# Patient Record
Sex: MALE | Race: BLACK OR AFRICAN AMERICAN | NOT HISPANIC OR LATINO | Employment: UNEMPLOYED | URBAN - METROPOLITAN AREA
[De-identification: names, ages, dates, MRNs, and addresses within clinical notes are randomized per-mention and may not be internally consistent; named-entity substitution may affect disease eponyms.]

---

## 2024-07-24 ENCOUNTER — LAB (OUTPATIENT)
Dept: LAB | Facility: LAB | Age: 74
End: 2024-07-24
Payer: MEDICARE

## 2024-07-24 ENCOUNTER — OFFICE VISIT (OUTPATIENT)
Dept: GASTROENTEROLOGY | Facility: HOSPITAL | Age: 74
End: 2024-07-24
Payer: MEDICARE

## 2024-07-24 VITALS
WEIGHT: 212.5 LBS | SYSTOLIC BLOOD PRESSURE: 118 MMHG | BODY MASS INDEX: 31.38 KG/M2 | HEART RATE: 102 BPM | DIASTOLIC BLOOD PRESSURE: 85 MMHG | OXYGEN SATURATION: 95 % | TEMPERATURE: 97.4 F

## 2024-07-24 DIAGNOSIS — R76.8 HCV ANTIBODY POSITIVE: ICD-10-CM

## 2024-07-24 DIAGNOSIS — B18.2 CHRONIC HEPATITIS C WITHOUT HEPATIC COMA (MULTI): ICD-10-CM

## 2024-07-24 DIAGNOSIS — B18.2 CHRONIC HEPATITIS C WITHOUT HEPATIC COMA (MULTI): Primary | ICD-10-CM

## 2024-07-24 LAB
AFP SERPL-MCNC: 5 NG/ML (ref 0–9)
ALBUMIN SERPL BCP-MCNC: 4.2 G/DL (ref 3.4–5)
ALP SERPL-CCNC: 81 U/L (ref 33–136)
ALT SERPL W P-5'-P-CCNC: 42 U/L (ref 10–52)
ANION GAP SERPL CALC-SCNC: 16 MMOL/L (ref 10–20)
APPEARANCE UR: CLEAR
AST SERPL W P-5'-P-CCNC: 51 U/L (ref 9–39)
BILIRUB DIRECT SERPL-MCNC: 0.1 MG/DL (ref 0–0.3)
BILIRUB SERPL-MCNC: 0.5 MG/DL (ref 0–1.2)
BILIRUB UR STRIP.AUTO-MCNC: NEGATIVE MG/DL
BUN SERPL-MCNC: 23 MG/DL (ref 6–23)
CALCIUM SERPL-MCNC: 10.7 MG/DL (ref 8.6–10.6)
CHLORIDE SERPL-SCNC: 102 MMOL/L (ref 98–107)
CO2 SERPL-SCNC: 22 MMOL/L (ref 21–32)
COLOR UR: NORMAL
CREAT SERPL-MCNC: 1.42 MG/DL (ref 0.5–1.3)
EGFRCR SERPLBLD CKD-EPI 2021: 52 ML/MIN/1.73M*2
ERYTHROCYTE [DISTWIDTH] IN BLOOD BY AUTOMATED COUNT: 14.7 % (ref 11.5–14.5)
GLUCOSE SERPL-MCNC: 94 MG/DL (ref 74–99)
GLUCOSE UR STRIP.AUTO-MCNC: NORMAL MG/DL
HAV AB SER QL IA: NONREACTIVE
HBV CORE AB SER QL: REACTIVE
HBV SURFACE AB SER-ACNC: <3.1 MIU/ML
HBV SURFACE AG SERPL QL IA: NONREACTIVE
HCT VFR BLD AUTO: 41 % (ref 41–52)
HCV AB SER QL: REACTIVE
HGB BLD-MCNC: 13.1 G/DL (ref 13.5–17.5)
HIV 1+2 AB+HIV1 P24 AG SERPL QL IA: NONREACTIVE
INR PPP: 1.1 (ref 0.9–1.1)
KETONES UR STRIP.AUTO-MCNC: NEGATIVE MG/DL
LEUKOCYTE ESTERASE UR QL STRIP.AUTO: NEGATIVE
MCH RBC QN AUTO: 28.3 PG (ref 26–34)
MCHC RBC AUTO-ENTMCNC: 32 G/DL (ref 32–36)
MCV RBC AUTO: 89 FL (ref 80–100)
NITRITE UR QL STRIP.AUTO: NEGATIVE
NRBC BLD-RTO: 0 /100 WBCS (ref 0–0)
PH UR STRIP.AUTO: 6 [PH]
PLATELET # BLD AUTO: 290 X10*3/UL (ref 150–450)
POTASSIUM SERPL-SCNC: 4.4 MMOL/L (ref 3.5–5.3)
PROT SERPL-MCNC: 9.3 G/DL (ref 6.4–8.2)
PROT UR STRIP.AUTO-MCNC: NEGATIVE MG/DL
PROTHROMBIN TIME: 11.9 SECONDS (ref 9.8–12.8)
RBC # BLD AUTO: 4.63 X10*6/UL (ref 4.5–5.9)
RBC # UR STRIP.AUTO: NEGATIVE /UL
SODIUM SERPL-SCNC: 136 MMOL/L (ref 136–145)
SP GR UR STRIP.AUTO: 1.01
UROBILINOGEN UR STRIP.AUTO-MCNC: NORMAL MG/DL
WBC # BLD AUTO: 9 X10*3/UL (ref 4.4–11.3)

## 2024-07-24 PROCEDURE — 87902 NFCT AGT GNTYP ALYS HEP C: CPT

## 2024-07-24 PROCEDURE — 1160F RVW MEDS BY RX/DR IN RCRD: CPT | Performed by: INTERNAL MEDICINE

## 2024-07-24 PROCEDURE — 85027 COMPLETE CBC AUTOMATED: CPT

## 2024-07-24 PROCEDURE — 87340 HEPATITIS B SURFACE AG IA: CPT

## 2024-07-24 PROCEDURE — 85610 PROTHROMBIN TIME: CPT

## 2024-07-24 PROCEDURE — 80053 COMPREHEN METABOLIC PANEL: CPT

## 2024-07-24 PROCEDURE — 81003 URINALYSIS AUTO W/O SCOPE: CPT

## 2024-07-24 PROCEDURE — 1159F MED LIST DOCD IN RCRD: CPT | Performed by: INTERNAL MEDICINE

## 2024-07-24 PROCEDURE — 99204 OFFICE O/P NEW MOD 45 MIN: CPT | Performed by: INTERNAL MEDICINE

## 2024-07-24 PROCEDURE — 87521 HEPATITIS C PROBE&RVRS TRNSC: CPT

## 2024-07-24 PROCEDURE — 99214 OFFICE O/P EST MOD 30 MIN: CPT | Performed by: INTERNAL MEDICINE

## 2024-07-24 PROCEDURE — 86706 HEP B SURFACE ANTIBODY: CPT

## 2024-07-24 PROCEDURE — 82105 ALPHA-FETOPROTEIN SERUM: CPT

## 2024-07-24 PROCEDURE — 86708 HEPATITIS A ANTIBODY: CPT

## 2024-07-24 PROCEDURE — 36415 COLL VENOUS BLD VENIPUNCTURE: CPT

## 2024-07-24 PROCEDURE — 87389 HIV-1 AG W/HIV-1&-2 AB AG IA: CPT

## 2024-07-24 PROCEDURE — 86704 HEP B CORE ANTIBODY TOTAL: CPT

## 2024-07-24 PROCEDURE — 82248 BILIRUBIN DIRECT: CPT

## 2024-07-24 PROCEDURE — 86803 HEPATITIS C AB TEST: CPT

## 2024-07-24 PROCEDURE — 1125F AMNT PAIN NOTED PAIN PRSNT: CPT | Performed by: INTERNAL MEDICINE

## 2024-07-24 RX ORDER — TAMSULOSIN HYDROCHLORIDE 0.4 MG/1
0.4 CAPSULE ORAL AS NEEDED
COMMUNITY

## 2024-07-24 RX ORDER — LEVOTHYROXINE SODIUM 25 UG/1
25 TABLET ORAL DAILY
COMMUNITY

## 2024-07-24 RX ORDER — AMLODIPINE BESYLATE 5 MG/1
1 TABLET ORAL
COMMUNITY
Start: 2023-10-26

## 2024-07-24 RX ORDER — GABAPENTIN 300 MG/1
CAPSULE ORAL AS NEEDED
COMMUNITY
Start: 2024-05-23

## 2024-07-24 RX ORDER — ACETAMINOPHEN 500 MG
TABLET ORAL DAILY
COMMUNITY

## 2024-07-24 RX ORDER — CHOLECALCIFEROL (VITAMIN D3) 25 MCG
TABLET ORAL
COMMUNITY

## 2024-07-24 RX ORDER — ASPIRIN 81 MG/1
TABLET ORAL DAILY
COMMUNITY
Start: 2021-10-12

## 2024-07-24 RX ORDER — IBUPROFEN 800 MG/1
800 TABLET ORAL 3 TIMES DAILY PRN
COMMUNITY
Start: 2023-01-30

## 2024-07-24 SDOH — ECONOMIC STABILITY: FOOD INSECURITY: WITHIN THE PAST 12 MONTHS, THE FOOD YOU BOUGHT JUST DIDN'T LAST AND YOU DIDN'T HAVE MONEY TO GET MORE.: NEVER TRUE

## 2024-07-24 SDOH — ECONOMIC STABILITY: FOOD INSECURITY: WITHIN THE PAST 12 MONTHS, YOU WORRIED THAT YOUR FOOD WOULD RUN OUT BEFORE YOU GOT MONEY TO BUY MORE.: NEVER TRUE

## 2024-07-24 ASSESSMENT — PATIENT HEALTH QUESTIONNAIRE - PHQ9
1. LITTLE INTEREST OR PLEASURE IN DOING THINGS: NOT AT ALL
SUM OF ALL RESPONSES TO PHQ9 QUESTIONS 1 & 2: 0
2. FEELING DOWN, DEPRESSED OR HOPELESS: NOT AT ALL

## 2024-07-24 ASSESSMENT — PAIN SCALES - GENERAL: PAINLEVEL: 5

## 2024-07-24 ASSESSMENT — LIFESTYLE VARIABLES
SKIP TO QUESTIONS 9-10: 1
HOW MANY STANDARD DRINKS CONTAINING ALCOHOL DO YOU HAVE ON A TYPICAL DAY: PATIENT DOES NOT DRINK
HOW OFTEN DO YOU HAVE A DRINK CONTAINING ALCOHOL: NEVER
HOW OFTEN DO YOU HAVE SIX OR MORE DRINKS ON ONE OCCASION: NEVER
AUDIT-C TOTAL SCORE: 0

## 2024-07-24 NOTE — PATIENT INSTRUCTIONS
"Welcome to Dr. [facility].  Dr. Morris sees patients at the following sites:  [place] [address] Liver/GI Clinic at [facility]  [address], [address] at [facility], [facility] [address]    Dr. Morris's hepatology care coordinator, Kelli ALVAREZ, can be reached at [phone].  Dr. Morris's , Mary Wellington, can be reached at [phone].    Get blood work and urine studies completed.    Get a Liver Ultrasound.  Do not eat or drink anything 6 hours prior to your exam.  Call [phone] to schedule.    Get a Fibroscan of your Liver.  Do not eat or drink anything 3 hours prior to your exam.   Schedule on the way out from your visit today, or call [phone].  When calling and after prompts, state \"make an appointment,\" and then \"gastro\" to get to the appropriate .     My office will follow up with a phone call after all testing is completed. If appropriate, Hepatitis C treatment will be prescribed.  "

## 2024-07-24 NOTE — PROGRESS NOTES
Subjective     Evaluation and management of hepatitis C infection.    History of Present Illness:   Swapnil Allen is a 74 y.o. male who presents to the [facility] Liver clinic for for initial evaluation chronic hepatitis C infection.  He is accompanied in clinic by his brother. He is referred by his primary care physician, Dr. Swapnil Max from the [facility].    He recently learned having chronic hepatitis C infection.  His primary care physician performed hepatitis screening with HCV antibody which came back reactive.  He was never aware of having hepatitis in the past.  He denies any history of jaundice or symptoms from liver disease.    Upon further questioning and discussion he has a very remote history of experimenting with intravenous drugs.  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  He denies any illicit drug use at this time.  Otherwise does report a history of heavy alcohol use.  Describes himself as a weekend drinker.  He had his pattern of drinking up until about 10 or so years ago.  At this time he no longer drinks alcohol.    He is eager to learn more about his hepatitis C infection and for treatment.    Review of Systems  Review of Systems  Refer to the new patient questionnaire for comprehensive self-reported review of systems.  Past Medical History  There is a history of high blood pressure, thyroid disease and urinary frequency and BPH.  As a child he was diagnosed with hyperthyroidism and underwent thyroidectomy at age 14.    He had a colonoscopy in 2018 at the [facility].  There were no polyps recommendation was to repeat it in 5 years.    Social History   reports that he has quit smoking. His smoking use included cigarettes. He has never been exposed to tobacco smoke. He has never used smokeless tobacco. He reports that he does not currently use alcohol. He reports that he does not use drugs.   Social history, as reported in the history of present  "illness section of this note.  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  Family History  family history is not on file.   He has a sister with chronic hepatitis C infection.  No known history of liver cancer.  Allergies  No Known Allergies    Medications  Current Outpatient Medications   Medication Instructions    amLODIPine (Norvasc) 5 mg tablet 1 tablet, oral, Daily (0630)    aspirin 81 mg EC tablet oral, Daily    cholecalciferol (Vitamin D-3) 25 MCG (1000 UT) tablet oral    gabapentin (Neurontin) 300 mg capsule As needed    ibuprofen 800 mg, oral, 3 times daily PRN    levothyroxine (SYNTHROID, LEVOXYL) 25 mcg, oral, Daily    melatonin 5 mg tablet oral, Daily    tamsulosin (FLOMAX) 0.4 mg, oral, As needed        Objective   Visit Vitals  /85   Pulse 102   Temp 36.3 °C (97.4 °F)          6/29/2021    10:45 AM 12/2/2021     2:27 PM 7/24/2024     2:35 PM   Vitals   Systolic   118   Diastolic   85   Heart Rate   102   Temp  36.3 °C (97.3 °F) 36.3 °C (97.4 °F)   Height (in) 1.753 m (5' 9\") 1.753 m (5' 9\")    Weight (lb) 203.4 206 212.5   BMI 30.04 kg/m2 30.42 kg/m2 31.38 kg/m2   BSA (m2) 2.12 m2 2.13 m2 2.17 m2   Visit Report   Report     Physical Exam  Vitals and nursing note reviewed.   Constitutional:       Appearance: Normal appearance. He is obese.   HENT:      Head: Normocephalic and atraumatic.      Mouth/Throat:      Mouth: Mucous membranes are moist.      Pharynx: Oropharynx is clear.   Eyes:      General: No scleral icterus.     Extraocular Movements: Extraocular movements intact.      Pupils: Pupils are equal, round, and reactive to light.   Cardiovascular:      Rate and Rhythm: Normal rate and regular rhythm.      Heart sounds: No murmur heard.  Pulmonary:      Effort: Pulmonary effort is normal.      Breath sounds: Normal breath sounds.   Abdominal:      General: Abdomen is flat. Bowel sounds are normal.      Palpations: Abdomen is soft.   Musculoskeletal:         General: No swelling. Normal range of motion. "      Right lower leg: No edema.      Left lower leg: No edema.   Skin:     Coloration: Skin is not jaundiced.   Neurological:      General: No focal deficit present.      Mental Status: He is alert and oriented to person, place, and time. Mental status is at baseline.   Psychiatric:         Mood and Affect: Mood normal.         Thought Content: Thought content normal.         Judgment: Judgment normal.     Labs    WBC   Date/Time Value Ref Range Status   12/03/2022 09:54 AM 8.1 4.4 - 11.3 x10E9/L Final     Hemoglobin   Date/Time Value Ref Range Status   12/03/2022 09:54 AM 15.1 13.5 - 17.5 g/dL Final     Hematocrit   Date/Time Value Ref Range Status   12/03/2022 09:54 AM 42.9 41.0 - 52.0 % Final     MCV   Date/Time Value Ref Range Status   12/03/2022 09:54 AM 89 80 - 100 fL Final     Platelets   Date/Time Value Ref Range Status   12/03/2022 09:54  150 - 450 x10E9/L Final        Albumin   Date/Time Value Ref Range Status   12/03/2022 09:54 AM 3.9 3.4 - 5.0 g/dL Final        Albumin   Date/Time Value Ref Range Status   12/03/2022 09:54 AM 3.9 3.4 - 5.0 g/dL Final      Assessment/Plan   Swapnil Allen is a 74 y.o. male who presents to Liver Clinic for an initial evaluation of chronic HCV infection.    Longstanding chronic HCV infection.  Treatment naive.  Unknown genotype.  Fibrosis: unknown.    Plan:    CBC  BMP  Hepatic Function Panel  PT/INR  Hepatitis A total Ab  Hepatitis B Surf Ag  Hepatitis B Surf Ab   Hepatitis B Core Ab Total  Hepatitis C Ab   HCV PCR with genotype reflex   HIV 1 / 2 Screen  AFP  ------------------------------------  Urinalysis    Initial Radiology Tests    Liver Ultrasound  Fibroscan    I provided counseling on hepatitis C, including discussion about the risks of developing cirrhosis and liver cancer. I also discussed the initial approach to evaluation and treatment. I also discussed issues related to HCV transmission.    Ramon Morris MD    Instructions      Ramon Morris MD

## 2024-07-24 NOTE — LETTER
July 25, 2024     Swapnil Max MD  [id number] [address]  [id number] [address]    Patient: Swapnil Allen   YOB: 1950   Date of Visit: 7/24/2024       Dear Dr. Swapnil Max MD:    Thank you for referring Swapnil Allen to me for evaluation. Below are my notes for this consultation.  If you have questions, please do not hesitate to call me. I look forward to following your patient along with you.       Sincerely,     Ramon Morris MD      CC: No Recipients  ______________________________________________________________________________________    Subjective     Evaluation and management of hepatitis C infection.    History of Present Illness:   Swapnil Allen is a 74 y.o. male who presents to the [facility] Liver clinic for for initial evaluation chronic hepatitis C infection.  He is accompanied in clinic by his brother. He is referred by his primary care physician, Dr. Swapnil Max from the [facility].    He recently learned having chronic hepatitis C infection.  His primary care physician performed hepatitis screening with HCV antibody which came back reactive.  He was never aware of having hepatitis in the past.  He denies any history of jaundice or symptoms from liver disease.    Upon further questioning and discussion he has a very remote history of experimenting with intravenous drugs.  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  He denies any illicit drug use at this time.  Otherwise does report a history of heavy alcohol use.  Describes himself as a weekend drinker.  He had his pattern of drinking up until about 10 or so years ago.  At this time he no longer drinks alcohol.    He is eager to learn more about his hepatitis C infection and for treatment.    Review of Systems  Review of Systems  Refer to the new patient questionnaire for comprehensive self-reported review of systems.  Past Medical History  There is a history of high blood pressure,  "thyroid disease and urinary frequency and BPH.  As a child he was diagnosed with hyperthyroidism and underwent thyroidectomy at age 14.    He had a colonoscopy in 2018 at the [facility].  There were no polyps recommendation was to repeat it in 5 years.    Social History   reports that he has quit smoking. His smoking use included cigarettes. He has never been exposed to tobacco smoke. He has never used smokeless tobacco. He reports that he does not currently use alcohol. He reports that he does not use drugs.   Social history, as reported in the history of present illness section of this note.  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  Family History  family history is not on file.   He has a sister with chronic hepatitis C infection.  No known history of liver cancer.  Allergies  No Known Allergies    Medications  Current Outpatient Medications   Medication Instructions   • amLODIPine (Norvasc) 5 mg tablet 1 tablet, oral, Daily (0630)   • aspirin 81 mg EC tablet oral, Daily   • cholecalciferol (Vitamin D-3) 25 MCG (1000 UT) tablet oral   • gabapentin (Neurontin) 300 mg capsule As needed   • ibuprofen 800 mg, oral, 3 times daily PRN   • levothyroxine (SYNTHROID, LEVOXYL) 25 mcg, oral, Daily   • melatonin 5 mg tablet oral, Daily   • tamsulosin (FLOMAX) 0.4 mg, oral, As needed        Objective   Visit Vitals  /85   Pulse 102   Temp 36.3 °C (97.4 °F)          6/29/2021    10:45 AM 12/2/2021     2:27 PM 7/24/2024     2:35 PM   Vitals   Systolic   118   Diastolic   85   Heart Rate   102   Temp  36.3 °C (97.3 °F) 36.3 °C (97.4 °F)   Height (in) 1.753 m (5' 9\") 1.753 m (5' 9\")    Weight (lb) 203.4 206 212.5   BMI 30.04 kg/m2 30.42 kg/m2 31.38 kg/m2   BSA (m2) 2.12 m2 2.13 m2 2.17 m2   Visit Report   Report     Physical Exam  Vitals and nursing note reviewed.   Constitutional:       Appearance: Normal appearance. He is obese.   HENT:      Head: Normocephalic and atraumatic.      Mouth/Throat:      Mouth: Mucous membranes " are moist.      Pharynx: Oropharynx is clear.   Eyes:      General: No scleral icterus.     Extraocular Movements: Extraocular movements intact.      Pupils: Pupils are equal, round, and reactive to light.   Cardiovascular:      Rate and Rhythm: Normal rate and regular rhythm.      Heart sounds: No murmur heard.  Pulmonary:      Effort: Pulmonary effort is normal.      Breath sounds: Normal breath sounds.   Abdominal:      General: Abdomen is flat. Bowel sounds are normal.      Palpations: Abdomen is soft.   Musculoskeletal:         General: No swelling. Normal range of motion.      Right lower leg: No edema.      Left lower leg: No edema.   Skin:     Coloration: Skin is not jaundiced.   Neurological:      General: No focal deficit present.      Mental Status: He is alert and oriented to person, place, and time. Mental status is at baseline.   Psychiatric:         Mood and Affect: Mood normal.         Thought Content: Thought content normal.         Judgment: Judgment normal.     Labs    WBC   Date/Time Value Ref Range Status   12/03/2022 09:54 AM 8.1 4.4 - 11.3 x10E9/L Final     Hemoglobin   Date/Time Value Ref Range Status   12/03/2022 09:54 AM 15.1 13.5 - 17.5 g/dL Final     Hematocrit   Date/Time Value Ref Range Status   12/03/2022 09:54 AM 42.9 41.0 - 52.0 % Final     MCV   Date/Time Value Ref Range Status   12/03/2022 09:54 AM 89 80 - 100 fL Final     Platelets   Date/Time Value Ref Range Status   12/03/2022 09:54  150 - 450 x10E9/L Final        Albumin   Date/Time Value Ref Range Status   12/03/2022 09:54 AM 3.9 3.4 - 5.0 g/dL Final        Albumin   Date/Time Value Ref Range Status   12/03/2022 09:54 AM 3.9 3.4 - 5.0 g/dL Final      Assessment/Plan   Swapnil Allen is a 74 y.o. male who presents to Liver Clinic for an initial evaluation of chronic HCV infection.    Longstanding chronic HCV infection.  Treatment naive.  Unknown genotype.  Fibrosis: unknown.    Plan:    CBC  BMP  Hepatic Function  Panel  PT/INR  Hepatitis A total Ab  Hepatitis B Surf Ag  Hepatitis B Surf Ab   Hepatitis B Core Ab Total  Hepatitis C Ab   HCV PCR with genotype reflex   HIV 1 / 2 Screen  AFP  ------------------------------------  Urinalysis    Initial Radiology Tests    Liver Ultrasound  Fibroscan    I provided counseling on hepatitis C, including discussion about the risks of developing cirrhosis and liver cancer. I also discussed the initial approach to evaluation and treatment. I also discussed issues related to HCV transmission.    Ramon Morris MD    Instructions      Ramon Morris MD

## 2024-07-25 LAB
HCV RNA SERPL NAA+PROBE-ACNC: ABNORMAL IU/ML
HCV RNA SERPL NAA+PROBE-ACNC: DETECTED K[IU]/ML
HCV RNA SERPL NAA+PROBE-LOG IU: 6.92 LOG IU/ML
LABORATORY COMMENT REPORT: ABNORMAL

## 2024-07-31 LAB
ELECTRONICALLY SIGNED BY: NORMAL
HCV GENTYP SERPL SEQ: NORMAL
HEPATITIS C INTERPRETATION: NORMAL

## 2024-08-07 ENCOUNTER — CLINICAL SUPPORT (OUTPATIENT)
Dept: GASTROENTEROLOGY | Facility: HOSPITAL | Age: 74
End: 2024-08-07
Payer: MEDICARE

## 2024-08-07 DIAGNOSIS — R76.8 HCV ANTIBODY POSITIVE: ICD-10-CM

## 2024-08-07 DIAGNOSIS — B18.2 CHRONIC HEPATITIS C WITHOUT HEPATIC COMA (MULTI): Primary | ICD-10-CM

## 2024-08-07 PROCEDURE — 91200 LIVER ELASTOGRAPHY: CPT | Performed by: INTERNAL MEDICINE

## 2024-08-07 RX ORDER — VELPATASVIR AND SOFOSBUVIR 100; 400 MG/1; MG/1
1 TABLET, FILM COATED ORAL DAILY
Qty: 28 TABLET | Refills: 2 | Status: SHIPPED | OUTPATIENT
Start: 2024-08-07 | End: 2024-10-30

## 2024-08-07 NOTE — PROGRESS NOTES
Results:  E (median liver stiffness measurement): 9.0 kPa  CAP (controlled attenuation parameter):  219 dB/m    Interpretation:  This was a technically adequate study. 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 The CAP score is consistent with 0 - 5 % hepatocyte steatosis (steatosis stage 0 of 3) .    Ramon Morris MD  Hepatology

## 2024-08-08 ENCOUNTER — SPECIALTY PHARMACY (OUTPATIENT)
Dept: PHARMACY | Facility: CLINIC | Age: 74
End: 2024-08-08

## 2024-08-09 ENCOUNTER — TELEPHONE (OUTPATIENT)
Dept: GASTROENTEROLOGY | Facility: HOSPITAL | Age: 74
End: 2024-08-09
Payer: MEDICARE

## 2024-08-09 ENCOUNTER — SPECIALTY PHARMACY (OUTPATIENT)
Dept: PHARMACY | Facility: CLINIC | Age: 74
End: 2024-08-09

## 2024-08-09 PROCEDURE — RXMED WILLOW AMBULATORY MEDICATION CHARGE

## 2024-08-09 NOTE — PROGRESS NOTES
This patient has scheduled their medication delivery with  [facility].  They should receive their medication 8/14/2024.

## 2024-08-09 NOTE — TELEPHONE ENCOUNTER
Hepatology Nurse Coordinator Note  Called patient to review their most recent results and recommendations from Dr. Morris. Provided education on fibrosis scoring of fibroscan. Patient is aware his fibroscan demonstrates stage 2 fibrosis. He's aware Dr. Morris sent a prescription for his Epclusa to  [facility]. He's aware it will be 1 pill daily x 12 weeks. Patient is aware if he doesn't hear from the pharmacy within a week, to call the office for an update.     Patient verbalized understanding of results and recommendations.  Patient was provided RN coordinators contact information should they have any additional questions, or concerns.    ----- Message from Ramon Morris sent at 8/7/2024  5:26 PM EDT -----  Regarding: HCV treatment  Please review with patient:    GT 1  Fibrosis - stage 2 on Fibroscan.    I prescribed Epclusa 1 pill daily x 12 weeks.  Sent to  [facility].  ----- Message -----  From: Lissette Balderas RN  Sent: 8/7/2024   4:31 PM EDT  To: Ramon Morris MD

## 2024-08-12 ENCOUNTER — PHARMACY VISIT (OUTPATIENT)
Dept: PHARMACY | Facility: CLINIC | Age: 74
End: 2024-08-12
Payer: MEDICARE

## 2024-08-12 ENCOUNTER — HOSPITAL ENCOUNTER (OUTPATIENT)
Dept: RADIOLOGY | Facility: HOSPITAL | Age: 74
Discharge: HOME | End: 2024-08-12
Payer: MEDICARE

## 2024-08-12 ENCOUNTER — APPOINTMENT (OUTPATIENT)
Dept: GASTROENTEROLOGY | Facility: HOSPITAL | Age: 74
End: 2024-08-12
Payer: MEDICARE

## 2024-08-12 DIAGNOSIS — R76.8 HCV ANTIBODY POSITIVE: ICD-10-CM

## 2024-08-12 DIAGNOSIS — B18.2 CHRONIC HEPATITIS C WITHOUT HEPATIC COMA (MULTI): ICD-10-CM

## 2024-08-12 PROCEDURE — 76700 US EXAM ABDOM COMPLETE: CPT | Performed by: RADIOLOGY

## 2024-08-12 PROCEDURE — 76700 US EXAM ABDOM COMPLETE: CPT

## 2024-08-14 ENCOUNTER — SPECIALTY PHARMACY (OUTPATIENT)
Dept: INTERNAL MEDICINE | Facility: HOSPITAL | Age: 74
End: 2024-08-14
Payer: MEDICARE

## 2024-08-14 DIAGNOSIS — N28.89 RIGHT KIDNEY MASS: Primary | ICD-10-CM

## 2024-08-14 DIAGNOSIS — B18.2 CHRONIC HEPATITIS C WITHOUT HEPATIC COMA (MULTI): Primary | ICD-10-CM

## 2024-08-14 NOTE — PROGRESS NOTES
[facility] Clinical Note    Swapnil Allen is a 74 y.o. male, who is on the specialty pharmacy service for management of:  Hepatitis C Core.    Swapnil Allen is taking: Epclusa.    Medication Receipt Date: 8/14/24  Medication Start Date (planned or actual): 8/14/24    Swapnil was contacted on 8/14/2024 at 1:47 PM for a virtual pharmacy visit with encounter number [id number] from:   [facility]PAT[facility] V[facility]  [address]  VIRTUAL DEPARTMENT  [facility] [address]  Loc: [phone]    Swapnil was offered a Telemedicine Video visit or Telephone visit.  Swapnil consented to a telephone visit, which was performed.    The most recent encounter visit with the referring prescriber Dr. Morris on 7/24/24 was reviewed.  Pharmacy will continue to collaborate in the care of this patient with the referring prescriber Dr. Morris.    General Assessment      Impression/Plan  IMPRESSION/PLAN:  Is patient high risk (potential patients:  pregnancy, geriatric, pediatric)?  yes  Is laboratory follow-up needed? no  Is a clinical intervention needed? no  Next reassessment date? Approx 9/17/24  Additional comments: patient wanted to know if the med will help him sleep; advised that there are a lot of factors that can affect his sleep and this is not something I could confirm    Refer to the encounter summary report for documentation details about patient counseling and education.      Medication Adherence    The importance of adherence was discussed with the patient and they were advised to take the medication as prescribed by their provider. Patient was encouraged to call their physician's office if they have a question regarding a missed dose.     QOL/Patient Satisfaction  Rate your quality of life on scale of 1-10: 5  Rate your satisfaction with  Specialty [facility]acy on scale of 1-10: 10 - Completely satisfied      Patient was advised to contact the pharmacy if there are any changes to  their medication list, including prescriptions, OTC medications, herbal products, or supplements. Patient was advised of [facility]'s dispensing process, refill timeline, contact information ([phone]), and patient management follow up. Patient confirmed understanding of education conducted during assessment. All patient questions and concerns were addressed to the best of my ability. Patient was encouraged to contact the specialty pharmacy with any questions or concerns.    Confirmed follow-up outreaches are properly scheduled and reviewed goals of therapy with the patient.        Marci Fishman, BarryD    Lab Results   Component Value Date    HCVPCRQUANT 8,230,000 (H) 07/24/2024    HCVGENO Genotype 1b 07/24/2024    AST 51 (H) 07/24/2024    ALT 42 07/24/2024    ALKPHOS 81 07/24/2024    HEPBCAB Reactive (A) 07/24/2024      Medication start date: 8/14/24  Therapy completion: 11/6/24  Anticipated SVR date: 1/29/25

## 2024-08-17 ENCOUNTER — TELEPHONE (OUTPATIENT)
Dept: UROLOGY | Facility: CLINIC | Age: 74
End: 2024-08-17

## 2024-08-19 ENCOUNTER — OFFICE VISIT (OUTPATIENT)
Dept: UROLOGY | Facility: HOSPITAL | Age: 74
End: 2024-08-19
Payer: MEDICARE

## 2024-08-19 DIAGNOSIS — N28.89 RIGHT KIDNEY MASS: Primary | ICD-10-CM

## 2024-08-19 DIAGNOSIS — N28.89 RIGHT RENAL MASS: ICD-10-CM

## 2024-08-19 DIAGNOSIS — N28.1 RENAL CYST, LEFT: ICD-10-CM

## 2024-08-19 PROBLEM — E03.9 HYPOTHYROIDISM (ACQUIRED): Status: ACTIVE | Noted: 2024-08-19

## 2024-08-19 PROBLEM — E78.5 HYPERLIPIDEMIA: Status: ACTIVE | Noted: 2024-08-19

## 2024-08-19 PROBLEM — I10 ESSENTIAL (PRIMARY) HYPERTENSION: Status: ACTIVE | Noted: 2021-10-12

## 2024-08-19 PROBLEM — H90.3 SENSORINEURAL HEARING LOSS, BILATERAL: Status: ACTIVE | Noted: 2024-08-19

## 2024-08-19 PROBLEM — F17.200 SMOKING: Status: ACTIVE | Noted: 2024-08-19

## 2024-08-19 PROBLEM — I63.9 CEREBROVASCULAR ACCIDENT (CVA) (MULTI): Status: ACTIVE | Noted: 2020-08-26

## 2024-08-19 PROCEDURE — 1159F MED LIST DOCD IN RCRD: CPT | Performed by: NURSE PRACTITIONER

## 2024-08-19 PROCEDURE — 99214 OFFICE O/P EST MOD 30 MIN: CPT | Performed by: NURSE PRACTITIONER

## 2024-08-19 PROCEDURE — 99204 OFFICE O/P NEW MOD 45 MIN: CPT | Performed by: NURSE PRACTITIONER

## 2024-08-19 PROCEDURE — G2211 COMPLEX E/M VISIT ADD ON: HCPCS | Performed by: NURSE PRACTITIONER

## 2024-08-19 PROCEDURE — 1160F RVW MEDS BY RX/DR IN RCRD: CPT | Performed by: NURSE PRACTITIONER

## 2024-08-19 PROCEDURE — 51798 US URINE CAPACITY MEASURE: CPT | Performed by: NURSE PRACTITIONER

## 2024-08-19 PROCEDURE — 1036F TOBACCO NON-USER: CPT | Performed by: NURSE PRACTITIONER

## 2024-08-19 RX ORDER — CHLORTHALIDONE 25 MG/1
25 TABLET ORAL DAILY
COMMUNITY
Start: 2024-08-19

## 2024-08-19 NOTE — PROGRESS NOTES
[facility]  Outpatient Clinic Note    Patient Name:  Swapnil Allen  MRN:  [id number]  :  1950    Referring Provider: Ramon Morris MD  Date of Service: 2024   Visit type: New patient visit     problem list/Chief complaint:  BPH - Taking Tamsulosin 0.4 mg nightly  Right Kidney mass 8.8 x 10.7 x 10 cm  Renal cysts      HISTORY OF PRESENT ILLNESS:  Swapnil Allen is a 74 y.o. male with past medical history of hepatitis C core on Epclusa, hyperthyroidism s/p thyroidectomy, Warthin's tumor, HTN, BPH, who presents for initial Urology visit. I performed a detailed review of the medical chart records lab testing and imaging. Patient referred by his Gastroenterologist for kidney mass.  Patient reports frequency about 5-6 times a day, nocturia x 3-4 due to waking up. He feels like the nocturia is due to sleep issues. Has good stream, no dysuria, no hematuria, no fever or chills. Erections are not a priority for him.     PVR: 7 ml  STEPHEN: 14  IPSS: 15  QOL: 4    I personally reviewed US abdomen dated 24.   IMPRESSION:  1. Large heterogeneous mass within the inferior pole of the right  kidney, concerning for solid renal neoplasm. MRI renal mass protocol  recommended.  2. Heterogeneous and echogenic appearance of the liver parenchyma,  which may represent steatosis. Correlate with LFTs.  3. The gallbladder wall is mildly thickened and heterogeneous, with  echogenic foci and comet tail artifacts. Findings are favored to  represent cholesterolosis of the gallbladder.      PAST MEDICAL HISTORY:  History reviewed. No pertinent past medical history.    PAST SURGICAL HISTORY:  History reviewed. No pertinent surgical history.    ALLERGIES:  No Known Allergies    MEDICATIONS:  Current Outpatient Medications   Medication Instructions    amLODIPine (Norvasc) 5 mg tablet 1 tablet, oral, Daily (0630)    aspirin 81 mg EC tablet oral, Daily    chlorthalidone (HYGROTON) 25 mg, oral, Daily    cholecalciferol (Vitamin  D-3) 25 MCG (1000 UT) tablet oral    gabapentin (Neurontin) 300 mg capsule As needed    ibuprofen 800 mg, oral, 3 times daily PRN    levothyroxine (SYNTHROID, LEVOXYL) 25 mcg, oral, Daily    melatonin 5 mg tablet oral, Daily    sofosbuvir-velpatasvir (Epclusa) 400-100 mg tablet 1 tablet, oral, Daily    tamsulosin (FLOMAX) 0.4 mg, oral, As needed        SOCIAL HISTORY:  Social History     Tobacco Use    Smoking status: Former     Types: Cigarettes     Passive exposure: Never    Smokeless tobacco: Never   Substance Use Topics    Alcohol use: Not Currently    Drug use: Never        FAMILY HISTORY:  No family history on file.     REVIEW OF SYSTEMS:  10-pt ROS reviewed and negative except as mentioned above.    Vital signs:  There were no vitals taken for this visit.    PHYSICAL EXAMINATION:  General: Appears comfortable and in no apparent distress, well nourished  Head: Normocephalic, atraumatic  Eyes: Non-injected conjunctiva, sclera clear, no proptosis  Lungs: Breathing is easy, non-labored. Speaking in clear and complete sentences. Normal diaphragmatic movement.  Cardiovascular: no peripheral edema, cyanosis or pallor.   Abdomen: soft, non-distended, non-tender  : Bladder: non tender, not distended  MSK: Ambulatory with cane  Skin: No visible rashes or lesions  Neurologic: Alert, oriented to person, place, and time  Psychiatric: mood and affect appropriate      IMAGING DATA:   US abdomen complete  Narrative: Interpreted By:  Amando Snyder,  and Nia Brady   STUDY:  US ABDOMEN COMPLETE;  8/12/2024 11:00 am      INDICATION:  Signs/Symptoms:screen for adv liver disease, screen for liver cancer.      COMPARISON:  None.      ACCESSION NUMBER(S):  [id number]      ORDERING CLINICIAN:  ELSIE PROCTOR      TECHNIQUE:  Multiple images of the abdomen were obtained.      FINDINGS:  LIVER:  The liver measures 14.2 cm . The liver is heterogeneous and mildly  hyperechoic. No focal lesions appreciated.       GALLBLADDER:  The gallbladder wall is mildly thickened and heterogeneous measuring  0.34 cm, with punctate echogenic foci producing comet tail artifacts.  Otherwise, the gallbladder is nondistended, and demonstrates no  evidence of gallstones or surrounding fluid.      BILE DUCTS:  No evidence of intra or extrahepatic biliary dilatation is  identified; the common bile duct measures 0.6 cm.      PANCREAS:  The pancreas is poorly visualized due to overlying bowel gas.      RIGHT KIDNEY:  The right kidney measures 9.0 cm in length. Large heterogeneous mass  within the inferior pole of the kidney measures 8.8 x 10.7 x 10.0 cm.  Multiple smaller renal cysts noted. No hydronephrosis or renal  calculi are seen.      LEFT KIDNEY:  The left kidney measures 11.1 cm in length. The renal cortical  echogenicity and thickness are within normal limits. There are  multiple anechoic cysts noted, measuring up to 4.6 cm. No  hydronephrosis or renal calculi are seen.      SPLEEN:  The spleen measures 7.3 cm and is grossly unremarkable.      URINARY BLADDER:  The urinary bladder is within normal limits.      PERITONEUM:  There is no free or loculated fluid seen in the abdomen.      ABDOMINAL AORTA AND IVC:  The visualized portions of the aorta and IVC are unremarkable.      Impression: 1. Large heterogeneous mass within the inferior pole of the right  kidney, concerning for solid renal neoplasm. MRI renal mass protocol  recommended.  2. Heterogeneous and echogenic appearance of the liver parenchyma,  which may represent steatosis. Correlate with LFTs.  3. The gallbladder wall is mildly thickened and heterogeneous,, with  echogenic foci and comet tail artifacts. Findings are favored to  represent cholesterolosis of the gallbladder.      I personally reviewed the images/study and I agree with the findings  as stated by Dr. Jose Antonio Kramer. This study was interpreted at  [facility], [place], [place].       MACRO:  Critical Finding:  See findings. Notification was initiated on  8/13/2024 at 3:35 pm by  Amando Allen.  (**-[id number]-**)  Instructions:  Urology consult.      Signed by: Amando Allen 8/13/2024 3:36 PM  Dictation workstation:   [id number]      LABORATORY DATA:    Lab Results   Component Value Date    WBC 9.0 07/24/2024    HGB 13.1 (L) 07/24/2024    HCT 41.0 07/24/2024    MCV 89 07/24/2024     07/24/2024     Lab Results   Component Value Date    GLUCOSE 94 07/24/2024    CALCIUM 10.7 (H) 07/24/2024     07/24/2024    K 4.4 07/24/2024    CO2 22 07/24/2024     07/24/2024    BUN 23 07/24/2024    CREATININE 1.42 (H) 07/24/2024         ASSESSMENT:  Swapnil Allen is a 74 y.o. male with history of BPH, recent kidney mass and kidney cysts noted on US abdomen on 8/12/24 who presents for initial Urology visit.    1.We discussed that simple renal cysts are benign findings and do not require intervention unless they are causing discomfort    PLAN:  -Patient unable to void during visit, has no concerning urinary symptoms  -MRI abdomen ordered to rule out cancer  -Discussed the risks and benefit of continuing Tamsulosin 0.4 mg nightly, medication is working well for patient with no side effects. Discussed other management options. The patient and I agreed to continue with medication.  -Referral to Dr. Green to review MRI results  -Follow up after scan or sooner if needed    All questions and concerns were addressed. Patient verbalizes understanding and has no other questions at this time.     E&M visit today is associated with current or anticipated ongoing medical care services related to a patient's single, serious condition or a complex condition.    KYLIE Arana-CNP  [facility]  8/19/2024 10:16 AM

## 2024-08-30 PROCEDURE — RXMED WILLOW AMBULATORY MEDICATION CHARGE

## 2024-09-03 ENCOUNTER — PHARMACY VISIT (OUTPATIENT)
Dept: PHARMACY | Facility: CLINIC | Age: 74
End: 2024-09-03
Payer: MEDICARE

## 2024-09-09 ENCOUNTER — LAB (OUTPATIENT)
Dept: LAB | Facility: LAB | Age: 74
End: 2024-09-09
Payer: MEDICARE

## 2024-09-09 ENCOUNTER — APPOINTMENT (OUTPATIENT)
Dept: RADIOLOGY | Facility: HOSPITAL | Age: 74
End: 2024-09-09
Payer: MEDICARE

## 2024-09-09 DIAGNOSIS — N28.89 RIGHT RENAL MASS: ICD-10-CM

## 2024-09-09 DIAGNOSIS — B18.2 CHRONIC HEPATITIS C WITHOUT HEPATIC COMA (MULTI): ICD-10-CM

## 2024-09-09 LAB
ALBUMIN SERPL BCP-MCNC: 4 G/DL (ref 3.4–5)
ALP SERPL-CCNC: 61 U/L (ref 33–136)
ALT SERPL W P-5'-P-CCNC: 21 U/L (ref 10–52)
ANION GAP SERPL CALC-SCNC: 11 MMOL/L (ref 10–20)
AST SERPL W P-5'-P-CCNC: 27 U/L (ref 9–39)
BILIRUB DIRECT SERPL-MCNC: 0.1 MG/DL (ref 0–0.3)
BILIRUB SERPL-MCNC: 0.4 MG/DL (ref 0–1.2)
BUN SERPL-MCNC: 27 MG/DL (ref 6–23)
CALCIUM SERPL-MCNC: 10.4 MG/DL (ref 8.6–10.6)
CHLORIDE SERPL-SCNC: 106 MMOL/L (ref 98–107)
CO2 SERPL-SCNC: 25 MMOL/L (ref 21–32)
CREAT SERPL-MCNC: 1.32 MG/DL (ref 0.5–1.3)
EGFRCR SERPLBLD CKD-EPI 2021: 57 ML/MIN/1.73M*2
GLUCOSE SERPL-MCNC: 99 MG/DL (ref 74–99)
POTASSIUM SERPL-SCNC: 4.8 MMOL/L (ref 3.5–5.3)
PROT SERPL-MCNC: 8.4 G/DL (ref 6.4–8.2)
SODIUM SERPL-SCNC: 137 MMOL/L (ref 136–145)

## 2024-09-09 PROCEDURE — 80053 COMPREHEN METABOLIC PANEL: CPT

## 2024-09-09 PROCEDURE — 87517 HEPATITIS B DNA QUANT: CPT

## 2024-09-09 PROCEDURE — 36415 COLL VENOUS BLD VENIPUNCTURE: CPT

## 2024-09-09 PROCEDURE — 87522 HEPATITIS C REVRS TRNSCRPJ: CPT

## 2024-09-09 PROCEDURE — 82248 BILIRUBIN DIRECT: CPT

## 2024-09-10 LAB
HCV RNA SERPL NAA+PROBE-ACNC: NOT DETECTED K[IU]/ML
HCV RNA SERPL NAA+PROBE-LOG IU: NORMAL {LOG_IU}/ML

## 2024-09-12 ENCOUNTER — HOSPITAL ENCOUNTER (OUTPATIENT)
Dept: RADIOLOGY | Facility: HOSPITAL | Age: 74
Discharge: HOME | End: 2024-09-12
Payer: MEDICARE

## 2024-09-12 DIAGNOSIS — N28.89 RIGHT RENAL MASS: ICD-10-CM

## 2024-09-12 LAB
HBV DNA SERPL NAA+PROBE-ACNC: NOT DETECTED [IU]/ML
HBV DNA SERPL NAA+PROBE-LOG IU: NORMAL {LOG_IU}/ML

## 2024-09-12 PROCEDURE — 74177 CT ABD & PELVIS W/CONTRAST: CPT | Performed by: RADIOLOGY

## 2024-09-12 PROCEDURE — 71260 CT THORAX DX C+: CPT | Performed by: RADIOLOGY

## 2024-09-12 PROCEDURE — 71260 CT THORAX DX C+: CPT

## 2024-09-12 PROCEDURE — 74160 CT ABDOMEN W/CONTRAST: CPT

## 2024-09-12 PROCEDURE — 2550000001 HC RX 255 CONTRASTS: Performed by: STUDENT IN AN ORGANIZED HEALTH CARE EDUCATION/TRAINING PROGRAM

## 2024-09-17 ENCOUNTER — SPECIALTY PHARMACY (OUTPATIENT)
Dept: INTERNAL MEDICINE | Facility: HOSPITAL | Age: 74
End: 2024-09-17
Payer: MEDICARE

## 2024-09-17 NOTE — H&P (VIEW-ONLY)
[facility] Specialty Pharm[facility] Clinical Note    Swapnil Allen is a 74 y.o. male, who is on the specialty pharmacy service for management of:  Hepatitis C Core.    Swapnil Allen is taking: Epclusa.    Medication Receipt Date: 8/14/24  Medication Start Date (planned or actual): 8/14/24    Swapnil was contacted on 9/17/2024 at 12:47 PM for a virtual pharmacy visit with encounter number [id number] from:   [facility] HEPATOLOGY VIRTUAL  [address]  VIRTUAL DEPARTMENT  [facility] [address]  Loc: [phone]    Swapnil was offered a Telemedicine Video visit or Telephone visit.  Swapnil consented to a telephone visit, which was performed.    The most recent encounter visit with the referring prescriber Ramon Morris on 7/24/24 was reviewed.  Pharmacy will continue to collaborate in the care of this patient with the referring prescriber Dr. Morris.    General Assessment      Impression/Plan  IMPRESSION/PLAN:  Is patient high risk (potential patients:  pregnancy, geriatric, pediatric)?  Yes, geriatric  Is laboratory follow-up needed? no  Is a clinical intervention needed? no  Next reassessment date? Approx 11/12/24  Additional comments:     Refer to the encounter summary report for documentation details about patient counseling and education.      Medication Adherence    The importance of adherence was discussed with the patient and they were advised to take the medication as prescribed by their provider. Patient was encouraged to call their physician's office if they have a question regarding a missed dose.     QOL/Patient Satisfaction  Rate your quality of life on scale of 1-10: 8  Rate your satisfaction with  Specialty Pharmacy on scale of 1-10: 9      Patient was advised to contact the pharmacy if there are any changes to their medication list, including prescriptions, OTC medications, herbal products, or supplements. Patient was advised of [facility]'s dispensing  process, refill timeline, contact information ([phone]), and patient management follow up. Patient confirmed understanding of education conducted during assessment. All patient questions and concerns were addressed to the best of my ability. Patient was encouraged to contact the specialty pharmacy with any questions or concerns.    Confirmed follow-up outreaches are properly scheduled and reviewed goals of therapy with the patient.        Lab Results   Component Value Date    HCVRNAPCRLOG  09/09/2024      Comment:      Not calculated    HCVRNAPCR Not Detected 09/09/2024        Lab Results   Component Value Date     09/09/2024    K 4.8 09/09/2024     09/09/2024    CO2 25 09/09/2024    BUN 27 (H) 09/09/2024    CREATININE 1.32 (H) 09/09/2024    EGFR 57 (L) 09/09/2024    CALCIUM 10.4 09/09/2024    ALBUMIN 4.0 09/09/2024    PROT 8.4 (H) 09/09/2024    BILITOT 0.4 09/09/2024    ALKPHOS 61 09/09/2024    ALT 21 09/09/2024    AST 27 09/09/2024    GLUCOSE 99 09/09/2024        Lab Results   Component Value Date    HEPBCAB Reactive (A) 07/24/2024   HBV DNA not detected       The patient was called today to follow up mid-HCV treatment. He stated that he has been taking his medication daily, though he does feel more tired than usual. He denies having started any new medications. Discussed the above lab results. He verbalized understanding.      Barry HoyosD

## 2024-09-17 NOTE — PROGRESS NOTES
[facility] Specialty Pharmacy Clinical Note    Swapnil Allen is a 74 y.o. male, who is on the specialty pharmacy service for management of:  Hepatitis C Core.    Swapnil Allen is taking: Epclusa.    Medication Receipt Date: 8/14/24  Medication Start Date (planned or actual): 8/14/24    Swapnil was contacted on 9/17/2024 at 12:47 PM for a virtual pharmacy visit with encounter number [id number] from:   [facility] [place] HEPATOLOGY VIRTUAL  [address]  VIRTUAL DEPARTMENT  [facility] [address]  Loc: [phone]    Swapnil was offered a Telemedicine Video visit or Telephone visit.  Swapnil consented to a telephone visit, which was performed.    The most recent encounter visit with the referring prescriber Ramon Morris on 7/24/24 was reviewed.  Pharmacy will continue to collaborate in the care of this patient with the referring prescriber Dr. Morris.    General Assessment      Impression/Plan  IMPRESSION/PLAN:  Is patient high risk (potential patients:  pregnancy, geriatric, pediatric)?  Yes, geriatric  Is laboratory follow-up needed? no  Is a clinical intervention needed? no  Next reassessment date? Approx 11/12/24  Additional comments:     Refer to the encounter summary report for documentation details about patient counseling and education.      Medication Adherence    The importance of adherence was discussed with the patient and they were advised to take the medication as prescribed by their provider. Patient was encouraged to call their physician's office if they have a question regarding a missed dose.     QOL/Patient Satisfaction  Rate your quality of life on scale of 1-10: 8  Rate your satisfaction with  Specialty Pharmacy on scale of 1-10: 9      Patient was advised to contact the pharmacy if there are any changes to their medication list, including prescriptions, OTC medications, herbal products, or supplements. Patient was advised of [facility]'s dispensing  process, refill timeline, contact information ([phone]), and patient management follow up. Patient confirmed understanding of education conducted during assessment. All patient questions and concerns were addressed to the best of my ability. Patient was encouraged to contact the specialty pharmacy with any questions or concerns.    Confirmed follow-up outreaches are properly scheduled and reviewed goals of therapy with the patient.        Lab Results   Component Value Date    HCVRNAPCRLOG  09/09/2024      Comment:      Not calculated    HCVRNAPCR Not Detected 09/09/2024        Lab Results   Component Value Date     09/09/2024    K 4.8 09/09/2024     09/09/2024    CO2 25 09/09/2024    BUN 27 (H) 09/09/2024    CREATININE 1.32 (H) 09/09/2024    EGFR 57 (L) 09/09/2024    CALCIUM 10.4 09/09/2024    ALBUMIN 4.0 09/09/2024    PROT 8.4 (H) 09/09/2024    BILITOT 0.4 09/09/2024    ALKPHOS 61 09/09/2024    ALT 21 09/09/2024    AST 27 09/09/2024    GLUCOSE 99 09/09/2024        Lab Results   Component Value Date    HEPBCAB Reactive (A) 07/24/2024   HBV DNA not detected       The patient was called today to follow up mid-HCV treatment. He stated that he has been taking his medication daily, though he does feel more tired than usual. He denies having started any new medications. Discussed the above lab results. He verbalized understanding.      Barry HoyosD

## 2024-09-19 ENCOUNTER — APPOINTMENT (OUTPATIENT)
Dept: UROLOGY | Facility: CLINIC | Age: 74
End: 2024-09-19
Payer: MEDICARE

## 2024-09-25 ENCOUNTER — SPECIALTY PHARMACY (OUTPATIENT)
Dept: PHARMACY | Facility: CLINIC | Age: 74
End: 2024-09-25

## 2024-09-25 PROCEDURE — RXMED WILLOW AMBULATORY MEDICATION CHARGE

## 2024-09-26 DIAGNOSIS — N28.89 RENAL MASS: Primary | ICD-10-CM

## 2024-09-26 RX ORDER — SODIUM CHLORIDE, SODIUM LACTATE, POTASSIUM CHLORIDE, CALCIUM CHLORIDE 600; 310; 30; 20 MG/100ML; MG/100ML; MG/100ML; MG/100ML
20 INJECTION, SOLUTION INTRAVENOUS CONTINUOUS
Status: CANCELLED | OUTPATIENT
Start: 2024-09-26

## 2024-09-26 RX ORDER — CEFAZOLIN SODIUM 2 G/100ML
2 INJECTION, SOLUTION INTRAVENOUS ONCE
OUTPATIENT
Start: 2024-09-26 | End: 2024-09-26

## 2024-09-27 PROBLEM — N28.89 RENAL MASS: Status: ACTIVE | Noted: 2024-09-26

## 2024-10-04 ENCOUNTER — PHARMACY VISIT (OUTPATIENT)
Dept: PHARMACY | Facility: CLINIC | Age: 74
End: 2024-10-04
Payer: MEDICARE

## 2024-10-09 ENCOUNTER — PRE-ADMISSION TESTING (OUTPATIENT)
Dept: PREADMISSION TESTING | Facility: HOSPITAL | Age: 74
End: 2024-10-09
Payer: MEDICARE

## 2024-10-09 VITALS
TEMPERATURE: 97.4 F | OXYGEN SATURATION: 96 % | HEIGHT: 71 IN | SYSTOLIC BLOOD PRESSURE: 125 MMHG | WEIGHT: 208.56 LBS | BODY MASS INDEX: 29.2 KG/M2 | HEART RATE: 82 BPM | DIASTOLIC BLOOD PRESSURE: 86 MMHG

## 2024-10-09 DIAGNOSIS — I63.50 CEREBROVASCULAR ACCIDENT (CVA) DUE TO STENOSIS OF CEREBRAL ARTERY (MULTI): ICD-10-CM

## 2024-10-09 DIAGNOSIS — E03.9 HYPOTHYROIDISM (ACQUIRED): Primary | ICD-10-CM

## 2024-10-09 DIAGNOSIS — N28.89 RENAL MASS: ICD-10-CM

## 2024-10-09 LAB
ABO GROUP (TYPE) IN BLOOD: NORMAL
ALBUMIN SERPL BCP-MCNC: 4.1 G/DL (ref 3.4–5)
ALP SERPL-CCNC: 66 U/L (ref 33–136)
ALT SERPL W P-5'-P-CCNC: 24 U/L (ref 10–52)
ANION GAP SERPL CALC-SCNC: 17 MMOL/L (ref 10–20)
ANTIBODY SCREEN: NORMAL
APTT PPP: 36 SECONDS (ref 27–38)
AST SERPL W P-5'-P-CCNC: 27 U/L (ref 9–39)
BILIRUB SERPL-MCNC: 0.5 MG/DL (ref 0–1.2)
BUN SERPL-MCNC: 24 MG/DL (ref 6–23)
CALCIUM SERPL-MCNC: 10.3 MG/DL (ref 8.6–10.6)
CHLORIDE SERPL-SCNC: 101 MMOL/L (ref 98–107)
CO2 SERPL-SCNC: 25 MMOL/L (ref 21–32)
CREAT SERPL-MCNC: 1.39 MG/DL (ref 0.5–1.3)
EGFRCR SERPLBLD CKD-EPI 2021: 53 ML/MIN/1.73M*2
ERYTHROCYTE [DISTWIDTH] IN BLOOD BY AUTOMATED COUNT: 15.9 % (ref 11.5–14.5)
GLUCOSE SERPL-MCNC: 94 MG/DL (ref 74–99)
HCT VFR BLD AUTO: 44.1 % (ref 41–52)
HGB BLD-MCNC: 13.7 G/DL (ref 13.5–17.5)
INR PPP: 1.1 (ref 0.9–1.1)
MCH RBC QN AUTO: 27 PG (ref 26–34)
MCHC RBC AUTO-ENTMCNC: 31.1 G/DL (ref 32–36)
MCV RBC AUTO: 87 FL (ref 80–100)
NRBC BLD-RTO: 0 /100 WBCS (ref 0–0)
PLATELET # BLD AUTO: 215 X10*3/UL (ref 150–450)
POTASSIUM SERPL-SCNC: 4.6 MMOL/L (ref 3.5–5.3)
PROT SERPL-MCNC: 8.5 G/DL (ref 6.4–8.2)
PROTHROMBIN TIME: 12.6 SECONDS (ref 9.8–12.8)
RBC # BLD AUTO: 5.08 X10*6/UL (ref 4.5–5.9)
RH FACTOR (ANTIGEN D): NORMAL
SODIUM SERPL-SCNC: 138 MMOL/L (ref 136–145)
TSH SERPL-ACNC: 2.35 MIU/L (ref 0.44–3.98)
WBC # BLD AUTO: 8.7 X10*3/UL (ref 4.4–11.3)

## 2024-10-09 PROCEDURE — 36415 COLL VENOUS BLD VENIPUNCTURE: CPT

## 2024-10-09 PROCEDURE — 80053 COMPREHEN METABOLIC PANEL: CPT

## 2024-10-09 PROCEDURE — 93005 ELECTROCARDIOGRAM TRACING: CPT

## 2024-10-09 PROCEDURE — 84443 ASSAY THYROID STIM HORMONE: CPT

## 2024-10-09 PROCEDURE — 86901 BLOOD TYPING SEROLOGIC RH(D): CPT

## 2024-10-09 PROCEDURE — 99205 OFFICE O/P NEW HI 60 MIN: CPT | Performed by: NURSE PRACTITIONER

## 2024-10-09 PROCEDURE — 85610 PROTHROMBIN TIME: CPT

## 2024-10-09 PROCEDURE — 85027 COMPLETE CBC AUTOMATED: CPT

## 2024-10-09 ASSESSMENT — DUKE ACTIVITY SCORE INDEX (DASI)
CAN YOU PARTICIPATE IN MODERATE RECREATIONAL ACTIVITIES LIKE GOLF, BOWLING, DANCING, DOUBLES TENNIS OR THROWING A BASEBALL OR FOOTBALL: NO
CAN YOU WALK INDOORS, SUCH AS AROUND YOUR HOUSE: YES
CAN YOU RUN A SHORT DISTANCE: NO
CAN YOU CLIMB A FLIGHT OF STAIRS OR WALK UP A HILL: YES
CAN YOU DO HEAVY WORK AROUND THE HOUSE LIKE SCRUBBING FLOORS OR LIFTING AND MOVING HEAVY FURNITURE: NO
CAN YOU PARTICIPATE IN STRENOUS SPORTS LIKE SWIMMING, SINGLES TENNIS, FOOTBALL, BASKETBALL, OR SKIING: NO
TOTAL_SCORE: 24.2
CAN YOU DO MODERATE WORK AROUND THE HOUSE LIKE VACUUMING, SWEEPING FLOORS OR CARRYING GROCERIES: YES
CAN YOU DO LIGHT WORK AROUND THE HOUSE LIKE DUSTING OR WASHING DISHES: YES
CAN YOU HAVE SEXUAL RELATIONS: YES
CAN YOU TAKE CARE OF YOURSELF (EAT, DRESS, BATHE, OR USE TOILET): YES
CAN YOU DO YARD WORK LIKE RAKING LEAVES, WEEDING OR PUSHING A MOWER: NO
CAN YOU WALK A BLOCK OR TWO ON LEVEL GROUND: YES
DASI METS SCORE: 5.7

## 2024-10-09 ASSESSMENT — ENCOUNTER SYMPTOMS
GASTROINTESTINAL NEGATIVE: 1
CARDIOVASCULAR NEGATIVE: 1
NECK NEGATIVE: 1
CONSTITUTIONAL NEGATIVE: 1
EYES NEGATIVE: 1
MUSCULOSKELETAL NEGATIVE: 1
ENDOCRINE NEGATIVE: 1
NEUROLOGICAL NEGATIVE: 1
RESPIRATORY NEGATIVE: 1

## 2024-10-09 ASSESSMENT — LIFESTYLE VARIABLES: SMOKING_STATUS: NONSMOKER

## 2024-10-09 NOTE — CPM/PAT H&P
CPM/PAT Evaluation       Name: Swapnil Allen (Swapnil Allen)  /Age: 1950/74 y.o.     Visit Type:   In-Person       Chief Complaint: renal mass, preop eval     HPI  The patient is a 74 year old male with history of BPH, recent kidney mass and kidney cysts noted on US abdomen on 24. He presents today for perioperative evaluation in anticipation of Nephrectomy - Right on 10/14/24 with Dr. Perez.    Past Medical History:   Diagnosis Date    BPH (benign prostatic hyperplasia)     Cerebral vascular accident (Multi)     CKD (chronic kidney disease)     Colon polyp     Hypertension     Hypothyroidism     Liver disease     Hep C treated with Epclusa    Parotid mass     Renal mass, right     Stroke (Multi)        Past Surgical History:   Procedure Laterality Date    COLONOSCOPY      THYROID SURGERY         Patient Sexual activity questions deferred to the physician.    Family History   Problem Relation Name Age of Onset    Hypertension Brother         No Known Allergies    Prior to Admission medications    Medication Sig Start Date End Date Taking? Authorizing Provider   amLODIPine (Norvasc) 5 mg tablet Take 1 tablet (5 mg) by mouth early in the morning.. 10/26/23   Historical Provider, MD   aspirin 81 mg EC tablet Take by mouth once daily. 10/12/21   Historical Provider, MD   chlorthalidone (Hygroton) 25 mg tablet Take 1 tablet (25 mg) by mouth once daily. 24   Historical Provider, MD   cholecalciferol (Vitamin D-3) 25 MCG (1000 UT) tablet Take by mouth.    Historical Provider, MD   gabapentin (Neurontin) 300 mg capsule if needed. 24   Historical Provider, MD   ibuprofen 800 mg tablet Take 1 tablet (800 mg) by mouth 3 times a day as needed. 23   Historical Provider, MD   levothyroxine (Synthroid, Levoxyl) 25 mcg tablet Take 1 tablet (25 mcg) by mouth once daily.    Historical Provider, MD   melatonin 5 mg tablet Take by mouth once daily.    Historical Provider, MD   sofosbuvir-velpatasvir (Epclusa)  "400-100 mg tablet Take 1 tablet by mouth once daily. 8/7/24 11/4/24  Ramon Morris MD   tamsulosin (Flomax) 0.4 mg 24 hr capsule Take 1 capsule (0.4 mg) by mouth if needed.    Historical Provider, MD VARGAS ROS:   Constitutional:   neg    Neuro/Psych:    Intermittent hand neuropathy  neg    Eyes:   neg     use of corrective lenses  Ears:    hearing loss  Nose:   neg    Mouth:   neg    Throat:   neg    Neck:   neg    Cardio:   neg    Respiratory:   neg    Endocrine:   neg    GI:   neg    :   neg    Musculoskeletal:   neg    Hematologic:   neg    Skin:  neg        Physical Exam  Vitals reviewed.   Constitutional:       Appearance: Normal appearance.   HENT:      Head: Normocephalic and atraumatic.      Nose: Nose normal.      Mouth/Throat:      Mouth: Mucous membranes are moist.      Pharynx: Oropharynx is clear.   Eyes:      Extraocular Movements: Extraocular movements intact.      Pupils: Pupils are equal, round, and reactive to light.   Cardiovascular:      Rate and Rhythm: Normal rate and regular rhythm.      Pulses: Normal pulses.      Heart sounds: Normal heart sounds.   Pulmonary:      Effort: Pulmonary effort is normal.      Breath sounds: Normal breath sounds.   Musculoskeletal:         General: Normal range of motion.      Cervical back: Normal range of motion.   Skin:     General: Skin is warm and dry.   Neurological:      General: No focal deficit present.      Mental Status: He is alert and oriented to person, place, and time.      Gait: Gait abnormal.      Comments: Uses cane   Psychiatric:         Mood and Affect: Mood normal.         Behavior: Behavior normal.          PAT AIRWAY:   Airway:     Mallampati::  IV    TM distance::  >3 FB    Neck ROM::  Full   Several missing teeth, poor dentition         Visit Vitals  /86   Pulse 82   Temp 36.3 °C (97.4 °F)   Ht 1.803 m (5' 11\")   Wt 94.6 kg (208 lb 8.9 oz)   SpO2 96%   BMI 29.09 kg/m²   Smoking Status Some Days   BSA 2.18 m²          DASI " Risk Score      Flowsheet Row Pre-Admission Testing from 10/9/2024 in [facility]   Can you take care of yourself (eat, dress, bathe, or use toilet)?  2.75 filed at 10/09/2024 0752   Can you walk indoors, such as around your house? 1.75 filed at 10/09/2024 0752   Can you walk a block or two on level ground?  2.75 filed at 10/09/2024 0752   Can you climb a flight of stairs or walk up a hill? 5.5 filed at 10/09/2024 0752   Can you run a short distance? 0 filed at 10/09/2024 0752   Can you do light work around the house like dusting or washing dishes? 2.7 filed at 10/09/2024 0752   Can you do moderate work around the house like vacuuming, sweeping floors or carrying groceries? 3.5 filed at 10/09/2024 0752   Can you do heavy work around the house like scrubbing floors or lifting and moving heavy furniture?  0 filed at 10/09/2024 0752   Can you do yard work like raking leaves, weeding or pushing a mower? 0 filed at 10/09/2024 0752   Can you have sexual relations? 5.25 filed at 10/09/2024 0752   Can you participate in moderate recreational activities like golf, bowling, dancing, doubles tennis or throwing a baseball or football? 0 filed at 10/09/2024 0752   Can you participate in strenous sports like swimming, singles tennis, football, basketball, or skiing? 0 filed at 10/09/2024 0752   DASI SCORE 24.2 filed at 10/09/2024 0752   METS Score (Will be calculated only when all the questions are answered) 5.7 filed at 10/09/2024 0752          Caprini DVT Assessment      Flowsheet Row Pre-Admission Testing from 10/9/2024 in [facility]   DVT Score 8 filed at 10/09/2024 0753   Surgical Factors Major surgery planned, lasting over 3 hours filed at 10/09/2024 0753   BMI 30 or less filed at 10/09/2024 0753          Modified Frailty Index      Flowsheet Row Pre-Admission Testing from 10/9/2024 in [facility]   Non-independent functional status (problems with dressing, bathing, personal  grooming, or cooking) 0 filed at 10/09/2024 0753   History of diabetes mellitus  0 filed at 10/09/2024 0753   History of COPD 0 filed at 10/09/2024 0753   History of CHF No filed at 10/09/2024 0753   History of MI 0 filed at 10/09/2024 0753   History of Percutaneous Coronary Intervention, Cardiac Surgery, or Angina No filed at 10/09/2024 0753   Hypertension requiring the use of medication  0.0909 filed at 10/09/2024 0753   Peripheral vascular disease 0 filed at 10/09/2024 0753   Impaired sensorium (cognitive impairement or loss, clouding, or delirium) 0 filed at 10/09/2024 0753   TIA or CVA withouy residual deficit 0.0909 filed at 10/09/2024 0753   Cerebrovascular accident with deficit 0 filed at 10/09/2024 0753   Modified Frailty Index Calculator .1818 filed at 10/09/2024 0753          CHADS2 Stroke Risk  Current as of 59 minutes ago        N/A 3 to 100%: High Risk   2 to < 3%: Medium Risk   0 to < 2%: Low Risk     Last Change: N/A          This score determines the patient's risk of having a stroke if the patient has atrial fibrillation.        This score is not applicable to this patient. Components are not calculated.          Revised Cardiac Risk Index      Flowsheet Row Pre-Admission Testing from 10/9/2024 in [facility]   High-Risk Surgery (Intraperitoneal, Intrathoracic,Suprainguinal vascular) 0 filed at 10/09/2024 0752   History of ischemic heart disease (History of MI, History of positive exercuse test, Current chest paint considered due to myocardial ischemia, Use of nitrate therapy, ECG with pathological Q Waves) 0 filed at 10/09/2024 0752   History of congestive heart failure (pulmonary edemia, bilateral rales or S3 gallop, Paroxysmal nocturnal dyspnea, CXR showing pulmonary vascular redistribution) 0 filed at 10/09/2024 0752   History of cerebrovascular disease (Prior TIA or stroke) 1 filed at 10/09/2024 0752   Pre-operative insulin treatment 0 filed at 10/09/2024 0752   Pre-operative  creatinine>2 mg/dl 0 filed at 10/09/2024 0752   Revised Cardiac Risk Calculator 1 filed at 10/09/2024 0752          Apfel Simplified Score      Flowsheet Row Pre-Admission Testing from 10/9/2024 in [facility]   Smoking status 1 filed at 10/09/2024 0753   History of motion sickness or PONV  0 filed at 10/09/2024 0753   Use of postoperative opioids 1 filed at 10/09/2024 0753   Gender - Female 0=No filed at 10/09/2024 0753   Apfel Simplified Score Calculator 2 filed at 10/09/2024 0753          Risk Analysis Index Results This Encounter    No data found in the last 10 encounters.       Stop Bang Score      Flowsheet Row Pre-Admission Testing from 10/9/2024 in [facility]   Do you snore loudly? 0 filed at 10/09/2024 0752   Do you often feel tired or fatigued after your sleep? 0 filed at 10/09/2024 0752   Has anyone ever observed you stop breathing in your sleep? 0 filed at 10/09/2024 0752   Do you have or are you being treated for high blood pressure? 1 filed at 10/09/2024 0752   Recent BMI (Calculated) 30.4 filed at 10/09/2024 0752   Is BMI greater than 35 kg/m2? 0=No filed at 10/09/2024 0752   Age older than 50 years old? 1=Yes filed at 10/09/2024 0752   Is your neck circumference greater than 17 inches (Male) or 16 inches (Female)? 0 filed at 10/09/2024 0752   Gender - Male 1=Yes filed at 10/09/2024 0752   STOP-BANG Total Score 3 filed at 10/09/2024 0752          Recent Results (from the past 336 hour(s))   CBC    Collection Time: 10/09/24  8:14 AM   Result Value Ref Range    WBC 8.7 4.4 - 11.3 x10*3/uL    nRBC 0.0 0.0 - 0.0 /100 WBCs    RBC 5.08 4.50 - 5.90 x10*6/uL    Hemoglobin 13.7 13.5 - 17.5 g/dL    Hematocrit 44.1 41.0 - 52.0 %    MCV 87 80 - 100 fL    MCH 27.0 26.0 - 34.0 pg    MCHC 31.1 (L) 32.0 - 36.0 g/dL    RDW 15.9 (H) 11.5 - 14.5 %    Platelets 215 150 - 450 x10*3/uL   Comprehensive Metabolic Panel    Collection Time: 10/09/24  8:14 AM   Result Value Ref Range    Glucose  94 74 - 99 mg/dL    Sodium 138 136 - 145 mmol/L    Potassium 4.6 3.5 - 5.3 mmol/L    Chloride 101 98 - 107 mmol/L    Bicarbonate 25 21 - 32 mmol/L    Anion Gap 17 10 - 20 mmol/L    Urea Nitrogen 24 (H) 6 - 23 mg/dL    Creatinine 1.39 (H) 0.50 - 1.30 mg/dL    eGFR 53 (L) >60 mL/min/1.73m*2    Calcium 10.3 8.6 - 10.6 mg/dL    Albumin 4.1 3.4 - 5.0 g/dL    Alkaline Phosphatase 66 33 - 136 U/L    Total Protein 8.5 (H) 6.4 - 8.2 g/dL    AST 27 9 - 39 U/L    Bilirubin, Total 0.5 0.0 - 1.2 mg/dL    ALT 24 10 - 52 U/L   Coagulation Screen    Collection Time: 10/09/24  8:14 AM   Result Value Ref Range    Protime 12.6 9.8 - 12.8 seconds    INR 1.1 0.9 - 1.1    aPTT 36 27 - 38 seconds   Type And Screen    Collection Time: 10/09/24  8:14 AM   Result Value Ref Range    ABO TYPE O     Rh TYPE POS     ANTIBODY SCREEN NEG    TSH with reflex to Free T4 if abnormal    Collection Time: 10/09/24  8:14 AM   Result Value Ref Range    Thyroid Stimulating Hormone 2.35 0.44 - 3.98 mIU/L        Diagnostic Results    EKG 10/9/24        CTA Head/Neck 8/26/2020  IMPRESSION:   No acute intracranial abnormality.     Mild stenosis of bilateral ICA origin.     Mild to moderate stenosis of right supraclinoid ICA.     2.4 x 2.1 cm nodule in the left parotid gland most likely represent a   mixed benign tumor..     Opacification of left paranasal sinuses.     Assessment and Plan:     Anesthesia:  The patient denies problems with anesthesia in the past such as PONV, prolonged sedation, awareness, dental damage, aspiration, cardiac arrest, difficult intubation, or unexpected hospital admissions.     Cardiovascular  The patient has hypertension managed on amlodipine-continue as prescribed in the perioperative period   He is scheduled for non-cardiac surgery associated with elevated risk. The patient has no major cardiac contraindications to non- cardiac surgery.  Cardiology Evaluation  The patient is not followed by cardiology.  METS  The patient's  functional capacity capacity is greater than 4 METS.  RCRI  The patient meets 1 RCRI criteria and therefore has a 6% risk of major adverse cardiac complications.  SHREYAS score which indicates a 0.1% risk of intraoperative or 30-day postoperative MACE (major adverse cardiac event).    Pulmonary   No significant findings on chart review or clinical presentation and evaluation. The patient is at increased risk of perioperative pulmonary complications secondary to ongoing tobacco abuse, advanced age greater than 60. Smoking cessation discussed     The patient has a stop bang score of 3, which places patient at intermediate risk for having BRAXTON.    ARISCAT 41, Intermediate, 13.3% risk of in-hospital postoperative pulmonary complications  PRODIGY 20, high risk of respiratory depression episode. Patient given PI sheet for preoperative deep breathing exercises.    Neuro:   The patient has history of CVA 2020. Currently on aspirin 81mg po daily-instructed to continue as prescribed int he perioperative period. The patient is at increased risk for postoperative delirium secondary to age 65 or older, renal Insufficiency. The patient is at increased risk for perioperative stroke secondary to prior CVA/TIA, chronic renal failure, hypertension , increased age, general anesthesia, operative time >2.5 hours. Handouts for preoperative brain exercises given to patient.    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 No current ENT complaints. No documented or reported history of airway difficulty. l    Endocrine  The patient has hypothyroidism managed on levothyroxine. Instructed to take levothyroxine morning of surgery.  TSH obtained.    Gastrointestinal  The patient has Hep C currently on Epclusa  GI: Dr. Ramon Morris, LOV 7/24/24    Eat 10- 0,  self-perceived oropharyngeal dysphagia scale (0-40)     Genitourinary  The patient has BPH managed on flomax. No current   complaints.     Renal  The patient has right renal mass scheduled for nephrectomy with Dr. Perez on 10/14/24. The patient has a history of chronic kidney disease most consistent with stage 3.  Patient's renal function appears unchanged when compared to prior labs. The patient has specific risk factors associated with increased risk of perioperative renal complications related to age greater than 55, male gender, hypertension, CKD, cerebrovascular disease. Preventative measures include preoperative hydration.    Hematology  No diagnoses or significant findings on chart review or clinical presentation and evaluation.    Caprini score 8, high risk of perioperative VTE.     Patient instructed to ambulate as soon as possible postoperatively to decrease thromboembolic risk. Initiate mechanical DVT prophylaxis as soon as possible and initiate chemical prophylaxis when deemed safe from a bleeding standpoint post surgery.     Transfusion Evaluation  A type and screen was obtained given the likelihood for perioperative transfusion of blood or blood products.    Musculoskeletal  No diagnoses or significant findings on chart review or clinical presentation and evaluation.    ID  No diagnoses or significant findings on chart review or clinical presentation and evaluation.    -Preoperative medication instructions were provided and reviewed with the patient.  Any additional testing or evaluation was explained to the patient.  NPO Instructions were discussed, and the patient's questions were answered prior to conclusion of this encounter. Patient verbalized understanding of preoperative instructions. After Visit Summary given.

## 2024-10-09 NOTE — PREPROCEDURE INSTRUCTIONS
Fasting Guidelines    NPO Instructions:    Do not eat any food after midnight the night before your surgery/procedure.  You may have up to 13.5 ounces of clear liquids until TWO hours before your instructed arrival time to the hospital. This includes water, black tea/coffee, (no milk or cream), apple juice, and/or electrolyte drinks (Gatorade).  You may chew gum up to TWO hours before your surgery/procedure.    Additional Instructions:    Avoid herbal supplements, multivitamins and NSAIDS (non-steroidal anti-inflammatory drugs) such as Advil, Aleve, Ibuprofen, Naproxen, Excedrin, Meloxicam or Celebrex for at least 7 days prior to surgery. May take Tylenol as needed.    Avoid tobacco and alcohol products for 24 hours prior to surgery.    CONTACT SURGEON'S OFFICE IF YOU DEVELOP:  * Fever = 100.4 F   * New respiratory symptoms (e.g. cough, shortness of breath, respiratory distress, sore throat)  * Recent loss of taste or smell  *Flu like symptoms such as headache, fatigue or gastrointestinal symptoms  * You develop any open sores, shingles, burning or painful urination   AND/OR:  * You no longer wish to have the surgery.  * Any other personal circumstances change that may lead to the need to cancel or defer this surgery.  *You were admitted to any hospital within one week of your planned procedure.    Seven/Six Days before Surgery:  Review your medication instructions, stop indicated medications    Day of Surgery:  Review your medication instructions, take indicated medications  Wear comfortable loose fitting clothing  Do not use moisturizers, creams, lotions or perfume  All jewelry and valuables should be left at home    Yasemin Max [facility]  [facility]  [phone]  [phone]  [email or web address]      Preoperative Brain Exercises    What are brain exercises?  A brain exercise is any activity that engages your thinking (cognitive) skills.    What types of  activities are considered brain exercises?  Jigsaw puzzles, crossword puzzles, word jumble, memory games, word search, and many more.  Many can be found free online or on your phone via a mobile roxi.    Why should I do brain exercises before my surgery?  More recent research has shown brain exercise before surgery can lower the risk of postoperative delirium (confusion) which can be especially important for older adults.  Patients who did brain exercises for 5 to 10 hours the days before surgery, cut their risk of postoperative delirium in half up to 1 week after surgery.         [facility]    Preoperative Deep Breathing Exercises    Why it is important to do deep breathing exercises before my surgery?  Deep breathing exercises strengthen your breathing muscles.  This helps you to recover after your surgery and decreases the chance of breathing complications.      How are the deep breathing exercises done?  Sit straight with your back supported.  Breathe in deeply and slowly through your nose. Your lower rib cage should expand and your abdomen may move forward.  Hold that breath for 3 to 5 seconds.  Breathe out through pursed lips, slowly and completely.  Rest and repeat 10 times every hour while awake.  Rest longer if you become dizzy or lightheaded.         Patient and Family Education             Ways You Can Help Prevent Blood Clots             This handout explains some simple things you can do to help prevent blood clots.      Blood clots are blockages that can form in the body's veins. When a blood clot forms in your deep veins, it may be called a deep vein thrombosis, or DVT for short. Blood clots can happen in any part of the body where blood flows, but they are most common in the arms and legs. If a piece of a blood clot breaks free and travels to the lungs, it is called a pulmonary embolus (PE). A PE can be a very serious problem.         Being in the hospital or having surgery  can raise your chances of getting a blood clot because you may not be well enough to move around as much as you normally do.         Ways you can help prevent blood clots in the hospital         Wearing SCDs. SCDs stands for Sequential Compression Devices.   SCDs are special sleeves that wrap around your legs  They attach to a pump that fills them with air to gently squeeze your legs every few minutes.   This helps return the blood in your legs to your heart.   SCDs should only be taken off when walking or bathing.   SCDs may not be comfortable, but they can help save your life.               Wearing compression stockings - if your doctor orders them. These special snug fitting stockings gently squeeze your legs to help blood flow.       Walking. Walking helps move the blood in your legs.   If your doctor says it is ok, try walking the halls at least   5 times a day. Ask us to help you get up, so you don't fall.      Taking any blood thinning medicines your doctor orders.        Page 1 of 2     [facility]; 3/23   Ways you can help prevent blood clots at home       Wearing compression stockings - if your doctor orders them. ? Walking - to help move the blood in your legs.       Taking any blood thinning medicines your doctor orders.      Signs of a blood clot or PE      Tell your doctor or nurse know right away if you have of the problems listed below.    If you are at home, seek medical care right away. Call 911 for chest pain or problems breathing.               Signs of a blood clot (DVT) - such as pain,  swelling, redness or warmth in your arm or leg      Signs of a pulmonary embolism (PE) - such as chest     pain or feeling short of breath

## 2024-10-11 ENCOUNTER — ANESTHESIA EVENT (OUTPATIENT)
Dept: OPERATING ROOM | Facility: HOSPITAL | Age: 74
End: 2024-10-11

## 2024-10-11 NOTE — PROGRESS NOTES
Pharmacy Medication History Review    Swapnil Allen is a 74 y.o. male who is planned to be admitted for Renal mass. Pharmacy called the patient prior to their scheduled procedure and reviewed the patient's nkcad-ed-fortnyfko medications for accuracy.    Medications ADDED:  none  Medications CHANGED:  Amlodipine 5mg to amlodipine 10mg  Medications REMOVED:   Gabapentin 300mg  Ibuprofen 800mg    Please review updated prior to admission medication list and comments regarding how patient may be taking medications differently by going to Admission tab --> Admission Orders --> Admit Orders / Review prior to admission medications.     Preferred pharmacy and allergies to be confirmed with patient by nursing the day of procedure.     Sources used to complete the med history include spoke with patient, surescripts, oarrs, care everywhere    Below are additional concerns with the patient's PTA list.  Patient states they stopped gabapentin cause they ran out of the medication a couple months ago    Sophia Sharpe    [facility]  Please reach out via Secure Chat for questions

## 2024-10-14 ENCOUNTER — ANESTHESIA (OUTPATIENT)
Dept: OPERATING ROOM | Facility: HOSPITAL | Age: 74
End: 2024-10-14
Payer: MEDICARE

## 2024-10-14 ENCOUNTER — HOSPITAL ENCOUNTER (OUTPATIENT)
Facility: HOSPITAL | Age: 74
Discharge: HOME | End: 2024-10-14
Attending: STUDENT IN AN ORGANIZED HEALTH CARE EDUCATION/TRAINING PROGRAM | Admitting: STUDENT IN AN ORGANIZED HEALTH CARE EDUCATION/TRAINING PROGRAM
Payer: MEDICARE

## 2024-10-14 VITALS
RESPIRATION RATE: 16 BRPM | DIASTOLIC BLOOD PRESSURE: 82 MMHG | HEART RATE: 107 BPM | HEIGHT: 71 IN | BODY MASS INDEX: 28.86 KG/M2 | WEIGHT: 206.13 LBS | TEMPERATURE: 97 F | OXYGEN SATURATION: 98 % | SYSTOLIC BLOOD PRESSURE: 144 MMHG

## 2024-10-14 DIAGNOSIS — N28.89 RIGHT RENAL MASS: Primary | ICD-10-CM

## 2024-10-14 DIAGNOSIS — N28.89 RENAL MASS: ICD-10-CM

## 2024-10-14 LAB
ABO GROUP (TYPE) IN BLOOD: NORMAL
RH FACTOR (ANTIGEN D): NORMAL

## 2024-10-14 PROCEDURE — 36415 COLL VENOUS BLD VENIPUNCTURE: CPT | Performed by: STUDENT IN AN ORGANIZED HEALTH CARE EDUCATION/TRAINING PROGRAM

## 2024-10-14 RX ORDER — CEFAZOLIN SODIUM 2 G/100ML
2 INJECTION, SOLUTION INTRAVENOUS ONCE
Status: DISCONTINUED | OUTPATIENT
Start: 2024-10-14 | End: 2024-10-14 | Stop reason: HOSPADM

## 2024-10-14 RX ORDER — HEPARIN SODIUM 5000 [USP'U]/ML
5000 INJECTION, SOLUTION INTRAVENOUS; SUBCUTANEOUS ONCE
Status: DISCONTINUED | OUTPATIENT
Start: 2024-10-14 | End: 2024-10-14 | Stop reason: HOSPADM

## 2024-10-14 ASSESSMENT — PAIN SCALES - GENERAL: PAINLEVEL_OUTOF10: 0 - NO PAIN

## 2024-10-14 ASSESSMENT — COLUMBIA-SUICIDE SEVERITY RATING SCALE - C-SSRS
1. IN THE PAST MONTH, HAVE YOU WISHED YOU WERE DEAD OR WISHED YOU COULD GO TO SLEEP AND NOT WAKE UP?: NO
2. HAVE YOU ACTUALLY HAD ANY THOUGHTS OF KILLING YOURSELF?: NO
6. HAVE YOU EVER DONE ANYTHING, STARTED TO DO ANYTHING, OR PREPARED TO DO ANYTHING TO END YOUR LIFE?: NO

## 2024-10-14 ASSESSMENT — PAIN - FUNCTIONAL ASSESSMENT: PAIN_FUNCTIONAL_ASSESSMENT: 0-10

## 2024-10-14 NOTE — ANESTHESIA PREPROCEDURE EVALUATION
Patient: Swapnil Allen    Procedure Information       Date/Time: 10/14/24 1220    Procedure: Nephrectomy (Right)    Location: [facility] OR 04 / [facility] OR    Surgeons: Vaughn Perez MD            Relevant Problems   Cardiac   (+) Essential (primary) hypertension   (+) Hyperlipidemia      Neuro   (+) Cerebrovascular accident (CVA) (Multi)      Endocrine   (+) Hypothyroidism (acquired)      HEENT   (+) Sensorineural hearing loss, bilateral       Clinical information reviewed:   Tobacco  Allergies  Meds   Med Hx  Surg Hx   Fam Hx  Soc Hx        NPO Detail:  NPO/Void Status  Date of Last Liquid: 10/14/24  Time of Last Liquid: 0000  Date of Last Solid: 10/14/24  Time of Last Solid: 0000         PHYSICAL EXAM    Anesthesia Plan

## 2024-10-17 ENCOUNTER — APPOINTMENT (OUTPATIENT)
Dept: UROLOGY | Facility: CLINIC | Age: 74
End: 2024-10-17
Payer: MEDICARE

## 2024-10-23 ENCOUNTER — TELEPHONE (OUTPATIENT)
Dept: UROLOGY | Facility: CLINIC | Age: 74
End: 2024-10-23
Payer: MEDICARE

## 2024-10-23 NOTE — TELEPHONE ENCOUNTER
Patient insurance called to confirm if patient had surgery. Insurance informed office that surgery auth is good for 30 days starting today 10/23/24. MD aware and will schedule surgery within time frame.

## 2024-10-26 ENCOUNTER — APPOINTMENT (OUTPATIENT)
Dept: RADIOLOGY | Facility: HOSPITAL | Age: 74
End: 2024-10-26
Payer: MEDICARE

## 2024-11-02 ENCOUNTER — HOSPITAL ENCOUNTER (OUTPATIENT)
Dept: RADIOLOGY | Facility: HOSPITAL | Age: 74
Discharge: HOME | End: 2024-11-02
Payer: MEDICARE

## 2024-11-02 DIAGNOSIS — N28.89 RIGHT RENAL MASS: ICD-10-CM

## 2024-11-02 PROCEDURE — 74183 MRI ABD W/O CNTR FLWD CNTR: CPT

## 2024-11-02 PROCEDURE — 74183 MRI ABD W/O CNTR FLWD CNTR: CPT | Performed by: RADIOLOGY

## 2024-11-02 PROCEDURE — 2550000001 HC RX 255 CONTRASTS: Performed by: STUDENT IN AN ORGANIZED HEALTH CARE EDUCATION/TRAINING PROGRAM

## 2024-11-02 PROCEDURE — A9575 INJ GADOTERATE MEGLUMI 0.1ML: HCPCS | Performed by: STUDENT IN AN ORGANIZED HEALTH CARE EDUCATION/TRAINING PROGRAM

## 2024-11-02 RX ORDER — GADOTERATE MEGLUMINE 376.9 MG/ML
0.2 INJECTION INTRAVENOUS
Status: COMPLETED | OUTPATIENT
Start: 2024-11-02 | End: 2024-11-02

## 2024-11-02 RX ADMIN — GADOTERATE MEGLUMINE 20 ML: 376.9 INJECTION INTRAVENOUS at 09:09

## 2024-11-04 DIAGNOSIS — N28.89 RIGHT RENAL MASS: Primary | ICD-10-CM

## 2024-11-06 DIAGNOSIS — N28.89 RENAL MASS: Primary | ICD-10-CM

## 2024-11-13 ENCOUNTER — SPECIALTY PHARMACY (OUTPATIENT)
Dept: PHARMACY | Facility: CLINIC | Age: 74
End: 2024-11-13

## 2024-11-13 NOTE — PROGRESS NOTES
This patient was called on 11/13/2024, to inquire if he had completed his HCV regimen. He stated that he did complete it without any side effects or missed doses. He was reminded to complete labs and follow up with the office soon after. He verbalized understanding. He didn't have any questions for the pharmacist.

## 2024-11-25 ENCOUNTER — LAB (OUTPATIENT)
Dept: LAB | Facility: LAB | Age: 74
End: 2024-11-25
Payer: MEDICARE

## 2024-11-25 DIAGNOSIS — N28.89 RENAL MASS: ICD-10-CM

## 2024-11-25 DIAGNOSIS — N28.89 RIGHT RENAL MASS: ICD-10-CM

## 2024-11-25 LAB
ANION GAP SERPL CALC-SCNC: 13 MMOL/L (ref 10–20)
APTT PPP: 36 SECONDS (ref 27–38)
BUN SERPL-MCNC: 23 MG/DL (ref 6–23)
CALCIUM SERPL-MCNC: 10.5 MG/DL (ref 8.6–10.6)
CHLORIDE SERPL-SCNC: 105 MMOL/L (ref 98–107)
CO2 SERPL-SCNC: 25 MMOL/L (ref 21–32)
CREAT SERPL-MCNC: 1.47 MG/DL (ref 0.5–1.3)
EGFRCR SERPLBLD CKD-EPI 2021: 50 ML/MIN/1.73M*2
ERYTHROCYTE [DISTWIDTH] IN BLOOD BY AUTOMATED COUNT: 17 % (ref 11.5–14.5)
GLUCOSE SERPL-MCNC: 85 MG/DL (ref 74–99)
HCT VFR BLD AUTO: 42.5 % (ref 41–52)
HGB BLD-MCNC: 13.1 G/DL (ref 13.5–17.5)
INR PPP: 1.1 (ref 0.9–1.1)
MCH RBC QN AUTO: 27.7 PG (ref 26–34)
MCHC RBC AUTO-ENTMCNC: 30.8 G/DL (ref 32–36)
MCV RBC AUTO: 90 FL (ref 80–100)
NRBC BLD-RTO: 0 /100 WBCS (ref 0–0)
PLATELET # BLD AUTO: 240 X10*3/UL (ref 150–450)
POTASSIUM SERPL-SCNC: 4.3 MMOL/L (ref 3.5–5.3)
PROTHROMBIN TIME: 12 SECONDS (ref 9.8–12.8)
RBC # BLD AUTO: 4.73 X10*6/UL (ref 4.5–5.9)
SODIUM SERPL-SCNC: 139 MMOL/L (ref 136–145)
WBC # BLD AUTO: 8.7 X10*3/UL (ref 4.4–11.3)

## 2024-11-25 PROCEDURE — 85610 PROTHROMBIN TIME: CPT

## 2024-11-25 PROCEDURE — 85027 COMPLETE CBC AUTOMATED: CPT

## 2024-11-25 PROCEDURE — 80048 BASIC METABOLIC PNL TOTAL CA: CPT

## 2024-11-25 PROCEDURE — 85730 THROMBOPLASTIN TIME PARTIAL: CPT

## 2024-11-25 PROCEDURE — 36415 COLL VENOUS BLD VENIPUNCTURE: CPT

## 2024-11-26 ENCOUNTER — HOSPITAL ENCOUNTER (OUTPATIENT)
Dept: RADIOLOGY | Facility: HOSPITAL | Age: 74
Discharge: HOME | End: 2024-11-26
Payer: MEDICARE

## 2024-11-26 DIAGNOSIS — N28.89 RIGHT RENAL MASS: ICD-10-CM

## 2024-12-09 ENCOUNTER — LAB (OUTPATIENT)
Dept: LAB | Facility: LAB | Age: 74
End: 2024-12-09
Payer: MEDICARE

## 2024-12-09 ENCOUNTER — HOSPITAL ENCOUNTER (OUTPATIENT)
Dept: RADIOLOGY | Facility: HOSPITAL | Age: 74
Discharge: HOME | End: 2024-12-09
Payer: MEDICARE

## 2024-12-09 VITALS
SYSTOLIC BLOOD PRESSURE: 121 MMHG | TEMPERATURE: 97.7 F | BODY MASS INDEX: 27.92 KG/M2 | HEART RATE: 75 BPM | WEIGHT: 195 LBS | DIASTOLIC BLOOD PRESSURE: 75 MMHG | HEIGHT: 70 IN | OXYGEN SATURATION: 99 % | RESPIRATION RATE: 18 BRPM

## 2024-12-09 DIAGNOSIS — N28.89 RIGHT RENAL MASS: ICD-10-CM

## 2024-12-09 DIAGNOSIS — B18.2 CHRONIC HEPATITIS C WITHOUT HEPATIC COMA (MULTI): ICD-10-CM

## 2024-12-09 LAB
ALBUMIN SERPL BCP-MCNC: 3.8 G/DL (ref 3.4–5)
ALP SERPL-CCNC: 62 U/L (ref 33–136)
ALT SERPL W P-5'-P-CCNC: 25 U/L (ref 10–52)
ANION GAP SERPL CALC-SCNC: 16 MMOL/L (ref 10–20)
AST SERPL W P-5'-P-CCNC: 33 U/L (ref 9–39)
BILIRUB DIRECT SERPL-MCNC: 0.1 MG/DL (ref 0–0.3)
BILIRUB SERPL-MCNC: 0.6 MG/DL (ref 0–1.2)
BUN SERPL-MCNC: 15 MG/DL (ref 6–23)
CALCIUM SERPL-MCNC: 9.8 MG/DL (ref 8.6–10.6)
CHLORIDE SERPL-SCNC: 104 MMOL/L (ref 98–107)
CO2 SERPL-SCNC: 24 MMOL/L (ref 21–32)
CREAT SERPL-MCNC: 1.35 MG/DL (ref 0.5–1.3)
EGFRCR SERPLBLD CKD-EPI 2021: 55 ML/MIN/1.73M*2
GLUCOSE SERPL-MCNC: 82 MG/DL (ref 74–99)
POTASSIUM SERPL-SCNC: 4 MMOL/L (ref 3.5–5.3)
PROT SERPL-MCNC: 7.9 G/DL (ref 6.4–8.2)
SODIUM SERPL-SCNC: 140 MMOL/L (ref 136–145)

## 2024-12-09 PROCEDURE — 80053 COMPREHEN METABOLIC PANEL: CPT

## 2024-12-09 PROCEDURE — 7100000009 HC PHASE TWO TIME - INITIAL BASE CHARGE

## 2024-12-09 PROCEDURE — 50200 RENAL BIOPSY PERQ: CPT | Mod: RT

## 2024-12-09 PROCEDURE — 7100000010 HC PHASE TWO TIME - EACH INCREMENTAL 1 MINUTE

## 2024-12-09 PROCEDURE — 2720000007 HC OR 272 NO HCPCS

## 2024-12-09 PROCEDURE — 2550000001 HC RX 255 CONTRASTS: Performed by: RADIOLOGY

## 2024-12-09 PROCEDURE — 82248 BILIRUBIN DIRECT: CPT

## 2024-12-09 PROCEDURE — 88342 IMHCHEM/IMCYTCHM 1ST ANTB: CPT | Mod: TC,SUR

## 2024-12-09 PROCEDURE — 87522 HEPATITIS C REVRS TRNSCRPJ: CPT

## 2024-12-09 PROCEDURE — 99152 MOD SED SAME PHYS/QHP 5/>YRS: CPT | Performed by: RADIOLOGY

## 2024-12-09 PROCEDURE — 99152 MOD SED SAME PHYS/QHP 5/>YRS: CPT

## 2024-12-09 PROCEDURE — 2500000004 HC RX 250 GENERAL PHARMACY W/ HCPCS (ALT 636 FOR OP/ED): Performed by: RADIOLOGY

## 2024-12-09 PROCEDURE — 36415 COLL VENOUS BLD VENIPUNCTURE: CPT

## 2024-12-09 PROCEDURE — 76978 US TRGT DYN MBUBB 1ST LES: CPT | Performed by: RADIOLOGY

## 2024-12-09 RX ORDER — FENTANYL CITRATE 50 UG/ML
INJECTION, SOLUTION INTRAMUSCULAR; INTRAVENOUS
Status: COMPLETED | OUTPATIENT
Start: 2024-12-09 | End: 2024-12-09

## 2024-12-09 RX ORDER — MIDAZOLAM HYDROCHLORIDE 1 MG/ML
INJECTION INTRAMUSCULAR; INTRAVENOUS
Status: COMPLETED | OUTPATIENT
Start: 2024-12-09 | End: 2024-12-09

## 2024-12-09 ASSESSMENT — PAIN - FUNCTIONAL ASSESSMENT
PAIN_FUNCTIONAL_ASSESSMENT: 0-10

## 2024-12-09 ASSESSMENT — PAIN SCALES - GENERAL
PAINLEVEL_OUTOF10: 0 - NO PAIN

## 2024-12-09 NOTE — PRE-PROCEDURE NOTE
Interventional Radiology Preprocedure Note    Indication for procedure: The encounter diagnosis was Right renal mass.    Relevant review of systems: NA    Relevant Labs:   Lab Results   Component Value Date    CREATININE 1.47 (H) 11/25/2024    EGFR 50 (L) 11/25/2024    INR 1.1 11/25/2024    PROTIME 12.0 11/25/2024       Planned Sedation/Anesthesia: Moderate    Airway assessment: normal    Directed physical examination:    Aox3  Normal rate of respirations    Mallampati: II (hard and soft palate, upper portion of tonsils and uvula visible)    ASA Score: ASA 2 - Patient with mild systemic disease with no functional limitations    Benefits, risks and alternatives of procedure and planned sedation have been discussed with the patient and/or their representative. All questions answered and they agree to proceed.

## 2024-12-09 NOTE — POST-PROCEDURE NOTE
Interventional Radiology Brief Postprocedure Note    Attending: Dr. Emely Quiroga    Assistant: Dr. Xavier Alvarez    Diagnosis:   1. Right renal mass  US guided percutaneous biopsy renal right    US guided percutaneous biopsy renal right        Description of procedure: Image guided right renal mass biopsy as dictated in further detail within PACs.     Anesthesia:  MAC Moderate    Complications: None    Estimated Blood Loss: none    Medications (Filter: Administrations occurring from 0825 to 0848 on 12/09/24) As of 12/09/24 0848      fentaNYL PF (Sublimaze) injection (mcg) Total dose:  100 mcg      Date/Time Rate/Dose/Volume Action       12/09/24  0835 50 mcg Given      0838 50 mcg Given               midazolam (Versed) injection (mg) Total dose:  2 mg      Date/Time Rate/Dose/Volume Action       12/09/24  0835 1 mg Given      0839 1 mg Given               sulfur hexafluoride microsphr (Lumason) injection (mL) Total volume:  2.4 mL      Date/Time Rate/Dose/Volume Action       12/09/24  0848 2.4 mL Given                   See detailed result report with images in PACS.    The patient tolerated the procedure well without incident or complication and is in stable condition.

## 2024-12-12 ENCOUNTER — SPECIALTY PHARMACY (OUTPATIENT)
Dept: PHARMACY | Facility: CLINIC | Age: 74
End: 2024-12-12

## 2024-12-12 LAB
LAB AP ASR DISCLAIMER: NORMAL
LABORATORY COMMENT REPORT: NORMAL
PATH REPORT.FINAL DX SPEC: NORMAL
PATH REPORT.GROSS SPEC: NORMAL
PATH REPORT.RELEVANT HX SPEC: NORMAL
PATH REPORT.TOTAL CANCER: NORMAL

## 2024-12-12 NOTE — PROGRESS NOTES
I called this patient 12/12/2024, to let then know that his HCV-End of Therapy labs were not detected. He was reminded to complete SVR labs in February and follow up with Dr. Morris's office soon after. He verbalized understanding, he didn't have any questions for the pharmacist.

## 2024-12-17 DIAGNOSIS — N28.89 RENAL MASS: Primary | ICD-10-CM

## 2024-12-17 RX ORDER — CEFAZOLIN SODIUM 2 G/100ML
2 INJECTION, SOLUTION INTRAVENOUS ONCE
OUTPATIENT
Start: 2024-12-17 | End: 2024-12-17

## 2025-01-16 ENCOUNTER — CLINICAL SUPPORT (OUTPATIENT)
Dept: PREADMISSION TESTING | Facility: HOSPITAL | Age: 75
End: 2025-01-16
Payer: MEDICARE

## 2025-01-16 NOTE — C[facility]/PAT H&P
CPM/PAT Evaluation       Name: Swapnil Allen (Swapnil Allen)  /Age: 1950/75 y.o.     { PAT Visit Type:99045}      Chief Complaint: ***    HPI    Swapnil Allen is scheduled for Radical nephrectomy (open) - Right on 25  Past Medical History:   Diagnosis Date    BPH (benign prostatic hyperplasia)     Cerebral vascular accident (Multi)     CKD (chronic kidney disease)     Colon polyp     Hypertension     Hypothyroidism     Liver disease     Hep C treated with Epclusa    Parotid mass     Renal mass, right     Stroke (Multi)        Past Surgical History:   Procedure Laterality Date    COLONOSCOPY      THYROID SURGERY         Patient Sexual activity questions deferred to the physician.    Family History   Problem Relation Name Age of Onset    Hypertension Brother         No Known Allergies    Prior to Admission medications    Medication Sig Start Date End Date Taking? Authorizing Provider   amLODIPine (Norvasc) 10 mg tablet Take 1 tablet (10 mg) by mouth once daily. 10/26/23   Historical Provider, MD   aspirin 81 mg EC tablet Take 1 tablet (81 mg) by mouth once daily. 10/12/21   Historical Provider, MD   chlorthalidone (Hygroton) 25 mg tablet Take 1 tablet (25 mg) by mouth once daily. 24   Historical Provider, MD   cholecalciferol (Vitamin D-3) 25 MCG (1000 UT) tablet Take 1 tablet (1,000 Units) by mouth once daily.    Historical Provider, MD   levothyroxine (Synthroid, Levoxyl) 25 mcg tablet Take 1 tablet (25 mcg) by mouth once daily.    Historical Provider, MD   melatonin 5 mg tablet Take 1 tablet (5 mg) by mouth once daily at bedtime.    Historical Provider, MD   tamsulosin (Flomax) 0.4 mg 24 hr capsule Take 1 capsule (0.4 mg) by mouth once daily.    Historical Provider, MD ALICIA BRAND     PAT Physical Exam     Airway      Testing/Diagnostic:       - EK22 ED visit  normal sinus rhythm at 94 bpm, left bundle berny block,   unchanged from prior EKG obtained in 2021, no STEMI.      -   Kidney: 11/02/24  IMPRESSION:  1.  Redemonstration of a heterogeneously enhancing right renal mass arising from the middle and lower thirds of the kidney, without evidence of macroscopic fat components, concerning for a renal neoplasm.  2. There is a mildly enlarged precaval lymph node, and secondary  involvement can not be excluded. Additional prominent nonenlarged pericaval lymph nodes are noted.  3. Note is made of a punctate enhancing focus along the lateral right pararenal fascia, which is indeterminate, but can not rule out a tiny neoplastic deposit.  4. Additional findings as above.      - CT Chest: 09/12/24  IMPRESSION:  1. Large right renal heterogeneous mixed density mass with punctate calcifications, which demonstrates no enhancement on early arterial or portal venous phase to suggest underlying malignancy. Adjacent mass like appearance in the right renal upper pole likely represents normal renal parenchyma as described above. Follow-up of the large  renal mass should be continued with ultrasound.    - CTA Head/Neck: 08/26/20  IMPRESSION:   No acute intracranial abnormality.     Mild stenosis of bilateral ICA origin.     Mild to moderate stenosis of right supraclinoid ICA. 2.4 x 2.1 cm nodule in the left parotid gland most likely represent a   mixed benign tumor.. Opacification of left paranasal sinuses.     - MR Brain: 08/26/20  IMPRESSION:     1. No acute potential cause of a neurologic deficit from brain   pathology is identified. There are numerous remote lacunar infarcts.     2. Left ostiomeatal unit pattern of paranasal sinus obstruction with  chronic sinusitis. An antrochoanal polyp is suspected.     3. 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       - Creatinine: 1.35 on 12/09/24      Patient Specialist/PCP:       PCP: Swapnil Max at the [facility]....history of hepatitis C core on Epclusa, hyperthyroidism s/p thyroidectomy, Warthin's tumor, HTN,  BPH.    Gastroenterology: Ramon Morris 07/24/24  evaluation chronic hepatitis C infection.     Urology: Mariella Patino CNP 08/19/24 Consult of recent kidney mass and kidney cysts noted on US abdomen on 8/12/24        _______________________________________________________  **Data input only. No medications, history or providers verified            Evelyn Mtz LPN  Preadmission Testing              Visit Vitals  Smoking Status Some Days       DASI Risk Score      Flowsheet Row Pre-Admission Testing [facility]9/[facility]24 in [facility]   Can you take care of yourself (eat, dress, bathe, or use toilet)?  2.75 filed at 10/09/2024 0752   Can you walk indoors, such as around your house? 1.75 filed at 10/09/2024 0752   Can you walk a block or two on level ground?  2.75 filed at 10/09/2024 0752   Can you climb a flight of stairs or walk up a hill? 5.5 filed at 10/09/2024 0752   Can you run a short distance? 0 filed at 10/09/2024 0752   Can you do light work around the house like dusting or washing dishes? 2.7 filed at 10/09/2024 0752   Can you do moderate work around the house like vacuuming, sweeping floors or carrying groceries? 3.5 filed at 10/09/2024 0752   Can you do heavy work around the house like scrubbing floors or lifting and moving heavy furniture?  0 filed at 10/09/2024 0752   Can you do yard work like raking leaves, weeding or pushing a mower? 0 filed at 10/09/2024 0752   Can you have sexual relations? 5.25 filed at 10/09/2024 0752   Can you participate in moderate recreational activities like golf, bowling, dancing, doubles tennis or throwing a baseball or football? 0 filed at 10/09/2024 0752   Can you participate in strenous sports like swimming, singles tennis, football, basketball, or skiing? 0 filed at 10/09/2024 0752   DASI SCORE 24.2 filed at 10/09/2024 0752   METS Score (Will be calculated only when all the questions are answered) 5.7 filed at 10/09/2024 0752          Caprini DVT Assessment       Flowsheet Row Pre-Admission Testing from 10/9/2024 in [facility]   DVT Score (IF A SCORE IS NOT CALCULATING, MUST SELECT A BMI TO COMPLETE) 8 filed at 10/09/2024 0753   Surgical Factors Major surgery planned, lasting over 3 hours filed at 10/09/2024 0753   BMI (BMI MUST BE CHOSEN) 30 or less filed at 10/09/2024 0753          Modified Frailty Index      Flowsheet Row Pre-Admission Testing from 10/9/2024 in [facility]   Non-independent functional status (problems with dressing, bathing, personal grooming, or cooking) 0 filed at 10/09/2024 0753   History of diabetes mellitus  0 filed at 10/09/2024 0753   History of COPD 0 filed at 10/09/2024 0753   History of CHF No filed at 10/09/2024 0753   History of MI 0 filed at 10/09/2024 0753   History of Percutaneous Coronary Intervention, Cardiac Surgery, or Angina No filed at 10/09/2024 0753   Hypertension requiring the use of medication  0.0909 filed at 10/09/2024 0753   Peripheral vascular disease 0 filed at 10/09/2024 0753   Impaired sensorium (cognitive impairement or loss, clouding, or delirium) 0 filed at 10/09/2024 0753   TIA or CVA withouy residual deficit 0.0909 filed at 10/09/2024 0753   Cerebrovascular accident with deficit 0 filed at 10/09/2024 0753   Modified Frailty Index Calculator .1818 filed at 10/09/2024 0753          CHADS2 Stroke Risk  Current as of today        N/A 3 to 100%: High Risk   2 to < 3%: Medium Risk   0 to < 2%: Low Risk     Last Change: N/A          This score determines the patient's risk of having a stroke if the patient has atrial fibrillation.        This score is not applicable to this patient. Components are not calculated.          Revised Cardiac Risk Index      Flowsheet Row Pre-Admission Testing from 10/9/2024 in [facility]   High-Risk Surgery (Intraperitoneal, Intrathoracic,Suprainguinal vascular) 0 filed at 10/09/2024 0752   History of ischemic heart disease (History of MI,  History of positive exercuse test, Current chest paint considered due to myocardial ischemia, Use of nitrate therapy, ECG with pathological Q Waves) 0 filed at 10/09/2024 0752   History of congestive heart failure (pulmonary edemia, bilateral rales or S3 gallop, Paroxysmal nocturnal dyspnea, CXR showing pulmonary vascular redistribution) 0 filed at 10/09/2024 0752   History of cerebrovascular disease (Prior TIA or stroke) 1 filed at 10/09/2024 0752   Pre-operative insulin treatment 0 filed at 10/09/2024 0752   Pre-operative creatinine>2 mg/dl 0 filed at 10/09/2024 0752   Revised Cardiac Risk Calculator 1 filed at 10/09/2024 0752          Apfel Simplified Score      Flowsheet Row Pre-Admission Testing from 10/9/2024 in [facility]   Smoking status 1 filed at 10/09/2024 0753   History of motion sickness or PONV  0 filed at 10/09/2024 0753   Use of postoperative opioids 1 filed at 10/09/2024 0753   Gender - Female 0=No filed at 10/09/2024 0753   Apfel Simplified Score Calculator 2 filed at 10/09/2024 0753          Risk Analysis Index Results This Encounter    No data found in the last 10 encounters.       Stop Bang Score      Flowsheet Row Pre-Admission Testing from 10/9/2024 in [facility]   Do you snore loudly? 0 filed at 10/09/2024 0752   Do you often feel tired or fatigued after your sleep? 0 filed at 10/09/2024 0752   Has anyone ever observed you stop breathing in your sleep? 0 filed at 10/09/2024 0752   Do you have or are you being treated for high blood pressure? 1 filed at 10/09/2024 0752   Recent BMI (Calculated) 30.4 filed at 10/09/2024 0752   Is BMI greater than 35 kg/m2? 0=No filed at 10/09/2024 0752   Age older than 50 years old? 1=Yes filed at 10/09/2024 0752   Is your neck circumference greater than 17 inches (Male) or 16 inches (Female)? 0 filed at 10/09/2024 0752   Gender - Male 1=Yes filed at 10/09/2024 0752   STOP-BANG Total Score 3 filed at 10/09/2024 0759           Prodigy: High Risk  Total Score: 20              Prodigy Age Score      Prodigy Gender Score          ARISCAT Score for Postoperative Pulmonary Complications    No data to display       Katie Perioperative Risk for Myocardial Infarction or Cardiac Arrest (SHREYAS)    No data to display         Assessment and Plan:     {[facility] EMBEDDED ASSESSMENT AND PLAN:92441}

## 2025-01-21 ENCOUNTER — PRE-ADMISSION TESTING (OUTPATIENT)
Dept: PREADMISSION TESTING | Facility: HOSPITAL | Age: 75
End: 2025-01-21
Payer: MEDICARE

## 2025-01-21 ENCOUNTER — ANESTHESIA EVENT (OUTPATIENT)
Dept: OPERATING ROOM | Facility: HOSPITAL | Age: 75
End: 2025-01-21
Payer: MEDICARE

## 2025-01-21 VITALS
SYSTOLIC BLOOD PRESSURE: 112 MMHG | WEIGHT: 210 LBS | HEIGHT: 71 IN | BODY MASS INDEX: 29.4 KG/M2 | TEMPERATURE: 97.7 F | HEART RATE: 85 BPM | OXYGEN SATURATION: 97 % | DIASTOLIC BLOOD PRESSURE: 79 MMHG

## 2025-01-21 DIAGNOSIS — D64.9 ANEMIA, UNSPECIFIED TYPE: ICD-10-CM

## 2025-01-21 DIAGNOSIS — Z01.818 PREOPERATIVE CLEARANCE: ICD-10-CM

## 2025-01-21 DIAGNOSIS — Z01.810 PREOPERATIVE CARDIOVASCULAR EXAMINATION: ICD-10-CM

## 2025-01-21 DIAGNOSIS — Z41.9 SURGERY, ELECTIVE: Primary | ICD-10-CM

## 2025-01-21 DIAGNOSIS — Z01.811 PREOPERATIVE RESPIRATORY EXAMINATION: ICD-10-CM

## 2025-01-21 LAB
ABO GROUP (TYPE) IN BLOOD: NORMAL
ANION GAP SERPL CALC-SCNC: 15 MMOL/L (ref 10–20)
ANTIBODY SCREEN: NORMAL
BASOPHILS # BLD AUTO: 0.07 X10*3/UL (ref 0–0.1)
BASOPHILS NFR BLD AUTO: 0.8 %
BUN SERPL-MCNC: 22 MG/DL (ref 6–23)
CALCIUM SERPL-MCNC: 10.2 MG/DL (ref 8.6–10.6)
CHLORIDE SERPL-SCNC: 104 MMOL/L (ref 98–107)
CO2 SERPL-SCNC: 24 MMOL/L (ref 21–32)
CREAT SERPL-MCNC: 1.47 MG/DL (ref 0.5–1.3)
EGFRCR SERPLBLD CKD-EPI 2021: 49 ML/MIN/1.73M*2
EOSINOPHIL # BLD AUTO: 0.04 X10*3/UL (ref 0–0.4)
EOSINOPHIL NFR BLD AUTO: 0.4 %
ERYTHROCYTE [DISTWIDTH] IN BLOOD BY AUTOMATED COUNT: 16 % (ref 11.5–14.5)
GLUCOSE SERPL-MCNC: 97 MG/DL (ref 74–99)
HCT VFR BLD AUTO: 38.3 % (ref 41–52)
HGB BLD-MCNC: 12.2 G/DL (ref 13.5–17.5)
IMM GRANULOCYTES # BLD AUTO: 0.03 X10*3/UL (ref 0–0.5)
IMM GRANULOCYTES NFR BLD AUTO: 0.3 % (ref 0–0.9)
LYMPHOCYTES # BLD AUTO: 2.3 X10*3/UL (ref 0.8–3)
LYMPHOCYTES NFR BLD AUTO: 25.5 %
MCH RBC QN AUTO: 28.2 PG (ref 26–34)
MCHC RBC AUTO-ENTMCNC: 31.9 G/DL (ref 32–36)
MCV RBC AUTO: 89 FL (ref 80–100)
MONOCYTES # BLD AUTO: 0.82 X10*3/UL (ref 0.05–0.8)
MONOCYTES NFR BLD AUTO: 9.1 %
NEUTROPHILS # BLD AUTO: 5.76 X10*3/UL (ref 1.6–5.5)
NEUTROPHILS NFR BLD AUTO: 63.9 %
NRBC BLD-RTO: 0 /100 WBCS (ref 0–0)
PLATELET # BLD AUTO: 255 X10*3/UL (ref 150–450)
POTASSIUM SERPL-SCNC: 4.2 MMOL/L (ref 3.5–5.3)
RBC # BLD AUTO: 4.32 X10*6/UL (ref 4.5–5.9)
RH FACTOR (ANTIGEN D): NORMAL
SODIUM SERPL-SCNC: 139 MMOL/L (ref 136–145)
WBC # BLD AUTO: 9 X10*3/UL (ref 4.4–11.3)

## 2025-01-21 PROCEDURE — 85025 COMPLETE CBC W/AUTO DIFF WBC: CPT

## 2025-01-21 PROCEDURE — 36415 COLL VENOUS BLD VENIPUNCTURE: CPT

## 2025-01-21 PROCEDURE — 80048 BASIC METABOLIC PNL TOTAL CA: CPT

## 2025-01-21 PROCEDURE — 86923 COMPATIBILITY TEST ELECTRIC: CPT

## 2025-01-21 PROCEDURE — 86850 RBC ANTIBODY SCREEN: CPT

## 2025-01-21 PROCEDURE — 99215 OFFICE O/P EST HI 40 MIN: CPT | Performed by: ANESTHESIOLOGY

## 2025-01-21 ASSESSMENT — ENCOUNTER SYMPTOMS
CARDIOVASCULAR NEGATIVE: 1
RESPIRATORY NEGATIVE: 1
DYSPNEA AT REST: 0
GASTROINTESTINAL NEGATIVE: 1
DYSPNEA WITH EXERTION: 0
PALPITATIONS: 0
FEVER: 0
SHORTNESS OF BREATH: 0
CONSTITUTIONAL NEGATIVE: 1
CHILLS: 0
WHEEZING: 0
COUGH: 0

## 2025-01-21 ASSESSMENT — DUKE ACTIVITY SCORE INDEX (DASI)
CAN YOU DO MODERATE WORK AROUND THE HOUSE LIKE VACUUMING, SWEEPING FLOORS OR CARRYING GROCERIES: YES
CAN YOU CLIMB A FLIGHT OF STAIRS OR WALK UP A HILL: YES
CAN YOU TAKE CARE OF YOURSELF (EAT, DRESS, BATHE, OR USE TOILET): YES
TOTAL_SCORE: 26.95
CAN YOU DO HEAVY WORK AROUND THE HOUSE LIKE SCRUBBING FLOORS OR LIFTING AND MOVING HEAVY FURNITURE: NO
CAN YOU DO YARD WORK LIKE RAKING LEAVES, WEEDING OR PUSHING A MOWER: NO
CAN YOU PARTICIPATE IN STRENOUS SPORTS LIKE SWIMMING, SINGLES TENNIS, FOOTBALL, BASKETBALL, OR SKIING: NO
CAN YOU PARTICIPATE IN MODERATE RECREATIONAL ACTIVITIES LIKE GOLF, BOWLING, DANCING, DOUBLES TENNIS OR THROWING A BASEBALL OR FOOTBALL: NO
CAN YOU DO LIGHT WORK AROUND THE HOUSE LIKE DUSTING OR WASHING DISHES: YES
CAN YOU WALK INDOORS, SUCH AS AROUND YOUR HOUSE: YES
CAN YOU HAVE SEXUAL RELATIONS: NO
CAN YOU RUN A SHORT DISTANCE: YES
CAN YOU WALK A BLOCK OR TWO ON LEVEL GROUND: YES
DASI METS SCORE: 6.1

## 2025-01-21 NOTE — PREPROCEDURE INSTRUCTIONS
Thank you for visiting [facility] today for your pre-procedure evaluation, you were seen by Dr. Wendy Cadena ([phone])    This summary includes instructions and information to aid you during your perioperative period.  Please read carefully. If you have any questions about your visit today, please call the number listed above.  If you become ill or have any changes to your health before your surgery, please contact your primary care provider and alert your surgeon.    Preparing for your Surgery       Exercises  Preoperative Deep Breathing Exercises  Why it is important to do deep breathing exercises before my surgery?  Deep breathing exercises strengthen your breathing muscles.  This helps you to recover after your surgery and decreases the chance of breathing complications.  How are the deep breathing exercises done?  Sit straight with your back supported.  Breathe in deeply and slowly through your nose. Your lower rib cage should expand and your abdomen may move forward.  Hold that breath for 3 to 5 seconds.  Breathe out through pursed lips, slowly and completely.  Rest and repeat 10 times every hour while awake.  Rest longer if you become dizzy or lightheaded      Preoperative Brain Exercises    What are brain exercises?  A brain exercise is any activity that engages your thinking (cognitive) skills.    What types of activities are considered brain exercises?  Jigsaw puzzles, crossword puzzles, word jumble, memory games, word search, and many more.  Many can be found free online or on your phone via a mobile roxi.    Why should I do brain exercises before my surgery?  More recent research has shown brain exercise before surgery can lower the risk of postoperative delirium (confusion) which can be especially important for older adults.  Patients who did brain exercises for 5 to 10 hours the days before surgery, cut their risk of postoperative delirium in half up to 1 week  after surgery.    Sit-to-Stand Exercise    What is the sit-to-stand exercise?  The sit-to-stand exercise strengthens the muscles of your lower body and muscles in the center of your body (core muscles for stability) helping to maintain and improve your strength and mobility.  How do I do the sit-to-stand exercise?  The goal is to do this exercise without using your arms or hands.  If this is too difficult, use your arms and hands or a chair with armrests to help slowly push yourself to the standing position and lower yourself back to the sitting position. As the movement becomes easier use your arms and hands less.    Steps to the sit-to-stand exercise  Sit up tall in a sturdy chair, knees bent, feet flat on the floor shoulder-width apart.  Shift your hips/pelvis forward in the chair to correctly position yourself for the next movement.  Lean forward at your hips.  Stand up straight putting equal weight on both feet.  Check to be sure you are properly aligned with the chair, in a slow controlled movement sit back down.  Repeat this exercise 10-15 times.  If needed you can do it fewer times until your strength improves.  Rest for 1 minute.  Do another 10-15 sit-to-stand exercises.  Try to do this in the morning and evening.        Instructions    Preoperative Fasting Guidelines    Why must I stop eating and drinking near surgery time?  With sedation, food or liquid in your stomach can enter your lungs causing serious complications  Food can increase nausea and vomiting  When do I need to stop eating and drinking before my surgery?      Do not eat any food after midnight the night before your surgery/procedure. You may have up to 13.5 ounces of clear liquid until TWO hours before your instructed arrival time to the hospital.  This includes water, black tea/coffee, (no milk or cream) apple juice, and electrolyte drinks (Gatorade). You may chew gum until TWO hours before your surgery/procedure      Simple things you can  do to help prevent blood clots     Blood clots are blockages that can form in the body's veins. When a blood clot forms in your deep veins, it may be called a deep vein thrombosis, or DVT for short. Blood clots can happen in any part of the body where blood flows, but they are most common in the arms and legs. If a piece of a blood clot breaks free and travels to the lungs, it is called a pulmonary embolus (PE). A PE can be a very serious problem.         Being in the hospital or having surgery can raise your chances of getting a blood clot because you may not be well enough to move around as much as you normally do.         Ways you can help prevent blood clots in the hospital       Wearing SCDs  SCDs stands for Sequential Compression Devices.   SCDs are special sleeves that wrap around your legs. They attach to a pump that fills them with air to gently squeeze your legs every few minutes.  This helps return the blood in your legs to your heart.   SCDs should only be taken off when walking or bathing. SCDs may not be comfortable, but they can help save your life.              Pump SCD leg sleeves  Wearing compression stockings - if your doctor orders them. These special snug-fitting stockings gently squeeze your legs to help blood flow.       Walking. Walking helps move the blood in your legs.   If your doctor says it is ok, try walking the halls at least   5 times a day. Ask us to help you get up, so you don't fall.      Taking any blood-thinning medicines your doctor orders.              Ways you can help prevent blood clots at home         Wearing compression stockings - if your doctor orders them.   Walking - to help move the blood in your legs.    Taking any blood-thinning medicines your doctor orders.      Signs of a blood clot or PE    Tell your doctor or nurse right away if you have any of the problems listed below.         If you are at home, seek medical care right away. Call 911 for chest pain or  problems breathing.            Signs of a blood clot (DVT) - such as pain, swelling, redness, or warmth in your arm or legs.  Signs of a pulmonary embolism (PE) - such as chest pain or feeling short of breath      Tobacco and Alcohol;  Do not drink alcohol or smoke within 24 hours of surgery.  It is best to quit smoking for as long as possible before any surgery or procedure.    The Week before Surgery        Seven days before Surgery  Check your [facility] medication instructions  Do the exercises provided to you by [facility]   Arrange for a responsible, adult licensed  to take you home after surgery and stay with you for 24 hours.  You will not be permitted to drive yourself home if you have received any anesthetic/sedation  Six days before surgery  Check your [facility] medication instructions  Do the exercises provided to you by [facility]   Start using Chlorhexidene (CHG) body wash if prescribed  Five days before surgery  Check your [facility] medication instructions  Do the exercises provided to you by [facility]   Continue to use CHG body wash if prescribed  Three days before surgery  Check your [facility] medication instructions  Do the exercises provided to you by [facility]   Continue to use CHG body wash if prescribed  Two days before surgery  Check your [facility] medication instructions  Do the exercises provided to you by [facility]   Continue to use CHG body wash if prescribed    The Day before Surgery       Check your [facility] medication and all other [facility] instructions including when to stop eating and drinking  You will be called with your arrival time for surgery in the late afternoon.  If you do not receive a call please reach out to your surgeon's office.  Do not smoke or drink 24 hours before surgery  Prepare items to bring with you to the hospital  Shower with your chlorhexidine wash if prescribed  Brush your teeth and use your chlorhexidine dental rinse if prescribed    The Day of Surgery       Check your [facility] medication instructions  Ensure you follow the  instructions for when to stop eating and drinking  Shower, if prescribed use CHG.  Do not apply any lotions, creams, moisturizers, perfume or deodorant  Brush your teeth and use your CHG dental rinse if prescribed  Wear loose comfortable clothing  Avoid make-up  Remove  jewelry and piercings, consider professional piercing removal with a plastic spacer if needed  Bring photo ID and Insurance card  Bring an accurate medication list that includes medication dose, frequency and allergies  Bring a copy of your advanced directives (will, health care power of )  Bring any devices and controllers as well as medical devices you have been provided with for surgery (CPAP, slings, braces, etc.)  Dentures, eyeglasses, and contacts will be removed before surgery, please bring cases for contacts or glasses

## 2025-01-21 NOTE — CPM/PAT H&P
CPM/PAT Evaluation       Name: Swapnil Allen (Swapnil Allen)  /Age: 1950/75 y.o.     In-Person       Chief Complaint: pre op eval for nephrectomy     74 year old male with history of BPH, recent kidney mass and kidney cysts noted on US abdomen in 2024. He presents today for perioperative evaluation in anticipation of Nephrectomy - Right with Dr. Perez.  Surgery was scheduled for 2024 but was canceled while pt was in pre op. Patient does not recall why it was cancelled.  He recently had biopsy done in December confirming renal cell carcinoma, its possible the surgeon wanted a biopsy done prior to nephrectomy.      Other PMH includes hyperthyroid s/p thyroidectomy, HTN, hepatitis C treated with Epclusa, Warthin's tumor. Pt denies any URI symptoms, chest pain, SOB, LIRIANO, any changes to med history.  Denies any recent drug use, says he stopped using things like marijuana, cocaine, and alcohol after his stroke in . Used to smoke 1 ppd, now has tried to cut back from cigarettes and smokes a few puffs of black and mild cigarillos.         Past Medical History:   Diagnosis Date    BPH (benign prostatic hyperplasia)     Cerebral vascular accident (Multi)     CKD (chronic kidney disease)     Colon polyp     Hypertension     managed with Amlodipine    Hypothyroidism     Internal carotid artery stenosis     2020--Mild to moderate stenosis of right supraclinoid ICA    Liver disease     Hep C treated with Epclusa 2024    Parotid mass     Renal mass, right     Right Kidney mass 8.8 x 10.7 x 10 cm    Stroke (Multi)     CVA -       Past Surgical History:   Procedure Laterality Date    COLONOSCOPY      RENAL BIOPSY      THYROID SURGERY      thyroidectomy       Patient Sexual activity questions deferred to the physician.    Family History   Problem Relation Name Age of Onset    Hypertension Brother         No Known Allergies    Prior to Admission medications    Medication Sig Start Date End Date Taking?  Authorizing Provider   amLODIPine (Norvasc) 10 mg tablet Take 1 tablet (10 mg) by mouth once daily. 10/26/23   Historical Provider, MD   aspirin 81 mg EC tablet Take 1 tablet (81 mg) by mouth once daily. 10/12/21   Historical Provider, MD   chlorthalidone (Hygroton) 25 mg tablet Take 1 tablet (25 mg) by mouth once daily. 8/19/24   Historical Provider, MD   cholecalciferol (Vitamin D-3) 25 MCG (1000 UT) tablet Take 1 tablet (1,000 Units) by mouth once daily.    Historical Provider, MD   levothyroxine (Synthroid, Levoxyl) 25 mcg tablet Take 1 tablet (25 mcg) by mouth once daily.    Historical Provider, MD   melatonin 5 mg tablet Take 1 tablet (5 mg) by mouth once daily at bedtime.    Historical Provider, MD   tamsulosin (Flomax) 0.4 mg 24 hr capsule Take 1 capsule (0.4 mg) by mouth once daily.    Historical Provider, MD        PAT ROS:   Constitutional:   neg     no fever   no chills  Neuro/Psych:   Eyes:   Ears:   Nose:   Mouth:   Throat:   Neck:   Cardio:   neg     no chest pain   no palpitations   no peripheral edema   no dyspnea   no LIRIANO  Respiratory:   neg     no cough   no wheezing   no shortness of breath  Endocrine:   GI:   neg    :    Feels like he urinates a lot from his tamsulosin    polyuria  Musculoskeletal:   Hematologic:   Skin:      Physical Exam  Vitals reviewed.   Constitutional:       Appearance: Normal appearance.   HENT:      Head: Normocephalic and atraumatic.      Mouth/Throat:      Mouth: Mucous membranes are moist.   Eyes:      Conjunctiva/sclera: Conjunctivae normal.   Cardiovascular:      Rate and Rhythm: Normal rate and regular rhythm.      Heart sounds: Normal heart sounds.   Pulmonary:      Effort: Pulmonary effort is normal. No respiratory distress.      Breath sounds: Normal breath sounds. No wheezing.   Skin:     General: Skin is warm and dry.   Neurological:      General: No focal deficit present.      Mental Status: He is alert and oriented to person, place, and time.      Gait:  Gait abnormal (uses a cane to walk).   Psychiatric:         Mood and Affect: Mood normal.         Behavior: Behavior normal.          PAT AIRWAY:   Airway:     Mallampati::  II    TM distance::  >3 FB    Neck ROM::  Full         Testing/Diagnostic:     Patient Specialist/PCP:     Visit Vitals  Smoking Status Some Days       DASI Risk Score      Flowsheet Row Pre-Admission Testing from 10/9/2024 in [facility]   Can you take care of yourself (eat, dress, bathe, or use toilet)?  2.75 filed at 10/09/2024 0752   Can you walk indoors, such as around your house? 1.75 filed at 10/09/2024 0752   Can you walk a block or two on level ground?  2.75 filed at 10/09/2024 0752   Can you climb a flight of stairs or walk up a hill? 5.5 filed at 10/09/2024 0752   Can you run a short distance? 0 filed at 10/09/2024 0752   Can you do light work around the house like dusting or washing dishes? 2.7 filed at 10/09/2024 0752   Can you do moderate work around the house like vacuuming, sweeping floors or carrying groceries? 3.5 filed at 10/09/2024 0752   Can you do heavy work around the house like scrubbing floors or lifting and moving heavy furniture?  0 filed at 10/09/2024 0752   Can you do yard work like raking leaves, weeding or pushing a mower? 0 filed at 10/09/2024 0752   Can you have sexual relations? 5.25 filed at 10/09/2024 0752   Can you participate in moderate recreational activities like golf, bowling, dancing, doubles tennis or throwing a baseball or football? 0 filed at 10/09/2024 0752   Can you participate in strenous sports like swimming, singles tennis, football, basketball, or skiing? 0 filed at 10/09/2024 0752   DASI SCORE 24.2 filed at 10/09/2024 0752   METS Score (Will be calculated only when all the questions are answered) 5.7 filed at 10/09/2024 0752          Caprini DVT Assessment      Flowsheet Row Pre-Admission Testing from 10/9/2024 in [facility]   DVT Score (IF A SCORE IS NOT  CALCULATING, MUST SELECT A BMI TO COMPLETE) 8 filed at 10/09/2024 0753   Surgical Factors Major surgery planned, lasting over 3 hours filed at 10/09/2024 0753   BMI (BMI MUST BE CHOSEN) 30 or less filed at 10/09/2024 0753          Modified Frailty Index      Flowsheet Row Pre-Admission Testing from 10/9/2024 in [facility]   Non-independent functional status (problems with dressing, bathing, personal grooming, or cooking) 0 filed at 10/09/2024 0753   History of diabetes mellitus  0 filed at 10/09/2024 0753   History of COPD 0 filed at 10/09/2024 0753   History of CHF No filed at 10/09/2024 0753   History of MI 0 filed at 10/09/2024 0753   History of Percutaneous Coronary Intervention, Cardiac Surgery, or Angina No filed at 10/09/2024 0753   Hypertension requiring the use of medication  0.0909 filed at 10/09/2024 0753   Peripheral vascular disease 0 filed at 10/09/2024 0753   Impaired sensorium (cognitive impairement or loss, clouding, or delirium) 0 filed at 10/09/2024 0753   TIA or CVA withouy residual deficit 0.0909 filed at 10/09/2024 0753   Cerebrovascular accident with deficit 0 filed at 10/09/2024 0753   Modified Frailty Index Calculator .1818 filed at 10/09/2024 0753          CHADS2 Stroke Risk  Current as of 2 minutes ago        N/A 3 to 100%: High Risk   2 to < 3%: Medium Risk   0 to < 2%: Low Risk     Last Change: N/A          This score determines the patient's risk of having a stroke if the patient has atrial fibrillation.        This score is not applicable to this patient. Components are not calculated.          Revised Cardiac Risk Index      Flowsheet Row Pre-Admission Testing from 10/9/2024 in [facility]   High-Risk Surgery (Intraperitoneal, Intrathoracic,Suprainguinal vascular) 0 filed at 10/09/2024 0752   History of ischemic heart disease (History of MI, History of positive exercuse test, Current chest paint considered due to myocardial ischemia, Use of nitrate  therapy, ECG with pathological Q Waves) 0 filed at 10/09/2024 0752   History of congestive heart failure (pulmonary edemia, bilateral rales or S3 gallop, Paroxysmal nocturnal dyspnea, CXR showing pulmonary vascular redistribution) 0 filed at 10/09/2024 0752   History of cerebrovascular disease (Prior TIA or stroke) 1 filed at 10/09/2024 0752   Pre-operative insulin treatment 0 filed at 10/09/2024 0752   Pre-operative creatinine>2 mg/dl 0 filed at 10/09/2024 0752   Revised Cardiac Risk Calculator 1 filed at 10/09/2024 0752          Apfel Simplified Score      Flowsheet Row Pre-Admission Testing from 10/9/2024 in [facility]   Smoking status 1 filed at 10/09/2024 0753   History of motion sickness or PONV  0 filed at 10/09/2024 0753   Use of postoperative opioids 1 filed at 10/09/2024 0753   Gender - Female 0=No filed at 10/09/2024 0753   Apfel Simplified Score Calculator 2 filed at 10/09/2024 0753          Risk Analysis Index Results This Encounter    No data found in the last 10 encounters.       Stop Bang Score      Flowsheet Row Pre-Admission Testing from 10/9/2024 in [facility]   Do you snore loudly? 0 filed at 10/09/2024 0752   Do you often feel tired or fatigued after your sleep? 0 filed at 10/09/2024 0752   Has anyone ever observed you stop breathing in your sleep? 0 filed at 10/09/2024 0752   Do you have or are you being treated for high blood pressure? 1 filed at 10/09/2024 0752   Recent BMI (Calculated) 30.4 filed at 10/09/2024 0752   Is BMI greater than 35 kg/m2? 0=No filed at 10/09/2024 0752   Age older than 50 years old? 1=Yes filed at 10/09/2024 0752   Is your neck circumference greater than 17 inches (Male) or 16 inches (Female)? 0 filed at 10/09/2024 0752   Gender - Male 1=Yes filed at 10/09/2024 0752   STOP-BANG Total Score 3 filed at 10/09/2024 0752          Prodigy: High Risk  Total Score: 20              Prodigy Age Score      Prodigy Gender Score          ARISCAT  Score for Postoperative Pulmonary Complications    No data to display       Wilson Perioperative Risk for Myocardial Infarction or Cardiac Arrest (SHREYAS)    No data to display       No results found for this or any previous visit (from the past 24 hours).     Assessment and Plan:    75 y.o.  male  scheduled for open Right nephrectomy on 1/28 with Dr. Perez.  PMHX includes BPH, kidney mass and kidney cysts, hyperthyroid s/p thyroidectomy, HTN, hepatitis C treated with Epclusa, Warthin's tumor .  Presents to CPM today for perioperative risk stratification and optimization    Neuro:    Hx CVA in past  - presented to ED in Aug. 2020 with stroke-like R sided symptoms this was following cocaine use per note  - on interview today, pt states he had a cold and took cold medicine, woke up the next morning with Right sided heaviness/weakness and went to urgent care who sent him to the ED  - imaging at the time showed chronic changes  - on ASA, continue   - TTE ordered but never obtained       MRI (8/2020)  Chronic Change: Mild patchy foci of T2/FLAIR hyperintensity in the   supratentorial white matter, non-specific but most likely mild   chronic microvascular ischemic changes. Remote lacunar infarcts are   visible in both thalami, eloy and both cerebellar hemispheres. There   are remote microhemorrhages in the cerebral hemispheres, a remote   microhemorrhage in the left thalamus and a remote microhemorrhage in the right striatocapsular region. These are most consistent with   chronic hypertensive encephalopathy.  IMPRESSION:  1. No acute potential cause of a neurologic deficit from brain   pathology is identified. There are numerous remote lacunar infarcts.  2. Left ostiomeatal unit pattern of paranasal sinus obstruction with   chronic sinusitis. An antrochoanal polyp is suspected.  3. 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     (CT 8/2020)  No acute intracranial abnormality.  Mild stenosis of bilateral  "ICA origin.  Mild to moderate stenosis of right supraclinoid ICA.  2.4 x 2.1 cm nodule in the left parotid gland most likely represent a   mixed benign tumor..  Opacification of left paranasal sinuses.    Delirium risk factors   age, sensory impairment, type and duration of surgery, Patient instructed on and provided cognitive exercises  Patient is at increased risk for perioperative CVA secondary to  previous CVA/TIA, HTN, increased age, operative time > 2.5 hours    HEENT:  Warthins parotid tumor   - seen on CT incidental     Cardiovascular:  HTN  - cont home amlodipine     METS: 6.1  RCRI: 2 points, 10.1% risk for postoperative MACE   SHREYAS: .27% risk for 30 day postoperative MACE    Pulmonary:  Former 1ppd smoker   - states now that he \"puffs\" on black & mild cigarillos, \"does not inhale\" and does not smoke the full thing  - previously had quit smoking completely for a few months while on Chantix which he says was very helpful, but gave him intense dreams se he stopped     No pulmonary diagnosis, however patient is at increased risk of perioperative complications secondary to  age > 60, major surgery, duration of surgery > 2 hours  Stop Bang score is 5 placing patient at mod-high risk for BRAXTON  ARISCAT: 26-44 points, 13.3% risk of in-hospital postoperative pulmonary complication    Smoking cessation discussed with patient, patient also provided cessation education/hotline handout    Renal:   R kidney mass and cysts   - scheduled for open nephrectomy on 1/28    BPH  - continue home tamsulosin     Renal carola-op Risk factors  Age equal to or greater than 56, HTN, cerebral vascular disease, intraperitoneal surgery    Endocrine:  Hyperthyroidism   - s/p thyroidectomy now on synthroid     Hematologic:  No hematologic diagnosis, however patient is at an increased risk for DVT  Caprini Score 10, patient at High risk for perioperative DVT.  Patient provided with VTE education/handout.    Gastrointestinal:   Hx hep C  - " treated with 12 weeks of  Epclusa (Sofosbuvir / Velpatasvir)  - HCV RNA not detected on 12/9/24    Apfel 2    Infectious disease:   No infectious diagnosis or significant findings on chart review or clinical presentation and evaluation.     Musculoskeletal:   No diagnosis or significant findings on chart review or clinical presentation and evaluation.     Anesthesia/Airway:  No anesthesia complications      Medication instructions and NPO guidelines reviewed with the patient.  All questions or concerns discussed and addressed.      Patient seen and staffed with Dr. Vogt

## 2025-01-21 NOTE — H&P (VIEW-ONLY)
CPM/PAT Evaluation       Name: Swapnil Allen (Swapnil Allen)  /Age: 1950/75 y.o.     In-Person       Chief Complaint: pre op eval for nephrectomy     74 year old male with history of BPH, recent kidney mass and kidney cysts noted on US abdomen in 2024. He presents today for perioperative evaluation in anticipation of Nephrectomy - Right with Dr. Perez.  Surgery was scheduled for 2024 but was canceled while pt was in pre op. Patient does not recall why it was cancelled.  He recently had biopsy done in December confirming renal cell carcinoma, its possible the surgeon wanted a biopsy done prior to nephrectomy.      Other PMH includes hyperthyroid s/p thyroidectomy, HTN, hepatitis C treated with Epclusa, Warthin's tumor. Pt denies any URI symptoms, chest pain, SOB, LIRIANO, any changes to med history.  Denies any recent drug use, says he stopped using things like marijuana, cocaine, and alcohol after his stroke in . Used to smoke 1 ppd, now has tried to cut back from cigarettes and smokes a few puffs of black and mild cigarillos.         Past Medical History:   Diagnosis Date    BPH (benign prostatic hyperplasia)     Cerebral vascular accident (Multi)     CKD (chronic kidney disease)     Colon polyp     Hypertension     managed with Amlodipine    Hypothyroidism     Internal carotid artery stenosis     2020--Mild to moderate stenosis of right supraclinoid ICA    Liver disease     Hep C treated with Epclusa 2024    Parotid mass     Renal mass, right     Right Kidney mass 8.8 x 10.7 x 10 cm    Stroke (Multi)     CVA -       Past Surgical History:   Procedure Laterality Date    COLONOSCOPY      RENAL BIOPSY      THYROID SURGERY      thyroidectomy       Patient Sexual activity questions deferred to the physician.    Family History   Problem Relation Name Age of Onset    Hypertension Brother         No Known Allergies    Prior to Admission medications    Medication Sig Start Date End Date Taking?  Authorizing Provider   amLODIPine (Norvasc) 10 mg tablet Take 1 tablet (10 mg) by mouth once daily. 10/26/23   Historical Provider, MD   aspirin 81 mg EC tablet Take 1 tablet (81 mg) by mouth once daily. 10/12/21   Historical Provider, MD   chlorthalidone (Hygroton) 25 mg tablet Take 1 tablet (25 mg) by mouth once daily. 8/19/24   Historical Provider, MD   cholecalciferol (Vitamin D-3) 25 MCG (1000 UT) tablet Take 1 tablet (1,000 Units) by mouth once daily.    Historical Provider, MD   levothyroxine (Synthroid, Levoxyl) 25 mcg tablet Take 1 tablet (25 mcg) by mouth once daily.    Historical Provider, MD   melatonin 5 mg tablet Take 1 tablet (5 mg) by mouth once daily at bedtime.    Historical Provider, MD   tamsulosin (Flomax) 0.4 mg 24 hr capsule Take 1 capsule (0.4 mg) by mouth once daily.    Historical Provider, MD        PAT ROS:   Constitutional:   neg     no fever   no chills  Neuro/Psych:   Eyes:   Ears:   Nose:   Mouth:   Throat:   Neck:   Cardio:   neg     no chest pain   no palpitations   no peripheral edema   no dyspnea   no LIRIANO  Respiratory:   neg     no cough   no wheezing   no shortness of breath  Endocrine:   GI:   neg    :    Feels like he urinates a lot from his tamsulosin    polyuria  Musculoskeletal:   Hematologic:   Skin:      Physical Exam  Vitals reviewed.   Constitutional:       Appearance: Normal appearance.   HENT:      Head: Normocephalic and atraumatic.      Mouth/Throat:      Mouth: Mucous membranes are moist.   Eyes:      Conjunctiva/sclera: Conjunctivae normal.   Cardiovascular:      Rate and Rhythm: Normal rate and regular rhythm.      Heart sounds: Normal heart sounds.   Pulmonary:      Effort: Pulmonary effort is normal. No respiratory distress.      Breath sounds: Normal breath sounds. No wheezing.   Skin:     General: Skin is warm and dry.   Neurological:      General: No focal deficit present.      Mental Status: He is alert and oriented to person, place, and time.      Gait:  Gait abnormal (uses a cane to walk).   Psychiatric:         Mood and Affect: Mood normal.         Behavior: Behavior normal.          PAT AIRWAY:   Airway:     Mallampati::  II    TM distance::  >3 FB    Neck ROM::  Full         Testing/Diagnostic:     Patient Specialist/PCP:     Visit Vitals  Smoking Status Some Days       DASI Risk Score      Flowsheet Row Pre-Admission Testing from 10/9/2024 in [facility]   Can you take care of yourself (eat, dress, bathe, or use toilet)?  2.75 filed at 10/09/2024 0752   Can you walk indoors, such as around your house? 1.75 filed at 10/09/2024 0752   Can you walk a block or two on level ground?  2.75 filed at 10/09/2024 0752   Can you climb a flight of stairs or walk up a hill? 5.5 filed at 10/09/2024 0752   Can you run a short distance? 0 filed at 10/09/2024 0752   Can you do light work around the house like dusting or washing dishes? 2.7 filed at 10/09/2024 0752   Can you do moderate work around the house like vacuuming, sweeping floors or carrying groceries? 3.5 filed at 10/09/2024 0752   Can you do heavy work around the house like scrubbing floors or lifting and moving heavy furniture?  0 filed at 10/09/2024 0752   Can you do yard work like raking leaves, weeding or pushing a mower? 0 filed at 10/09/2024 0752   Can you have sexual relations? 5.25 filed at 10/09/2024 0752   Can you participate in moderate recreational activities like golf, bowling, dancing, doubles tennis or throwing a baseball or football? 0 filed at 10/09/2024 0752   Can you participate in strenous sports like swimming, singles tennis, football, basketball, or skiing? 0 filed at 10/09/2024 0752   DASI SCORE 24.2 filed at 10/09/2024 0752   METS Score (Will be calculated only when all the questions are answered) 5.7 filed at 10/09/2024 0752          Caprini DVT Assessment      Flowsheet Row Pre-Admission Testing from 10/9/2024 in [facility]   DVT Score (IF A SCORE IS NOT  CALCULATING, MUST SELECT A BMI TO COMPLETE) 8 filed at 10/09/2024 0753   Surgical Factors Major surgery planned, lasting over 3 hours filed at 10/09/2024 0753   BMI (BMI MUST BE CHOSEN) 30 or less filed at 10/09/2024 0753          Modified Frailty Index      Flowsheet Row Pre-Admission Testing from 10/9/2024 in [facility]3]   Non-independent functional status (problems with dressing, bathing, personal grooming, or cooking) 0 filed at 10/09/2024 0753   History of diabetes mellitus  0 filed at 10/09/2024 0753   History of COPD 0 filed at 10/09/2024 0753   History of CHF No filed at 10/09/2024 0753   History of MI 0 filed at 10/09/2024 0753   History of Percutaneous Coronary Intervention, Cardiac Surgery, or Angina No filed at 10/09/2024 0753   Hypertension requiring the use of medication  0.0909 filed at 10/09/2024 0753   Peripheral vascular disease 0 filed at 10/09/2024 0753   Impaired sensorium (cognitive impairement or loss, clouding, or delirium) 0 filed at 10/09/2024 0753   TIA or CVA withouy residual deficit 0.0909 filed at 10/09/2024 0753   Cerebrovascular accident with deficit 0 filed at 10/09/2024 0753   Modified Frailty Index Calculator .1818 filed at 10/09/2024 0753          CHADS2 Stroke Risk  Current as of 2 minutes ago        N/A 3 to 100%: High Risk   2 to < 3%: Medium Risk   0 to < 2%: Low Risk     Last Change: N/A          This score determines the patient's risk of having a stroke if the patient has atrial fibrillation.        This score is not applicable to this patient. Components are not calculated.          Revised Cardiac Risk Index      Flowsheet Row Pre-Admission Testing from 10/9/2024 in [facility]3]   High-Risk Surgery (Intraperitoneal, Intrathoracic,Suprainguinal vascular) 0 filed at 10/09/2024 0752   History of ischemic heart disease (History of MI, History of positive exercuse test, Current chest paint considered due to myocardial ischemia, Use of nitrate  therapy, ECG with pathological Q Waves) 0 filed at 10/09/2024 0752   History of congestive heart failure (pulmonary edemia, bilateral rales or S3 gallop, Paroxysmal nocturnal dyspnea, CXR showing pulmonary vascular redistribution) 0 filed at 10/09/2024 0752   History of cerebrovascular disease (Prior TIA or stroke) 1 filed at 10/09/2024 0752   Pre-operative insulin treatment 0 filed at 10/09/2024 0752   Pre-operative creatinine>2 mg/dl 0 filed at 10/09/2024 0752   Revised Cardiac Risk Calculator 1 filed at 10/09/2024 0752          Apfel Simplified Score      Flowsheet Row Pre-Admission Testing from 10/9/2024 in [facility]   Smoking status 1 filed at 10/09/2024 0753   History of motion sickness or PONV  0 filed at 10/09/2024 0753   Use of postoperative opioids 1 filed at 10/09/2024 0753   Gender - Female 0=No filed at 10/09/2024 0753   Apfel Simplified Score Calculator 2 filed at 10/09/2024 0753          Risk Analysis Index Results This Encounter    No data found in the last 10 encounters.       Stop Bang Score      Flowsheet Row Pre-Admission Testing from 10/9/2024 in [facility]   Do you snore loudly? 0 filed at 10/09/2024 0752   Do you often feel tired or fatigued after your sleep? 0 filed at 10/09/2024 0752   Has anyone ever observed you stop breathing in your sleep? 0 filed at 10/09/2024 0752   Do you have or are you being treated for high blood pressure? 1 filed at 10/09/2024 0752   Recent BMI (Calculated) 30.4 filed at 10/09/2024 0752   Is BMI greater than 35 kg/m2? 0=No filed at 10/09/2024 0752   Age older than 50 years old? 1=Yes filed at 10/09/2024 0752   Is your neck circumference greater than 17 inches (Male) or 16 inches (Female)? 0 filed at 10/09/2024 0752   Gender - Male 1=Yes filed at 10/09/2024 0752   STOP-BANG Total Score 3 filed at 10/09/2024 0752          Prodigy: High Risk  Total Score: 20              Prodigy Age Score      Prodigy Gender Score          ARISCAT  Score for Postoperative Pulmonary Complications    No data to display       Wilson Perioperative Risk for Myocardial Infarction or Cardiac Arrest (SHREYAS)    No data to display       No results found for this or any previous visit (from the past 24 hours).     Assessment and Plan:    75 y.o.  male  scheduled for open Right nephrectomy on 1/28 with Dr. Perez.  PMHX includes BPH, kidney mass and kidney cysts, hyperthyroid s/p thyroidectomy, HTN, hepatitis C treated with Epclusa, Warthin's tumor .  Presents to CPM today for perioperative risk stratification and optimization    Neuro:    Hx CVA in past  - presented to ED in Aug. 2020 with stroke-like R sided symptoms this was following cocaine use per note  - on interview today, pt states he had a cold and took cold medicine, woke up the next morning with Right sided heaviness/weakness and went to urgent care who sent him to the ED  - imaging at the time showed chronic changes  - on ASA, continue   - TTE ordered but never obtained       MRI (8/2020)  Chronic Change: Mild patchy foci of T2/FLAIR hyperintensity in the   supratentorial white matter, non-specific but most likely mild   chronic microvascular ischemic changes. Remote lacunar infarcts are   visible in both thalami, eloy and both cerebellar hemispheres. There   are remote microhemorrhages in the cerebral hemispheres, a remote   microhemorrhage in the left thalamus and a remote microhemorrhage in the right striatocapsular region. These are most consistent with   chronic hypertensive encephalopathy.  IMPRESSION:  1. No acute potential cause of a neurologic deficit from brain   pathology is identified. There are numerous remote lacunar infarcts.  2. Left ostiomeatal unit pattern of paranasal sinus obstruction with   chronic sinusitis. An antrochoanal polyp is suspected.  3. 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     (CT 8/2020)  No acute intracranial abnormality.  Mild stenosis of bilateral  "ICA origin.  Mild to moderate stenosis of right supraclinoid ICA.  2.4 x 2.1 cm nodule in the left parotid gland most likely represent a   mixed benign tumor..  Opacification of left paranasal sinuses.    Delirium risk factors   age, sensory impairment, type and duration of surgery, Patient instructed on and provided cognitive exercises  Patient is at increased risk for perioperative CVA secondary to  previous CVA/TIA, HTN, increased age, operative time > 2.5 hours    HEENT:  Warthins parotid tumor   - seen on CT incidental     Cardiovascular:  HTN  - cont home amlodipine     METS: 6.1  RCRI: 2 points, 10.1% risk for postoperative MACE   SHREYAS: .27% risk for 30 day postoperative MACE    Pulmonary:  Former 1ppd smoker   - states now that he \"puffs\" on black & mild cigarillos, \"does not inhale\" and does not smoke the full thing  - previously had quit smoking completely for a few months while on Chantix which he says was very helpful, but gave him intense dreams se he stopped     No pulmonary diagnosis, however patient is at increased risk of perioperative complications secondary to  age > 60, major surgery, duration of surgery > 2 hours  Stop Bang score is 5 placing patient at mod-high risk for BRAXTON  ARISCAT: 26-44 points, 13.3% risk of in-hospital postoperative pulmonary complication    Smoking cessation discussed with patient, patient also provided cessation education/hotline handout    Renal:   R kidney mass and cysts   - scheduled for open nephrectomy on 1/28    BPH  - continue home tamsulosin     Renal carola-op Risk factors  Age equal to or greater than 56, HTN, cerebral vascular disease, intraperitoneal surgery    Endocrine:  Hyperthyroidism   - s/p thyroidectomy now on synthroid     Hematologic:  No hematologic diagnosis, however patient is at an increased risk for DVT  Caprini Score 10, patient at High risk for perioperative DVT.  Patient provided with VTE education/handout.    Gastrointestinal:   Hx hep C  - " treated with 12 weeks of  Epclusa (Sofosbuvir / Velpatasvir)  - HCV RNA not detected on 12/9/24    Apfel 2    Infectious disease:   No infectious diagnosis or significant findings on chart review or clinical presentation and evaluation.     Musculoskeletal:   No diagnosis or significant findings on chart review or clinical presentation and evaluation.     Anesthesia/Airway:  No anesthesia complications      Medication instructions and NPO guidelines reviewed with the patient.  All questions or concerns discussed and addressed.      Patient seen and staffed with Dr. Vogt

## 2025-01-27 NOTE — PROGRESS NOTES
Pharmacy Medication History Review    Swapnil Allen is a 75 y.o. male who is planned to be admitted for Renal mass. Pharmacy called the patient prior to their scheduled procedure and reviewed the patient's [id number] medications for accuracy.    Medications ADDED:  none  Medications CHANGED:  none  Medications REMOVED:   Chlorthalidone 25mg    Please review updated prior to admission medication list and comments regarding how patient may be taking medications differently by going to Admission tab --> Admission Orders --> Admit Orders / Review prior to admission medications.     Preferred pharmacy, last doses of medications, and allergies to be confirmed with patient by nursing the day of procedure.     Sources used to complete the med history [facility]  Pharmacy dispense history  Patient interview  Chart Review  Care Everywhere     Below are additional concerns with the patient's PTA list.  Patient states they do NOT take gabapentin 300mg at this time. L.F. 12/17/24 #30/30d. [facility] verified    Sophia Sharpe  Terra  Please reach out via Secure Chat for questions or call [employer] or [employer]

## 2025-01-28 ENCOUNTER — ANESTHESIA (OUTPATIENT)
Dept: OPERATING ROOM | Facility: HOSPITAL | Age: 75
End: 2025-01-28
Payer: MEDICARE

## 2025-01-28 ENCOUNTER — HOSPITAL ENCOUNTER (INPATIENT)
Facility: HOSPITAL | Age: 75
End: 2025-01-28
Attending: STUDENT IN AN ORGANIZED HEALTH CARE EDUCATION/TRAINING PROGRAM | Admitting: STUDENT IN AN ORGANIZED HEALTH CARE EDUCATION/TRAINING PROGRAM
Payer: MEDICARE

## 2025-01-28 DIAGNOSIS — G89.18 POST-OP PAIN: ICD-10-CM

## 2025-01-28 DIAGNOSIS — K56.7 ILEUS (MULTI): ICD-10-CM

## 2025-01-28 DIAGNOSIS — N28.89 RENAL MASS: Primary | ICD-10-CM

## 2025-01-28 DIAGNOSIS — K56.609 SMALL BOWEL OBSTRUCTION (MULTI): ICD-10-CM

## 2025-01-28 DIAGNOSIS — Z93.1 S/P PERCUTANEOUS ENDOSCOPIC GASTROSTOMY (PEG) TUBE PLACEMENT (MULTI): ICD-10-CM

## 2025-01-28 PROCEDURE — 2500000005 HC RX 250 GENERAL PHARMACY W/O HCPCS

## 2025-01-28 PROCEDURE — 36620 INSERTION CATHETER ARTERY: CPT | Performed by: STUDENT IN AN ORGANIZED HEALTH CARE EDUCATION/TRAINING PROGRAM

## 2025-01-28 PROCEDURE — 7100000001 HC RECOVERY ROOM TIME - INITIAL BASE CHARGE: Performed by: STUDENT IN AN ORGANIZED HEALTH CARE EDUCATION/TRAINING PROGRAM

## 2025-01-28 PROCEDURE — 96372 THER/PROPH/DIAG INJ SC/IM: CPT | Performed by: STUDENT IN AN ORGANIZED HEALTH CARE EDUCATION/TRAINING PROGRAM

## 2025-01-28 PROCEDURE — 50230 REMOVAL KIDNEY OPEN RADICAL: CPT | Performed by: STUDENT IN AN ORGANIZED HEALTH CARE EDUCATION/TRAINING PROGRAM

## 2025-01-28 PROCEDURE — 88341 IMHCHEM/IMCYTCHM EA ADD ANTB: CPT | Performed by: PATHOLOGY

## 2025-01-28 PROCEDURE — 2500000005 HC RX 250 GENERAL PHARMACY W/O HCPCS: Performed by: ANESTHESIOLOGIST ASSISTANT

## 2025-01-28 PROCEDURE — 88342 IMHCHEM/IMCYTCHM 1ST ANTB: CPT | Performed by: PATHOLOGY

## 2025-01-28 PROCEDURE — 2500000004 HC RX 250 GENERAL PHARMACY W/ HCPCS (ALT 636 FOR OP/ED)

## 2025-01-28 PROCEDURE — 99222 1ST HOSP IP/OBS MODERATE 55: CPT

## 2025-01-28 PROCEDURE — 3600000009 HC OR TIME - EACH INCREMENTAL 1 MINUTE - PROCEDURE LEVEL FOUR: Performed by: STUDENT IN AN ORGANIZED HEALTH CARE EDUCATION/TRAINING PROGRAM

## 2025-01-28 PROCEDURE — 88364 INSITU HYBRIDIZATION (FISH): CPT | Performed by: PATHOLOGY

## 2025-01-28 PROCEDURE — 88365 INSITU HYBRIDIZATION (FISH): CPT | Performed by: PATHOLOGY

## 2025-01-28 PROCEDURE — 2500000001 HC RX 250 WO HCPCS SELF ADMINISTERED DRUGS (ALT 637 FOR MEDICARE OP): Performed by: STUDENT IN AN ORGANIZED HEALTH CARE EDUCATION/TRAINING PROGRAM

## 2025-01-28 PROCEDURE — 3700000002 HC GENERAL ANESTHESIA TIME - EACH INCREMENTAL 1 MINUTE: Performed by: STUDENT IN AN ORGANIZED HEALTH CARE EDUCATION/TRAINING PROGRAM

## 2025-01-28 PROCEDURE — 07TD0ZZ RESECTION OF AORTIC LYMPHATIC, OPEN APPROACH: ICD-10-PCS | Performed by: STUDENT IN AN ORGANIZED HEALTH CARE EDUCATION/TRAINING PROGRAM

## 2025-01-28 PROCEDURE — 2500000004 HC RX 250 GENERAL PHARMACY W/ HCPCS (ALT 636 FOR OP/ED): Performed by: ANESTHESIOLOGIST ASSISTANT

## 2025-01-28 PROCEDURE — 88305 TISSUE EXAM BY PATHOLOGIST: CPT | Performed by: PATHOLOGY

## 2025-01-28 PROCEDURE — 3700000001 HC GENERAL ANESTHESIA TIME - INITIAL BASE CHARGE: Performed by: STUDENT IN AN ORGANIZED HEALTH CARE EDUCATION/TRAINING PROGRAM

## 2025-01-28 PROCEDURE — 88307 TISSUE EXAM BY PATHOLOGIST: CPT | Mod: TC,SUR,WESLAB | Performed by: STUDENT IN AN ORGANIZED HEALTH CARE EDUCATION/TRAINING PROGRAM

## 2025-01-28 PROCEDURE — 2500000004 HC RX 250 GENERAL PHARMACY W/ HCPCS (ALT 636 FOR OP/ED): Performed by: STUDENT IN AN ORGANIZED HEALTH CARE EDUCATION/TRAINING PROGRAM

## 2025-01-28 PROCEDURE — 7100000002 HC RECOVERY ROOM TIME - EACH INCREMENTAL 1 MINUTE: Performed by: STUDENT IN AN ORGANIZED HEALTH CARE EDUCATION/TRAINING PROGRAM

## 2025-01-28 PROCEDURE — 2720000007 HC OR 272 NO HCPCS: Performed by: STUDENT IN AN ORGANIZED HEALTH CARE EDUCATION/TRAINING PROGRAM

## 2025-01-28 PROCEDURE — 88307 TISSUE EXAM BY PATHOLOGIST: CPT | Performed by: PATHOLOGY

## 2025-01-28 PROCEDURE — 0TT00ZZ RESECTION OF RIGHT KIDNEY, OPEN APPROACH: ICD-10-PCS | Performed by: STUDENT IN AN ORGANIZED HEALTH CARE EDUCATION/TRAINING PROGRAM

## 2025-01-28 PROCEDURE — 3600000004 HC OR TIME - INITIAL BASE CHARGE - PROCEDURE LEVEL FOUR: Performed by: STUDENT IN AN ORGANIZED HEALTH CARE EDUCATION/TRAINING PROGRAM

## 2025-01-28 PROCEDURE — 88365 INSITU HYBRIDIZATION (FISH): CPT | Mod: TC,SUR,WESLAB | Performed by: STUDENT IN AN ORGANIZED HEALTH CARE EDUCATION/TRAINING PROGRAM

## 2025-01-28 PROCEDURE — 1200000003 HC ONCOLOGY  ROOM WITH TELEMETRY DAILY

## 2025-01-28 RX ORDER — ALBUTEROL SULFATE 0.83 MG/ML
2.5 SOLUTION RESPIRATORY (INHALATION) ONCE AS NEEDED
Status: DISCONTINUED | OUTPATIENT
Start: 2025-01-28 | End: 2025-01-28 | Stop reason: HOSPADM

## 2025-01-28 RX ORDER — ONDANSETRON HYDROCHLORIDE 2 MG/ML
4 INJECTION, SOLUTION INTRAVENOUS ONCE AS NEEDED
Status: COMPLETED | OUTPATIENT
Start: 2025-01-28 | End: 2025-01-28

## 2025-01-28 RX ORDER — ONDANSETRON HYDROCHLORIDE 2 MG/ML
INJECTION, SOLUTION INTRAVENOUS AS NEEDED
Status: DISCONTINUED | OUTPATIENT
Start: 2025-01-28 | End: 2025-01-28

## 2025-01-28 RX ORDER — SENNOSIDES 8.6 MG/1
2 TABLET ORAL 2 TIMES DAILY
Status: DISCONTINUED | OUTPATIENT
Start: 2025-01-28 | End: 2025-02-01

## 2025-01-28 RX ORDER — CEFAZOLIN 1 G/1
INJECTION, POWDER, FOR SOLUTION INTRAVENOUS AS NEEDED
Status: DISCONTINUED | OUTPATIENT
Start: 2025-01-28 | End: 2025-01-28

## 2025-01-28 RX ORDER — HEPARIN SODIUM 5000 [USP'U]/ML
5000 INJECTION, SOLUTION INTRAVENOUS; SUBCUTANEOUS EVERY 8 HOURS
Status: DISCONTINUED | OUTPATIENT
Start: 2025-01-28 | End: 2025-02-22

## 2025-01-28 RX ORDER — OXYCODONE HYDROCHLORIDE 5 MG/1
10 TABLET ORAL EVERY 6 HOURS PRN
Status: DISCONTINUED | OUTPATIENT
Start: 2025-01-28 | End: 2025-02-15

## 2025-01-28 RX ORDER — HYDROMORPHONE HYDROCHLORIDE 0.2 MG/ML
0.2 INJECTION INTRAMUSCULAR; INTRAVENOUS; SUBCUTANEOUS EVERY 2 HOUR PRN
Status: DISCONTINUED | OUTPATIENT
Start: 2025-01-28 | End: 2025-01-29

## 2025-01-28 RX ORDER — ACETAMINOPHEN 325 MG/1
975 TABLET ORAL EVERY 6 HOURS
Status: DISCONTINUED | OUTPATIENT
Start: 2025-01-28 | End: 2025-01-31

## 2025-01-28 RX ORDER — ROCURONIUM BROMIDE 10 MG/ML
INJECTION, SOLUTION INTRAVENOUS AS NEEDED
Status: DISCONTINUED | OUTPATIENT
Start: 2025-01-28 | End: 2025-01-28

## 2025-01-28 RX ORDER — MIDAZOLAM HYDROCHLORIDE 1 MG/ML
INJECTION INTRAMUSCULAR; INTRAVENOUS AS NEEDED
Status: DISCONTINUED | OUTPATIENT
Start: 2025-01-28 | End: 2025-01-28

## 2025-01-28 RX ORDER — ACETAMINOPHEN 325 MG/1
650 TABLET ORAL EVERY 4 HOURS PRN
Status: DISCONTINUED | OUTPATIENT
Start: 2025-01-28 | End: 2025-01-28 | Stop reason: HOSPADM

## 2025-01-28 RX ORDER — METOCLOPRAMIDE 10 MG/1
10 TABLET ORAL EVERY 6 HOURS PRN
Status: DISCONTINUED | OUTPATIENT
Start: 2025-01-28 | End: 2025-01-31

## 2025-01-28 RX ORDER — POLYETHYLENE GLYCOL 3350 17 G/17G
17 POWDER, FOR SOLUTION ORAL DAILY
Status: DISCONTINUED | OUTPATIENT
Start: 2025-01-29 | End: 2025-02-01

## 2025-01-28 RX ORDER — AMLODIPINE BESYLATE 10 MG/1
10 TABLET ORAL DAILY
Status: DISCONTINUED | OUTPATIENT
Start: 2025-01-29 | End: 2025-02-01

## 2025-01-28 RX ORDER — GLYCOPYRROLATE 0.2 MG/ML
INJECTION INTRAMUSCULAR; INTRAVENOUS AS NEEDED
Status: DISCONTINUED | OUTPATIENT
Start: 2025-01-28 | End: 2025-01-28

## 2025-01-28 RX ORDER — HYDROMORPHONE HYDROCHLORIDE 1 MG/ML
INJECTION, SOLUTION INTRAMUSCULAR; INTRAVENOUS; SUBCUTANEOUS AS NEEDED
Status: DISCONTINUED | OUTPATIENT
Start: 2025-01-28 | End: 2025-01-28

## 2025-01-28 RX ORDER — METOCLOPRAMIDE HYDROCHLORIDE 5 MG/ML
10 INJECTION INTRAMUSCULAR; INTRAVENOUS EVERY 6 HOURS PRN
Status: DISCONTINUED | OUTPATIENT
Start: 2025-01-28 | End: 2025-01-31

## 2025-01-28 RX ORDER — DROPERIDOL 2.5 MG/ML
0.62 INJECTION, SOLUTION INTRAMUSCULAR; INTRAVENOUS ONCE AS NEEDED
Status: DISCONTINUED | OUTPATIENT
Start: 2025-01-28 | End: 2025-01-28 | Stop reason: HOSPADM

## 2025-01-28 RX ORDER — OXYCODONE HYDROCHLORIDE 5 MG/1
10 TABLET ORAL EVERY 4 HOURS PRN
Status: DISCONTINUED | OUTPATIENT
Start: 2025-01-28 | End: 2025-01-28 | Stop reason: HOSPADM

## 2025-01-28 RX ORDER — PHENYLEPHRINE HCL IN 0.9% NACL 0.4MG/10ML
SYRINGE (ML) INTRAVENOUS AS NEEDED
Status: DISCONTINUED | OUTPATIENT
Start: 2025-01-28 | End: 2025-01-28

## 2025-01-28 RX ORDER — ROPIVACAINE HYDROCHLORIDE 5 MG/ML
INJECTION, SOLUTION EPIDURAL; INFILTRATION; PERINEURAL AS NEEDED
Status: DISCONTINUED | OUTPATIENT
Start: 2025-01-28 | End: 2025-01-28

## 2025-01-28 RX ORDER — NORETHINDRONE AND ETHINYL ESTRADIOL 0.5-0.035
KIT ORAL AS NEEDED
Status: DISCONTINUED | OUTPATIENT
Start: 2025-01-28 | End: 2025-01-28

## 2025-01-28 RX ORDER — HEPARIN SODIUM 5000 [USP'U]/ML
5000 INJECTION, SOLUTION INTRAVENOUS; SUBCUTANEOUS ONCE
Status: COMPLETED | OUTPATIENT
Start: 2025-01-28 | End: 2025-01-28

## 2025-01-28 RX ORDER — CEFAZOLIN SODIUM 2 G/100ML
2 INJECTION, SOLUTION INTRAVENOUS ONCE
Status: DISCONTINUED | OUTPATIENT
Start: 2025-01-28 | End: 2025-01-28 | Stop reason: HOSPADM

## 2025-01-28 RX ORDER — PHENYLEPHRINE 10 MG/250 ML(40 MCG/ML)IN 0.9 % SOD.CHLORIDE INTRAVENOUS
CONTINUOUS PRN
Status: DISCONTINUED | OUTPATIENT
Start: 2025-01-28 | End: 2025-01-28

## 2025-01-28 RX ORDER — HYDROMORPHONE HYDROCHLORIDE 0.2 MG/ML
0.2 INJECTION INTRAMUSCULAR; INTRAVENOUS; SUBCUTANEOUS EVERY 5 MIN PRN
Status: DISCONTINUED | OUTPATIENT
Start: 2025-01-28 | End: 2025-01-28 | Stop reason: HOSPADM

## 2025-01-28 RX ORDER — OXYCODONE HYDROCHLORIDE 5 MG/1
5 TABLET ORAL EVERY 6 HOURS PRN
Status: DISCONTINUED | OUTPATIENT
Start: 2025-01-28 | End: 2025-02-15

## 2025-01-28 RX ORDER — PROPOFOL 10 MG/ML
INJECTION, EMULSION INTRAVENOUS AS NEEDED
Status: DISCONTINUED | OUTPATIENT
Start: 2025-01-28 | End: 2025-01-28

## 2025-01-28 RX ORDER — SODIUM CHLORIDE, SODIUM LACTATE, POTASSIUM CHLORIDE, CALCIUM CHLORIDE 600; 310; 30; 20 MG/100ML; MG/100ML; MG/100ML; MG/100ML
INJECTION, SOLUTION INTRAVENOUS CONTINUOUS PRN
Status: DISCONTINUED | OUTPATIENT
Start: 2025-01-28 | End: 2025-01-28

## 2025-01-28 RX ORDER — ASPIRIN 81 MG/1
81 TABLET ORAL DAILY
Status: DISCONTINUED | OUTPATIENT
Start: 2025-01-29 | End: 2025-02-26 | Stop reason: HOSPADM

## 2025-01-28 RX ORDER — LEVOTHYROXINE SODIUM 25 UG/1
25 TABLET ORAL DAILY
Status: DISCONTINUED | OUTPATIENT
Start: 2025-01-29 | End: 2025-02-13

## 2025-01-28 RX ORDER — OXYCODONE HYDROCHLORIDE 5 MG/1
5 TABLET ORAL EVERY 4 HOURS PRN
Status: DISCONTINUED | OUTPATIENT
Start: 2025-01-28 | End: 2025-01-28 | Stop reason: HOSPADM

## 2025-01-28 RX ORDER — ROPIVACAINE IN 0.9% SOD CHL/PF 0.2 %
14 PLASTIC BAG, INJECTION (ML) EPIDURAL CONTINUOUS
Status: DISCONTINUED | OUTPATIENT
Start: 2025-01-28 | End: 2025-01-31

## 2025-01-28 RX ORDER — SODIUM CHLORIDE, SODIUM LACTATE, POTASSIUM CHLORIDE, CALCIUM CHLORIDE 600; 310; 30; 20 MG/100ML; MG/100ML; MG/100ML; MG/100ML
100 INJECTION, SOLUTION INTRAVENOUS CONTINUOUS
Status: DISCONTINUED | OUTPATIENT
Start: 2025-01-28 | End: 2025-01-29

## 2025-01-28 RX ORDER — LIDOCAINE HCL/PF 100 MG/5ML
SYRINGE (ML) INTRAVENOUS AS NEEDED
Status: DISCONTINUED | OUTPATIENT
Start: 2025-01-28 | End: 2025-01-28

## 2025-01-28 RX ORDER — TAMSULOSIN HYDROCHLORIDE 0.4 MG/1
0.4 CAPSULE ORAL DAILY
Status: DISCONTINUED | OUTPATIENT
Start: 2025-01-29 | End: 2025-02-24

## 2025-01-28 RX ORDER — KETAMINE HYDROCHLORIDE 10 MG/ML
INJECTION, SOLUTION INTRAMUSCULAR; INTRAVENOUS AS NEEDED
Status: DISCONTINUED | OUTPATIENT
Start: 2025-01-28 | End: 2025-01-28

## 2025-01-28 RX ORDER — FENTANYL CITRATE 50 UG/ML
INJECTION, SOLUTION INTRAMUSCULAR; INTRAVENOUS AS NEEDED
Status: DISCONTINUED | OUTPATIENT
Start: 2025-01-28 | End: 2025-01-28

## 2025-01-28 RX ORDER — CHOLECALCIFEROL (VITAMIN D3) 25 MCG
1000 TABLET ORAL DAILY
Status: DISCONTINUED | OUTPATIENT
Start: 2025-01-29 | End: 2025-02-01

## 2025-01-28 RX ADMIN — PROPOFOL 150 MG: 10 INJECTION, EMULSION INTRAVENOUS at 14:46

## 2025-01-28 RX ADMIN — ROCURONIUM BROMIDE 20 MG: 10 INJECTION INTRAVENOUS at 18:03

## 2025-01-28 RX ADMIN — CEFAZOLIN 2 G: 1 INJECTION, POWDER, FOR SOLUTION INTRAMUSCULAR; INTRAVENOUS at 18:56

## 2025-01-28 RX ADMIN — FENTANYL CITRATE 50 MCG: 50 INJECTION, SOLUTION INTRAMUSCULAR; INTRAVENOUS at 16:07

## 2025-01-28 RX ADMIN — ACETAMINOPHEN 975 MG: 325 TABLET ORAL at 21:51

## 2025-01-28 RX ADMIN — FENTANYL CITRATE 50 MCG: 50 INJECTION, SOLUTION INTRAMUSCULAR; INTRAVENOUS at 15:20

## 2025-01-28 RX ADMIN — Medication 80 MCG: at 16:53

## 2025-01-28 RX ADMIN — ROCURONIUM BROMIDE 20 MG: 10 INJECTION INTRAVENOUS at 15:30

## 2025-01-28 RX ADMIN — HYDROMORPHONE HYDROCHLORIDE 0.2 MG: 0.2 INJECTION, SOLUTION INTRAMUSCULAR; INTRAVENOUS; SUBCUTANEOUS at 21:51

## 2025-01-28 RX ADMIN — Medication 80 MCG: at 16:58

## 2025-01-28 RX ADMIN — MIDAZOLAM HYDROCHLORIDE 2 MG: 1 INJECTION, SOLUTION INTRAMUSCULAR; INTRAVENOUS at 14:30

## 2025-01-28 RX ADMIN — HEPARIN SODIUM 5000 UNITS: 5000 INJECTION INTRAVENOUS; SUBCUTANEOUS at 21:51

## 2025-01-28 RX ADMIN — HYDROMORPHONE HYDROCHLORIDE 0.5 MG: 0.5 INJECTION, SOLUTION INTRAMUSCULAR; INTRAVENOUS; SUBCUTANEOUS at 19:45

## 2025-01-28 RX ADMIN — ROPIVACAINE HYDROCHLORIDE 30 ML: 5 INJECTION, SOLUTION EPIDURAL; INFILTRATION; PERINEURAL at 11:32

## 2025-01-28 RX ADMIN — Medication 10 MG: at 18:45

## 2025-01-28 RX ADMIN — SUGAMMADEX 300 MG: 100 INJECTION, SOLUTION INTRAVENOUS at 19:19

## 2025-01-28 RX ADMIN — GLYCOPYRROLATE 0.2 MG: 0.2 INJECTION INTRAMUSCULAR; INTRAVENOUS at 11:29

## 2025-01-28 RX ADMIN — ONDANSETRON 4 MG: 2 INJECTION INTRAMUSCULAR; INTRAVENOUS at 19:07

## 2025-01-28 RX ADMIN — DEXAMETHASONE SODIUM PHOSPHATE 8 MG: 4 INJECTION INTRA-ARTICULAR; INTRALESIONAL; INTRAMUSCULAR; INTRAVENOUS; SOFT TISSUE at 18:37

## 2025-01-28 RX ADMIN — ROCURONIUM BROMIDE 10 MG: 10 INJECTION INTRAVENOUS at 17:34

## 2025-01-28 RX ADMIN — Medication 80 MCG: at 16:45

## 2025-01-28 RX ADMIN — SODIUM CHLORIDE, POTASSIUM CHLORIDE, SODIUM LACTATE AND CALCIUM CHLORIDE 125 ML/HR: 600; 310; 30; 20 INJECTION, SOLUTION INTRAVENOUS at 21:58

## 2025-01-28 RX ADMIN — HYDROMORPHONE HYDROCHLORIDE 0.2 MG: 1 INJECTION, SOLUTION INTRAMUSCULAR; INTRAVENOUS; SUBCUTANEOUS at 19:14

## 2025-01-28 RX ADMIN — Medication 14 ML/HR: at 15:47

## 2025-01-28 RX ADMIN — FENTANYL CITRATE 50 MCG: 50 INJECTION, SOLUTION INTRAMUSCULAR; INTRAVENOUS at 14:46

## 2025-01-28 RX ADMIN — SODIUM CHLORIDE, POTASSIUM CHLORIDE, SODIUM LACTATE AND CALCIUM CHLORIDE: 600; 310; 30; 20 INJECTION, SOLUTION INTRAVENOUS at 14:35

## 2025-01-28 RX ADMIN — Medication 120 MCG: at 18:11

## 2025-01-28 RX ADMIN — EPHEDRINE SULFATE 5 MG: 50 INJECTION, SOLUTION INTRAVENOUS at 15:30

## 2025-01-28 RX ADMIN — Medication 20 MG: at 16:40

## 2025-01-28 RX ADMIN — ROCURONIUM BROMIDE 50 MG: 10 INJECTION INTRAVENOUS at 14:46

## 2025-01-28 RX ADMIN — OXYCODONE 10 MG: 5 TABLET ORAL at 20:14

## 2025-01-28 RX ADMIN — PHENYLEPHRINE-NACL IV SOLUTION 10 MG/250ML-0.9% 0.2 MCG/KG/MIN: 10-0.9/25 SOLUTION at 16:58

## 2025-01-28 RX ADMIN — HEPARIN SODIUM 5000 UNITS: 5000 INJECTION, SOLUTION INTRAVENOUS; SUBCUTANEOUS at 12:15

## 2025-01-28 RX ADMIN — HYDROMORPHONE HYDROCHLORIDE 0.2 MG: 0.2 INJECTION, SOLUTION INTRAMUSCULAR; INTRAVENOUS; SUBCUTANEOUS at 19:56

## 2025-01-28 RX ADMIN — FENTANYL CITRATE 50 MCG: 50 INJECTION, SOLUTION INTRAMUSCULAR; INTRAVENOUS at 17:40

## 2025-01-28 RX ADMIN — HYDROMORPHONE HYDROCHLORIDE 0.4 MG: 1 INJECTION, SOLUTION INTRAMUSCULAR; INTRAVENOUS; SUBCUTANEOUS at 19:24

## 2025-01-28 RX ADMIN — Medication 10 MG: at 18:16

## 2025-01-28 RX ADMIN — LIDOCAINE HYDROCHLORIDE 100 MG: 20 INJECTION INTRAVENOUS at 14:46

## 2025-01-28 RX ADMIN — CEFAZOLIN 2 G: 1 INJECTION, POWDER, FOR SOLUTION INTRAMUSCULAR; INTRAVENOUS at 14:56

## 2025-01-28 RX ADMIN — ROCURONIUM BROMIDE 20 MG: 10 INJECTION INTRAVENOUS at 16:41

## 2025-01-28 RX ADMIN — ONDANSETRON 4 MG: 2 INJECTION INTRAMUSCULAR; INTRAVENOUS at 19:38

## 2025-01-28 ASSESSMENT — PAIN - FUNCTIONAL ASSESSMENT
PAIN_FUNCTIONAL_ASSESSMENT: 0-10
PAIN_FUNCTIONAL_ASSESSMENT: 0-10
PAIN_FUNCTIONAL_ASSESSMENT: UNABLE TO SELF-REPORT
PAIN_FUNCTIONAL_ASSESSMENT: 0-10
PAIN_FUNCTIONAL_ASSESSMENT: 0-10
PAIN_FUNCTIONAL_ASSESSMENT: UNABLE TO SELF-REPORT
PAIN_FUNCTIONAL_ASSESSMENT: 0-10

## 2025-01-28 ASSESSMENT — PAIN DESCRIPTION - LOCATION
LOCATION: ABDOMEN
LOCATION: ABDOMEN

## 2025-01-28 ASSESSMENT — COGNITIVE AND FUNCTIONAL STATUS - GENERAL
TOILETING: A LITTLE
MOVING TO AND FROM BED TO CHAIR: A LITTLE
TURNING FROM BACK TO SIDE WHILE IN FLAT BAD: A LITTLE
PERSONAL GROOMING: A LITTLE
CLIMB 3 TO 5 STEPS WITH RAILING: A LITTLE
HELP NEEDED FOR BATHING: A LITTLE
STANDING UP FROM CHAIR USING ARMS: A LITTLE
DRESSING REGULAR LOWER BODY CLOTHING: A LITTLE
MOVING FROM LYING ON BACK TO SITTING ON SIDE OF FLAT BED WITH BEDRAILS: A LITTLE
EATING MEALS: A LITTLE
DAILY ACTIVITIY SCORE: 18
MOBILITY SCORE: 18
DRESSING REGULAR UPPER BODY CLOTHING: A LITTLE
WALKING IN HOSPITAL ROOM: A LITTLE

## 2025-01-28 ASSESSMENT — PAIN SCALES - GENERAL
PAINLEVEL_OUTOF10: 7
PAINLEVEL_OUTOF10: 0 - NO PAIN
PAINLEVEL_OUTOF10: 6
PAINLEVEL_OUTOF10: 0 - NO PAIN
PAINLEVEL_OUTOF10: 7
PAINLEVEL_OUTOF10: 8

## 2025-01-28 ASSESSMENT — COLUMBIA-SUICIDE SEVERITY RATING SCALE - C-SSRS
1. IN THE PAST MONTH, HAVE YOU WISHED YOU WERE DEAD OR WISHED YOU COULD GO TO SLEEP AND NOT WAKE UP?: NO
6. HAVE YOU EVER DONE ANYTHING, STARTED TO DO ANYTHING, OR PREPARED TO DO ANYTHING TO END YOUR LIFE?: NO
2. HAVE YOU ACTUALLY HAD ANY THOUGHTS OF KILLING YOURSELF?: NO

## 2025-01-28 NOTE — ANESTHESIA PREPROCEDURE EVALUATION
Patient: Swapnil Allen    Procedure Information       Anesthesia Start Date/Time: 01/28/25 1435    Procedure: Radical nephrectomy (open) (Right)    Location: [facility] OR 02 / Virtual [facility] OR    Surgeons: Vaughn Perez MD            Relevant Problems   Cardiac   (+) Essential (primary) hypertension   (+) Hyperlipidemia      Neuro   (+) Cerebrovascular accident (CVA) (Multi)      Endocrine   (+) Hypothyroidism (acquired)      HEENT   (+) Sensorineural hearing loss, bilateral       Clinical information reviewed:   Tobacco  Allergies  Meds   Med Hx  Surg Hx   Fam Hx  Soc Hx        NPO Detail:  NPO/Void Status  Carbohydrate Drink Given Prior to Surgery? : N  Date of Last Liquid: 01/27/25  Time of Last Liquid: 2100  Date of Last Solid: 01/27/25  Time of Last Solid: 2100  Last Intake Type: Food         Physical Exam    Airway  Mallampati: IV  TM distance: >3 FB  Neck ROM: full     Cardiovascular - normal exam     Dental        Pulmonary - normal exam     Abdominal   (+) obese             Anesthesia Plan    History of general anesthesia?: yes  History of complications of general anesthesia?: no    ASA 3     general     intravenous induction   Postoperative administration of opioids is intended.  Trial extubation is planned.  Anesthetic plan and risks discussed with patient.    Plan discussed with CAA.

## 2025-01-28 NOTE — CONSULTS
Consults  Acute Pain Service    Swapnil Allen is a 75 y.o. year old male patient who presents for radical right nephrectomy (open) with Dr. Perez on 1/28/25. Acute Pain consulted for block for postoperative pain control.     Anticipated Postop Pain Issues -   Palliative: typically relieved with IV analgesics and regional local anesthetics  Provocative: typically with movement  Quality: typically burning and aching  Radiation: typically none  Severity: typically severe 8-10/10  Timing: typically constant    Past Medical History:   Diagnosis Date    BPH (benign prostatic hyperplasia)     Cerebral vascular accident (Multi)     CKD (chronic kidney disease)     Colon polyp     Hypertension     managed with Amlodipine    Hypothyroidism     Internal carotid artery stenosis     8/2020--Mild to moderate stenosis of right supraclinoid ICA    Kidney mass     Liver disease     Hep C treated with Epclusa 9/2024    Parotid mass     Renal mass, right     Right Kidney mass 8.8 x 10.7 x 10 cm    Stroke (Multi)     CVA -2020        Past Surgical History:   Procedure Laterality Date    COLONOSCOPY      RENAL BIOPSY      THYROID SURGERY      thyroidectomy        Family History   Problem Relation Name Age of Onset    Hypertension Brother          Social History     Socioeconomic History    Marital status: Single     Spouse name: Not on file    Number of children: Not on file    Years of education: Not on file    Highest education level: Not on file   Occupational History    Not on file   Tobacco Use    Smoking status: Some Days     Types: Cigars     Passive exposure: Never    Smokeless tobacco: Never    Tobacco comments:     Quit cigarette smoking over 20 years. Occassional cigar   Vaping Use    Vaping status: Never Used   Substance and Sexual Activity    Alcohol use: Not Currently     Comment: former drinker    Drug use: Not Currently     Types: Marijuana     Comment: formerly smoked marijuana    Sexual activity: Defer   Other Topics  Concern    Not on file   Social History Narrative    Not on file     Social Drivers of Health     Financial Resource Strain: Not on File (2023)    Received from [employer]    Financial Resource Strain     Financial Resource Strain: 0   Food Insecurity: Not on File (2024)    Received from [employer]    Food Insecurity     Food: 0   Transportation Needs: Not on File (2023)    Received from [employer]    Transportation Needs     Transportation: 0   Physical Activity: Not on File (2023)    Received from [employer]    Physical Activity     Physical Activity: 0   Stress: Not on File (2023)    Received from [employer]    Stress     Stress: 0   Social Connections: Not on File (2024)    Received from [employer]    Social Connections     Connectedness: 0   Intimate Partner Violence: Not on file   Housing Stability: Not on File (2023)    Received from [employer]    Housing Stability     Housin        No Known Allergies      Review of Systems  Gen: No fatigue, anorexia, insomnia, fever.   Eyes: No vision loss, double vision, drainage, eye pain.   ENT: No pharyngitis, dry mouth, no hearing changes or ear discharge  Cardiac: No chest pain, palpitations, syncope, near syncope.   Pulmonary: No shortness of breath, cough, hemoptysis.   Heme/lymph: No swollen glands, fever, bleeding.   GI: No abdominal pain, change in bowel habits, melena, hematemesis, hematochezia, nausea, vomiting, diarrhea.   : No discharge, dysuria, frequency, urgency, hematuria.  Endo: No polyuria or weight loss.   Musculoskeletal: Negative for any pain or loss of ROM/weakness  Skin: No rashes or lesions  Neuro: Normal speech, no numbness or weakness. No gait difficulties  Review of systems is otherwise negative unless stated above or in history of present illness.    Physical Exam:  Constitutional:  no distress, alert and cooperative  Eyes: clear sclera  Head/Neck: No apparent injury, trachea midline  Respiratory/Thorax: Patent airways, thorax symmetric,  breathing comfortably  Cardiovascular: no pitting edema  Gastrointestinal: Nondistended  Musculoskeletal: ROM intact  Extremities: no clubbing  Neurological: alert, villeda x4  Psychological: Appropriate affect    No results found for this or any previous visit (from the past 24 hours).     Swapnil Allen is a 75 y.o. year old male patient who presents for radical right nephrectomy (open) with Dr. Perez on 1/28/25. Acute Pain consulted for block for postoperative pain control.     Plan:    - Bilateral erector spinae plane blocks with catheters performed preoperatively on 1/28/25  - Ambit ball with Ropivacaine 0.2%/NaCl 0.9% 500mL, Rate 7 cc/hr bilaterally  - Ambit medication will not interfere with pain medication prescribed by the primary team.   - Please be aware of local anesthetic toxic dose and absorption variability before considering lidocaine patches  - Acute pain service will follow while catheters in place  - Rest of pain management per primary team    Acute Pain Resident  pg [phone] ph [phone]

## 2025-01-28 NOTE — ANESTHESIA PROCEDURE NOTES
Peripheral Block    Patient location during procedure: pre-op  Reason for block: at surgeon's request and post-op pain management  Staffing  Performed: resident and attending   Authorized by: Kenny Marquez MD    Performed by: Quan Rooney MD  Preanesthetic Checklist  Completed: patient identified, IV checked, site marked, risks and benefits discussed, surgical consent, monitors and equipment checked, pre-op evaluation and timeout performed   Timeout performed at: 1/28/2025 11:25 AM  Peripheral Block  Patient position: sitting  Prep: ChloraPrep  Patient monitoring: heart rate and continuous pulse ox  Block type: DANIELLE  Injection technique: catheter  Guidance: ultrasound guided  Local infiltration: lidocaine  Infiltration strength: 1 %  Dose: 3 mL  Needle  Needle type: Tuohy   Needle gauge: 22 G  Needle length: 8 cm  Needle localization: ultrasound guidance     image stored in chart  Assessment  Injection assessment: negative aspiration for heme, no paresthesia on injection, incremental injection and local visualized surrounding nerve on ultrasound  Additional Notes  Erector spinae plane block:     Prior to procedure: Following a focused history, procedure-related and patient-specific complications were discussed. Risks, benefits, and alternatives were explained. Informed, written consent was provided by the patient and/or surrogate decision maker for the block. Anticoagulation (if any) was held per TEETEE guidelines. ASA monitors were applied. Patient was positioned, prepped with chlorhexidine, and draped with sterile towels.     Ultrasound guidance was used to visualize the erector spinae muscle above the TP/costal junction at T7. Skin was numbed with 1% lidocaine. Needle was inserted and advanced towards target with visualization of the needle throughout duration of the procedure. A total of Type of Local: 30 cc of 0.5% ropivacaine, dexamethasone 4mg, and 1:200,000 epinephrine, was divided and injected  bilaterally. Catheters threaded and secured. Patient tolerated procedure well.    Timeout by Alberto ALVAREZ

## 2025-01-28 NOTE — ANESTHESIA PROCEDURE NOTES
Peripheral IV  Date/Time: 1/28/2025 3:12 PM  Inserted by: CHANG Junior    Placement  Needle size: 16 G  Laterality: right  Location: hand  Local anesthetic: none  Site prep: alcohol  Technique: anatomical landmarks  Attempts: 1

## 2025-01-28 NOTE — Clinical Note
20Fr g-tube placed. Dressing CDI. Total 2mg versed, 100mcg fentanyl given ivp. VSS t/o procedure. Report to RPMATEUS RN.

## 2025-01-28 NOTE — ANESTHESIA PROCEDURE NOTES
Peripheral IV  Date/Time: 1/28/2025 2:55 PM      Placement  Needle size: 16 G  Laterality: right  Location: hand  Local anesthetic: none  Site prep: alcohol  Technique: anatomical landmarks  Attempts: 1

## 2025-01-28 NOTE — H&P
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 Perez for incidentally found right renal mass on abd US for hepatitis monitoring. Follow-up CT c/a/p 9/12/24 demonstrated a 10.8cm middle/lower pole heterogenous right renal mass with calcifications and areas of increased soft tissue density, none of which were enhancing, as well as multiple bilateral renal cysts. Multiple benign appearing prominent subcentimeter lymph nodes were also noted including hepatic, mediastinal and right caval nodes. Per the radiology read, monitoring of the renal mass was recommended by ultrasound as the mass was not enhancing. He was originally scheduled for surgery 10/14, but the procedure was cancelled in order to obtain additional imaging to further characterize the lesion. MRI was obtained 11/2 showing the mass to be heterogeneously enhancing and concerning for renal neoplasm, as well as redemonstrating mildly enlarged precaval and prominent pericaval lymph nodes and a small enhancing focus along the lateral right pararenal fascia which was said to be indeterminate, but neoplastic deposit could not be ruled out. He underwent RMB 12/9 with path c/w renal cell carcinoma, favoring papillary type. He now presents for open right radical nephrectomy with Dr. Perez on 1/28.      Past Medical History  Past Medical History:   Diagnosis Date    BPH (benign prostatic hyperplasia)     Cerebral vascular accident (Multi)     CKD (chronic kidney disease)     Colon polyp     Hypertension     managed with Amlodipine    Hypothyroidism     Internal carotid artery stenosis     8/2020--Mild to moderate stenosis of right supraclinoid  "ICA    Kidney mass     Liver disease     Hep C treated with Epclusa 9/2024    Parotid mass     Renal mass, right     Right Kidney mass 8.8 x 10.7 x 10 cm    Stroke (Multi)     CVA -2020       Surgical History  Past Surgical History:   Procedure Laterality Date    COLONOSCOPY      RENAL BIOPSY      THYROID SURGERY      thyroidectomy        Social History  He reports that he has been smoking cigars. He has never been exposed to tobacco smoke. He has never used smokeless tobacco. He reports that he does not currently use alcohol. He reports that he does not currently use drugs after having used the following drugs: Marijuana.    Family History  Family History   Problem Relation Name Age of Onset    Hypertension Brother          Allergies  Patient has no known allergies.    Physical Exam  Constitutional: Alert, NAD  HEENT: normocephalic, atraumatic EOMI  Neuro: A&O x3  CV: rate is regular  Pulm: Non-laboured breathing  GI: abdomen soft, non-tender, non-distended  : Voiding spontaneously  Skin: No lesions or discoloration noted.  Musculoskeletal: RODOLFO  Extremities: no edema or cyanosis noted     Last Recorded Vitals  Blood pressure 137/87, pulse 77, temperature 36.4 °C (97.5 °F), temperature source Temporal, resp. rate 18, height 1.803 m (5' 11\"), weight 102 kg (224 lb 3.3 oz), SpO2 94%.    Relevant Results  Lab Results   Component Value Date    WBC 9.0 01/21/2025    HGB 12.2 (L) 01/21/2025    HCT 38.3 (L) 01/21/2025    MCV 89 01/21/2025     01/21/2025     Lab Results   Component Value Date    GLUCOSE 97 01/21/2025    CALCIUM 10.2 01/21/2025     01/21/2025    K 4.2 01/21/2025    CO2 24 01/21/2025     01/21/2025    BUN 22 01/21/2025    CREATININE 1.47 (H) 01/21/2025     Assessment: Right renal mass    Plan:    - OR for open right radical nephrectomy with Dr. Perez on 1/28.     Deena Kilpatrick PA-C  Pager [phone]    "

## 2025-01-28 NOTE — ANESTHESIA PROCEDURE NOTES
Airway  Date/Time: 1/28/2025 2:50 PM  Urgency: elective    Airway not difficult    Staffing  Performed: EDUARDO   Authorized by: Shahram De Leon DO    Performed by: CHANG Junior  Patient location during procedure: OR    Indications and Patient Condition  Indications for airway management: anesthesia and airway protection  Spontaneous ventilation: present  Sedation level: deep  Preoxygenated: yes  Patient position: sniffing  Mask difficulty assessment: 1 - vent by mask  Planned trial extubation    Final Airway Details  Final airway type: endotracheal airway      Successful airway: ETT  Cuffed: yes   Successful intubation technique: direct laryngoscopy  Endotracheal tube insertion site: oral  Blade: Eric  Blade size: #4  ETT size (mm): 7.5  Cormack-Lehane Classification: grade IIa - partial view of glottis  Placement verified by: chest auscultation and capnometry   Measured from: gums  ETT to gums (cm): 22  Number of attempts at approach: 1

## 2025-01-28 NOTE — ANESTHESIA PROCEDURE NOTES
Peripheral IV  Date/Time: 1/28/2025 3:12 PM  Inserted by: CHANG Junior    Placement  Needle size: 16 G  Laterality: left  Location: hand  Local anesthetic: none  Site prep: alcohol  Technique: anatomical landmarks  Attempts: 1

## 2025-01-28 NOTE — ANESTHESIA PROCEDURE NOTES
Arterial Line:    Date/Time: 1/28/2025 3:10 PM    Staffing  Performed: attending   Authorized by: Shahram De Leon DO    Performed by: CHANG Junior    An arterial line was placed. Procedure performed using surface landmarks.in the OR for the following indication(s): continuous blood pressure monitoring.    A 20 gauge (size), 1 and 3/4 inch (length), Angiocath (type) catheter was placed into the Left radial artery, secured by Tegaderm,   Selisaías technique used.  Events:  greater than 3 attempts.      Additional notes:  Initial 3 attempts made by EDUARDO

## 2025-01-29 ENCOUNTER — APPOINTMENT (OUTPATIENT)
Dept: HEMATOLOGY/ONCOLOGY | Facility: HOSPITAL | Age: 75
DRG: 656 | End: 2025-01-29
Payer: MEDICARE

## 2025-01-29 ENCOUNTER — APPOINTMENT (OUTPATIENT)
Dept: RADIOLOGY | Facility: HOSPITAL | Age: 75
DRG: 656 | End: 2025-01-29
Payer: MEDICARE

## 2025-01-29 LAB
ANION GAP SERPL CALC-SCNC: 12 MMOL/L (ref 10–20)
ANION GAP SERPL CALC-SCNC: 16 MMOL/L (ref 10–20)
BUN SERPL-MCNC: 15 MG/DL (ref 6–23)
BUN SERPL-MCNC: 19 MG/DL (ref 6–23)
CALCIUM SERPL-MCNC: 9.8 MG/DL (ref 8.6–10.6)
CALCIUM SERPL-MCNC: 9.8 MG/DL (ref 8.6–10.6)
CHLORIDE SERPL-SCNC: 101 MMOL/L (ref 98–107)
CHLORIDE SERPL-SCNC: 103 MMOL/L (ref 98–107)
CO2 SERPL-SCNC: 22 MMOL/L (ref 21–32)
CO2 SERPL-SCNC: 26 MMOL/L (ref 21–32)
CREAT SERPL-MCNC: 1.76 MG/DL (ref 0.5–1.3)
CREAT SERPL-MCNC: 1.92 MG/DL (ref 0.5–1.3)
EGFRCR SERPLBLD CKD-EPI 2021: 36 ML/MIN/1.73M*2
EGFRCR SERPLBLD CKD-EPI 2021: 40 ML/MIN/1.73M*2
ERYTHROCYTE [DISTWIDTH] IN BLOOD BY AUTOMATED COUNT: 15.4 % (ref 11.5–14.5)
GLUCOSE SERPL-MCNC: 116 MG/DL (ref 74–99)
GLUCOSE SERPL-MCNC: 122 MG/DL (ref 74–99)
HCT VFR BLD AUTO: 39.1 % (ref 41–52)
HGB BLD-MCNC: 12.4 G/DL (ref 13.5–17.5)
MAGNESIUM SERPL-MCNC: 1.7 MG/DL (ref 1.6–2.4)
MCH RBC QN AUTO: 27.9 PG (ref 26–34)
MCHC RBC AUTO-ENTMCNC: 31.7 G/DL (ref 32–36)
MCV RBC AUTO: 88 FL (ref 80–100)
NRBC BLD-RTO: 0 /100 WBCS (ref 0–0)
PLATELET # BLD AUTO: 278 X10*3/UL (ref 150–450)
POTASSIUM SERPL-SCNC: 4.2 MMOL/L (ref 3.5–5.3)
POTASSIUM SERPL-SCNC: 4.4 MMOL/L (ref 3.5–5.3)
RBC # BLD AUTO: 4.45 X10*6/UL (ref 4.5–5.9)
SODIUM SERPL-SCNC: 135 MMOL/L (ref 136–145)
SODIUM SERPL-SCNC: 137 MMOL/L (ref 136–145)
TSH SERPL-ACNC: 2.59 MIU/L (ref 0.44–3.98)
WBC # BLD AUTO: 12.8 X10*3/UL (ref 4.4–11.3)

## 2025-01-29 PROCEDURE — 2500000001 HC RX 250 WO HCPCS SELF ADMINISTERED DRUGS (ALT 637 FOR MEDICARE OP): Performed by: STUDENT IN AN ORGANIZED HEALTH CARE EDUCATION/TRAINING PROGRAM

## 2025-01-29 PROCEDURE — 36415 COLL VENOUS BLD VENIPUNCTURE: CPT | Performed by: STUDENT IN AN ORGANIZED HEALTH CARE EDUCATION/TRAINING PROGRAM

## 2025-01-29 PROCEDURE — 71275 CT ANGIOGRAPHY CHEST: CPT | Performed by: RADIOLOGY

## 2025-01-29 PROCEDURE — 2500000001 HC RX 250 WO HCPCS SELF ADMINISTERED DRUGS (ALT 637 FOR MEDICARE OP)

## 2025-01-29 PROCEDURE — 83735 ASSAY OF MAGNESIUM: CPT

## 2025-01-29 PROCEDURE — 2500000004 HC RX 250 GENERAL PHARMACY W/ HCPCS (ALT 636 FOR OP/ED)

## 2025-01-29 PROCEDURE — 1200000003 HC ONCOLOGY  ROOM WITH TELEMETRY DAILY

## 2025-01-29 PROCEDURE — 80048 BASIC METABOLIC PNL TOTAL CA: CPT | Performed by: STUDENT IN AN ORGANIZED HEALTH CARE EDUCATION/TRAINING PROGRAM

## 2025-01-29 PROCEDURE — 71275 CT ANGIOGRAPHY CHEST: CPT

## 2025-01-29 PROCEDURE — 84443 ASSAY THYROID STIM HORMONE: CPT

## 2025-01-29 PROCEDURE — 2500000004 HC RX 250 GENERAL PHARMACY W/ HCPCS (ALT 636 FOR OP/ED): Performed by: STUDENT IN AN ORGANIZED HEALTH CARE EDUCATION/TRAINING PROGRAM

## 2025-01-29 PROCEDURE — 2550000001 HC RX 255 CONTRASTS: Performed by: STUDENT IN AN ORGANIZED HEALTH CARE EDUCATION/TRAINING PROGRAM

## 2025-01-29 PROCEDURE — 2500000002 HC RX 250 W HCPCS SELF ADMINISTERED DRUGS (ALT 637 FOR MEDICARE OP, ALT 636 FOR OP/ED): Performed by: STUDENT IN AN ORGANIZED HEALTH CARE EDUCATION/TRAINING PROGRAM

## 2025-01-29 PROCEDURE — 93005 ELECTROCARDIOGRAM TRACING: CPT

## 2025-01-29 PROCEDURE — 93010 ELECTROCARDIOGRAM REPORT: CPT | Performed by: INTERNAL MEDICINE

## 2025-01-29 PROCEDURE — 80048 BASIC METABOLIC PNL TOTAL CA: CPT

## 2025-01-29 PROCEDURE — 99231 SBSQ HOSP IP/OBS SF/LOW 25: CPT | Performed by: STUDENT IN AN ORGANIZED HEALTH CARE EDUCATION/TRAINING PROGRAM

## 2025-01-29 PROCEDURE — 36415 COLL VENOUS BLD VENIPUNCTURE: CPT

## 2025-01-29 PROCEDURE — 85027 COMPLETE CBC AUTOMATED: CPT | Performed by: STUDENT IN AN ORGANIZED HEALTH CARE EDUCATION/TRAINING PROGRAM

## 2025-01-29 RX ORDER — CALCIUM CARBONATE 200(500)MG
1000 TABLET,CHEWABLE ORAL ONCE
Status: COMPLETED | OUTPATIENT
Start: 2025-01-29 | End: 2025-01-29

## 2025-01-29 RX ORDER — HYDROMORPHONE HYDROCHLORIDE 1 MG/ML
0.4 INJECTION, SOLUTION INTRAMUSCULAR; INTRAVENOUS; SUBCUTANEOUS ONCE
Status: COMPLETED | OUTPATIENT
Start: 2025-01-29 | End: 2025-01-29

## 2025-01-29 RX ORDER — PANTOPRAZOLE SODIUM 40 MG/10ML
40 INJECTION, POWDER, LYOPHILIZED, FOR SOLUTION INTRAVENOUS ONCE
Status: COMPLETED | OUTPATIENT
Start: 2025-01-29 | End: 2025-01-29

## 2025-01-29 RX ORDER — SODIUM CHLORIDE, SODIUM LACTATE, POTASSIUM CHLORIDE, CALCIUM CHLORIDE 600; 310; 30; 20 MG/100ML; MG/100ML; MG/100ML; MG/100ML
100 INJECTION, SOLUTION INTRAVENOUS CONTINUOUS
Status: ACTIVE | OUTPATIENT
Start: 2025-01-29 | End: 2025-01-30

## 2025-01-29 RX ORDER — PANTOPRAZOLE SODIUM 40 MG/10ML
40 INJECTION, POWDER, LYOPHILIZED, FOR SOLUTION INTRAVENOUS DAILY
Status: DISCONTINUED | OUTPATIENT
Start: 2025-01-29 | End: 2025-01-29

## 2025-01-29 RX ORDER — CALCIUM CARBONATE 200(500)MG
500 TABLET,CHEWABLE ORAL 4 TIMES DAILY PRN
Status: DISCONTINUED | OUTPATIENT
Start: 2025-01-29 | End: 2025-02-01

## 2025-01-29 RX ORDER — METHOCARBAMOL 500 MG/1
500 TABLET, FILM COATED ORAL EVERY 8 HOURS SCHEDULED
Status: DISCONTINUED | OUTPATIENT
Start: 2025-01-29 | End: 2025-01-31

## 2025-01-29 RX ORDER — HYDROMORPHONE HYDROCHLORIDE 1 MG/ML
0.4 INJECTION, SOLUTION INTRAMUSCULAR; INTRAVENOUS; SUBCUTANEOUS EVERY 2 HOUR PRN
Status: DISCONTINUED | OUTPATIENT
Start: 2025-01-29 | End: 2025-02-03

## 2025-01-29 RX ADMIN — SENNOSIDES 17.2 MG: 8.6 TABLET, FILM COATED ORAL at 08:27

## 2025-01-29 RX ADMIN — LEVOTHYROXINE SODIUM 25 MCG: 25 TABLET ORAL at 06:09

## 2025-01-29 RX ADMIN — OXYCODONE 5 MG: 5 TABLET ORAL at 08:40

## 2025-01-29 RX ADMIN — ANTACID TABLETS 500 MG: 500 TABLET, CHEWABLE ORAL at 13:56

## 2025-01-29 RX ADMIN — METHOCARBAMOL TABLETS 500 MG: 500 TABLET, COATED ORAL at 13:55

## 2025-01-29 RX ADMIN — OXYCODONE HYDROCHLORIDE 10 MG: 10 TABLET ORAL at 02:35

## 2025-01-29 RX ADMIN — ACETAMINOPHEN 975 MG: 325 TABLET ORAL at 20:46

## 2025-01-29 RX ADMIN — HEPARIN SODIUM 5000 UNITS: 5000 INJECTION INTRAVENOUS; SUBCUTANEOUS at 13:56

## 2025-01-29 RX ADMIN — HEPARIN SODIUM 5000 UNITS: 5000 INJECTION INTRAVENOUS; SUBCUTANEOUS at 06:09

## 2025-01-29 RX ADMIN — ASPIRIN 81 MG: 81 TABLET, COATED ORAL at 08:27

## 2025-01-29 RX ADMIN — ACETAMINOPHEN 975 MG: 325 TABLET ORAL at 03:17

## 2025-01-29 RX ADMIN — HYDROMORPHONE HYDROCHLORIDE 0.2 MG: 0.2 INJECTION, SOLUTION INTRAMUSCULAR; INTRAVENOUS; SUBCUTANEOUS at 05:24

## 2025-01-29 RX ADMIN — METHOCARBAMOL TABLETS 500 MG: 500 TABLET, COATED ORAL at 20:54

## 2025-01-29 RX ADMIN — SODIUM CHLORIDE, POTASSIUM CHLORIDE, SODIUM LACTATE AND CALCIUM CHLORIDE 100 ML/HR: 600; 310; 30; 20 INJECTION, SOLUTION INTRAVENOUS at 21:28

## 2025-01-29 RX ADMIN — Medication 1000 UNITS: at 08:27

## 2025-01-29 RX ADMIN — CALCIUM CARBONATE (ANTACID) CHEW TAB 500 MG 1000 MG: 500 CHEW TAB at 03:17

## 2025-01-29 RX ADMIN — HEPARIN SODIUM 5000 UNITS: 5000 INJECTION INTRAVENOUS; SUBCUTANEOUS at 20:47

## 2025-01-29 RX ADMIN — IOHEXOL 80 ML: 350 INJECTION, SOLUTION INTRAVENOUS at 19:53

## 2025-01-29 RX ADMIN — TAMSULOSIN HYDROCHLORIDE 0.4 MG: 0.4 CAPSULE ORAL at 08:27

## 2025-01-29 RX ADMIN — HYDROMORPHONE HYDROCHLORIDE 0.4 MG: 1 INJECTION, SOLUTION INTRAMUSCULAR; INTRAVENOUS; SUBCUTANEOUS at 17:12

## 2025-01-29 RX ADMIN — AMLODIPINE BESYLATE 10 MG: 10 TABLET ORAL at 08:27

## 2025-01-29 RX ADMIN — ACETAMINOPHEN 975 MG: 325 TABLET ORAL at 13:54

## 2025-01-29 RX ADMIN — PANTOPRAZOLE SODIUM 40 MG: 40 INJECTION, POWDER, FOR SOLUTION INTRAVENOUS at 14:53

## 2025-01-29 RX ADMIN — SODIUM CHLORIDE 500 ML: 9 INJECTION, SOLUTION INTRAVENOUS at 19:29

## 2025-01-29 RX ADMIN — SODIUM CHLORIDE, POTASSIUM CHLORIDE, SODIUM LACTATE AND CALCIUM CHLORIDE 125 ML/HR: 600; 310; 30; 20 INJECTION, SOLUTION INTRAVENOUS at 06:13

## 2025-01-29 RX ADMIN — ACETAMINOPHEN 975 MG: 325 TABLET ORAL at 08:27

## 2025-01-29 SDOH — SOCIAL STABILITY: SOCIAL INSECURITY
WITHIN THE LAST YEAR, HAVE YOU BEEN KICKED, HIT, SLAPPED, OR OTHERWISE PHYSICALLY HURT BY YOUR PARTNER OR EX-PARTNER?: NO

## 2025-01-29 SDOH — ECONOMIC STABILITY: FOOD INSECURITY: WITHIN THE PAST 12 MONTHS, YOU WORRIED THAT YOUR FOOD WOULD RUN OUT BEFORE YOU GOT THE MONEY TO BUY MORE.: NEVER TRUE

## 2025-01-29 SDOH — ECONOMIC STABILITY: INCOME INSECURITY: IN THE PAST 12 MONTHS HAS THE ELECTRIC, GAS, OIL, OR WATER COMPANY THREATENED TO SHUT OFF SERVICES IN YOUR HOME?: NO

## 2025-01-29 SDOH — ECONOMIC STABILITY: FOOD INSECURITY: HOW HARD IS IT FOR YOU TO PAY FOR THE VERY BASICS LIKE FOOD, HOUSING, MEDICAL CARE, AND HEATING?: NOT VERY HARD

## 2025-01-29 SDOH — SOCIAL STABILITY: SOCIAL INSECURITY
WITHIN THE LAST YEAR, HAVE YOU BEEN RAPED OR FORCED TO HAVE ANY KIND OF SEXUAL ACTIVITY BY YOUR PARTNER OR EX-PARTNER?: NO

## 2025-01-29 SDOH — HEALTH STABILITY: MENTAL HEALTH: HOW OFTEN DO YOU HAVE A DRINK CONTAINING ALCOHOL?: MONTHLY OR LESS

## 2025-01-29 SDOH — SOCIAL STABILITY: SOCIAL INSECURITY: ABUSE: ADULT

## 2025-01-29 SDOH — ECONOMIC STABILITY: TRANSPORTATION INSECURITY: IN THE PAST 12 MONTHS, HAS LACK OF TRANSPORTATION KEPT YOU FROM MEDICAL APPOINTMENTS OR FROM GETTING MEDICATIONS?: NO

## 2025-01-29 SDOH — SOCIAL STABILITY: SOCIAL INSECURITY: WERE YOU ABLE TO COMPLETE ALL THE BEHAVIORAL HEALTH SCREENINGS?: YES

## 2025-01-29 SDOH — SOCIAL STABILITY: SOCIAL INSECURITY: ARE THERE ANY APPARENT SIGNS OF INJURIES/BEHAVIORS THAT COULD BE RELATED TO ABUSE/NEGLECT?: NO

## 2025-01-29 SDOH — HEALTH STABILITY: MENTAL HEALTH: HOW OFTEN DO YOU HAVE SIX OR MORE DRINKS ON ONE OCCASION?: NEVER

## 2025-01-29 SDOH — ECONOMIC STABILITY: FOOD INSECURITY: WITHIN THE PAST 12 MONTHS, THE FOOD YOU BOUGHT JUST DIDN'T LAST AND YOU DIDN'T HAVE MONEY TO GET MORE.: NEVER TRUE

## 2025-01-29 SDOH — SOCIAL STABILITY: SOCIAL INSECURITY: WITHIN THE LAST YEAR, HAVE YOU BEEN AFRAID OF YOUR PARTNER OR EX-PARTNER?: NO

## 2025-01-29 SDOH — ECONOMIC STABILITY: HOUSING INSECURITY: AT ANY TIME IN THE PAST 12 MONTHS, WERE YOU HOMELESS OR LIVING IN A SHELTER (INCLUDING NOW)?: NO

## 2025-01-29 SDOH — ECONOMIC STABILITY: HOUSING INSECURITY: IN THE PAST 12 MONTHS, HOW MANY TIMES HAVE YOU MOVED WHERE YOU WERE LIVING?: 0

## 2025-01-29 SDOH — ECONOMIC STABILITY: HOUSING INSECURITY: IN THE LAST 12 MONTHS, WAS THERE A TIME WHEN YOU WERE NOT ABLE TO PAY THE MORTGAGE OR RENT ON TIME?: NO

## 2025-01-29 SDOH — SOCIAL STABILITY: SOCIAL INSECURITY: WITHIN THE LAST YEAR, HAVE YOU BEEN HUMILIATED OR EMOTIONALLY ABUSED IN OTHER WAYS BY YOUR PARTNER OR EX-PARTNER?: NO

## 2025-01-29 SDOH — SOCIAL STABILITY: SOCIAL INSECURITY: ARE YOU OR HAVE YOU BEEN THREATENED OR ABUSED PHYSICALLY, EMOTIONALLY, OR SEXUALLY BY ANYONE?: NO

## 2025-01-29 SDOH — HEALTH STABILITY: MENTAL HEALTH: HOW MANY DRINKS CONTAINING ALCOHOL DO YOU HAVE ON A TYPICAL DAY WHEN YOU ARE DRINKING?: 1 OR 2

## 2025-01-29 SDOH — SOCIAL STABILITY: SOCIAL INSECURITY: HAS ANYONE EVER THREATENED TO HURT YOUR FAMILY OR YOUR PETS?: NO

## 2025-01-29 SDOH — SOCIAL STABILITY: SOCIAL INSECURITY: DO YOU FEEL UNSAFE GOING BACK TO THE PLACE WHERE YOU ARE LIVING?: NO

## 2025-01-29 SDOH — SOCIAL STABILITY: SOCIAL INSECURITY: DOES ANYONE TRY TO KEEP YOU FROM HAVING/CONTACTING OTHER FRIENDS OR DOING THINGS OUTSIDE YOUR HOME?: NO

## 2025-01-29 SDOH — SOCIAL STABILITY: SOCIAL INSECURITY: HAVE YOU HAD THOUGHTS OF HARMING ANYONE ELSE?: NO

## 2025-01-29 SDOH — SOCIAL STABILITY: SOCIAL INSECURITY: DO YOU FEEL ANYONE HAS EXPLOITED OR TAKEN ADVANTAGE OF YOU FINANCIALLY OR OF YOUR PERSONAL PROPERTY?: NO

## 2025-01-29 SDOH — SOCIAL STABILITY: SOCIAL INSECURITY: HAVE YOU HAD ANY THOUGHTS OF HARMING ANYONE ELSE?: NO

## 2025-01-29 ASSESSMENT — COGNITIVE AND FUNCTIONAL STATUS - GENERAL
DRESSING REGULAR LOWER BODY CLOTHING: A LITTLE
DAILY ACTIVITIY SCORE: 20
MOVING FROM LYING ON BACK TO SITTING ON SIDE OF FLAT BED WITH BEDRAILS: A LITTLE
DRESSING REGULAR LOWER BODY CLOTHING: A LITTLE
TURNING FROM BACK TO SIDE WHILE IN FLAT BAD: A LITTLE
DAILY ACTIVITIY SCORE: 18
MOVING TO AND FROM BED TO CHAIR: A LITTLE
DRESSING REGULAR UPPER BODY CLOTHING: A LITTLE
TOILETING: A LITTLE
TURNING FROM BACK TO SIDE WHILE IN FLAT BAD: A LITTLE
EATING MEALS: A LITTLE
MOVING TO AND FROM BED TO CHAIR: A LITTLE
MOBILITY SCORE: 19
PERSONAL GROOMING: A LITTLE
WALKING IN HOSPITAL ROOM: A LITTLE
DRESSING REGULAR UPPER BODY CLOTHING: A LITTLE
CLIMB 3 TO 5 STEPS WITH RAILING: A LITTLE
PATIENT BASELINE BEDBOUND: NO
CLIMB 3 TO 5 STEPS WITH RAILING: A LITTLE
STANDING UP FROM CHAIR USING ARMS: A LITTLE
WALKING IN HOSPITAL ROOM: A LITTLE
HELP NEEDED FOR BATHING: A LITTLE
MOBILITY SCORE: 18
STANDING UP FROM CHAIR USING ARMS: A LITTLE
HELP NEEDED FOR BATHING: A LITTLE
TOILETING: A LITTLE

## 2025-01-29 ASSESSMENT — PAIN DESCRIPTION - LOCATION
LOCATION: ABDOMEN
LOCATION: ABDOMEN

## 2025-01-29 ASSESSMENT — ACTIVITIES OF DAILY LIVING (ADL)
FEEDING YOURSELF: INDEPENDENT
HEARING - RIGHT EAR: FUNCTIONAL
WALKS IN HOME: INDEPENDENT
HEARING - LEFT EAR: FUNCTIONAL
LACK_OF_TRANSPORTATION: NO
TOILETING: INDEPENDENT
PATIENT'S MEMORY ADEQUATE TO SAFELY COMPLETE DAILY ACTIVITIES?: YES
ADEQUATE_TO_COMPLETE_ADL: YES
LACK_OF_TRANSPORTATION: NO
GROOMING: INDEPENDENT
BATHING: INDEPENDENT
JUDGMENT_ADEQUATE_SAFELY_COMPLETE_DAILY_ACTIVITIES: YES
DRESSING YOURSELF: INDEPENDENT

## 2025-01-29 ASSESSMENT — PAIN SCALES - GENERAL
PAINLEVEL_OUTOF10: 8
PAINLEVEL_OUTOF10: 7
PAINLEVEL_OUTOF10: 10 - WORST POSSIBLE PAIN
PAINLEVEL_OUTOF10: 6
PAINLEVEL_OUTOF10: 2

## 2025-01-29 ASSESSMENT — LIFESTYLE VARIABLES
HOW OFTEN DO YOU HAVE A DRINK CONTAINING ALCOHOL: MONTHLY OR LESS
AUDIT-C TOTAL SCORE: 1
HOW OFTEN DO YOU HAVE 6 OR MORE DRINKS ON ONE OCCASION: NEVER
SKIP TO QUESTIONS 9-10: 1
AUDIT-C TOTAL SCORE: 1
AUDIT-C TOTAL SCORE: 1
SKIP TO QUESTIONS 9-10: 1
HOW MANY STANDARD DRINKS CONTAINING ALCOHOL DO YOU HAVE ON A TYPICAL DAY: 1 OR 2

## 2025-01-29 ASSESSMENT — PAIN - FUNCTIONAL ASSESSMENT: PAIN_FUNCTIONAL_ASSESSMENT: 0-10

## 2025-01-29 NOTE — SIGNIFICANT EVENT
Contrast Imaging Risk Documentation:    Patient has been identified as having an urgent need for administration of iodinated contrast for CT PE despite known elevated GFR due to possibility of life and/or limb threatening pathology.    I acknowledge the risks and benefits of emergently proceeding with contrast administration including that, at present, it is the position of the [employer] College [employer] that contrast induced nephropathy (YOANNA) is a rare but possible consequence. At this time the benefits of proceeding with contrast administration outweigh the risks.    Attempts will be made to mitigate possible YOANNA risk with IV fluid hydration if able.    D/w attending Dr. Ana Martines MD  Urology PGY-3  Pager: [phone]

## 2025-01-29 NOTE — CARE PLAN
Problem: Pain - Adult  Goal: Verbalizes/displays adequate comfort level or baseline comfort level  Outcome: Progressing     Problem: Safety - Adult  Goal: Free from fall injury  Outcome: Progressing     Problem: Discharge Planning  Goal: Discharge to home or other facility with appropriate resources  Outcome: Progressing     Problem: Nutrition  Goal: Nutrient intake appropriate for maintaining nutritional needs  Outcome: Progressing     Problem: Skin  Goal: Prevent/manage excess moisture  Outcome: Progressing  Goal: Prevent/minimize sheer/friction injuries  Outcome: Progressing  Goal: Promote/optimize nutrition  Outcome: Progressing     Problem: Pain  Goal: Takes deep breaths with improved pain control throughout the shift  Outcome: Progressing  Goal: Turns in bed with improved pain control throughout the shift  Outcome: Progressing  Goal: Walks with improved pain control throughout the shift  Outcome: Progressing  Goal: Performs ADL's with improved pain control throughout shift  Outcome: Progressing  Goal: Participates in PT with improved pain control throughout the shift  Outcome: Progressing  Goal: Free from opioid side effects throughout the shift  Outcome: Progressing  Goal: Free from acute confusion related to pain meds throughout the shift  Outcome: Progressing   The patient's goals for the shift include      The clinical goals for the shift include Pt will report adequate pain control throughout shift

## 2025-01-29 NOTE — ANESTHESIA POSTPROCEDURE EVALUATION
Patient: Swapnil Allen    Procedure Summary       Date: 01/28/25 Room / Location: [facility] OR 02 / Virtual [facility] OR    Anesthesia Start: 1435 Anesthesia Stop: 1934    Procedure: Right Radical Nephrectomy, Retroperitoneal Lymph Node Dissection (Right) Diagnosis:       Renal mass      (Renal mass [N28.89])    Surgeons: Vaughn Perez MD Responsible Provider: Maria Isabel Costello MD    Anesthesia Type: general ASA Status: 3            Anesthesia Type: general    Vitals Value Taken Time   /66 01/28/25 2001   Temp 36.5 °C (97.7 °F) 01/28/25 1930   Pulse 80 01/28/25 2004   Resp 16 01/28/25 2004   SpO2 95 % 01/28/25 2004   Vitals shown include unfiled device data.    Anesthesia Post Evaluation    Patient location during evaluation: PACU  Patient participation: complete - patient participated  Level of consciousness: awake and alert  Pain management: adequate  Airway patency: patent  Cardiovascular status: acceptable  Respiratory status: acceptable  Hydration status: acceptable  Postoperative Nausea and Vomiting: none        No notable events documented.

## 2025-01-29 NOTE — PROGRESS NOTES
Physical Therapy                 Therapy Communication Note    Patient Name: Swapnil Allen  MRN: [id number]  Department: [facility]  Room: [address] [place] [address]-A  Today's Date: 1/29/2025     Discipline: Physical Therapy          Missed Visit Reason: Missed Visit Reason: Patient placed on medical hold (Pt's BP taken during session with reading of 165/102. Will hold PT at this time and re-attempt as able and appropriate. History and PLOF obtained this date.)    Missed Time: Attempt

## 2025-01-29 NOTE — OP NOTE
Right Radical Nephrectomy, Retroperitoneal Lymph Node Dissection (R) Operative Note     Date: 2025  OR Location: [facility] OR    Name: Swapnil Allen : 1950, Age: 75 y.o., MRN: [id number], Sex: male    Diagnosis  Pre-op Diagnosis      * Renal mass [N28.89] Post-op Diagnosis     * Renal mass [N28.89]     Procedures  Right Radical Nephrectomy, Retroperitoneal Lymph Node Dissection  64474 - CO NEPHRECTOMY W/PRTL URETERECT OPN RIB RESCJ COMPL      Surgeons      * Vaughn Perez - Primary    Resident/Fellow/Other Assistant:  Surgeons and Role:     * Ajay Gannon MD - Resident - Assisting     * Jannie Hamm MD - Resident - Assisting     * SANTI Jolly - Assisting    Staff:   Circulator: Joann Aguilarub Person: Abdias  Relief Scrub: Giovanni  Relief Scrub: Liss  Relief Circulator: Ignacia  Relief Scrub: Jacqueline    Anesthesia Staff: Anesthesiologist: Shahram De Leon DO; Maria Isabel Costello MD  C-AA: CHANG Junior    Procedure Summary  Anesthesia: Anesthesia type not filed in the log.  ASA: ASA status not filed in the log.  Estimated Blood Loss: 200mL  Intra-op Medications:   Administrations occurring from  to  on 25:   Medication Name Total Dose   ceFAZolin (Ancef) vial 1 g 4 g   dexAMETHasone (Decadron) 4 mg/mL 8 mg   ePHEDrine injection 5 mg   fentaNYL (Sublimaze) injection 50 mcg/mL 200 mcg   HYDROmorphone (Dilaudid) injection 1 mg/mL 0.2 mg   ketamine (Ketalar) 10 mg/mL injection 40 mg   LR infusion Cannot be calculated   lidocaine (cardiac) injection 2% prefilled syringe 100 mg   midazolam PF (Versed) injection 1 mg/mL 2 mg   ondansetron (Zofran) 2 mg/mL injection 4 mg   phenylephrine (Jose-Synephrine) 10 mg in sodium chloride 0.9% 250 mL (0.04 mg/mL) infusion (premix) 2.45 mg   phenylephrine 40 mcg/mL syringe 10 mL 360 mcg   propofol (Diprivan) injection 10 mg/mL 150 mg   rocuronium (ZeMuron) 50 mg/5 mL injection 120 mg   ropivacaine (PF) in NS cmpd (Naropin) 1000 mg/500 mL  (0.2%) infusion 50.87 mL   sugammadex (Bridion) 200 mg/2 mL injection 300 mg   heparin (porcine) injection 5,000 Units 5,000 Units              Anesthesia Record               Intraprocedure I/O Totals          Intake    Ketamine 0.00 mL    The total shown is the total volume documented since Anesthesia Start was filed.    Phenylephrine Drip 0.00 mL    The total shown is the total volume documented since Anesthesia Start was filed.    LR infusion 2500.00 mL    Total Intake 2500 mL       Output    Urine 725 mL    Est. Blood Loss 250 mL    Total Output 975 mL       Net    Net Volume 1525 mL          Specimen:   ID Type Source Tests Collected by Time   1 : RETROPERITONEAL LYMPH NODES Tissue RETROPERITONEUM, LYMPH NODE DISSECTION BILATERAL SURGICAL PATHOLOGY EXAM Vaughn Perez MD 1/28/2025 1645   2 : RIGHT KIDNEY Tissue KIDNEY RADICAL NEPHRECTOMY RIGHT SURGICAL PATHOLOGY EXAM Vaughn Perez MD 1/28/2025 1817   3 : PRECAVAL LYMPH NODE Tissue LYMPH NODE EXCISION SURGICAL PATHOLOGY EXAM Vaughn Perez MD 1/28/2025 1840                 Drains and/or Catheters:   Urethral Catheter 16 Fr. (Active)       Tourniquet Times:         Implants:     Findings: Firm precaval lymph nodes removed; large right anterior renal mass; hemostatic hilum at the end of the case    Indications: Swapnil Allen is an 75 y.o. male who is having surgery for Renal mass [N28.89].     The patient was seen in the preoperative area. The risks, benefits, complications, treatment options, non-operative alternatives, expected recovery and outcomes were discussed with the patient. The possibilities of reaction to medication, pulmonary aspiration, injury to surrounding structures, bleeding, recurrent infection, the need for additional procedures, failure to diagnose a condition, and creating a complication requiring transfusion or operation were discussed with the patient. The patient concurred with the proposed plan, giving informed consent.  The site  of surgery was properly noted/marked if necessary per policy. The patient has been actively warmed in preoperative area. Preoperative antibiotics have been ordered and given within 1 hours of incision. Venous thrombosis prophylaxis have been ordered including bilateral sequential compression devices and chemical prophylaxis    Procedure Details:     Procedure: Patient was brought to the operating room and placed in the supine position.  General anesthesia was induced.  Once anesthetized, a Awad catheter was placed.  The abdomen was shaved, prepped, draped in the usual sterile fashion.  We began by making a midline incision just below the xiphoid down to just above the pubic bone.  We dissected down to fascia which was then gently opened with Bovie cautery and we entered into the abdomen.  The right colon was identified and the white line of Toldt was taken down, allowing us to retract the right colon medially.  We immediately encountered the kidney and the renal mass.  A Rivera retractor was then placed to aid in retraction.  We were able to identify the right ureter and gonadal vein.  We then carried our dissection cephalad until we encountered the renal vein and a branch of the renal artery.  We continued our dissection cephalad and were able to palpate the main renal artery.  Using a combination of blunt dissection and Bovie cautery, we were able to isolate the hilum which was then taken with a 45 mm vascular staple load.    We then took the surrounding attachments of the kidney using the LigaSure.  Once the kidney was free, a 45 mm vascular load was then used to transect the ureter and ligate it.  The kidney was handed off the field to be sent as a specimen.  Warm irrigation was placed in our field and suctioned out.  Meticulous hemostasis was then obtained using Bovie cautery and the aqua Adan.    We then turned our attention to the retroperitoneal lymph node dissection.  Of note he had a known 1 cm lymph  node that was seen on imaging.  This could be clearly palpated on top of the IVC.  We then performed our lymph node dissection using Bovie cautery, clips, and LigaSure.  Once the lymph node packets were free, they were handed off the field.  We repeated irrigation using warm water.  We then used Tisseel over our lymph node dissection bed and over the hilum.    Once we are satisfied with our hemostasis, the Rivera retractor was removed and we began closing fascia.  1-0 PDS was used to run the fascia in a bidirectional fashion, meeting in the middle.  We then closed the deep tissue using a 3-0 Vicryl.  Skin was then closed with a 4-0 stratafix.  Dermabond was applied over our incision and a dry sterile dressing was applied. Patient was awakened and taken to the PACU in stable condition.    Of note, the procedure took 25% more time due to patient's obesity and fat distribution, as well as the large size of his mass.       Complications:  None; patient tolerated the procedure well.    Disposition: PACU - hemodynamically stable.  Condition: stable     Additional Info:  I certify that the services provided by the physician assistant including prepping the patient, docking the robot, exchanging robotic arms, providing retraction, and closure of the operative site for which payment is claimed were medically necessary. No qualified resident assist was available as listed residents have not taken part in a certified robotic assistant training program and are precluded from furnishing above services due to involvement in educational activities during the case.        Additional Details: admit to the floor    Attending Attestation: I was present and scrubbed for the entire procedure.    Vaughn Perez  Phone Number: [phone]

## 2025-01-29 NOTE — SIGNIFICANT EVENT
Urology Update:    Patient was ordered for telemetry monitoring post-operatively per attending physician Dr. Perez but went to unit without available telemetry capacity. Discussed with bed management, PACU charge nurse, CICU, CTICU, SICU, and TICU-  ICU level monitoring is not currently indicated. There is one telemetry unit available on another floor- patient will be transferred as soon as possible.    Elmo Monteiro MD  Urologic Surgery PGY-3  Adult Urology: [id number]    Pediatric Urology: [id number]

## 2025-01-29 NOTE — PROGRESS NOTES
Postop Pain HPI -   Palliative: relieved with IV analgesics and regional local anesthetics  Provocative: movement  Quality:  burning and aching  Radiation:  none  Severity:  6/10  Timing: constant    24-HOUR OPIOID CONSUMPTION:  Oxycodone 20mg  Dilaudid 1.1mg    Scheduled medications  acetaminophen, 975 mg, oral, q6h  amLODIPine, 10 mg, oral, Daily  aspirin, 81 mg, oral, Daily  cholecalciferol, 1,000 Units, oral, Daily  heparin (porcine), 5,000 Units, subcutaneous, q8h  levothyroxine, 25 mcg, oral, Daily  polyethylene glycol, 17 g, oral, Daily  sennosides, 2 tablet, oral, BID  tamsulosin, 0.4 mg, oral, Daily      Continuous medications  lactated Ringer's, 100 mL/hr, Last Rate: 125 mL/hr (01/29/25 0613)  ropivacaine (PF) in NS cmpd, 14 mL/hr, Last Rate: 14 mL/hr (01/28/25 1547)      PRN medications  PRN medications: HYDROmorphone, metoclopramide **OR** metoclopramide, oxyCODONE, oxyCODONE     Physical Exam:  Constitutional:  no distress, alert and cooperative  Eyes: clear sclera  Head/Neck: No apparent injury, trachea midline  Respiratory/Thorax: Patent airways, thorax symmetric, breathing comfortably  Cardiovascular: no pitting edema  Gastrointestinal: Nondistended  Musculoskeletal: ROM intact  Extremities: no clubbing  Neurological: alert, villeda x4  Psychological: Appropriate affect    Results for orders placed or performed during the hospital encounter of 01/28/25 (from the past 24 hours)   Prepare RBC: 2 Units   Result Value Ref Range    PRODUCT CODE X5864J60     Unit Number [id number]     Unit ABO O     Unit RH POS     XM INTEP COMP     Dispense Status XM     Blood Expiration Date 2/22/2025 11:59:00 PM EST     PRODUCT BLOOD TYPE 5100     UNIT VOLUME 350     PRODUCT CODE U0534V15     Unit Number [id number]     Unit ABO O     Unit RH POS     XM INTEP COMP     Dispense Status XM     Blood Expiration Date 2/22/2025 11:59:00 PM EST     PRODUCT BLOOD TYPE 5100     UNIT VOLUME 350    CBC   Result Value Ref  Range    WBC 12.8 (H) 4.4 - 11.3 x10*3/uL    nRBC 0.0 0.0 - 0.0 /100 WBCs    RBC 4.45 (L) 4.50 - 5.90 x10*6/uL    Hemoglobin 12.4 (L) 13.5 - 17.5 g/dL    Hematocrit 39.1 (L) 41.0 - 52.0 %    MCV 88 80 - 100 fL    MCH 27.9 26.0 - 34.0 pg    MCHC 31.7 (L) 32.0 - 36.0 g/dL    RDW 15.4 (H) 11.5 - 14.5 %    Platelets 278 150 - 450 x10*3/uL   Basic metabolic panel   Result Value Ref Range    Glucose 122 (H) 74 - 99 mg/dL    Sodium 137 136 - 145 mmol/L    Potassium 4.2 3.5 - 5.3 mmol/L    Chloride 103 98 - 107 mmol/L    Bicarbonate 26 21 - 32 mmol/L    Anion Gap 12 10 - 20 mmol/L    Urea Nitrogen 15 6 - 23 mg/dL    Creatinine 1.76 (H) 0.50 - 1.30 mg/dL    eGFR 40 (L) >60 mL/min/1.73m*2    Calcium 9.8 8.6 - 10.6 mg/dL        Swapnil Allen is a 75 y.o. year old male patient who presents for radical right nephrectomy (open) with Dr. Perez on 1/28/25. Acute Pain consulted for block for postoperative pain control.      Plan:     - Bilateral erector spinae plane blocks with catheters performed preoperatively on 1/28/25  - Ambit ball with Ropivacaine 0.2%/NaCl 0.9% 500mL, Rate 7 cc/hr bilaterally  - Ambit medication will not interfere with pain medication prescribed by the primary team.   - Please be aware of local anesthetic toxic dose and absorption variability before considering lidocaine patches  - Acute pain service will follow while catheters in place  - Rest of pain management per primary team     Acute Pain Resident  pg [phone] ph [phone]

## 2025-01-29 NOTE — SIGNIFICANT EVENT
"Urology Post-Op Check    Assessment & Plan:  75 y.o. male who is POD 0 from Right Radical Nephrectomy, Retroperitoneal Lymph Node Dissection (Right). EBL was 250 mL.    Neuro: Continue current pain regimen with DANIELLE blocks, scheduled tylenol, PRN oxycodone 5/10, breakthrough dilaudid.    Resp: IS, weaned off O2  Card: Telemetry. PTA ASA 81, Norvasc  FEN: IVF @ 125  GI: CLD, consider advancing in AM. Bowel reg.  : Rodriguez, consider removing in AM. Daily BMP. PTA Flomax  Heme/ID: Completed periop abx  Endo: PTA Levo  Ppy: SQH 5000u tid  Dispo: Continue care on RNF.    SUBJECTIVE  Pain well controlled  Nausea stable, has not vomited  Denies passing gas, not having bowel movements  Voiding via rodriguez catheter  Has not ambulated    OBJECTIVE  /74 (BP Location: Right arm, Patient Position: Lying)   Pulse 85   Temp 36.2 °C (97.2 °F) (Temporal)   Resp 19   Ht 1.803 m (5' 11\")   Wt 97 kg (213 lb 13.5 oz)   SpO2 95%   BMI 29.83 kg/m²   Physical Exam:  Gen: in no apparent distress  Resp: has a normal respiratory effort   Abd: Abdomen is soft, nondistended, and appropriately tender . Midline incision is covered with dry dressing, strikethrough noted.   Skin: Warm and dry  : Rodriguez with clear yellow urine    I/O:  I/O last 3 completed shifts:  In: 1400 (13.8 mL/kg) [I.V.:1400 (13.8 mL/kg)]  Out: 950 (9.3 mL/kg) [Urine:700 (0.2 mL/kg/hr); Blood:250]  Weight: 101.7 kg   I/O this shift:  In: 1160 [P.O.:60; I.V.:1100]  Out: 175 [Urine:175]    Labs:  Preop labs- Cr 1.47. Hgb 12.2    Elmo Monteiro MD  Urologic Surgery PGY-3  Adult Urology: [id number]    Pediatric Urology: [id number]   "

## 2025-01-29 NOTE — CARE PLAN
Problem: Pain - Adult  Goal: Verbalizes/displays adequate comfort level or baseline comfort level  Outcome: Progressing   The patient's goals for the shift include      The clinical goals for the shift include pain control    Over the shift, the patient did not make progress toward the following goals. Barriers to progression include ***. Recommendations to address these barriers include ***.

## 2025-01-29 NOTE — PROGRESS NOTES
Urology [place]  Progress Note      Patient: Swapnil Allen  Age/Sex: 75 y.o., male  MRN: [id number]  Date of surgery: 1/28/25  Admit Date: 1/28/2025   Code Status: Full Code  Length of Stay: 1      Interval History/Overnight Events:   Per patient rough night due to pain, on/off sleep. Acid reflux/heartburn. Clinically stable/cheerful AM rounds.   Reports pain   Afebrile, VSS, intermittent tachycardia 100s  Denies any fevers, chills, vomiting. Endorses nausea and little hiccupping.   No BMs or flatus as yet   Has not ambulating   Tolerating sips diet, does not have appetite       Objective  01/27 1900 - 01/29 0659  In: 3591.2 [P.O.:60; I.V.:3531.2]  Out: 2375 [Urine:2125]              12.2     9.0>-----<255              38.3   139  104  22                  ----------------<97     4.2  24  1.47          Ca 10.2 Phos No results found for requested labs within last 365 days. Mg No results found for requested labs within last 365 days.       ALT 25 AST 33 AlkPhos 62 tBili 0.6          Vitals:    01/28/25 2151 01/28/25 2356 01/29/25 0336 01/29/25 0730   BP:  131/87 (!) 137/91 (!) 150/97   BP Location:  Right arm Left arm Left arm   Patient Position:  Lying Lying Lying   Pulse:  97 109 103   Resp:  18 18 18   Temp:  36.1 °C (97 °F) 37.2 °C (99 °F) 36.7 °C (98.1 °F)   TempSrc:  Temporal Temporal Temporal   SpO2:  94% 94% 94%   Weight: 97 kg (213 lb 13.5 oz)      Height:              Medications:  Current Facility-Administered Medications   Medication Dose Route Frequency Provider Last Rate Last Admin    acetaminophen (Tylenol) tablet 975 mg  975 mg oral q6h Jannie Hamm MD   975 mg at 01/29/25 0827    amLODIPine (Norvasc) tablet 10 mg  10 mg oral Daily Jannie Hamm MD   10 mg at 01/29/25 0827    aspirin EC tablet 81 mg  81 mg oral Daily Jannie Hamm MD   81 mg at 01/29/25 0827    cholecalciferol (Vitamin D-3) tablet 1,000 Units  1,000 Units oral Daily Jannie Hamm MD   1,000 Units at 01/29/25 0827     heparin (porcine) injection 5,000 Units  5,000 Units subcutaneous q8h Jannie Hamm MD   5,000 Units at 01/29/25 0609    HYDROmorphone PF (Dilaudid) injection 0.2 mg  0.2 mg intravenous q2h PRN Jannie Hamm MD   0.2 mg at 01/29/25 0524    lactated Ringer's infusion  125 mL/hr intravenous Continuous Jannie Hamm  mL/hr at 01/29/25 0613 125 mL/hr at 01/29/25 0613    levothyroxine (Synthroid, Levoxyl) tablet 25 mcg  25 mcg oral Daily Jannie Hamm MD   25 mcg at 01/29/25 0609    metoclopramide (Reglan) tablet 10 mg  10 mg oral q6h PRN Jannie Hamm MD        Or    metoclopramide (Reglan) injection 10 mg  10 mg intravenous q6h PRN Jannie Hamm MD        oxyCODONE (Roxicodone) immediate release tablet 10 mg  10 mg oral q6h PRN Jannie Hamm MD   10 mg at 01/29/25 0235    oxyCODONE (Roxicodone) immediate release tablet 5 mg  5 mg oral q6h PRN Jannie Hamm MD        polyethylene glycol (Glycolax, Miralax) packet 17 g  17 g oral Daily Jannie Hamm MD   17 g at 01/29/25 0828    ropivacaine (PF) in NS cmpd (Naropin) 1000 mg/500 mL (0.2%) infusion  14 mL/hr infiltration Continuous Jannie Hamm MD 14 mL/hr at 01/28/25 1547 14 mL/hr at 01/28/25 1547    sennosides (Senokot) tablet 17.2 mg  2 tablet oral BID Jannie Hamm MD   17.2 mg at 01/29/25 0827    tamsulosin (Flomax) 24 hr capsule 0.4 mg  0.4 mg oral Daily Jannie Hamm MD   0.4 mg at 01/29/25 0827       Physical Exam                                                                                                                         Gen -  No acute distress     Neuro - Alert, oriented, conversant     CV -  tachycardic to 100s, normotensive      Pulm - Symmetric chest rise, non-labored breathing on room air      Abd - Abdomen is soft, nondistended, and appropriately tender. Midline incision is covered with dry dressing, strikethrough noted.      Ext - Warm, well perfused     Skin - without jaunice       Psych - appropriate tone,  affect      - Rodriguez draining clear yellow urine       Imaging  No results found.     Assessment & Plan  75 y.o. 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    Plan 1/29:  -Rodriguez out, follow up TOV  -Regular diet   -Pain control   -Follow up labs  -Ambulate/OOB  -Likely discharge tomorrow     Neuro:   -Pain controlled with Scheduled acetaminophen, Oxycodone 5/10 PRN, Dilaudid 0.2 PRN for breakthrough pain.   -Bilateral erector spinae plane blocks with catheters performed preoperatively on 1/28/25    Resp:   -ICS, breathing comfortably on RA    Card:   -Monitor vitals  -Amlodpine  -Aspirin   -Cholecalciferol     FEN: mIVF LR @ 125ml    GI:   -Reg diet   -BR: Miralax, senokat   -PRN Reglan for nausea     :   -Discharge rodriguez catheter, follow up TOV   -Flomax   -Daily BMP to monitor kidney function and replete electrolytes as indicated.     Endo:   -Levothyroxine      Heme/ID:   -Daily CBC to monitor for acute blood loss anemia   -No indication for blood transfusion or antibiotics at this time     Ppy:   -ALEN  -SCD  -IS    Dispo:   RNF. Possible discharge tomorrow.       Assessment and plan discussed with chief resident Dr. Martines and attending Dr Ana Xiao MD, [facility]    [facility] [place]  Adult Urology Pager: [phone]  Pediatric Urology Pager: [phone]

## 2025-01-29 NOTE — PROGRESS NOTES
01/29/25 1600   Discharge Planning   Living Arrangements Alone   Support Systems Family members   Assistance Needed uses cane at baseline   Type of Residence Private residence   Home or Post Acute Services None   Expected Discharge Disposition Home   Does the patient need discharge transport arranged? No   Financial Resource Strain   How hard is it for you to pay for the very basics like food, housing, medical care, and heating? Not very   Housing Stability   In the last 12 months, was there a time when you were not able to pay the mortgage or rent on time? N   In the past 12 months, how many times have you moved where you were living? 0   At any time in the past 12 months, were you homeless or living in a shelter (including now)? N   Transportation Needs   In the past 12 months, has lack of transportation kept you from medical appointments or from getting medications? no   In the past 12 months, has lack of transportation kept you from meetings, work, or from getting things needed for daily living? No     LSW met with pt at bedside to complete assessment. Pt is POD1 nephrectomy and retroperitoneal lymph node dissection, potential discharge Thursday 1/31. Pt lives alone in apartment and has MOW and supportive family, but report he will be discharging to his brother and sister in [facility] home in [place] for help following surgery. Pt reports he uses a cane, but does not feel that he needs any additional DME or HHC at this time, PT recs pending. LSW to cont to follow.

## 2025-01-30 ENCOUNTER — APPOINTMENT (OUTPATIENT)
Dept: RADIOLOGY | Facility: HOSPITAL | Age: 75
End: 2025-01-30
Payer: MEDICARE

## 2025-01-30 PROBLEM — G89.18 POST-OP PAIN: Status: ACTIVE | Noted: 2025-01-30

## 2025-01-30 LAB
ANION GAP SERPL CALC-SCNC: 14 MMOL/L (ref 10–20)
ATRIAL RATE: 120 BPM
BUN SERPL-MCNC: 20 MG/DL (ref 6–23)
CALCIUM SERPL-MCNC: 10 MG/DL (ref 8.6–10.6)
CHLORIDE SERPL-SCNC: 102 MMOL/L (ref 98–107)
CO2 SERPL-SCNC: 23 MMOL/L (ref 21–32)
CREAT SERPL-MCNC: 2.07 MG/DL (ref 0.5–1.3)
EGFRCR SERPLBLD CKD-EPI 2021: 33 ML/MIN/1.73M*2
ERYTHROCYTE [DISTWIDTH] IN BLOOD BY AUTOMATED COUNT: 15.9 % (ref 11.5–14.5)
GLUCOSE SERPL-MCNC: 100 MG/DL (ref 74–99)
HCT VFR BLD AUTO: 37.6 % (ref 41–52)
HGB BLD-MCNC: 11.9 G/DL (ref 13.5–17.5)
MCH RBC QN AUTO: 28.1 PG (ref 26–34)
MCHC RBC AUTO-ENTMCNC: 31.6 G/DL (ref 32–36)
MCV RBC AUTO: 89 FL (ref 80–100)
NRBC BLD-RTO: 0 /100 WBCS (ref 0–0)
P AXIS: 35 DEGREES
P OFFSET: 185 MS
P ONSET: 132 MS
PLATELET # BLD AUTO: 273 X10*3/UL (ref 150–450)
POTASSIUM SERPL-SCNC: 4.4 MMOL/L (ref 3.5–5.3)
PR INTERVAL: 164 MS
Q ONSET: 214 MS
QRS COUNT: 20 BEATS
QRS DURATION: 138 MS
QT INTERVAL: 330 MS
QTC CALCULATION(BAZETT): 466 MS
QTC FREDERICIA: 415 MS
R AXIS: -41 DEGREES
RBC # BLD AUTO: 4.23 X10*6/UL (ref 4.5–5.9)
SODIUM SERPL-SCNC: 135 MMOL/L (ref 136–145)
T AXIS: 0 DEGREES
T OFFSET: 379 MS
VENTRICULAR RATE: 120 BPM
WBC # BLD AUTO: 13.4 X10*3/UL (ref 4.4–11.3)

## 2025-01-30 PROCEDURE — 71045 X-RAY EXAM CHEST 1 VIEW: CPT | Performed by: RADIOLOGY

## 2025-01-30 PROCEDURE — 2500000002 HC RX 250 W HCPCS SELF ADMINISTERED DRUGS (ALT 637 FOR MEDICARE OP, ALT 636 FOR OP/ED): Performed by: STUDENT IN AN ORGANIZED HEALTH CARE EDUCATION/TRAINING PROGRAM

## 2025-01-30 PROCEDURE — 1200000003 HC ONCOLOGY  ROOM WITH TELEMETRY DAILY

## 2025-01-30 PROCEDURE — 2500000001 HC RX 250 WO HCPCS SELF ADMINISTERED DRUGS (ALT 637 FOR MEDICARE OP): Performed by: STUDENT IN AN ORGANIZED HEALTH CARE EDUCATION/TRAINING PROGRAM

## 2025-01-30 PROCEDURE — 85027 COMPLETE CBC AUTOMATED: CPT | Performed by: STUDENT IN AN ORGANIZED HEALTH CARE EDUCATION/TRAINING PROGRAM

## 2025-01-30 PROCEDURE — 80048 BASIC METABOLIC PNL TOTAL CA: CPT | Performed by: STUDENT IN AN ORGANIZED HEALTH CARE EDUCATION/TRAINING PROGRAM

## 2025-01-30 PROCEDURE — 36415 COLL VENOUS BLD VENIPUNCTURE: CPT | Performed by: STUDENT IN AN ORGANIZED HEALTH CARE EDUCATION/TRAINING PROGRAM

## 2025-01-30 PROCEDURE — 2500000001 HC RX 250 WO HCPCS SELF ADMINISTERED DRUGS (ALT 637 FOR MEDICARE OP)

## 2025-01-30 PROCEDURE — 2500000004 HC RX 250 GENERAL PHARMACY W/ HCPCS (ALT 636 FOR OP/ED)

## 2025-01-30 PROCEDURE — 71045 X-RAY EXAM CHEST 1 VIEW: CPT

## 2025-01-30 PROCEDURE — 2500000004 HC RX 250 GENERAL PHARMACY W/ HCPCS (ALT 636 FOR OP/ED): Mod: TB

## 2025-01-30 PROCEDURE — 97161 PT EVAL LOW COMPLEX 20 MIN: CPT | Mod: GP

## 2025-01-30 PROCEDURE — 2500000004 HC RX 250 GENERAL PHARMACY W/ HCPCS (ALT 636 FOR OP/ED): Performed by: STUDENT IN AN ORGANIZED HEALTH CARE EDUCATION/TRAINING PROGRAM

## 2025-01-30 RX ORDER — POLYETHYLENE GLYCOL 3350 17 G/17G
17 POWDER, FOR SOLUTION ORAL DAILY
Qty: 7 PACKET | Refills: 0 | Status: SHIPPED | OUTPATIENT
Start: 2025-01-31 | End: 2025-02-11 | Stop reason: HOSPADM

## 2025-01-30 RX ORDER — ACETAMINOPHEN 500 MG
5 TABLET ORAL ONCE
Status: COMPLETED | OUTPATIENT
Start: 2025-01-30 | End: 2025-01-30

## 2025-01-30 RX ORDER — ACETAMINOPHEN 325 MG/1
975 TABLET ORAL EVERY 6 HOURS
Start: 2025-01-30 | End: 2025-02-17

## 2025-01-30 RX ORDER — LABETALOL HYDROCHLORIDE 5 MG/ML
10 INJECTION, SOLUTION INTRAVENOUS ONCE
Status: COMPLETED | OUTPATIENT
Start: 2025-01-30 | End: 2025-01-30

## 2025-01-30 RX ORDER — SODIUM CHLORIDE, SODIUM LACTATE, POTASSIUM CHLORIDE, CALCIUM CHLORIDE 600; 310; 30; 20 MG/100ML; MG/100ML; MG/100ML; MG/100ML
50 INJECTION, SOLUTION INTRAVENOUS CONTINUOUS
Status: ACTIVE | OUTPATIENT
Start: 2025-01-30 | End: 2025-01-31

## 2025-01-30 RX ORDER — OXYCODONE HYDROCHLORIDE 5 MG/1
5 TABLET ORAL EVERY 6 HOURS PRN
Qty: 12 TABLET | Refills: 0 | Status: SHIPPED | OUTPATIENT
Start: 2025-01-30 | End: 2025-02-11 | Stop reason: HOSPADM

## 2025-01-30 RX ORDER — ACETAMINOPHEN 500 MG
5 TABLET ORAL NIGHTLY PRN
Status: DISCONTINUED | OUTPATIENT
Start: 2025-01-30 | End: 2025-01-30

## 2025-01-30 RX ORDER — ACETAMINOPHEN 500 MG
10 TABLET ORAL NIGHTLY PRN
Status: DISCONTINUED | OUTPATIENT
Start: 2025-01-30 | End: 2025-02-01

## 2025-01-30 RX ADMIN — LABETALOL HYDROCHLORIDE 10 MG: 5 INJECTION, SOLUTION INTRAVENOUS at 02:20

## 2025-01-30 RX ADMIN — Medication 5 MG: at 04:58

## 2025-01-30 RX ADMIN — Medication 10 MG: at 21:51

## 2025-01-30 RX ADMIN — ACETAMINOPHEN 975 MG: 325 TABLET ORAL at 21:51

## 2025-01-30 RX ADMIN — METHOCARBAMOL TABLETS 500 MG: 500 TABLET, COATED ORAL at 05:01

## 2025-01-30 RX ADMIN — HEPARIN SODIUM 5000 UNITS: 5000 INJECTION INTRAVENOUS; SUBCUTANEOUS at 04:58

## 2025-01-30 RX ADMIN — METHOCARBAMOL TABLETS 500 MG: 500 TABLET, COATED ORAL at 21:51

## 2025-01-30 RX ADMIN — HEPARIN SODIUM 5000 UNITS: 5000 INJECTION INTRAVENOUS; SUBCUTANEOUS at 13:49

## 2025-01-30 RX ADMIN — Medication 1000 UNITS: at 09:59

## 2025-01-30 RX ADMIN — LEVOTHYROXINE SODIUM 25 MCG: 25 TABLET ORAL at 05:01

## 2025-01-30 RX ADMIN — METHOCARBAMOL TABLETS 500 MG: 500 TABLET, COATED ORAL at 13:49

## 2025-01-30 RX ADMIN — TAMSULOSIN HYDROCHLORIDE 0.4 MG: 0.4 CAPSULE ORAL at 09:59

## 2025-01-30 RX ADMIN — ACETAMINOPHEN 975 MG: 325 TABLET ORAL at 15:10

## 2025-01-30 RX ADMIN — SODIUM CHLORIDE, POTASSIUM CHLORIDE, SODIUM LACTATE AND CALCIUM CHLORIDE 100 ML/HR: 600; 310; 30; 20 INJECTION, SOLUTION INTRAVENOUS at 03:44

## 2025-01-30 RX ADMIN — Medication 5 MG: at 00:43

## 2025-01-30 RX ADMIN — HEPARIN SODIUM 5000 UNITS: 5000 INJECTION INTRAVENOUS; SUBCUTANEOUS at 21:51

## 2025-01-30 RX ADMIN — AMLODIPINE BESYLATE 10 MG: 10 TABLET ORAL at 09:59

## 2025-01-30 RX ADMIN — ASPIRIN 81 MG: 81 TABLET, COATED ORAL at 09:59

## 2025-01-30 RX ADMIN — SODIUM CHLORIDE, POTASSIUM CHLORIDE, SODIUM LACTATE AND CALCIUM CHLORIDE 100 ML/HR: 600; 310; 30; 20 INJECTION, SOLUTION INTRAVENOUS at 09:59

## 2025-01-30 RX ADMIN — ACETAMINOPHEN 975 MG: 325 TABLET ORAL at 09:59

## 2025-01-30 ASSESSMENT — PAIN SCALES - GENERAL
PAINLEVEL_OUTOF10: 0 - NO PAIN

## 2025-01-30 ASSESSMENT — ACTIVITIES OF DAILY LIVING (ADL)
ADL_ASSISTANCE: INDEPENDENT
LACK_OF_TRANSPORTATION: NO

## 2025-01-30 ASSESSMENT — COGNITIVE AND FUNCTIONAL STATUS - GENERAL
TURNING FROM BACK TO SIDE WHILE IN FLAT BAD: A LITTLE
CLIMB 3 TO 5 STEPS WITH RAILING: A LITTLE
MOVING TO AND FROM BED TO CHAIR: A LOT
MOVING TO AND FROM BED TO CHAIR: A LITTLE
MOVING TO AND FROM BED TO CHAIR: A LITTLE
TURNING FROM BACK TO SIDE WHILE IN FLAT BAD: A LITTLE
TOILETING: A LITTLE
TOILETING: A LITTLE
WALKING IN HOSPITAL ROOM: A LITTLE
MOBILITY SCORE: 19
DRESSING REGULAR LOWER BODY CLOTHING: A LITTLE
DAILY ACTIVITIY SCORE: 20
TURNING FROM BACK TO SIDE WHILE IN FLAT BAD: A LITTLE
DRESSING REGULAR UPPER BODY CLOTHING: A LITTLE
MOVING FROM LYING ON BACK TO SITTING ON SIDE OF FLAT BED WITH BEDRAILS: A LITTLE
MOBILITY SCORE: 14
STANDING UP FROM CHAIR USING ARMS: A LITTLE
DRESSING REGULAR UPPER BODY CLOTHING: A LITTLE
STANDING UP FROM CHAIR USING ARMS: A LITTLE
WALKING IN HOSPITAL ROOM: A LOT
DRESSING REGULAR LOWER BODY CLOTHING: A LITTLE
MOBILITY SCORE: 19
STANDING UP FROM CHAIR USING ARMS: A LOT
HELP NEEDED FOR BATHING: A LITTLE
CLIMB 3 TO 5 STEPS WITH RAILING: A LOT
DAILY ACTIVITIY SCORE: 20
WALKING IN HOSPITAL ROOM: A LITTLE
CLIMB 3 TO 5 STEPS WITH RAILING: A LITTLE
HELP NEEDED FOR BATHING: A LITTLE

## 2025-01-30 ASSESSMENT — PAIN - FUNCTIONAL ASSESSMENT: PAIN_FUNCTIONAL_ASSESSMENT: 0-10

## 2025-01-30 NOTE — CARE PLAN
The patient's goals for the shift include      Problem: Pain - Adult  Goal: Verbalizes/displays adequate comfort level or baseline comfort level  Outcome: Progressing     Problem: Safety - Adult  Goal: Free from fall injury  Outcome: Progressing     Problem: Discharge Planning  Goal: Discharge to home or other facility with appropriate resources  Outcome: Progressing     Problem: Nutrition  Goal: Nutrient intake appropriate for maintaining nutritional needs  Outcome: Progressing     Problem: Skin  Goal: Prevent/manage excess moisture  Outcome: Progressing  Flowsheets (Taken 1/30/2025 0250)  Prevent/manage excess moisture: Monitor for/manage infection if present  Goal: Prevent/minimize sheer/friction injuries  Outcome: Progressing  Flowsheets (Taken 1/30/2025 0250)  Prevent/minimize sheer/friction injuries: Increase activity/out of bed for meals  Goal: Promote/optimize nutrition  Outcome: Progressing  Flowsheets (Taken 1/30/2025 0250)  Promote/optimize nutrition: Offer water/supplements/favorite foods     Problem: Pain  Goal: Takes deep breaths with improved pain control throughout the shift  Outcome: Progressing  Goal: Turns in bed with improved pain control throughout the shift  Outcome: Progressing  Goal: Walks with improved pain control throughout the shift  Outcome: Progressing  Goal: Performs ADL's with improved pain control throughout shift  Outcome: Progressing  Goal: Participates in PT with improved pain control throughout the shift  Outcome: Progressing  Goal: Free from opioid side effects throughout the shift  Outcome: Progressing  Goal: Free from acute confusion related to pain meds throughout the shift  Outcome: Progressing     The clinical goals for the shift include Pt will report adequate pain control throughout shift

## 2025-01-30 NOTE — PROGRESS NOTES
Physical Therapy    Physical Therapy Evaluation    Patient Name: Swapnil Allen  MRN: [id number]  Department: [facility]  Room: [facility]-A  Today's Date: 1/30/2025   Time Calculation  Start Time: 1130  Stop Time: 1147  Time Calculation (min): 17 min    Assessment/Plan   PT Assessment  PT Assessment Results: Decreased range of motion, Decreased strength, Decreased endurance, Impaired balance, Decreased mobility, Decreased safety awareness, Impaired sensation  Rehab Prognosis: Good  Barriers to Discharge Home: Caregiver assistance, Cognition needs, Physical needs  Caregiver Assistance: Patient lives alone and/or does not have reliable caregiver assistance  Cognition Needs: 24hr supervision for safety awareness needed, Insight of patient limited regarding functional ability/needs  Physical Needs: 24hr mobility assistance needed, 24hr ADL assistance needed  Evaluation/Treatment Tolerance: Patient tolerated treatment well  Medical Staff Made Aware: Yes (RN aware)  Strengths: Attitude of self, Premorbid level of function  Barriers to Participation: Comorbidities  End of Session Communication: Bedside nurse  Assessment Comment: Pt is a 75 year old male with a PMHx of HTN, 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 On this date pt transfers with mod A x2 and ambulates with mod A x1 and FWW. Pt displays impairments in strength, endurance, balance, and safety awareness impacting functional mobility. Pt would benefit from continued therapy to minimize potential complications and improve functional mobility.  End of Session Patient Position: Alarm off, not on at start of session, Up in chair  IP OR SWING BED PT PLAN  Inpatient or Swing  Bed: Inpatient  PT Plan  Treatment/Interventions: Bed mobility, Gait training, Transfer training, Stair training, Balance training, Neuromuscular re-education, Strengthening, Endurance training, Range of motion, Therapeutic exercise, Therapeutic activity, Home exercise program, Positioning, Postural re-education  PT Plan: Ongoing PT  PT Frequency: 3 times per week  PT Discharge Recommendations: Moderate intensity level of continued care  Equipment Recommended upon Discharge: Wheeled walker  PT Recommended Transfer Status: Assist x2    Subjective   General Visit Information:  General  Reason for Referral: s/p nephrectomy  Past Medical History Relevant to Rehab: HTN, 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  Home Living:  Home Living  Type of Home: Apartment (senior living)  Lives With: Alone  Home Adaptive Equipment: Cane (pt reports having a cane but does not always use it at home)  Home Layout: One level  Home Access: Elevator  Bathroom Shower/Tub: Tub/shower unit  Bathroom Toilet: Standard  Bathroom Equipment: Shower chair with back, Grab bars in shower  Home Living Comments: Pt reports that he will be staying with his brother for a while with a few MARY and no stairs once in the house. + driving - falls  Prior Level of Function:  Prior Function Per Pt/Caregiver Report  Level of [place]: Independent with ADLs and functional transfers, Independent with homemaking with ambulation, Independent with homemaking  with wheelchair (laundry service and meals on wheels at [employer].)  Receives Help From: Family (5 brothers and 2 sisters that live nearby)  ADL Assistance: Independent  Homemaking Assistance: Independent  Ambulatory Assistance: Independent  Precautions:  Precautions  Medical Precautions: Fall precautions  Precautions Comment: protective       Vital Signs Comment: After ambulation ~6 feet, /95. -114 throughout session.     Objective   Pain:  Pain Assessment  0-10 (Numeric) Pain Score:  (pt reports abdominal soreness but does not provide a rating)  Cognition:  Cognition  Overall Cognitive Status: Within Functional Limits  Orientation Level: Oriented X4  Insight: Mild  Impulsive: Moderately    General Assessments:  Activity Tolerance  Endurance: Tolerates 10 - 20 min exercise with multiple rests    Sensation  Sensation Comment: Pt reports some numbness and tingling in his fingertips    Strength  Strength Comments: WFL  Coordination  Movements are Fluid and Coordinated: Yes    Postural Control  Postural Control: Within Functional Limits    Static Sitting Balance  Static Sitting-Balance Support: Bilateral upper extremity supported, Feet supported  Static Sitting-Level of Assistance: Close supervision  Dynamic Sitting Balance  Dynamic Sitting-Balance Support: Bilateral upper extremity supported, Feet supported  Dynamic Sitting-Level of Assistance: Close supervision    Static Standing Balance  Static Standing-Balance Support: Bilateral upper extremity supported (FWW)  Static Standing-Level of Assistance: Moderate assistance  Dynamic Standing Balance  Dynamic Standing-Balance Support: Bilateral upper extremity supported (FWW)  Dynamic Standing-Level of Assistance: Moderate assistance (x2)  Functional Assessments:  Bed Mobility  Bed Mobility: No    Transfers  Transfer: Yes  Transfer 1  Transfer From 1: Sit to, Stand to  Transfer to 1: Sit, Stand  Technique 1: Stand to sit, Sit to stand  Transfer Device 1:  Walker (FWW)  Transfer Level of Assistance 1: Moderate assistance, Maximum verbal cues (x2)  Trials/Comments 1: VCs for hand placement and hip and knee extension. VCs for pacing. ~2 trials    Ambulation/Gait Training  Ambulation/Gait Training Performed: Yes  Ambulation/Gait Training 1  Surface 1: Level tile  Device 1: Rolling walker  Assistance 1: Moderate assistance (FWW)  Quality of Gait 1: Narrow base of support, Inconsistent stride length, Decreased step length  Comments/Distance (ft) 1: ~6 feet in room. increased VC for command following    Stairs  Stairs: No  Extremity/Trunk Assessments:  RLE   RLE : Within Functional Limits (observed via function)  LLE   LLE : Within Functional Limits (observed via function)  Outcome Measures:  [facility] Basic Mobility  Turning from your back to your side while in a flat bed without using bedrails: A little  Moving from lying on your back to sitting on the side of a flat bed without using bedrails: A little  Moving to and from bed to chair (including a wheelchair): A lot  Standing up from a chair using your arms (e.g. wheelchair or bedside chair): A lot  To walk in hospital room: A lot  Climbing 3-5 steps with railing: A lot  Basic Mobility - Total Score: 14    Encounter Problems       Encounter Problems (Active)       Mobility       LTG - Patient will navigate 4-6 steps with rails/device and moderate assistance.       Start:  01/30/25    Expected End:  02/13/25            STG - Patient will ambulate ~100 feet with FWW and minimal assistance.        Start:  01/30/25    Expected End:  02/13/25            Pt will ambulate >/= 100 ft with FWW and minimal assistance and no signs of fatigue or SOB.         Start:  01/30/25    Expected End:  02/13/25               PT Transfers       LTG - Patient will transfer from one surface to another CGA with FWW.       Start:  01/30/25    Expected End:  02/13/25            STG - Patient will perform bed mobility with close supervision.         Start:  01/30/25    Expected End:  02/13/25               Pain - Adult              Education Documentation  Handouts, taught by THEODORA Shrestha at 1/30/2025  2:47 PM.  Learner: Patient  Readiness: Acceptance  Method: Explanation  Response: Verbalizes Understanding  Comment: bed mobility, transfers, ambulation with FWW, and energy conservation    Precautions, taught by THEODORA Shrestha at 1/30/2025  2:47 PM.  Learner: Patient  Readiness: Acceptance  Method: Explanation  Response: Verbalizes Understanding  Comment: bed mobility, transfers, ambulation with FWW, and energy conservation    Body Mechanics, taught by THEODORA Shrestha at 1/30/2025  2:47 PM.  Learner: Patient  Readiness: Acceptance  Method: Explanation  Response: Verbalizes Understanding  Comment: bed mobility, transfers, ambulation with FWW, and energy conservation    Home Exercise Program, taught by THEODORA Shrestha at 1/30/2025  2:47 PM.  Learner: Patient  Readiness: Acceptance  Method: Explanation  Response: Verbalizes Understanding  Comment: bed mobility, transfers, ambulation with FWW, and energy conservation    Mobility Training, taught by THEODORA Shrestha at 1/30/2025  2:47 PM.  Learner: Patient  Readiness: Acceptance  Method: Explanation  Response: Verbalizes Understanding  Comment: bed mobility, transfers, ambulation with FWW, and energy conservation        01/30/25 at 3:16 PM - THEODORA SHRESTHA

## 2025-01-30 NOTE — CARE PLAN
Problem: Pain - Adult  Goal: Verbalizes/displays adequate comfort level or baseline comfort level  Outcome: Progressing     Problem: Safety - Adult  Goal: Free from fall injury  Outcome: Progressing     Problem: Nutrition  Goal: Nutrient intake appropriate for maintaining nutritional needs  Outcome: Progressing     Problem: Skin  Goal: Prevent/manage excess moisture  Outcome: Progressing  Goal: Prevent/minimize sheer/friction injuries  Outcome: Progressing  Goal: Promote/optimize nutrition  Outcome: Progressing

## 2025-01-30 NOTE — PROGRESS NOTES
01/30/25 1200   Discharge Planning   Living Arrangements Alone   Support Systems Family members   Type of Residence Private residence   Home or Post Acute Services In home services   Type of Home Care Services Home PT   Expected Discharge Disposition Home H   Does the patient need discharge transport arranged? No   Financial Resource Strain   How hard is it for you to pay for the very basics like food, housing, medical care, and heating? Not very   Housing Stability   In the last 12 months, was there a time when you were not able to pay the mortgage or rent on time? N   In the past 12 months, how many times have you moved where you were living? 0   At any time in the past 12 months, were you homeless or living in a shelter (including now)? N   Transportation Needs   In the past 12 months, has lack of transportation kept you from medical appointments or from getting medications? no   In the past 12 months, has lack of transportation kept you from meetings, work, or from getting things needed for daily living? No     PT evaluated this pt today and is recommending SNF placement.  A list of choices from [facility] was provided to the pt.  He declined SNF so home care was offered which the pt said he'd consider but wasn't sure about.  He is staying at his brother, Peter Cheng ([phone]) house after discharge (didn't know address off hand.)  He was indecisive about needing a walker after discharge as well.  Will follow up again tomorrow regarding the discharge plan.  Marissa Grubbs RN, TCC

## 2025-01-30 NOTE — PROGRESS NOTES
Postop Pain HPI -   Palliative: relieved with IV analgesics and regional local anesthetics  Provocative: movement  Quality:  burning and aching  Radiation:  none  Severity:  6/10  Timing: constant    24-HOUR OPIOID CONSUMPTION:  Oxycodone 5mg, dilaudid 0.4mg    Scheduled medications  acetaminophen, 975 mg, oral, q6h  amLODIPine, 10 mg, oral, Daily  aspirin, 81 mg, oral, Daily  cholecalciferol, 1,000 Units, oral, Daily  heparin (porcine), 5,000 Units, subcutaneous, q8h  levothyroxine, 25 mcg, oral, Daily  methocarbamol, 500 mg, oral, q8h ALEN  polyethylene glycol, 17 g, oral, Daily  sennosides, 2 tablet, oral, BID  tamsulosin, 0.4 mg, oral, Daily      Continuous medications  lactated Ringer's, 100 mL/hr, Last Rate: 100 mL/hr (01/30/25 1035)  lactated Ringer's, 100 mL/hr, Last Rate: 100 mL/hr (01/30/25 1035)  ropivacaine (PF) in NS cmpd, 14 mL/hr, Last Rate: 14 mL/hr (01/28/25 1547)      PRN medications  PRN medications: calcium carbonate, HYDROmorphone, melatonin, metoclopramide **OR** metoclopramide, oxyCODONE, oxyCODONE     Physical Exam:  Constitutional:  no distress, alert and cooperative  Eyes: clear sclera  Head/Neck: No apparent injury, trachea midline  Respiratory/Thorax: Patent airways, thorax symmetric, breathing comfortably  Cardiovascular: no pitting edema  Gastrointestinal: Nondistended  Musculoskeletal: ROM intact  Extremities: no clubbing  Neurological: alert, villeda x4  Psychological: Appropriate affect    Results for orders placed or performed during the hospital encounter of 01/28/25 (from the past 24 hours)   ECG 12 Lead   Result Value Ref Range    Ventricular Rate 120 BPM    Atrial Rate 120 BPM    MO Interval 164 ms    QRS Duration 138 ms    QT Interval 330 ms    QTC Calculation(Bazett) 466 ms    P Axis 35 degrees    R Axis -41 degrees    T Axis 0 degrees    QRS Count 20 beats    Q Onset 214 ms    P Onset 132 ms    P Offset 185 ms    T Offset 379 ms    QTC Fredericia 415 ms   Basic metabolic panel    Result Value Ref Range    Glucose 116 (H) 74 - 99 mg/dL    Sodium 135 (L) 136 - 145 mmol/L    Potassium 4.4 3.5 - 5.3 mmol/L    Chloride 101 98 - 107 mmol/L    Bicarbonate 22 21 - 32 mmol/L    Anion Gap 16 10 - 20 mmol/L    Urea Nitrogen 19 6 - 23 mg/dL    Creatinine 1.92 (H) 0.50 - 1.30 mg/dL    eGFR 36 (L) >60 mL/min/1.73m*2    Calcium 9.8 8.6 - 10.6 mg/dL   CBC   Result Value Ref Range    WBC 13.4 (H) 4.4 - 11.3 x10*3/uL    nRBC 0.0 0.0 - 0.0 /100 WBCs    RBC 4.23 (L) 4.50 - 5.90 x10*6/uL    Hemoglobin 11.9 (L) 13.5 - 17.5 g/dL    Hematocrit 37.6 (L) 41.0 - 52.0 %    MCV 89 80 - 100 fL    MCH 28.1 26.0 - 34.0 pg    MCHC 31.6 (L) 32.0 - 36.0 g/dL    RDW 15.9 (H) 11.5 - 14.5 %    Platelets 273 150 - 450 x10*3/uL   Basic metabolic panel   Result Value Ref Range    Glucose 100 (H) 74 - 99 mg/dL    Sodium 135 (L) 136 - 145 mmol/L    Potassium 4.4 3.5 - 5.3 mmol/L    Chloride 102 98 - 107 mmol/L    Bicarbonate 23 21 - 32 mmol/L    Anion Gap 14 10 - 20 mmol/L    Urea Nitrogen 20 6 - 23 mg/dL    Creatinine 2.07 (H) 0.50 - 1.30 mg/dL    eGFR 33 (L) >60 mL/min/1.73m*2    Calcium 10.0 8.6 - 10.6 mg/dL       Swapnil Allen is a 75 y.o. year old male patient who presents for radical right nephrectomy (open) with Dr. Perez on 1/28/25. Acute Pain consulted for block for postoperative pain control.      Plan:     - Bilateral erector spinae plane blocks with catheters performed preoperatively on 1/28/25  - Ambit ball with Ropivacaine 0.2%/NaCl 0.9% 500mL, Rate 7 cc/hr bilaterally  - Catheters refilled today. 5cc ropi bolused through each side.  - Ambit medication will not interfere with pain medication prescribed by the primary team.   - Please be aware of local anesthetic toxic dose and absorption variability before considering lidocaine patches  - Acute pain service will follow while catheters in place  - Rest of pain management per primary team     Acute Pain Resident  pg [phone] ph [phone]

## 2025-01-30 NOTE — PROGRESS NOTES
[facility] [place]  Progress Note      Patient: Swapnil Allen  Age/Sex: 75 y.o., male  MRN: [id number]  Date of surgery: 1/28/25  Admit Date: 1/28/2025   Code Status: Full Code  Length of Stay: 2      Interval History/Overnight Events:   Developed tachycardia 110s yesterday afternoon without chest pain/SOB/increased oxygen requirements. Pain was moderately controlled. EKG unremarkable. CT PE obtained and was negative. TSH normal. No electrolyte abnormalities on labs. Patient was given 500cc LR bolus, muscle relaxant was added. Tachycardia improved throughout the night and seems to have resolved this morning  Denies any fevers, chills, N/V  No BMs or flatus as yet   Sitting up in chair   Tolerating sips diet, does not have appetite       Objective  01/28 1900 - 01/30 0659  In: 3164.6 [P.O.:260; I.V.:2904.6]  Out: 2650 [Urine:2650]              12.4     12.8>-----<278              39.1   135  101  19                  ----------------<116     4.4  22  1.92          Ca 9.8 Phos No results found for requested labs within last 365 days. Mg 1.70       ALT 25 AST 33 AlkPhos 62 tBili 0.6          Vitals:    01/30/25 0008 01/30/25 0220 01/30/25 0344 01/30/25 0710   BP: (!) 144/96 136/87 132/88 94/66   BP Location:   Right arm Right arm   Patient Position: Sitting  Sitting Lying   Pulse:  103 87 99   Resp:   16 18   Temp:   36.6 °C (97.9 °F) 36.7 °C (98.1 °F)   TempSrc:   Temporal Temporal   SpO2:   96%    Weight:       Height:              Medications:  Current Facility-Administered Medications   Medication Dose Route Frequency Provider Last Rate Last Admin    acetaminophen (Tylenol) tablet 975 mg  975 mg oral q6h Jannie Hamm MD   975 mg at 01/29/25 2046    amLODIPine (Norvasc) tablet 10 mg  10 mg oral Daily Jannie Hamm MD   10 mg at 01/29/25 0827    aspirin EC tablet 81 mg  81 mg oral Daily Jannie Hamm MD   81 mg at 01/29/25 0827    calcium carbonate (Tums) chewable tablet 500 mg  500 mg oral 4x daily PRN Alexys  MD Sana   500 mg at 01/29/25 1356    cholecalciferol (Vitamin D-3) tablet 1,000 Units  1,000 Units oral Daily Jannie Hamm MD   1,000 Units at 01/29/25 0827    heparin (porcine) injection 5,000 Units  5,000 Units subcutaneous q8h Jannie Hamm MD   5,000 Units at 01/30/25 0458    HYDROmorphone (Dilaudid) injection 0.4 mg  0.4 mg intravenous q2h PRN Gallo Martines MD        lactated Ringer's infusion  100 mL/hr intravenous Continuous Alexys MD Sana 100 mL/hr at 01/30/25 0512 100 mL/hr at 01/30/25 0512    levothyroxine (Synthroid, Levoxyl) tablet 25 mcg  25 mcg oral Daily Jannie Hamm MD   25 mcg at 01/30/25 0501    melatonin tablet 5 mg  5 mg oral Nightly PRN Elmo Monteiro MD   5 mg at 01/30/25 0043    methocarbamol (Robaxin) tablet 500 mg  500 mg oral q8h ALEN Gallo Martines MD   500 mg at 01/30/25 0501    metoclopramide (Reglan) tablet 10 mg  10 mg oral q6h PRN Jannie Hamm MD        Or    metoclopramide (Reglan) injection 10 mg  10 mg intravenous q6h PRN Jannie Hamm MD        oxyCODONE (Roxicodone) immediate release tablet 10 mg  10 mg oral q6h PRN Jannie Hamm MD   10 mg at 01/29/25 0235    oxyCODONE (Roxicodone) immediate release tablet 5 mg  5 mg oral q6h PRN Jannie Hamm MD   5 mg at 01/29/25 0840    polyethylene glycol (Glycolax, Miralax) packet 17 g  17 g oral Daily Jannie Hamm MD        ropivacaine (PF) in NS cmpd (Naropin) 1000 mg/500 mL (0.2%) infusion  14 mL/hr infiltration Continuous Jannie Hamm MD 14 mL/hr at 01/28/25 1547 14 mL/hr at 01/28/25 1547    sennosides (Senokot) tablet 17.2 mg  2 tablet oral BID Jannie Hamm MD   17.2 mg at 01/29/25 0827    tamsulosin (Flomax) 24 hr capsule 0.4 mg  0.4 mg oral Daily Jannie Hamm MD   0.4 mg at 01/29/25 0827       Physical Exam                                                                                                                         Gen -  No acute distress     Neuro - Alert, oriented, conversant      CV -  normal heart rate, normotensive      Pulm - Symmetric chest rise, non-labored breathing on room air      Abd - Abdomen is soft, nondistended, and appropriately tender. Midline incision c/d/i     Ext - Warm, well perfused     Skin - without jaunice       Psych - appropriate tone, affect      - Awad draining clear yellow urine       Imaging  CT angio chest for pulmonary embolism    Result Date: 1/29/2025  STUDY: CT ANGIO CHEST FOR PULMONARY EMBOLISM;  1/29/2025 7:52 pm   INDICATION: Signs/Symptoms:tachycardia 120s postoperative day 1. Status post right radical nephrectomy.   COMPARISON: None   ACCESSION NUMBER(S): [id number]   ORDERING CLINICIAN: FILIPE ROA   TECHNIQUE: Helical data acquisition of the chest was obtained after intravenous administration of 80 ML Omnipaque 350, as per PE protocol. Images were reformatted in coronal and sagittal planes. Axial and coronal maximum intensity projection (MIP) images were created and reviewed.   FINDINGS: POTENTIAL LIMITATIONS OF THE STUDY: The assessment is limited by respiratory motion and suboptimal contrast opacification of pulmonary arteries.   HEART AND VESSELS: No discrete filling defects within the main pulmonary artery or its branches to the segmental level. Please note that, assessment of distal segmental and subsegmental branches is limited and small peripheral emboli are not entirely excluded. Main pulmonary artery and its branches are normal in caliber.   The thoracic aorta normal in course and caliber.There is mild scattered atherosclerosis present, including calcified and noncalcified plaques.Although, the study is not tailored for evaluation of aorta, there is no definite evidence of acute aortic pathology. Mild coronary artery calcifications are seen. Please note,the study is not optimized for evaluation of coronary arteries.   The cardiac chambers are not enlarged.There are no findings to suggest right heart strain.   There is no pericardial  effusion seen.   MEDIASTINUM AND TUAN, LOWER NECK AND AXILLA: The visualized thyroid gland is within normal limits. No evidence of thoracic lymphadenopathy by CT criteria. Esophagus appears within normal limits as seen.   LUNGS AND AIRWAYS: The trachea and central airways are patent. No endobronchial lesion is seen.   There are bibasilar areas of bandlike opacification. No evidence of pleural effusion or pneumothorax.     UPPER ABDOMEN: The visualized subdiaphragmatic structures demonstrate no remarkable findings. Multiple fat density gallstones are visualized.Incompletely characterized right radical nephrectomy surgical bed on current examination however there are few scattered foci of gas in this region which could represent expected postoperative air.     CHEST WALL AND OSSEOUS STRUCTURES: Chest wall is within normal limits. No acute osseous pathology.There are no suspicious osseous lesions.       1. No evidence of acute pulmonary embolism to the segmental level. Peripheral subsegmental emboli are not definitively excluded. 2. Bandlike opacities in the bilateral lung bases, favored to represent atelectatic changes. Otherwise no pleural effusion or pneumothorax.   I personally reviewed the images/study and I agree with the findings as stated by resident Tian Dolan. This study was interpreted at [facility], [place], [place].   MACRO: None     Dictation workstation:   [id number]      Assessment & Plan  75 y.o. 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    Plan 1/29:  - DC rodriguez catheter + TOV  -Regular diet   -LR 100cc/hr  -Pain control   -Follow  up labs, trend Cr and UOP  -Ambulate/OOB    Neuro:   -Pain controlled with Scheduled acetaminophen, Oxycodone 5/10 PRN, Dilaudid 0.2 PRN for breakthrough pain, robaxin 500mg q8hrs   -Bilateral erector spinae plane blocks with catheters performed preoperatively on 1/28/25    Resp:   -ICS, breathing comfortably on RA    Card:   -Monitor vitals  -Amlodpine  -Aspirin   -Cholecalciferol     FEN: mIVF LR @ 100ml    GI:   -Reg diet   -BR: Miralax, senokat   -PRN Reglan for nausea     :   -Cr 2.07 from 1.9  -Flomax   -Daily BMP to monitor kidney function and replete electrolytes as indicated.   -Remove rodriguez catheter    Endo:   -Levothyroxine      Heme/ID:   -Daily CBC to monitor for acute blood loss anemia   -No indication for blood transfusion or antibiotics at this time     Ppy:   -ALEN  -SCD  -IS    Dispo:   RNF      Assessment and plan discussed with chief resident Dr. Martines and attending Dr Ana Hood MD  U[facility] [place]  Adult Urology Pager: [phone]  Pediatric Urology Pager: [phone]

## 2025-01-30 NOTE — SIGNIFICANT EVENT
Urology Night Sign Out:    Briefly, 75m POD 1 from RIGHT radical nephrectomy with retroperitoneal LND. PMHx significant for HTN on amlodipine, hyperthyroid s/p thyroidectomy on levothyroxine replacement, treated Hep C, prior R-sided stroke like symptoms following cocaine use in 2021, on ASA81.    Throughout the day, he has been persistently in sinus tachycardia, rates gradually increasing to 120. Hypertensive to 150's/100s. Afebrile and appropriate saturations on RA.     AM Labs with normal K, WBC 12.8, Hgb 12.4 (Pre-op 12.2), Mag 1.7. Cr 1.76 from baseline about 1.4.    EKG with sinus tach, RBB and LVH stable from prior exams.    He is asymptomatic, endorsing only incisional pain which is improved. Denies SOB, CP, Cough. He is now ambulating.    Social history reviewed- endorses prior history of heavy ETOH and illicit drug use including cocaine, reports he has been abstinent from them for quite some time.    He is on ASA, SQH, is wearing SCDs continuously.       Plan:  -CT PE stat  -Recheck electrolytes, TSH/T4  -If no clear cause identified by this evaluation, will consult medicine for their input.    Elmo Monteiro MD  Urologic Surgery PGY-3  Adult Urology: [id number]    Pediatric Urology: [id number]     Re-eval 2230:    CT PE negative.  K 4.4. Mag 1.7  HR trending downward, now 100 on telemetry, /87    Will continue to monitor and defer further workup at this time. Suspect appropriate sinus tachycardia response to pain and anxiety.

## 2025-01-31 ENCOUNTER — APPOINTMENT (OUTPATIENT)
Dept: RADIOLOGY | Facility: HOSPITAL | Age: 75
End: 2025-01-31
Payer: MEDICARE

## 2025-01-31 ENCOUNTER — APPOINTMENT (OUTPATIENT)
Dept: HEMATOLOGY/ONCOLOGY | Facility: HOSPITAL | Age: 75
DRG: 656 | End: 2025-01-31
Payer: MEDICARE

## 2025-01-31 ENCOUNTER — APPOINTMENT (OUTPATIENT)
Dept: RADIOLOGY | Facility: HOSPITAL | Age: 75
DRG: 656 | End: 2025-01-31
Payer: MEDICARE

## 2025-01-31 LAB
ANION GAP SERPL CALC-SCNC: 18 MMOL/L (ref 10–20)
BUN SERPL-MCNC: 26 MG/DL (ref 6–23)
CALCIUM SERPL-MCNC: 10.6 MG/DL (ref 8.6–10.6)
CHLORIDE SERPL-SCNC: 101 MMOL/L (ref 98–107)
CO2 SERPL-SCNC: 21 MMOL/L (ref 21–32)
CREAT SERPL-MCNC: 2.22 MG/DL (ref 0.5–1.3)
EGFRCR SERPLBLD CKD-EPI 2021: 30 ML/MIN/1.73M*2
ERYTHROCYTE [DISTWIDTH] IN BLOOD BY AUTOMATED COUNT: 15.7 % (ref 11.5–14.5)
GLUCOSE SERPL-MCNC: 113 MG/DL (ref 74–99)
HCT VFR BLD AUTO: 41 % (ref 41–52)
HGB BLD-MCNC: 12.8 G/DL (ref 13.5–17.5)
MAGNESIUM SERPL-MCNC: 2.02 MG/DL (ref 1.6–2.4)
MCH RBC QN AUTO: 28 PG (ref 26–34)
MCHC RBC AUTO-ENTMCNC: 31.2 G/DL (ref 32–36)
MCV RBC AUTO: 90 FL (ref 80–100)
NRBC BLD-RTO: 0 /100 WBCS (ref 0–0)
PLATELET # BLD AUTO: 285 X10*3/UL (ref 150–450)
POTASSIUM SERPL-SCNC: 4.1 MMOL/L (ref 3.5–5.3)
RBC # BLD AUTO: 4.57 X10*6/UL (ref 4.5–5.9)
SODIUM SERPL-SCNC: 136 MMOL/L (ref 136–145)
WBC # BLD AUTO: 10.7 X10*3/UL (ref 4.4–11.3)

## 2025-01-31 PROCEDURE — 2500000001 HC RX 250 WO HCPCS SELF ADMINISTERED DRUGS (ALT 637 FOR MEDICARE OP): Performed by: STUDENT IN AN ORGANIZED HEALTH CARE EDUCATION/TRAINING PROGRAM

## 2025-01-31 PROCEDURE — 2500000001 HC RX 250 WO HCPCS SELF ADMINISTERED DRUGS (ALT 637 FOR MEDICARE OP)

## 2025-01-31 PROCEDURE — 80048 BASIC METABOLIC PNL TOTAL CA: CPT | Performed by: STUDENT IN AN ORGANIZED HEALTH CARE EDUCATION/TRAINING PROGRAM

## 2025-01-31 PROCEDURE — 2500000001 HC RX 250 WO HCPCS SELF ADMINISTERED DRUGS (ALT 637 FOR MEDICARE OP): Performed by: NURSE PRACTITIONER

## 2025-01-31 PROCEDURE — 2500000002 HC RX 250 W HCPCS SELF ADMINISTERED DRUGS (ALT 637 FOR MEDICARE OP, ALT 636 FOR OP/ED): Performed by: STUDENT IN AN ORGANIZED HEALTH CARE EDUCATION/TRAINING PROGRAM

## 2025-01-31 PROCEDURE — 93010 ELECTROCARDIOGRAM REPORT: CPT | Performed by: INTERNAL MEDICINE

## 2025-01-31 PROCEDURE — 99231 SBSQ HOSP IP/OBS SF/LOW 25: CPT | Performed by: STUDENT IN AN ORGANIZED HEALTH CARE EDUCATION/TRAINING PROGRAM

## 2025-01-31 PROCEDURE — 74018 RADEX ABDOMEN 1 VIEW: CPT

## 2025-01-31 PROCEDURE — 83735 ASSAY OF MAGNESIUM: CPT

## 2025-01-31 PROCEDURE — 74018 RADEX ABDOMEN 1 VIEW: CPT | Performed by: STUDENT IN AN ORGANIZED HEALTH CARE EDUCATION/TRAINING PROGRAM

## 2025-01-31 PROCEDURE — 1200000003 HC ONCOLOGY  ROOM WITH TELEMETRY DAILY

## 2025-01-31 PROCEDURE — 71045 X-RAY EXAM CHEST 1 VIEW: CPT | Performed by: STUDENT IN AN ORGANIZED HEALTH CARE EDUCATION/TRAINING PROGRAM

## 2025-01-31 PROCEDURE — 71045 X-RAY EXAM CHEST 1 VIEW: CPT

## 2025-01-31 PROCEDURE — 2500000004 HC RX 250 GENERAL PHARMACY W/ HCPCS (ALT 636 FOR OP/ED)

## 2025-01-31 PROCEDURE — 2500000004 HC RX 250 GENERAL PHARMACY W/ HCPCS (ALT 636 FOR OP/ED): Performed by: STUDENT IN AN ORGANIZED HEALTH CARE EDUCATION/TRAINING PROGRAM

## 2025-01-31 PROCEDURE — 93005 ELECTROCARDIOGRAM TRACING: CPT

## 2025-01-31 PROCEDURE — 36415 COLL VENOUS BLD VENIPUNCTURE: CPT | Performed by: STUDENT IN AN ORGANIZED HEALTH CARE EDUCATION/TRAINING PROGRAM

## 2025-01-31 PROCEDURE — 2500000005 HC RX 250 GENERAL PHARMACY W/O HCPCS

## 2025-01-31 PROCEDURE — 85027 COMPLETE CBC AUTOMATED: CPT | Performed by: STUDENT IN AN ORGANIZED HEALTH CARE EDUCATION/TRAINING PROGRAM

## 2025-01-31 RX ORDER — METHOCARBAMOL 100 MG/ML
500 INJECTION, SOLUTION INTRAMUSCULAR; INTRAVENOUS EVERY 8 HOURS
Status: DISPENSED | OUTPATIENT
Start: 2025-01-31 | End: 2025-02-02

## 2025-01-31 RX ORDER — SODIUM CHLORIDE, SODIUM LACTATE, POTASSIUM CHLORIDE, CALCIUM CHLORIDE 600; 310; 30; 20 MG/100ML; MG/100ML; MG/100ML; MG/100ML
100 INJECTION, SOLUTION INTRAVENOUS CONTINUOUS
Status: DISCONTINUED | OUTPATIENT
Start: 2025-02-01 | End: 2025-02-01

## 2025-01-31 RX ORDER — BISACODYL 10 MG/1
10 SUPPOSITORY RECTAL DAILY
Status: DISCONTINUED | OUTPATIENT
Start: 2025-01-31 | End: 2025-02-17

## 2025-01-31 RX ORDER — SODIUM CHLORIDE, SODIUM LACTATE, POTASSIUM CHLORIDE, CALCIUM CHLORIDE 600; 310; 30; 20 MG/100ML; MG/100ML; MG/100ML; MG/100ML
100 INJECTION, SOLUTION INTRAVENOUS CONTINUOUS
Status: ACTIVE | OUTPATIENT
Start: 2025-01-31 | End: 2025-02-01

## 2025-01-31 RX ORDER — ADHESIVE BANDAGE
30 BANDAGE TOPICAL DAILY
Status: DISCONTINUED | OUTPATIENT
Start: 2025-01-31 | End: 2025-02-01

## 2025-01-31 RX ORDER — LIDOCAINE 560 MG/1
3 PATCH PERCUTANEOUS; TOPICAL; TRANSDERMAL DAILY
Status: DISCONTINUED | OUTPATIENT
Start: 2025-01-31 | End: 2025-02-17

## 2025-01-31 RX ORDER — ONDANSETRON HYDROCHLORIDE 2 MG/ML
4 INJECTION, SOLUTION INTRAVENOUS EVERY 6 HOURS PRN
Status: DISCONTINUED | OUTPATIENT
Start: 2025-01-31 | End: 2025-02-26 | Stop reason: HOSPADM

## 2025-01-31 RX ORDER — METOPROLOL TARTRATE 25 MG/1
12.5 TABLET, FILM COATED ORAL ONCE
Status: DISCONTINUED | OUTPATIENT
Start: 2025-01-31 | End: 2025-01-31

## 2025-01-31 RX ORDER — ACETAMINOPHEN 10 MG/ML
1000 INJECTION, SOLUTION INTRAVENOUS EVERY 6 HOURS SCHEDULED
Status: COMPLETED | OUTPATIENT
Start: 2025-02-01 | End: 2025-02-01

## 2025-01-31 RX ORDER — LORAZEPAM 1 MG/1
1 TABLET ORAL ONCE
Status: COMPLETED | OUTPATIENT
Start: 2025-01-31 | End: 2025-01-31

## 2025-01-31 RX ORDER — LABETALOL HYDROCHLORIDE 5 MG/ML
10 INJECTION, SOLUTION INTRAVENOUS EVERY 8 HOURS PRN
Status: DISCONTINUED | OUTPATIENT
Start: 2025-01-31 | End: 2025-02-24

## 2025-01-31 RX ADMIN — LEVOTHYROXINE SODIUM 25 MCG: 25 TABLET ORAL at 05:31

## 2025-01-31 RX ADMIN — METHOCARBAMOL TABLETS 500 MG: 500 TABLET, COATED ORAL at 05:31

## 2025-01-31 RX ADMIN — HYDROMORPHONE HYDROCHLORIDE 0.4 MG: 1 INJECTION, SOLUTION INTRAMUSCULAR; INTRAVENOUS; SUBCUTANEOUS at 08:21

## 2025-01-31 RX ADMIN — POLYETHYLENE GLYCOL 3350 17 G: 17 POWDER, FOR SOLUTION ORAL at 08:20

## 2025-01-31 RX ADMIN — MAGNESIUM HYDROXIDE 30 ML: 400 SUSPENSION ORAL at 08:21

## 2025-01-31 RX ADMIN — ONDANSETRON 4 MG: 2 INJECTION INTRAMUSCULAR; INTRAVENOUS at 20:57

## 2025-01-31 RX ADMIN — Medication 1000 UNITS: at 08:20

## 2025-01-31 RX ADMIN — HYDROMORPHONE HYDROCHLORIDE 0.4 MG: 1 INJECTION, SOLUTION INTRAMUSCULAR; INTRAVENOUS; SUBCUTANEOUS at 16:00

## 2025-01-31 RX ADMIN — Medication 5 L/MIN: at 23:09

## 2025-01-31 RX ADMIN — SENNOSIDES 17.2 MG: 8.6 TABLET, FILM COATED ORAL at 08:20

## 2025-01-31 RX ADMIN — ACETAMINOPHEN 975 MG: 325 TABLET ORAL at 10:19

## 2025-01-31 RX ADMIN — AMLODIPINE BESYLATE 10 MG: 10 TABLET ORAL at 08:20

## 2025-01-31 RX ADMIN — HYDROMORPHONE HYDROCHLORIDE 0.4 MG: 1 INJECTION, SOLUTION INTRAMUSCULAR; INTRAVENOUS; SUBCUTANEOUS at 13:45

## 2025-01-31 RX ADMIN — TAMSULOSIN HYDROCHLORIDE 0.4 MG: 0.4 CAPSULE ORAL at 08:20

## 2025-01-31 RX ADMIN — METHOCARBAMOL TABLETS 500 MG: 500 TABLET, COATED ORAL at 13:45

## 2025-01-31 RX ADMIN — ACETAMINOPHEN 975 MG: 325 TABLET ORAL at 16:00

## 2025-01-31 RX ADMIN — ASPIRIN 81 MG: 81 TABLET, COATED ORAL at 08:20

## 2025-01-31 RX ADMIN — SODIUM CHLORIDE, POTASSIUM CHLORIDE, SODIUM LACTATE AND CALCIUM CHLORIDE 100 ML/HR: 600; 310; 30; 20 INJECTION, SOLUTION INTRAVENOUS at 10:19

## 2025-01-31 RX ADMIN — LORAZEPAM 1 MG: 1 TABLET ORAL at 01:06

## 2025-01-31 RX ADMIN — HEPARIN SODIUM 5000 UNITS: 5000 INJECTION INTRAVENOUS; SUBCUTANEOUS at 13:45

## 2025-01-31 RX ADMIN — HEPARIN SODIUM 5000 UNITS: 5000 INJECTION INTRAVENOUS; SUBCUTANEOUS at 05:31

## 2025-01-31 RX ADMIN — METOCLOPRAMIDE 10 MG: 5 INJECTION, SOLUTION INTRAMUSCULAR; INTRAVENOUS at 02:33

## 2025-01-31 RX ADMIN — HEPARIN SODIUM 5000 UNITS: 5000 INJECTION INTRAVENOUS; SUBCUTANEOUS at 20:57

## 2025-01-31 ASSESSMENT — COGNITIVE AND FUNCTIONAL STATUS - GENERAL
DAILY ACTIVITIY SCORE: 20
STANDING UP FROM CHAIR USING ARMS: A LITTLE
TOILETING: A LITTLE
DRESSING REGULAR UPPER BODY CLOTHING: A LITTLE
WALKING IN HOSPITAL ROOM: A LITTLE
CLIMB 3 TO 5 STEPS WITH RAILING: A LITTLE
MOVING TO AND FROM BED TO CHAIR: A LITTLE
HELP NEEDED FOR BATHING: A LITTLE
TOILETING: A LITTLE
WALKING IN HOSPITAL ROOM: A LITTLE
CLIMB 3 TO 5 STEPS WITH RAILING: A LITTLE
MOBILITY SCORE: 19
DAILY ACTIVITIY SCORE: 20
DRESSING REGULAR LOWER BODY CLOTHING: A LITTLE
DRESSING REGULAR UPPER BODY CLOTHING: A LITTLE
MOBILITY SCORE: 19
MOVING TO AND FROM BED TO CHAIR: A LITTLE
TURNING FROM BACK TO SIDE WHILE IN FLAT BAD: A LITTLE
DRESSING REGULAR LOWER BODY CLOTHING: A LITTLE
TURNING FROM BACK TO SIDE WHILE IN FLAT BAD: A LITTLE
STANDING UP FROM CHAIR USING ARMS: A LITTLE
HELP NEEDED FOR BATHING: A LITTLE

## 2025-01-31 ASSESSMENT — PAIN SCALES - GENERAL
PAINLEVEL_OUTOF10: 4
PAINLEVEL_OUTOF10: 3
PAINLEVEL_OUTOF10: 10 - WORST POSSIBLE PAIN
PAINLEVEL_OUTOF10: 8
PAINLEVEL_OUTOF10: 6
PAINLEVEL_OUTOF10: 3

## 2025-01-31 ASSESSMENT — PAIN - FUNCTIONAL ASSESSMENT
PAIN_FUNCTIONAL_ASSESSMENT: 0-10

## 2025-01-31 ASSESSMENT — PAIN DESCRIPTION - DESCRIPTORS: DESCRIPTORS: SORE

## 2025-01-31 NOTE — CARE PLAN
The patient's goals for the shift include      Problem: Pain - Adult  Goal: Verbalizes/displays adequate comfort level or baseline comfort level  Outcome: Progressing     Problem: Safety - Adult  Goal: Free from fall injury  Outcome: Progressing     Problem: Discharge Planning  Goal: Discharge to home or other facility with appropriate resources  Outcome: Progressing     Problem: Nutrition  Goal: Nutrient intake appropriate for maintaining nutritional needs  Outcome: Progressing     Problem: Skin  Goal: Prevent/manage excess moisture  Outcome: Progressing  Flowsheets (Taken 1/30/2025 2323)  Prevent/manage excess moisture: Monitor for/manage infection if present  Goal: Prevent/minimize sheer/friction injuries  Outcome: Progressing  Flowsheets (Taken 1/30/2025 2323)  Prevent/minimize sheer/friction injuries: Increase activity/out of bed for meals  Goal: Promote/optimize nutrition  Outcome: Progressing  Flowsheets (Taken 1/30/2025 2323)  Promote/optimize nutrition: Offer water/supplements/favorite foods     Problem: Pain  Goal: Takes deep breaths with improved pain control throughout the shift  Outcome: Progressing  Goal: Turns in bed with improved pain control throughout the shift  Outcome: Progressing  Goal: Walks with improved pain control throughout the shift  Outcome: Progressing  Goal: Performs ADL's with improved pain control throughout shift  Outcome: Progressing  Goal: Participates in PT with improved pain control throughout the shift  Outcome: Progressing  Goal: Free from opioid side effects throughout the shift  Outcome: Progressing  Goal: Free from acute confusion related to pain meds throughout the shift  Outcome: Progressing     The clinical goals for the shift include pt will be free from injury throughout shift

## 2025-01-31 NOTE — PROGRESS NOTES
[facility]  Progress Note      Patient: Swapnil Allen  Age/Sex: 75 y.o., male  MRN: [id number]  Date of surgery: 1/28/25  Admit Date: 1/28/2025   Code Status: Full Code  Length of Stay: 3      Interval History/Overnight Events:   Patient 1x small emesis of ensure. Tachycardic overnight.   Denies any fevers, chills, N/V. Feeling nauseous.     No BMs or flatus as yet   Ambulated a little with walker in room   Abdomen feels sore, mildly distended on physical exam.      Objective  01/29 1900 - 01/31 0659  In: 3926.7 [P.O.:100; I.V.:3826.7]  Out: 1525 [Urine:1525]              11.9     13.4>-----<273              37.6   135  102  20                  ----------------<100     4.4  23  2.07          Ca 10.0 Phos No results found for requested labs within last 365 days. Mg 1.70       ALT 25 AST 33 AlkPhos 62 tBili 0.6          Vitals:    01/30/25 1900 01/30/25 2253 01/31/25 0300 01/31/25 0744   BP: 135/88 135/90 134/87 130/85   BP Location: Left arm Left arm Left arm Right arm   Patient Position: Sitting Sitting Lying Lying   Pulse: 102 110 (!) 119 (!) 115   Resp: 18 18 18 18   Temp: 36.6 °C (97.9 °F) 36.4 °C (97.5 °F) 36.6 °C (97.9 °F) 36.2 °C (97.2 °F)   TempSrc: Temporal Temporal Temporal Temporal   SpO2: 95% 96% 98% 96%   Weight:       Height:              Medications:  Current Facility-Administered Medications   Medication Dose Route Frequency Provider Last Rate Last Admin    acetaminophen (Tylenol) tablet 975 mg  975 mg oral q6h Jannie Hamm MD   975 mg at 01/30/25 2151    amLODIPine (Norvasc) tablet 10 mg  10 mg oral Daily Jannie Hamm MD   10 mg at 01/30/25 0959    aspirin EC tablet 81 mg  81 mg oral Daily Jannie Hamm MD   81 mg at 01/30/25 0959    bisacodyl (Dulcolax) suppository 10 mg  10 mg rectal Daily Karen Katz, APRN-CNP        calcium carbonate (Tums) chewable tablet 500 mg  500 mg oral 4x daily PRN Alexys Hood MD   500 mg at 01/29/25 1356    cholecalciferol (Vitamin D-3) tablet  1,000 Units  1,000 Units oral Daily Jannie Hamm MD   1,000 Units at 01/30/25 0959    heparin (porcine) injection 5,000 Units  5,000 Units subcutaneous q8h Jannie Hamm MD   5,000 Units at 01/31/25 0531    HYDROmorphone (Dilaudid) injection 0.4 mg  0.4 mg intravenous q2h PRN Gallo Martines MD        lactated Ringer's infusion  50 mL/hr intravenous Continuous Alexysgurmeet Hood MD 50 mL/hr at 01/31/25 0608 50 mL/hr at 01/31/25 0608    levothyroxine (Synthroid, Levoxyl) tablet 25 mcg  25 mcg oral Daily Jannie Hamm MD   25 mcg at 01/31/25 0531    magnesium hydroxide (Milk of Magnesia) 400 mg/5 mL suspension 30 mL  30 mL oral Daily Karen Katz, APRN-CNP        melatonin tablet 10 mg  10 mg oral Nightly PRN Elmo Monteiro MD   10 mg at 01/30/25 2151    methocarbamol (Robaxin) tablet 500 mg  500 mg oral q8h [facility] Gallo Martines MD   500 mg at 01/31/25 0531    metoclopramide (Reglan) tablet 10 mg  10 mg oral q6h PRN Jannie Hamm MD        Or    metoclopramide (Reglan) injection 10 mg  10 mg intravenous q6h PRN Jannie Hamm MD   10 mg at 01/31/25 0233    [Held by provider] oxyCODONE (Roxicodone) immediate release tablet 10 mg  10 mg oral q6h PRN Jannie Hamm MD   10 mg at 01/29/25 0235    [Held by provider] oxyCODONE (Roxicodone) immediate release tablet 5 mg  5 mg oral q6h PRN Jannie Hamm MD   5 mg at 01/29/25 0840    polyethylene glycol (Glycolax, Miralax) packet 17 g  17 g oral Daily Jannie Hamm MD        ropivacaine (PF) in NS cmpd (Naropin) 1000 mg/500 mL (0.2%) infusion  14 mL/hr infiltration Continuous Jannie Hamm MD 14 mL/hr at 01/28/25 1547 14 mL/hr at 01/28/25 1547    sennosides (Senokot) tablet 17.2 mg  2 tablet oral BID Jannie Hamm MD   17.2 mg at 01/29/25 0827    tamsulosin (Flomax) 24 hr capsule 0.4 mg  0.4 mg oral Daily Jannie Hamm MD   0.4 mg at 01/30/25 0959       Physical Exam                                                                                                                          Gen -  No acute distress     Neuro - Alert, oriented, conversant     CV -  tachycardic, normotensive      Pulm - Symmetric chest rise, non-labored breathing on room air      Abd - Abdomen is soft, mildly-distended, and appropriately tender. Midline incision c/d/I closed with glue      Ext - Warm, well perfused     Skin - without jaunice       Psych - appropriate tone, affect      - Voiding independently. No rodriguez.       Imaging  XR chest 1 view    Result Date: 1/30/2025  Interpreted By:  Tahira Wisdom and Awan Komal STUDY: XR CHEST 1 VIEW; 1/30/2025 9:43 am   INDICATION: Signs/Symptoms:coughing post-op.   COMPARISON: Chest radiograph 04/12/2021 and CT angio chest 01/29/2025   ACCESSION NUMBER(S): [id number]   ORDERING CLINICIAN: FILIPE ROA   FINDINGS: AP radiograph of the chest was provided for interpretation.   CARDIOMEDIASTINAL SILHOUETTE: Cardiomediastinal silhouette is stable in size and configuration.   LUNGS: Lung fields are clear. The lungs are hypoexpanded. No evidence of a pneumothorax.   ABDOMEN: Air under the right hemidiaphragm.   BONES: No acute osseous changes.       1. No evidence of acute intrathoracic process. 2. Pneumoperitoneum,     I personally reviewed the images/study and I agree with the findings as stated by Resident Willa Lambert. This study was interpreted at [place], [place].   Signed by: Tahira Wisdom 1/30/2025 10:42 AM Dictation workstation:   [id number]    ECG 12 Lead    Result Date: 1/30/2025  Sinus tachycardia Left axis deviation Right bundle branch block Abnormal ECG When compared with ECG of 29-JAN-2025 17:07, (unconfirmed) No significant change was found    CT angio chest for pulmonary embolism    Result Date: 1/30/2025  Interpreted By:  Jamey Hays and Ritchie Brandon STUDY: CT ANGIO CHEST FOR PULMONARY EMBOLISM;  1/29/2025 7:52 pm   INDICATION: Signs/Symptoms:tachycardia 120s postoperative day  1. Status post right radical nephrectomy.   COMPARISON: Prior CT 09/12/2024   ACCESSION NUMBER(S): [id number]   ORDERING CLINICIAN: FILIPE ROA   TECHNIQUE: Helical data acquisition of the chest was obtained after intravenous administration of 80 ML Omnipaque 350, as per PE protocol. Images were reformatted in coronal and sagittal planes. Axial and coronal maximum intensity projection (MIP) images were created and reviewed.   FINDINGS: POTENTIAL LIMITATIONS OF THE STUDY: Motion blurring   HEART AND VESSELS: No central or segmental pulmonary emboli are identified. The examination is not diagnostic of the subsegmental level given the degree of motion blurring.   The heart is enlarged, similar to prior. Normal caliber thoracic aorta and pulmonary trunk. Scattered atherosclerotic changes. Calcification at the level of the aortic valve leaflets. No pericardial effusion   MEDIASTINUM AND TUAN, LOWER NECK AND AXILLA: The esophagus is increasingly thick walled and patulous/air-filled. More conspicuous small hiatal hernia. No discrete intrathoracic lymphadenopathy. Absent or atrophic left thyroid lobe. Prominent right thyroid lobe appears heterogeneous without measurable nodule, similar to prior   LUNGS AND AIRWAYS: The examination is motion blurred. Mild asymmetric elevation of the right hemidiaphragm is again noted. Mild generalized airway thickening. No mass, consolidation, central airway obstruction or new nodule. Scattered 4 mm and smaller nodular densities which appear stable and annotated for reference on series 402. Changes of subsegmental atelectasis in both lower lobes are now present.     UPPER ABDOMEN: Partially included postoperative changes related to interval right nephrectomy. A small amount of pneumoperitoneum is noted in the postoperative setting.     CHEST WALL AND OSSEOUS STRUCTURES: Degenerative changes of the spine. Partially included postoperative changes in the anterior abdominal wall  subcutaneous fat.       1. Motion blurred exam. No central or segmental pulmonary emboli are identified. 2. Cardiomegaly and atherosclerotic disease. Aortic valve leaflet calcification for which correlation with a recent echocardiogram is advised 3. Stable nonspecific 4 mm and smaller pulmonary nodules for which surveillance may be obtained as clinically appropriate. 4. Partially included postoperative changes in the upper abdomen with residual small quantity of postoperative pneumoperitoneum.   I personally reviewed the images/study and I agree with the findings as stated by resident Tian Dolan. This study was interpreted at [place], [place].   MACRO: None   Signed by: Jamey Hays 1/30/2025 7:56 AM Dictation workstation:   [id number]      Assessment & Plan  75 y.o. 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    Plan 1/31:  -Bladder scan to see if retaining   -Adding milk of mg and suppository   -Pain control   -Follow up labs, trend Cr and UOP  -Ambulate/OOB, needs to work with PT     Neuro:   -Pain controlled with Scheduled acetaminophen, Oxycodone 5/10 HELD, Dilaudid 0.4 PRN for breakthrough pain, robaxin 500mg q8hrs   -Bilateral erector spinae plane blocks with catheters performed preoperatively on 1/28/25  -PRN melatonin for sleep aid     Resp:   -ICS, breathing comfortably on RA    Card:   -Monitor vitals  -Amlodpine  -Aspirin   -Cholecalciferol     FEN: mIVF LR @ 50ml    GI:   -Reg diet   -BR: Miralax, senokat   -PRN Reglan for nausea   - Bisacodyl suppository     :   -Cr 2.07 from 1.9  -Flomax   -Daily BMP to monitor kidney  function and replete electrolytes as indicated.     Endo:   -Levothyroxine      Heme/ID:   -Daily CBC to monitor for acute blood loss anemia   -No indication for blood transfusion or antibiotics at this time     Ppy:   -ALEN  -SCD  -IS    Dispo:   RNF. Was recommended SNF, not interested. Might consider home care.     Assessment and plan discussed with chief resident Dr. Martines and attending Dr. Ana Xiao MD, [facility]   [facility] [place]  Adult Urology Pager: [phone]  Pediatric Urology Pager: [phone]

## 2025-01-31 NOTE — PROGRESS NOTES
Postop Pain HPI -   Palliative: relieved with IV analgesics and regional local anesthetics  Provocative: movement  Quality:  burning and aching  Radiation:  none  Severity:  5/10  Timing: constant    24-HOUR OPIOID CONSUMPTION:  none    Scheduled medications  acetaminophen, 975 mg, oral, q6h  amLODIPine, 10 mg, oral, Daily  aspirin, 81 mg, oral, Daily  bisacodyl, 10 mg, rectal, Daily  cholecalciferol, 1,000 Units, oral, Daily  heparin (porcine), 5,000 Units, subcutaneous, q8h  levothyroxine, 25 mcg, oral, Daily  magnesium hydroxide, 30 mL, oral, Daily  methocarbamol, 500 mg, oral, q8h ALEN  polyethylene glycol, 17 g, oral, Daily  sennosides, 2 tablet, oral, BID  tamsulosin, 0.4 mg, oral, Daily      Continuous medications  lactated Ringer's, 100 mL/hr, Last Rate: 100 mL/hr (01/31/25 1019)  ropivacaine (PF) in NS cmpd, 14 mL/hr, Last Rate: 14 mL/hr (01/28/25 1547)      PRN medications  PRN medications: calcium carbonate, HYDROmorphone, melatonin, ondansetron, [Held by provider] oxyCODONE, [Held by provider] oxyCODONE     Physical Exam:  Constitutional:  no distress, alert and cooperative  Eyes: clear sclera  Head/Neck: No apparent injury, trachea midline  Respiratory/Thorax: Patent airways, thorax symmetric, breathing comfortably  Cardiovascular: no pitting edema  Gastrointestinal: Nondistended  Musculoskeletal: ROM intact  Extremities: no clubbing  Neurological: alert, villeda x4  Psychological: Appropriate affect    Results for orders placed or performed during the hospital encounter of 01/28/25 (from the past 24 hours)   CBC   Result Value Ref Range    WBC 10.7 4.4 - 11.3 x10*3/uL    nRBC 0.0 0.0 - 0.0 /100 WBCs    RBC 4.57 4.50 - 5.90 x10*6/uL    Hemoglobin 12.8 (L) 13.5 - 17.5 g/dL    Hematocrit 41.0 41.0 - 52.0 %    MCV 90 80 - 100 fL    MCH 28.0 26.0 - 34.0 pg    MCHC 31.2 (L) 32.0 - 36.0 g/dL    RDW 15.7 (H) 11.5 - 14.5 %    Platelets 285 150 - 450 x10*3/uL   Basic metabolic panel   Result Value Ref Range     Glucose 113 (H) 74 - 99 mg/dL    Sodium 136 136 - 145 mmol/L    Potassium 4.1 3.5 - 5.3 mmol/L    Chloride 101 98 - 107 mmol/L    Bicarbonate 21 21 - 32 mmol/L    Anion Gap 18 10 - 20 mmol/L    Urea Nitrogen 26 (H) 6 - 23 mg/dL    Creatinine 2.22 (H) 0.50 - 1.30 mg/dL    eGFR 30 (L) >60 mL/min/1.73m*2    Calcium 10.6 8.6 - 10.6 mg/dL   Magnesium   Result Value Ref Range    Magnesium 2.02 1.60 - 2.40 mg/dL        Swapnil Allen is a 75 y.o. year old male patient who presents for radical right nephrectomy (open) with Dr. Perez on 1/28/25. Acute Pain consulted for block for postoperative pain control.      Plan:     - Bilateral erector spinae plane blocks with catheters performed preoperatively on 1/28/25  - Catheters removed today by APS  - Rest of pain management per primary team  - APS will sign off     Acute Pain Resident  pg [phone] ph [phone]

## 2025-01-31 NOTE — CARE PLAN
Problem: Pain - Adult  Goal: Verbalizes/displays adequate comfort level or baseline comfort level  Outcome: Progressing     Problem: Safety - Adult  Goal: Free from fall injury  Outcome: Progressing     Problem: Discharge Planning  Goal: Discharge to home or other facility with appropriate resources  Outcome: Progressing     Problem: Nutrition  Goal: Nutrient intake appropriate for maintaining nutritional needs  Outcome: Progressing     Problem: Skin  Goal: Prevent/manage excess moisture  Outcome: Progressing  Goal: Prevent/minimize sheer/friction injuries  Outcome: Progressing  Goal: Promote/optimize nutrition  Outcome: Progressing     Problem: Pain  Goal: Takes deep breaths with improved pain control throughout the shift  Outcome: Progressing  Goal: Turns in bed with improved pain control throughout the shift  Outcome: Progressing  Goal: Walks with improved pain control throughout the shift  Outcome: Progressing  Goal: Performs ADL's with improved pain control throughout shift  Outcome: Progressing  Goal: Participates in PT with improved pain control throughout the shift  Outcome: Progressing  Goal: Free from opioid side effects throughout the shift  Outcome: Progressing  Goal: Free from acute confusion related to pain meds throughout the shift  Outcome: Progressing

## 2025-02-01 ENCOUNTER — APPOINTMENT (OUTPATIENT)
Dept: RADIOLOGY | Facility: HOSPITAL | Age: 75
End: 2025-02-01
Payer: MEDICARE

## 2025-02-01 LAB
ANION GAP SERPL CALC-SCNC: 18 MMOL/L (ref 10–20)
BLOOD EXPIRATION DATE: NORMAL
BLOOD EXPIRATION DATE: NORMAL
BUN SERPL-MCNC: 40 MG/DL (ref 6–23)
CALCIUM SERPL-MCNC: 10.2 MG/DL (ref 8.6–10.6)
CHLORIDE SERPL-SCNC: 97 MMOL/L (ref 98–107)
CO2 SERPL-SCNC: 28 MMOL/L (ref 21–32)
CREAT SERPL-MCNC: 2.78 MG/DL (ref 0.5–1.3)
DISPENSE STATUS: NORMAL
DISPENSE STATUS: NORMAL
EGFRCR SERPLBLD CKD-EPI 2021: 23 ML/MIN/1.73M*2
ERYTHROCYTE [DISTWIDTH] IN BLOOD BY AUTOMATED COUNT: 16 % (ref 11.5–14.5)
GLUCOSE SERPL-MCNC: 102 MG/DL (ref 74–99)
HCT VFR BLD AUTO: 38.5 % (ref 41–52)
HGB BLD-MCNC: 11.8 G/DL (ref 13.5–17.5)
MCH RBC QN AUTO: 27.6 PG (ref 26–34)
MCHC RBC AUTO-ENTMCNC: 30.6 G/DL (ref 32–36)
MCV RBC AUTO: 90 FL (ref 80–100)
NRBC BLD-RTO: 0 /100 WBCS (ref 0–0)
PLATELET # BLD AUTO: 265 X10*3/UL (ref 150–450)
POTASSIUM SERPL-SCNC: 4.5 MMOL/L (ref 3.5–5.3)
PRODUCT BLOOD TYPE: 5100
PRODUCT BLOOD TYPE: 5100
PRODUCT CODE: NORMAL
PRODUCT CODE: NORMAL
RBC # BLD AUTO: 4.27 X10*6/UL (ref 4.5–5.9)
SODIUM SERPL-SCNC: 138 MMOL/L (ref 136–145)
UNIT ABO: NORMAL
UNIT ABO: NORMAL
UNIT NUMBER: NORMAL
UNIT NUMBER: NORMAL
UNIT RH: NORMAL
UNIT RH: NORMAL
UNIT VOLUME: 350
UNIT VOLUME: 350
WBC # BLD AUTO: 9.9 X10*3/UL (ref 4.4–11.3)
XM INTEP: NORMAL
XM INTEP: NORMAL

## 2025-02-01 PROCEDURE — 2500000004 HC RX 250 GENERAL PHARMACY W/ HCPCS (ALT 636 FOR OP/ED)

## 2025-02-01 PROCEDURE — 2500000002 HC RX 250 W HCPCS SELF ADMINISTERED DRUGS (ALT 637 FOR MEDICARE OP, ALT 636 FOR OP/ED): Performed by: STUDENT IN AN ORGANIZED HEALTH CARE EDUCATION/TRAINING PROGRAM

## 2025-02-01 PROCEDURE — 85027 COMPLETE CBC AUTOMATED: CPT | Performed by: STUDENT IN AN ORGANIZED HEALTH CARE EDUCATION/TRAINING PROGRAM

## 2025-02-01 PROCEDURE — 74018 RADEX ABDOMEN 1 VIEW: CPT | Performed by: STUDENT IN AN ORGANIZED HEALTH CARE EDUCATION/TRAINING PROGRAM

## 2025-02-01 PROCEDURE — 2500000005 HC RX 250 GENERAL PHARMACY W/O HCPCS

## 2025-02-01 PROCEDURE — 36415 COLL VENOUS BLD VENIPUNCTURE: CPT | Performed by: STUDENT IN AN ORGANIZED HEALTH CARE EDUCATION/TRAINING PROGRAM

## 2025-02-01 PROCEDURE — 2500000004 HC RX 250 GENERAL PHARMACY W/ HCPCS (ALT 636 FOR OP/ED): Performed by: STUDENT IN AN ORGANIZED HEALTH CARE EDUCATION/TRAINING PROGRAM

## 2025-02-01 PROCEDURE — 2500000001 HC RX 250 WO HCPCS SELF ADMINISTERED DRUGS (ALT 637 FOR MEDICARE OP): Performed by: STUDENT IN AN ORGANIZED HEALTH CARE EDUCATION/TRAINING PROGRAM

## 2025-02-01 PROCEDURE — 94640 AIRWAY INHALATION TREATMENT: CPT

## 2025-02-01 PROCEDURE — 2500000001 HC RX 250 WO HCPCS SELF ADMINISTERED DRUGS (ALT 637 FOR MEDICARE OP)

## 2025-02-01 PROCEDURE — 2500000001 HC RX 250 WO HCPCS SELF ADMINISTERED DRUGS (ALT 637 FOR MEDICARE OP): Performed by: NURSE PRACTITIONER

## 2025-02-01 PROCEDURE — 80048 BASIC METABOLIC PNL TOTAL CA: CPT | Performed by: STUDENT IN AN ORGANIZED HEALTH CARE EDUCATION/TRAINING PROGRAM

## 2025-02-01 PROCEDURE — 1200000003 HC ONCOLOGY  ROOM WITH TELEMETRY DAILY

## 2025-02-01 PROCEDURE — 74018 RADEX ABDOMEN 1 VIEW: CPT

## 2025-02-01 RX ORDER — AMLODIPINE BESYLATE 10 MG/1
10 TABLET ORAL DAILY
Status: DISCONTINUED | OUTPATIENT
Start: 2025-02-01 | End: 2025-02-11

## 2025-02-01 RX ORDER — SODIUM CHLORIDE, SODIUM LACTATE, POTASSIUM CHLORIDE, CALCIUM CHLORIDE 600; 310; 30; 20 MG/100ML; MG/100ML; MG/100ML; MG/100ML
100 INJECTION, SOLUTION INTRAVENOUS CONTINUOUS
Status: ACTIVE | OUTPATIENT
Start: 2025-02-01 | End: 2025-02-02

## 2025-02-01 RX ORDER — IPRATROPIUM BROMIDE AND ALBUTEROL SULFATE 2.5; .5 MG/3ML; MG/3ML
3 SOLUTION RESPIRATORY (INHALATION)
Status: DISCONTINUED | OUTPATIENT
Start: 2025-02-01 | End: 2025-02-01

## 2025-02-01 RX ORDER — CALCIUM CARBONATE 200(500)MG
500 TABLET,CHEWABLE ORAL 4 TIMES DAILY PRN
Status: DISCONTINUED | OUTPATIENT
Start: 2025-02-01 | End: 2025-02-15

## 2025-02-01 RX ORDER — SENNOSIDES 8.6 MG/1
2 TABLET ORAL 2 TIMES DAILY
Status: DISCONTINUED | OUTPATIENT
Start: 2025-02-01 | End: 2025-02-06

## 2025-02-01 RX ORDER — ASPIRIN 300 MG/1
150 SUPPOSITORY RECTAL DAILY
Status: DISCONTINUED | OUTPATIENT
Start: 2025-02-01 | End: 2025-02-11

## 2025-02-01 RX ORDER — LEVOTHYROXINE SODIUM ANHYDROUS 100 UG/5ML
12.5 INJECTION, POWDER, LYOPHILIZED, FOR SOLUTION INTRAVENOUS
Status: DISCONTINUED | OUTPATIENT
Start: 2025-02-01 | End: 2025-02-13

## 2025-02-01 RX ORDER — ADHESIVE BANDAGE
30 BANDAGE TOPICAL DAILY
Status: DISCONTINUED | OUTPATIENT
Start: 2025-02-01 | End: 2025-02-06

## 2025-02-01 RX ORDER — IPRATROPIUM BROMIDE AND ALBUTEROL SULFATE 2.5; .5 MG/3ML; MG/3ML
3 SOLUTION RESPIRATORY (INHALATION) 3 TIMES DAILY
Status: DISCONTINUED | OUTPATIENT
Start: 2025-02-02 | End: 2025-02-06

## 2025-02-01 RX ORDER — ACETAMINOPHEN 500 MG
10 TABLET ORAL NIGHTLY PRN
Status: DISCONTINUED | OUTPATIENT
Start: 2025-02-01 | End: 2025-02-15

## 2025-02-01 RX ORDER — SODIUM CHLORIDE, SODIUM LACTATE, POTASSIUM CHLORIDE, CALCIUM CHLORIDE 600; 310; 30; 20 MG/100ML; MG/100ML; MG/100ML; MG/100ML
125 INJECTION, SOLUTION INTRAVENOUS CONTINUOUS
Status: ACTIVE | OUTPATIENT
Start: 2025-02-01 | End: 2025-02-02

## 2025-02-01 RX ORDER — CHOLECALCIFEROL (VITAMIN D3) 25 MCG
1000 TABLET ORAL DAILY
Status: DISCONTINUED | OUTPATIENT
Start: 2025-02-01 | End: 2025-02-11

## 2025-02-01 RX ORDER — POLYETHYLENE GLYCOL 3350 17 G/17G
17 POWDER, FOR SOLUTION ORAL DAILY
Status: DISCONTINUED | OUTPATIENT
Start: 2025-02-01 | End: 2025-02-06

## 2025-02-01 RX ADMIN — SODIUM CHLORIDE, POTASSIUM CHLORIDE, SODIUM LACTATE AND CALCIUM CHLORIDE 100 ML/HR: 600; 310; 30; 20 INJECTION, SOLUTION INTRAVENOUS at 08:50

## 2025-02-01 RX ADMIN — ASPIRIN 150 MG: 300 SUPPOSITORY RECTAL at 14:56

## 2025-02-01 RX ADMIN — ACETAMINOPHEN 1000 MG: 10 INJECTION INTRAVENOUS at 08:50

## 2025-02-01 RX ADMIN — SENNOSIDES 17.2 MG: 8.6 TABLET, FILM COATED ORAL at 21:02

## 2025-02-01 RX ADMIN — IPRATROPIUM BROMIDE AND ALBUTEROL SULFATE 3 ML: .5; 3 SOLUTION RESPIRATORY (INHALATION) at 16:42

## 2025-02-01 RX ADMIN — Medication 1000 UNITS: at 08:51

## 2025-02-01 RX ADMIN — SODIUM CHLORIDE, POTASSIUM CHLORIDE, SODIUM LACTATE AND CALCIUM CHLORIDE 100 ML/HR: 600; 310; 30; 20 INJECTION, SOLUTION INTRAVENOUS at 16:40

## 2025-02-01 RX ADMIN — METHOCARBAMOL 500 MG: 1000 INJECTION, SOLUTION INTRAMUSCULAR; INTRAVENOUS at 13:09

## 2025-02-01 RX ADMIN — MAGNESIUM HYDROXIDE 30 ML: 400 SUSPENSION ORAL at 08:50

## 2025-02-01 RX ADMIN — ACETAMINOPHEN 1000 MG: 10 INJECTION INTRAVENOUS at 18:39

## 2025-02-01 RX ADMIN — BISACODYL 10 MG: 10 SUPPOSITORY RECTAL at 10:53

## 2025-02-01 RX ADMIN — SODIUM CHLORIDE, POTASSIUM CHLORIDE, SODIUM LACTATE AND CALCIUM CHLORIDE 500 ML: 600; 310; 30; 20 INJECTION, SOLUTION INTRAVENOUS at 11:20

## 2025-02-01 RX ADMIN — HYDROMORPHONE HYDROCHLORIDE 0.4 MG: 1 INJECTION, SOLUTION INTRAMUSCULAR; INTRAVENOUS; SUBCUTANEOUS at 16:40

## 2025-02-01 RX ADMIN — SENNOSIDES 17.2 MG: 8.6 TABLET, FILM COATED ORAL at 08:51

## 2025-02-01 RX ADMIN — METHOCARBAMOL 500 MG: 1000 INJECTION, SOLUTION INTRAMUSCULAR; INTRAVENOUS at 21:02

## 2025-02-01 RX ADMIN — ACETAMINOPHEN 1000 MG: 10 INJECTION INTRAVENOUS at 00:48

## 2025-02-01 RX ADMIN — POLYETHYLENE GLYCOL 3350 17 G: 17 POWDER, FOR SOLUTION ORAL at 08:51

## 2025-02-01 RX ADMIN — Medication 1 SPRAY: at 11:20

## 2025-02-01 RX ADMIN — HYDROMORPHONE HYDROCHLORIDE 0.4 MG: 1 INJECTION, SOLUTION INTRAMUSCULAR; INTRAVENOUS; SUBCUTANEOUS at 10:47

## 2025-02-01 RX ADMIN — METHOCARBAMOL 500 MG: 1000 INJECTION, SOLUTION INTRAMUSCULAR; INTRAVENOUS at 03:28

## 2025-02-01 RX ADMIN — HEPARIN SODIUM 5000 UNITS: 5000 INJECTION INTRAVENOUS; SUBCUTANEOUS at 16:41

## 2025-02-01 RX ADMIN — SODIUM CHLORIDE, POTASSIUM CHLORIDE, SODIUM LACTATE AND CALCIUM CHLORIDE 125 ML/HR: 600; 310; 30; 20 INJECTION, SOLUTION INTRAVENOUS at 23:16

## 2025-02-01 RX ADMIN — HEPARIN SODIUM 5000 UNITS: 5000 INJECTION INTRAVENOUS; SUBCUTANEOUS at 08:51

## 2025-02-01 RX ADMIN — SODIUM CHLORIDE, POTASSIUM CHLORIDE, SODIUM LACTATE AND CALCIUM CHLORIDE 1000 ML: 600; 310; 30; 20 INJECTION, SOLUTION INTRAVENOUS at 23:16

## 2025-02-01 RX ADMIN — IPRATROPIUM BROMIDE AND ALBUTEROL SULFATE 3 ML: .5; 3 SOLUTION RESPIRATORY (INHALATION) at 22:31

## 2025-02-01 RX ADMIN — Medication 5 L/MIN: at 08:53

## 2025-02-01 RX ADMIN — AMLODIPINE BESYLATE 10 MG: 10 TABLET ORAL at 08:51

## 2025-02-01 RX ADMIN — ACETAMINOPHEN 1000 MG: 10 INJECTION INTRAVENOUS at 13:09

## 2025-02-01 RX ADMIN — Medication 5 L/MIN: at 20:57

## 2025-02-01 RX ADMIN — LEVOTHYROXINE SODIUM ANHYDROUS 12.5 MCG: 100 INJECTION, POWDER, LYOPHILIZED, FOR SOLUTION INTRAVENOUS at 08:50

## 2025-02-01 ASSESSMENT — COGNITIVE AND FUNCTIONAL STATUS - GENERAL
DAILY ACTIVITIY SCORE: 20
DRESSING REGULAR UPPER BODY CLOTHING: A LITTLE
CLIMB 3 TO 5 STEPS WITH RAILING: A LITTLE
STANDING UP FROM CHAIR USING ARMS: A LITTLE
TOILETING: A LITTLE
TURNING FROM BACK TO SIDE WHILE IN FLAT BAD: A LITTLE
HELP NEEDED FOR BATHING: A LITTLE
MOBILITY SCORE: 19
WALKING IN HOSPITAL ROOM: A LITTLE
MOVING TO AND FROM BED TO CHAIR: A LITTLE
DRESSING REGULAR LOWER BODY CLOTHING: A LITTLE

## 2025-02-01 ASSESSMENT — PAIN SCALES - GENERAL
PAINLEVEL_OUTOF10: 0 - NO PAIN
PAINLEVEL_OUTOF10: 5 - MODERATE PAIN
PAINLEVEL_OUTOF10: 5 - MODERATE PAIN

## 2025-02-01 NOTE — SIGNIFICANT EVENT
Rapid Response Nurse Note: RADAR alert: 6    Pager time: [phone]  Arrival time: 325  Event end time: 330  Location: [facility] bed 1  [] Triage by phone or secure messaging    Rapid response initiated by:  [] Rapid response RN [] Family [] Nursing Supervisor [] Physician   [x] RADAR auto page [] Sepsis auto-page [] RN [] RT   [] NP/PA [] Other:     Primary reason for call:   [] BAT [] New CPAP/BiPAP [] Bleeding [] Change in mental status   [] Chest pain [] Code blue [] FiO2 >/= 50% [] HR </= 40 bpm   [] HR >/= 130 bpm [] Hyperglycemia [] Hypoglycemia [x] RADAR    [] RR </= 8 bpm [] RR >/= 30 bpm [] SBP </= 90 mmHg [] SpO2 < 90%   [] Seizure [] Sepsis [] Shortness of breath  [] Staff concern: see comments     Initial VS and/or RADAR VS: T 36.6 °C; ; RR 18; /74; SPO2 94%.    Providers present at bedside (if applicable): na    Name of ICU Provider contacted (if applicable): na    Interventions:  [x] None [] ABG/VBG [] Assist w/ICU transfer [] BAT paged    [] Bag mask [] Blood [] Cardioversion [] Code Blue   [] Code blue for intubation [] Code status changed [] Chest x-ray [] EKG   [] IV fluid/bolus [] KUB x-ray [] Labs/cultures [] Medication   [] Nebulizer treatment [] NIPPV (CPAP/BiPAP) [] Oxygen [] Oral airway   [] Peripheral IV [] Palliative care consult [] CT/MRI [] Sepsis protocol    [] Suctioned [] Other:     Outcome:  [] Coded and  [] Code blue for intubation [] Coded and transferred to ICU []  on division   [x] Remained on division (no change) [] Remained on division + additional monitoring [] Remained in ED [] Transferred to ED   [] Transferred to ICU [] Transferred to inpatient status [] Transferred for interventions (procedure) [] Transferred to ICU stepdown    [] Transferred to surgery [] Transferred to telemetry [] Sepsis protocol [] STEMI protocol   [] Stroke protocol [x] Bedside nurse instructed to page rapid response for any concerns or acute change in condition/VS     Additional  "Comments: reviewed vitals with bedside RN. Per bedside RN, recently placed NG to decompress stomach and got 1.5 liters out. Patient stated he \"felt better\" after decompression. No concerns from bedside RN at this time. Will continue to monitor   "

## 2025-02-01 NOTE — PROGRESS NOTES
[facility] [place]  Progress Note      Patient: Swapnil Allen  Age/Sex: 75 y.o., male  MRN: [id number]  Date of surgery: 1/28/25  Admit Date: 1/28/2025   Code Status: Full Code  Length of Stay: 4      Interval History/Overnight Events:   Patient with continued emesis ON requiring NGT placement  Tachycardia persistent but improved after NGT  Denies any fevers, chills, N/V. Feeling nauseous.     No BMs but endorsing flatus after NGT placed        Objective  01/30 1900 - 02/01 0659  In: 1782.5 [I.V.:1782.5]  Out: 2000 [Urine:800]              11.8     9.9>-----<265              38.5   138  97  40                  ----------------<102     4.5  28  2.78          Ca 10.2 Phos No results found for requested labs within last 365 days. Mg 2.02       ALT 25 AST 33 AlkPhos 62 tBili 0.6          Vitals:    01/31/25 2116 02/01/25 0039 02/01/25 0319 02/01/25 0741   BP:  112/73 107/74 115/69   BP Location:    Right arm   Patient Position:    Lying   Pulse:  (!) 134 (!) 112 (!) 113   Resp:  18 18 18   Temp:  36.6 °C (97.9 °F) 36.6 °C (97.9 °F) 36.8 °C (98.2 °F)   TempSrc:  Temporal Temporal Temporal   SpO2: 91% 93% 94% 96%   Weight:    103 kg (226 lb 13.7 oz)   Height:              Medications:  Current Facility-Administered Medications   Medication Dose Route Frequency Provider Last Rate Last Admin    acetaminophen (Ofirmev) injection 1,000 mg  1,000 mg intravenous q6h ALEN Martines MD   Stopped at 02/01/25 0913    amLODIPine (Norvasc) tablet 10 mg  10 mg nasogastric tube Daily Ludwin Phillips MD   10 mg at 02/01/25 0851    [Held by provider] aspirin EC tablet 81 mg  81 mg oral Daily Jannie Hamm MD   81 mg at 01/31/25 0820    benzocaine-menthol (Cepastat Sore Throat) lozenge 1 lozenge  1 lozenge Mouth/Throat q2h PRN Ludwin Phillips MD        bisacodyl (Dulcolax) suppository 10 mg  10 mg rectal Daily Karen Katz, APRN-CNP        calcium carbonate (Tums) chewable tablet 500 mg  500 mg nasogastric tube 4x  daily PRN Ludwin Phillips MD        cholecalciferol (Vitamin D-3) tablet 1,000 Units  1,000 Units nasogastric tube Daily Ludwin Phillips MD   1,000 Units at 02/01/25 0851    heparin (porcine) injection 5,000 Units  5,000 Units subcutaneous q8h Jannie Hamm MD   5,000 Units at 02/01/25 0851    HYDROmorphone (Dilaudid) injection 0.4 mg  0.4 mg intravenous q2h PRN Gallo Martines MD   0.4 mg at 01/31/25 1600    labetaloL (Normodyne,Trandate) injection 10 mg  10 mg intravenous q8h PRN Gallo Martines MD        lactated Ringer's infusion  100 mL/hr intravenous Continuous Gallo Martines  mL/hr at 02/01/25 0850 100 mL/hr at 02/01/25 0850    lactated Ringer's infusion  100 mL/hr intravenous Continuous Ludwin Phillips MD        lactated Ringer's infusion  100 mL/hr intravenous Continuous Deena Medina MD        levothyroxine (Synthroid) injection 12.5 mcg  12.5 mcg intravenous q24h ALEN Ludwin Phillips MD   12.5 mcg at 02/01/25 0850    [Held by provider] levothyroxine (Synthroid, Levoxyl) tablet 25 mcg  25 mcg oral Daily Jannie Hamm MD   25 mcg at 01/31/25 0531    lidocaine 4 % patch 3 patch  3 patch transdermal Daily Gallo Martines MD        magnesium hydroxide (Milk of Magnesia) 400 mg/5 mL suspension 30 mL  30 mL nasogastric tube Daily Ludwin Phillips MD   30 mL at 02/01/25 0850    melatonin tablet 10 mg  10 mg nasogastric tube Nightly PRN Ludwin Phillips MD        methocarbamol (Robaxin) injection 500 mg  500 mg intravenous q8h Gallo Martines MD   500 mg at 02/01/25 0328    ondansetron (Zofran) injection 4 mg  4 mg intravenous q6h PRN Gallo Martines MD   4 mg at 01/31/25 2057    [Held by provider] oxyCODONE (Roxicodone) immediate release tablet 10 mg  10 mg oral q6h PRN Jannie Hamm MD   10 mg at 01/29/25 0235    [Held by provider] oxyCODONE (Roxicodone) immediate release tablet 5 mg  5 mg oral q6h PRN Jannie Hamm MD   5 mg at 01/29/25 0840    oxygen (O2)  therapy   inhalation Continuous - Inhalation Ludwin Phillips ,000 mL/hr at 02/01/25 0853 5 L/min at 02/01/25 0853    phenoL (Chloraseptic) 1.4 % mouth/throat spray 1 spray  1 spray Mouth/Throat 4x daily PRN Ludwin Phillips MD        polyethylene glycol (Glycolax, Miralax) packet 17 g  17 g nasogastric tube Daily Ludwin Phillips MD   17 g at 02/01/25 0851    sennosides (Senokot) tablet 17.2 mg  2 tablet nasogastric tube BID Ludwin Phillips MD   17.2 mg at 02/01/25 0851    [Held by provider] tamsulosin (Flomax) 24 hr capsule 0.4 mg  0.4 mg oral Daily Jannie Hamm MD   0.4 mg at 01/31/25 0820       Physical Exam                                                                                                                         Gen -  No acute distress     Neuro - Alert, oriented, conversant     CV -  tachycardic, normotensive      Pulm - Symmetric chest rise, non-labored breathing on room air      Abd - Abdomen is soft, mildly-distended, and appropriately tender. Midline incision c/d/I closed with glue      Ext - Warm, well perfused     MSK- no pain or swelling in LE bilaterally     Skin - without jaunice       Psych - appropriate tone, affect      - Voiding independently. No rodriguez.       Imaging  XR abdomen 1 view    Result Date: 2/1/2025  Interpreted By:  Pillo Wilson, STUDY: XR ABDOMEN 1 VIEW;  2/1/2025 3:50 am   INDICATION: Signs/Symptoms:NG insertion.   COMPARISON: Abdominal radiographs 01/31/2025   ACCESSION NUMBER(S): [id number]   ORDERING CLINICIAN: FILIPE ROA   FINDINGS: Single AP radiograph of abdomen available for interpretation. Enteric tube now seen in place with tip projecting over expected location of gastric body. Multiple surgical clips seen projecting over right hemiabdomen.   Similar gaseous prominence of small and large bowel loops throughout the abdomen. Moderate colonic stool burden. Small volume pneumoperitoneum beneath right hemidiaphragm again seen.    Visualized lung bases are clear   Osseous structures demonstrate no acute bony changes.       1. Enteric tube tip overlying expected location of gastric body. 2. Similar gaseous prominence of small and large bowel loops throughout the abdomen. Correlate with concern for ileus.   Signed by: Pillo Wilson 2/1/2025 8:16 AM Dictation workstation:   [id number]    XR chest 1 view    Result Date: 2/1/2025  Interpreted By:  Pillo Wilson and Ritchie Brandon STUDY: XR CHEST 1 VIEW;  1/31/2025 9:45 pm   INDICATION: Signs/Symptoms:increasing o2 requirement. The patient is status post right radical nephrectomy/retroperitoneal lymph node dissection.   COMPARISON: Chest radiograph 01/30/2025, 9:43 a.m.; Chest CT 01/29/2025   ACCESSION NUMBER(S): [id number]   ORDERING CLINICIAN: FILIPE ROA   FINDINGS: AP radiograph of the chest was provided. Lordotic radiograph with midline positioning of the patient.   CARDIOMEDIASTINAL SILHOUETTE: Cardiomediastinal silhouette is normal in size when accounting for AP radiograph in lordotic positioning.   LUNGS: Poor inspiratory effort resulting in low lung volumes and contributing to bronchovascular crowding. Accounting for this, there is no pulmonary edema. Mild bibasilar atelectasis. No new airspace consolidation, pleural effusion, or pneumothorax.   ABDOMEN: Decreasing curvilinear lucency/air below the right hemidiaphragm.   BONES: No acute osseous changes.       1.  Low lung volumes contributing to bronchovascular crowding with similar linear atelectatic changes of the lung bases. No new airspace disease, pleural effusion, or pneumothorax. 2. Pneumoperitoneum appears less conspicuous on present study.   I personally reviewed the images/study and I agree with the findings as stated by resident Tian Dolan. This study was interpreted at [place], [place].   MACRO: None   Signed by: Pillo Wilson 2/1/2025 8:15 AM Dictation workstation:    [id number]    XR abdomen 1 view    Result Date: 2/1/2025  Interpreted By:  Pillo Wilson, STUDY: XR ABDOMEN 1 VIEW;  1/31/2025 8:42 pm   INDICATION: Signs/Symptoms:ileus.   COMPARISON: Chest radiograph 01/30/2025, chest and abdomen CT scan 09/12/2024   ACCESSION NUMBER(S): [id number]   ORDERING CLINICIAN: FILIPE ROA   FINDINGS: 3 AP radiographs of abdomen are available for interpretation. Multiple surgical clips seen projecting over right hemiabdomen.   Gas-filled prominent to mildly dilated small and large bowel loops throughout the abdomen. Moderate colonic stool burden. Small volume pneumoperitoneum beneath right hemidiaphragm again seen.   Visualized lung bases are clear   Osseous structures demonstrate no acute bony changes.       1. Gas-filled prominent to mildly dilated small and large bowel loops throughout the abdomen. 2. Persistent small volume pneumoperitoneum beneath right hemidiaphragm.   Signed by: Pillo Wilson 2/1/2025 8:13 AM Dictation workstation:   [id number]      Assessment & Plan  75 y.o. 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    -NGT placed 01/31 overnight  -Cr 02/01 continued to increase to 2.78. Will continue to monitor for post operative plateau and involve nephrology PRN    Plan:  -Will trial another dose of MoM and suppository today  -Pain control   -Follow up labs, trend Cr and UOP  -Ambulate/OOB, needs to work with PT     Neuro:   -Pain controlled with Scheduled acetaminophen, Oxycodone 5/10 HELD while NPO, Dilaudid 0.4 PRN for breakthrough pain, robaxin 500mg q8hrs   -PRN melatonin for sleep aid     Resp:   -ICS  -O2 4L ON, CXR showed atelectasis.  Will continue to monitor/wean  -RT to assess today    Card:   -Monitor vitals  -continue telemetry  -Amlodpine  -Aspirin   -Cholecalciferol     FEN: mIVF LR @ 100ml    GI:   -NPO for NGT  -BR: Miralax, senokat   -PRN Reglan for nausea   - Bisacodyl suppository     :   -Flomax   -Daily BMP to monitor kidney function and replete electrolytes as indicated. Will consider onconephrology consult if continues to rise    Endo:   -Levothyroxine IV while NPO     Heme/ID:   -Daily CBC to monitor for acute blood loss anemia   -No indication for blood transfusion or antibiotics at this time     Ppy:   -ALEN  -SCD  -IS    Dispo:   RNF. Was recommended SNF, not interested. Might consider home care.     Assessment and plan discussed with chief resident Dr. La and attending Dr. Ana Medina MD, PGY4  [facility]  Adult Urology Pager: [phone]  Pediatric Urology Pager: [phone]

## 2025-02-01 NOTE — SIGNIFICANT EVENT
Rapid Response Nurse Note: RADAR alert: 6    Pager time:   Arrival time:   Event end time:   Location: [facility]3 bed 1  [] Triage by phone or secure messaging    Rapid response initiated by:  [] Rapid response RN [] Family [] Nursing Supervisor [] Physician   [x] RADAR auto page [] Sepsis auto-page [] RN [] RT   [] NP/PA [] Other:     Primary reason for call:   [] BAT [] New CPAP/BiPAP [] Bleeding [] Change in mental status   [] Chest pain [] Code blue [] FiO2 >/= 50% [] HR </= 40 bpm   [x] HR >/= 130 bpm [] Hyperglycemia [] Hypoglycemia [x] RADAR    [] RR </= 8 bpm [] RR >/= 30 bpm [] SBP </= 90 mmHg [x] SpO2 < 90%   [] Seizure [] Sepsis [] Shortness of breath  [] Staff concern: see comments     Initial VS and/or RADAR VS: T 37.3 °C; ; RR 18; /95; SPO2 88%.    Providers present at bedside (if applicable): coverage team    Name of ICU Provider contacted (if applicable): n/a    Interventions:  [] None [] ABG/VBG [] Assist w/ICU transfer [] BAT paged    [] Bag mask [] Blood [] Cardioversion [] Code Blue   [] Code blue for intubation [] Code status changed [x] Chest x-ray [] EKG   [] IV fluid/bolus [] KUB x-ray [] Labs/cultures [] Medication   [] Nebulizer treatment [] NIPPV (CPAP/BiPAP) [x] Oxygen [] Oral airway   [] Peripheral IV [] Palliative care consult [] CT/MRI [] Sepsis protocol    [] Suctioned [] Other:     Outcome:  [] Coded and  [] Code blue for intubation [] Coded and transferred to ICU []  on division   [x] Remained on division (no change) [] Remained on division + additional monitoring [] Remained in ED [] Transferred to ED   [] Transferred to ICU [] Transferred to inpatient status [] Transferred for interventions (procedure) [] Transferred to ICU stepdown    [] Transferred to surgery [] Transferred to telemetry [] Sepsis protocol [] STEMI protocol   [] Stroke protocol [x] Bedside nurse instructed to page rapid response for any concerns or acute change in condition/VS      Additional Comments: per bedside RN, team ordered xray due to patient having emesis and concern for aspiration with new O2 requirements of 5L NC. Team okay with HR and patient on 5L NC. Bedside RN has no further concerns at this time. XRAY of chest ordered and will follow up.

## 2025-02-02 VITALS
OXYGEN SATURATION: 94 % | BODY MASS INDEX: 31.76 KG/M2 | SYSTOLIC BLOOD PRESSURE: 127 MMHG | WEIGHT: 226.85 LBS | HEART RATE: 105 BPM | TEMPERATURE: 97.5 F | HEIGHT: 71 IN | DIASTOLIC BLOOD PRESSURE: 61 MMHG | RESPIRATION RATE: 18 BRPM

## 2025-02-02 LAB
ALBUMIN SERPL BCP-MCNC: 3 G/DL (ref 3.4–5)
ANION GAP SERPL CALC-SCNC: 18 MMOL/L (ref 10–20)
BUN SERPL-MCNC: 39 MG/DL (ref 6–23)
CALCIUM SERPL-MCNC: 9.3 MG/DL (ref 8.6–10.6)
CHLORIDE SERPL-SCNC: 97 MMOL/L (ref 98–107)
CO2 SERPL-SCNC: 30 MMOL/L (ref 21–32)
CREAT SERPL-MCNC: 2.06 MG/DL (ref 0.5–1.3)
EGFRCR SERPLBLD CKD-EPI 2021: 33 ML/MIN/1.73M*2
ERYTHROCYTE [DISTWIDTH] IN BLOOD BY AUTOMATED COUNT: 15.9 % (ref 11.5–14.5)
GLUCOSE BLD MANUAL STRIP-MCNC: 106 MG/DL (ref 74–99)
GLUCOSE BLD MANUAL STRIP-MCNC: 99 MG/DL (ref 74–99)
GLUCOSE SERPL-MCNC: 73 MG/DL (ref 74–99)
HCT VFR BLD AUTO: 33.1 % (ref 41–52)
HGB BLD-MCNC: 10.5 G/DL (ref 13.5–17.5)
MAGNESIUM SERPL-MCNC: 2.35 MG/DL (ref 1.6–2.4)
MCH RBC QN AUTO: 28.2 PG (ref 26–34)
MCHC RBC AUTO-ENTMCNC: 31.7 G/DL (ref 32–36)
MCV RBC AUTO: 89 FL (ref 80–100)
NRBC BLD-RTO: 0 /100 WBCS (ref 0–0)
PHOSPHATE SERPL-MCNC: 2.9 MG/DL (ref 2.5–4.9)
PLATELET # BLD AUTO: 267 X10*3/UL (ref 150–450)
POTASSIUM SERPL-SCNC: 3.9 MMOL/L (ref 3.5–5.3)
RBC # BLD AUTO: 3.72 X10*6/UL (ref 4.5–5.9)
SODIUM SERPL-SCNC: 141 MMOL/L (ref 136–145)
WBC # BLD AUTO: 11.4 X10*3/UL (ref 4.4–11.3)

## 2025-02-02 PROCEDURE — 85027 COMPLETE CBC AUTOMATED: CPT | Performed by: STUDENT IN AN ORGANIZED HEALTH CARE EDUCATION/TRAINING PROGRAM

## 2025-02-02 PROCEDURE — 36415 COLL VENOUS BLD VENIPUNCTURE: CPT | Performed by: STUDENT IN AN ORGANIZED HEALTH CARE EDUCATION/TRAINING PROGRAM

## 2025-02-02 PROCEDURE — 2500000004 HC RX 250 GENERAL PHARMACY W/ HCPCS (ALT 636 FOR OP/ED)

## 2025-02-02 PROCEDURE — 2500000004 HC RX 250 GENERAL PHARMACY W/ HCPCS (ALT 636 FOR OP/ED): Performed by: STUDENT IN AN ORGANIZED HEALTH CARE EDUCATION/TRAINING PROGRAM

## 2025-02-02 PROCEDURE — 1200000003 HC ONCOLOGY  ROOM WITH TELEMETRY DAILY

## 2025-02-02 PROCEDURE — 2500000005 HC RX 250 GENERAL PHARMACY W/O HCPCS

## 2025-02-02 PROCEDURE — 83735 ASSAY OF MAGNESIUM: CPT | Performed by: STUDENT IN AN ORGANIZED HEALTH CARE EDUCATION/TRAINING PROGRAM

## 2025-02-02 PROCEDURE — 2500000001 HC RX 250 WO HCPCS SELF ADMINISTERED DRUGS (ALT 637 FOR MEDICARE OP): Performed by: NURSE PRACTITIONER

## 2025-02-02 PROCEDURE — 94640 AIRWAY INHALATION TREATMENT: CPT

## 2025-02-02 PROCEDURE — 82947 ASSAY GLUCOSE BLOOD QUANT: CPT

## 2025-02-02 PROCEDURE — 80069 RENAL FUNCTION PANEL: CPT | Performed by: STUDENT IN AN ORGANIZED HEALTH CARE EDUCATION/TRAINING PROGRAM

## 2025-02-02 PROCEDURE — 2500000002 HC RX 250 W HCPCS SELF ADMINISTERED DRUGS (ALT 637 FOR MEDICARE OP, ALT 636 FOR OP/ED): Performed by: STUDENT IN AN ORGANIZED HEALTH CARE EDUCATION/TRAINING PROGRAM

## 2025-02-02 PROCEDURE — 2500000001 HC RX 250 WO HCPCS SELF ADMINISTERED DRUGS (ALT 637 FOR MEDICARE OP)

## 2025-02-02 RX ORDER — SODIUM CHLORIDE, SODIUM LACTATE, POTASSIUM CHLORIDE, CALCIUM CHLORIDE 600; 310; 30; 20 MG/100ML; MG/100ML; MG/100ML; MG/100ML
100 INJECTION, SOLUTION INTRAVENOUS CONTINUOUS
Status: ACTIVE | OUTPATIENT
Start: 2025-02-02 | End: 2025-02-03

## 2025-02-02 RX ORDER — DEXTROSE 50 % IN WATER (D50W) INTRAVENOUS SYRINGE
12.5
Status: DISCONTINUED | OUTPATIENT
Start: 2025-02-02 | End: 2025-02-26 | Stop reason: HOSPADM

## 2025-02-02 RX ORDER — SODIUM CHLORIDE, SODIUM LACTATE, POTASSIUM CHLORIDE, CALCIUM CHLORIDE 600; 310; 30; 20 MG/100ML; MG/100ML; MG/100ML; MG/100ML
125 INJECTION, SOLUTION INTRAVENOUS CONTINUOUS
Status: ACTIVE | OUTPATIENT
Start: 2025-02-02 | End: 2025-02-03

## 2025-02-02 RX ADMIN — Medication 5 L/MIN: at 09:49

## 2025-02-02 RX ADMIN — AMLODIPINE BESYLATE 10 MG: 10 TABLET ORAL at 09:16

## 2025-02-02 RX ADMIN — POLYETHYLENE GLYCOL 3350 17 G: 17 POWDER, FOR SOLUTION ORAL at 09:16

## 2025-02-02 RX ADMIN — LEVOTHYROXINE SODIUM ANHYDROUS 12.5 MCG: 100 INJECTION, POWDER, LYOPHILIZED, FOR SOLUTION INTRAVENOUS at 09:16

## 2025-02-02 RX ADMIN — HEPARIN SODIUM 5000 UNITS: 5000 INJECTION INTRAVENOUS; SUBCUTANEOUS at 09:15

## 2025-02-02 RX ADMIN — SODIUM CHLORIDE, POTASSIUM CHLORIDE, SODIUM LACTATE AND CALCIUM CHLORIDE 125 ML/HR: 600; 310; 30; 20 INJECTION, SOLUTION INTRAVENOUS at 12:58

## 2025-02-02 RX ADMIN — DEXTROSE MONOHYDRATE 12.5 G: 25 INJECTION, SOLUTION INTRAVENOUS at 14:02

## 2025-02-02 RX ADMIN — HYDROMORPHONE HYDROCHLORIDE 0.4 MG: 1 INJECTION, SOLUTION INTRAMUSCULAR; INTRAVENOUS; SUBCUTANEOUS at 03:17

## 2025-02-02 RX ADMIN — Medication 5 L/MIN: at 09:21

## 2025-02-02 RX ADMIN — Medication 10 MG: at 20:20

## 2025-02-02 RX ADMIN — SENNOSIDES 17.2 MG: 8.6 TABLET, FILM COATED ORAL at 09:16

## 2025-02-02 RX ADMIN — SODIUM CHLORIDE, POTASSIUM CHLORIDE, SODIUM LACTATE AND CALCIUM CHLORIDE 100 ML/HR: 600; 310; 30; 20 INJECTION, SOLUTION INTRAVENOUS at 14:02

## 2025-02-02 RX ADMIN — IPRATROPIUM BROMIDE AND ALBUTEROL SULFATE 3 ML: .5; 3 SOLUTION RESPIRATORY (INHALATION) at 07:53

## 2025-02-02 RX ADMIN — BISACODYL 10 MG: 10 SUPPOSITORY RECTAL at 09:16

## 2025-02-02 RX ADMIN — HYDROMORPHONE HYDROCHLORIDE 0.4 MG: 1 INJECTION, SOLUTION INTRAMUSCULAR; INTRAVENOUS; SUBCUTANEOUS at 22:51

## 2025-02-02 RX ADMIN — Medication 1000 UNITS: at 09:16

## 2025-02-02 RX ADMIN — HYDROMORPHONE HYDROCHLORIDE 0.4 MG: 1 INJECTION, SOLUTION INTRAMUSCULAR; INTRAVENOUS; SUBCUTANEOUS at 17:55

## 2025-02-02 RX ADMIN — Medication 3 L/MIN: at 19:43

## 2025-02-02 RX ADMIN — HYDROMORPHONE HYDROCHLORIDE 0.4 MG: 1 INJECTION, SOLUTION INTRAMUSCULAR; INTRAVENOUS; SUBCUTANEOUS at 00:59

## 2025-02-02 RX ADMIN — HEPARIN SODIUM 5000 UNITS: 5000 INJECTION INTRAVENOUS; SUBCUTANEOUS at 00:53

## 2025-02-02 RX ADMIN — Medication 5 L/MIN: at 09:00

## 2025-02-02 RX ADMIN — HYDROMORPHONE HYDROCHLORIDE 0.4 MG: 1 INJECTION, SOLUTION INTRAMUSCULAR; INTRAVENOUS; SUBCUTANEOUS at 09:50

## 2025-02-02 RX ADMIN — MAGNESIUM HYDROXIDE 30 ML: 400 SUSPENSION ORAL at 14:13

## 2025-02-02 RX ADMIN — SODIUM CHLORIDE, POTASSIUM CHLORIDE, SODIUM LACTATE AND CALCIUM CHLORIDE 950 ML/HR: 600; 310; 30; 20 INJECTION, SOLUTION INTRAVENOUS at 23:46

## 2025-02-02 RX ADMIN — Medication 1 SPRAY: at 16:31

## 2025-02-02 RX ADMIN — IPRATROPIUM BROMIDE AND ALBUTEROL SULFATE 3 ML: .5; 3 SOLUTION RESPIRATORY (INHALATION) at 14:34

## 2025-02-02 RX ADMIN — SENNOSIDES 17.2 MG: 8.6 TABLET, FILM COATED ORAL at 20:20

## 2025-02-02 RX ADMIN — METHOCARBAMOL 500 MG: 1000 INJECTION, SOLUTION INTRAMUSCULAR; INTRAVENOUS at 06:35

## 2025-02-02 RX ADMIN — HEPARIN SODIUM 5000 UNITS: 5000 INJECTION INTRAVENOUS; SUBCUTANEOUS at 16:28

## 2025-02-02 ASSESSMENT — PAIN SCALES - GENERAL
PAINLEVEL_OUTOF10: 7
PAINLEVEL_OUTOF10: 6
PAINLEVEL_OUTOF10: 6
PAINLEVEL_OUTOF10: 0 - NO PAIN
PAINLEVEL_OUTOF10: 7
PAINLEVEL_OUTOF10: 0 - NO PAIN
PAINLEVEL_OUTOF10: 0 - NO PAIN

## 2025-02-02 ASSESSMENT — COGNITIVE AND FUNCTIONAL STATUS - GENERAL
HELP NEEDED FOR BATHING: A LITTLE
MOBILITY SCORE: 24
DRESSING REGULAR LOWER BODY CLOTHING: A LITTLE
STANDING UP FROM CHAIR USING ARMS: A LITTLE
WALKING IN HOSPITAL ROOM: A LITTLE
DRESSING REGULAR UPPER BODY CLOTHING: A LITTLE
DRESSING REGULAR LOWER BODY CLOTHING: A LITTLE
MOVING TO AND FROM BED TO CHAIR: A LITTLE
DRESSING REGULAR UPPER BODY CLOTHING: A LITTLE
TOILETING: A LITTLE
TOILETING: A LITTLE
DAILY ACTIVITIY SCORE: 20
MOBILITY SCORE: 19
CLIMB 3 TO 5 STEPS WITH RAILING: A LITTLE
DAILY ACTIVITIY SCORE: 20
HELP NEEDED FOR BATHING: A LITTLE
TURNING FROM BACK TO SIDE WHILE IN FLAT BAD: A LITTLE

## 2025-02-02 ASSESSMENT — PAIN DESCRIPTION - ORIENTATION: ORIENTATION: MID

## 2025-02-02 ASSESSMENT — PAIN - FUNCTIONAL ASSESSMENT
PAIN_FUNCTIONAL_ASSESSMENT: 0-10

## 2025-02-02 ASSESSMENT — PAIN DESCRIPTION - LOCATION: LOCATION: ABDOMEN

## 2025-02-02 NOTE — SIGNIFICANT EVENT
Rapid Response Nurse Note: RADAR alert: 7    Pager time: [phone]  Arrival time:   Event end time:   Location: [place] [id number]  [x] Triage by phone or secure messaging    Rapid response initiated by:  [] Rapid response RN [] Family [] Nursing Supervisor [] Physician   [x] RADAR auto page [] Sepsis auto-page [] RN [] RT   [] NP/PA [] Other:     Primary reason for call:   [] BAT [] New CPAP/BiPAP [] Bleeding [] Change in mental status   [] Chest pain [] Code blue [] FiO2 >/= 50% [] HR </= 40 bpm   [] HR >/= 130 bpm [] Hyperglycemia [] Hypoglycemia [x] RADAR    [] RR </= 8 bpm [] RR >/= 30 bpm [] SBP </= 90 mmHg [] SpO2 < 90%   [] Seizure [] Sepsis [] Shortness of breath  [] Staff concern: see comments     Initial VS and/or RADAR VS: T null °C; ; RR 18; /64; SPO2 93%.    Providers present at bedside (if applicable):     Name of ICU Provider contacted (if applicable):     Interventions:  [x] None [] ABG/VBG [] Assist w/ICU transfer [] BAT paged    [] Bag mask [] Blood [] Cardioversion [] Code Blue   [] Code blue for intubation [] Code status changed [] Chest x-ray [] EKG   [] IV fluid/bolus [] KUB x-ray [] Labs/cultures [] Medication   [] Nebulizer treatment [] NIPPV (CPAP/BiPAP) [] Oxygen [] Oral airway   [] Peripheral IV [] Palliative care consult [] CT/MRI [] Sepsis protocol    [] Suctioned [] Other:     Outcome:  [] Coded and  [] Code blue for intubation [] Coded and transferred to ICU []  on division   [x] Remained on division (no change) [] Remained on division + additional monitoring [] Remained in ED [] Transferred to ED   [] Transferred to ICU [] Transferred to inpatient status [] Transferred for interventions (procedure) [] Transferred to ICU stepdown    [] Transferred to surgery [] Transferred to telemetry [] Sepsis protocol [] STEMI protocol   [] Stroke protocol [x] Bedside nurse instructed to page rapid response for any concerns or acute change in condition/VS     Additional Comments:      Radar auto-page received for a radar score of 7 with the above listed vital signs.  Vital signs were confirmed and reviewed with primary RN.  He is at his current baseline and the primary RN has no present concerns.  There are no indications for interventions by Rapid Response at this time.  RN to contact Rapid Response with any future concerns or signs of clinical decompensation.

## 2025-02-02 NOTE — PROGRESS NOTES
[facility] [place]  Progress Note      Patient: Swapnil Allen  Age/Sex: 75 y.o., male  MRN: [id number]  Date of surgery: 1/28/25  Admit Date: 1/28/2025   Code Status: Full Code  Length of Stay: 5      Interval History/Overnight Events:   Patient with continued high output >2L from NGT  Tachycardia resolved after NGT and fluid bolus  Denies any fevers, chills, N/V. Feeling nauseous.     Still no BMs but endorsing flatus after NGT placed        Objective  01/31 1900 - 02/02 0659  In: 1400 [I.V.:1400]  Out: 4325 [Urine:1025]              11.8     9.9>-----<265              38.5   138  97  40                  ----------------<102     4.5  28  2.78          Ca 10.2 Phos No results found for requested labs within last 365 days. Mg 2.02       ALT 25 AST 33 AlkPhos 62 tBili 0.6          Vitals:    02/02/25 0300 02/02/25 0753 02/02/25 0815 02/02/25 0921   BP: 102/69  124/77    BP Location: Right arm  Right arm    Patient Position: Lying  Lying    Pulse: 94  94    Resp: 17  18    Temp: 36.6 °C (97.9 °F)  36.6 °C (97.9 °F)    TempSrc: Temporal  Temporal    SpO2: 96% 99% 93% 96%   Weight:       Height:              Medications:  Current Facility-Administered Medications   Medication Dose Route Frequency Provider Last Rate Last Admin    amLODIPine (Norvasc) tablet 10 mg  10 mg nasogastric tube Daily Ludwin Phillips MD   10 mg at 02/02/25 0916    [Held by provider] aspirin EC tablet 81 mg  81 mg oral Daily Jannie Hamm MD   81 mg at 01/31/25 0820    aspirin suppository 150 mg  150 mg rectal Daily Deena Medina MD   150 mg at 02/01/25 1456    benzocaine-menthol (Cepastat Sore Throat) lozenge 1 lozenge  1 lozenge Mouth/Throat q2h PRN Ludwin Phillips MD        bisacodyl (Dulcolax) suppository 10 mg  10 mg rectal Daily KYLIE Britt-CNP   10 mg at 02/02/25 0916    calcium carbonate (Tums) chewable tablet 500 mg  500 mg nasogastric tube 4x daily PRN Ludwin Phillips MD        cholecalciferol (Vitamin D-3)  tablet 1,000 Units  1,000 Units nasogastric tube Daily Ludwin Phillips MD   1,000 Units at 02/02/25 0916    heparin (porcine) injection 5,000 Units  5,000 Units subcutaneous q8h Jannie Hamm MD   5,000 Units at 02/02/25 0915    HYDROmorphone (Dilaudid) injection 0.4 mg  0.4 mg intravenous q2h PRN Gallo Martines MD   0.4 mg at 02/02/25 0317    ipratropium-albuteroL (Duo-Neb) 0.5-2.5 mg/3 mL nebulizer solution 3 mL  3 mL nebulization TID Deena Medina MD   3 mL at 02/02/25 0753    labetaloL (Normodyne,Trandate) injection 10 mg  10 mg intravenous q8h PRN Gallo Martines MD        lactated Ringer's infusion  100 mL/hr intravenous Continuous Deena Medina  mL/hr at 02/01/25 1640 100 mL/hr at 02/01/25 1640    lactated Ringer's infusion  125 mL/hr intravenous Continuous Deena Medina MD   Stopped at 02/02/25 0037    levothyroxine (Synthroid) injection 12.5 mcg  12.5 mcg intravenous q24h ALEN Ludwin Phillips MD   12.5 mcg at 02/02/25 0916    [Held by provider] levothyroxine (Synthroid, Levoxyl) tablet 25 mcg  25 mcg oral Daily Jannie Hamm MD   25 mcg at 01/31/25 0531    lidocaine 4 % patch 3 patch  3 patch transdermal Daily Gallo Martines MD        magnesium hydroxide (Milk of Magnesia) 400 mg/5 mL suspension 30 mL  30 mL nasogastric tube Daily Ludwin Phillips MD   30 mL at 02/01/25 0850    melatonin tablet 10 mg  10 mg nasogastric tube Nightly PRN Ludwin Phillips MD        methocarbamol (Robaxin) injection 500 mg  500 mg intravenous q8h Gallo Martines MD   500 mg at 02/02/25 0635    ondansetron (Zofran) injection 4 mg  4 mg intravenous q6h PRN Gallo Martines MD   4 mg at 01/31/25 2057    [Held by provider] oxyCODONE (Roxicodone) immediate release tablet 10 mg  10 mg oral q6h PRN Jannie Hamm MD   10 mg at 01/29/25 0235    [Held by provider] oxyCODONE (Roxicodone) immediate release tablet 5 mg  5 mg oral q6h PRN Jannie Hamm MD   5 mg at 01/29/25 0840    oxygen (O2) therapy    inhalation Continuous - Inhalation Ludwin Phillips MD   Stopped at 02/02/25 0921    phenoL (Chloraseptic) 1.4 % mouth/throat spray 1 spray  1 spray Mouth/Throat 4x daily PRN Ludwin Phillips MD   1 spray at 02/01/25 1120    polyethylene glycol (Glycolax, Miralax) packet 17 g  17 g nasogastric tube Daily Ludwin Phillips MD   17 g at 02/02/25 0916    sennosides (Senokot) tablet 17.2 mg  2 tablet nasogastric tube BID Ludwin Phillips MD   17.2 mg at 02/02/25 0916    [Held by provider] tamsulosin (Flomax) 24 hr capsule 0.4 mg  0.4 mg oral Daily Jannie Hamm MD   0.4 mg at 01/31/25 0820       Physical Exam                                                                                                                         Gen -  No acute distress  Heent: NGT in place with dark brown output     Neuro - Alert, oriented, conversant     CV -  tachycardia resolved, normotensive      Pulm - Symmetric chest rise, non-labored breathing on room air      Abd - Abdomen is soft, mildly-distended, and appropriately tender. Midline incision c/d/I closed with glue.       Ext - Warm, well perfused     MSK- no pain or swelling in LE bilaterally     Skin - without jaunice       Psych - appropriate tone, affect      - Awad replaced draining clear yellow urine      Imaging  XR abdomen 1 view    Result Date: 2/1/2025  Interpreted By:  Pillo Wilson, STUDY: XR ABDOMEN 1 VIEW;  2/1/2025 3:50 am   INDICATION: Signs/Symptoms:NG insertion.   COMPARISON: Abdominal radiographs 01/31/2025   ACCESSION NUMBER(S): [id number]   ORDERING CLINICIAN: FILIPE ROA   FINDINGS: Single AP radiograph of abdomen available for interpretation. Enteric tube now seen in place with tip projecting over expected location of gastric body. Multiple surgical clips seen projecting over right hemiabdomen.   Similar gaseous prominence of small and large bowel loops throughout the abdomen. Moderate colonic stool burden. Small volume  pneumoperitoneum beneath right hemidiaphragm again seen.   Visualized lung bases are clear   Osseous structures demonstrate no acute bony changes.       1. Enteric tube tip overlying expected location of gastric body. 2. Similar gaseous prominence of small and large bowel loops throughout the abdomen. Correlate with concern for ileus.   Signed by: Pillo Wilson 2/1/2025 8:16 AM Dictation workstation:   [id number]    XR chest 1 view    Result Date: 2/1/2025  Interpreted By:  Pillo Wilson and Ritchie Brandon STUDY: XR CHEST 1 VIEW;  1/31/2025 9:45 pm   INDICATION: Signs/Symptoms:increasing o2 requirement. The patient is status post right radical nephrectomy/retroperitoneal lymph node dissection.   COMPARISON: Chest radiograph 01/30/2025, 9:43 a.m.; Chest CT 01/29/2025   ACCESSION NUMBER(S): [id number]   ORDERING CLINICIAN: FILIPE ROA   FINDINGS: AP radiograph of the chest was provided. Lordotic radiograph with midline positioning of the patient.   CARDIOMEDIASTINAL SILHOUETTE: Cardiomediastinal silhouette is normal in size when accounting for AP radiograph in lordotic positioning.   LUNGS: Poor inspiratory effort resulting in low lung volumes and contributing to bronchovascular crowding. Accounting for this, there is no pulmonary edema. Mild bibasilar atelectasis. No new airspace consolidation, pleural effusion, or pneumothorax.   ABDOMEN: Decreasing curvilinear lucency/air below the right hemidiaphragm.   BONES: No acute osseous changes.       1.  Low lung volumes contributing to bronchovascular crowding with similar linear atelectatic changes of the lung bases. No new airspace disease, pleural effusion, or pneumothorax. 2. Pneumoperitoneum appears less conspicuous on present study.   I personally reviewed the images/study and I agree with the findings as stated by resident Tian Dolan. This study was interpreted at [facility], [place], [place].   MACRO: None   Signed  by: Pillo Wilson 2/1/2025 8:15 AM Dictation workstation:   [id number]    XR abdomen 1 view    Result Date: 2/1/2025  Interpreted By:  Pillo Wilson, STUDY: XR ABDOMEN 1 VIEW;  1/31/2025 8:42 pm   INDICATION: Signs/Symptoms:ileus.   COMPARISON: Chest radiograph 01/30/2025, chest and abdomen CT scan 09/12/2024   ACCESSION NUMBER(S): [id number]   ORDERING CLINICIAN: FILIPE ROA   FINDINGS: 3 AP radiographs of abdomen are available for interpretation. Multiple surgical clips seen projecting over right hemiabdomen.   Gas-filled prominent to mildly dilated small and large bowel loops throughout the abdomen. Moderate colonic stool burden. Small volume pneumoperitoneum beneath right hemidiaphragm again seen.   Visualized lung bases are clear   Osseous structures demonstrate no acute bony changes.       1. Gas-filled prominent to mildly dilated small and large bowel loops throughout the abdomen. 2. Persistent small volume pneumoperitoneum beneath right hemidiaphragm.   Signed by: Pillo Wilson 2/1/2025 8:13 AM Dictation workstation:   [id number]      Assessment & Plan  75 y.o. 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    -NGT placed 01/31 overnight  -Cr 02/01 continued to increase to 2.78. Will continue to monitor for post operative plateau and involve nephrology PRN    Plan:  -Pain control   -Ambulate/OOB,  -Continue fluid repletion for NGT losses. Patient greatly improved today after NGT placement and fluid repletion    Neuro:   -Pain controlled with Scheduled acetaminophen, Oxycodone 5/10 HELD while NPO, Dilaudid 0.4 PRN for breakthrough pain, robaxin 500mg q8hrs   -PRN melatonin for sleep  aid     Resp:   -ICS  -Weaned O2 to room air this AM, will continue to monitor  -Appreciate RT recs    Card:   -Monitor vitals  -continue telemetry  -Amlodpine  -Aspirin   -Cholecalciferol     FEN: mIVF LR @ 100ml  Fluid repletion at 0.5:1 of NGT output, bolused 1.5L over past 24 hrs with improvement    GI:   -NPO for NGT  -BR: Miralax, senokat   -PRN Reglan for nausea   - Bisacodyl suppository     :   -Flomax   -Daily BMP to monitor kidney function and replete electrolytes as indicated.   -Cr improved today, will continue to monitor    Endo:   -Levothyroxine IV while NPO     Heme/ID:   -Daily CBC to monitor for acute blood loss anemia   -No indication for blood transfusion or antibiotics at this time     Ppy:   -ALEN  -SCD  -IS    Dispo:   RNF. Was recommended SNF, not interested. Might consider home care.     Assessment and plan discussed with chief resident Dr. La and attending Dr. Ana Medina MD, PGY4  [facility] [place]  Adult Urology Pager: [phone]  Pediatric Urology Pager: [phone]

## 2025-02-03 ENCOUNTER — APPOINTMENT (OUTPATIENT)
Dept: RADIOLOGY | Facility: HOSPITAL | Age: 75
End: 2025-02-03
Payer: MEDICARE

## 2025-02-03 LAB
ALBUMIN SERPL BCP-MCNC: 3.1 G/DL (ref 3.4–5)
ANION GAP SERPL CALC-SCNC: 13 MMOL/L (ref 10–20)
ATRIAL RATE: 120 BPM
BUN SERPL-MCNC: 38 MG/DL (ref 6–23)
CALCIUM SERPL-MCNC: 9.1 MG/DL (ref 8.6–10.6)
CHLORIDE SERPL-SCNC: 96 MMOL/L (ref 98–107)
CO2 SERPL-SCNC: 34 MMOL/L (ref 21–32)
CREAT SERPL-MCNC: 1.8 MG/DL (ref 0.5–1.3)
EGFRCR SERPLBLD CKD-EPI 2021: 39 ML/MIN/1.73M*2
ERYTHROCYTE [DISTWIDTH] IN BLOOD BY AUTOMATED COUNT: 16.3 % (ref 11.5–14.5)
GLUCOSE BLD MANUAL STRIP-MCNC: 80 MG/DL (ref 74–99)
GLUCOSE BLD MANUAL STRIP-MCNC: 96 MG/DL (ref 74–99)
GLUCOSE SERPL-MCNC: 88 MG/DL (ref 74–99)
HCT VFR BLD AUTO: 33 % (ref 41–52)
HGB BLD-MCNC: 10.4 G/DL (ref 13.5–17.5)
LACTATE SERPL-SCNC: 1.2 MMOL/L (ref 0.4–2)
MAGNESIUM SERPL-MCNC: 2.79 MG/DL (ref 1.6–2.4)
MCH RBC QN AUTO: 28.5 PG (ref 26–34)
MCHC RBC AUTO-ENTMCNC: 31.5 G/DL (ref 32–36)
MCV RBC AUTO: 90 FL (ref 80–100)
NRBC BLD-RTO: 0 /100 WBCS (ref 0–0)
P AXIS: 35 DEGREES
P OFFSET: 185 MS
P ONSET: 132 MS
PHOSPHATE SERPL-MCNC: 2.5 MG/DL (ref 2.5–4.9)
PHOSPHATE SERPL-MCNC: 2.5 MG/DL (ref 2.5–4.9)
PLATELET # BLD AUTO: 272 X10*3/UL (ref 150–450)
POTASSIUM SERPL-SCNC: 6 MMOL/L (ref 3.5–5.3)
PR INTERVAL: 164 MS
Q ONSET: 214 MS
QRS COUNT: 20 BEATS
QRS DURATION: 138 MS
QT INTERVAL: 330 MS
QTC CALCULATION(BAZETT): 466 MS
QTC FREDERICIA: 415 MS
R AXIS: -41 DEGREES
RBC # BLD AUTO: 3.65 X10*6/UL (ref 4.5–5.9)
SODIUM SERPL-SCNC: 137 MMOL/L (ref 136–145)
T AXIS: 0 DEGREES
T OFFSET: 379 MS
TRIGL SERPL-MCNC: 254 MG/DL (ref 0–149)
VENTRICULAR RATE: 120 BPM
WBC # BLD AUTO: 6.9 X10*3/UL (ref 4.4–11.3)

## 2025-02-03 PROCEDURE — 85027 COMPLETE CBC AUTOMATED: CPT | Performed by: STUDENT IN AN ORGANIZED HEALTH CARE EDUCATION/TRAINING PROGRAM

## 2025-02-03 PROCEDURE — 2580000001 HC RX 258 IV SOLUTIONS: Performed by: NURSE PRACTITIONER

## 2025-02-03 PROCEDURE — 2500000001 HC RX 250 WO HCPCS SELF ADMINISTERED DRUGS (ALT 637 FOR MEDICARE OP): Performed by: NURSE PRACTITIONER

## 2025-02-03 PROCEDURE — 36415 COLL VENOUS BLD VENIPUNCTURE: CPT | Performed by: STUDENT IN AN ORGANIZED HEALTH CARE EDUCATION/TRAINING PROGRAM

## 2025-02-03 PROCEDURE — 83605 ASSAY OF LACTIC ACID: CPT | Performed by: NURSE PRACTITIONER

## 2025-02-03 PROCEDURE — 2500000005 HC RX 250 GENERAL PHARMACY W/O HCPCS

## 2025-02-03 PROCEDURE — 84100 ASSAY OF PHOSPHORUS: CPT | Performed by: NURSE PRACTITIONER

## 2025-02-03 PROCEDURE — 50230 REMOVAL KIDNEY OPEN RADICAL: CPT | Performed by: STUDENT IN AN ORGANIZED HEALTH CARE EDUCATION/TRAINING PROGRAM

## 2025-02-03 PROCEDURE — 2500000004 HC RX 250 GENERAL PHARMACY W/ HCPCS (ALT 636 FOR OP/ED)

## 2025-02-03 PROCEDURE — 2500000004 HC RX 250 GENERAL PHARMACY W/ HCPCS (ALT 636 FOR OP/ED): Performed by: STUDENT IN AN ORGANIZED HEALTH CARE EDUCATION/TRAINING PROGRAM

## 2025-02-03 PROCEDURE — 2500000002 HC RX 250 W HCPCS SELF ADMINISTERED DRUGS (ALT 637 FOR MEDICARE OP, ALT 636 FOR OP/ED): Performed by: STUDENT IN AN ORGANIZED HEALTH CARE EDUCATION/TRAINING PROGRAM

## 2025-02-03 PROCEDURE — 82947 ASSAY GLUCOSE BLOOD QUANT: CPT

## 2025-02-03 PROCEDURE — 84478 ASSAY OF TRIGLYCERIDES: CPT | Performed by: NURSE PRACTITIONER

## 2025-02-03 PROCEDURE — 74176 CT ABD & PELVIS W/O CONTRAST: CPT

## 2025-02-03 PROCEDURE — 2500000004 HC RX 250 GENERAL PHARMACY W/ HCPCS (ALT 636 FOR OP/ED): Mod: TB

## 2025-02-03 PROCEDURE — 2550000001 HC RX 255 CONTRASTS: Performed by: NURSE PRACTITIONER

## 2025-02-03 PROCEDURE — 2500000001 HC RX 250 WO HCPCS SELF ADMINISTERED DRUGS (ALT 637 FOR MEDICARE OP)

## 2025-02-03 PROCEDURE — 94640 AIRWAY INHALATION TREATMENT: CPT

## 2025-02-03 PROCEDURE — 2500000005 HC RX 250 GENERAL PHARMACY W/O HCPCS: Performed by: NURSE PRACTITIONER

## 2025-02-03 PROCEDURE — 36415 COLL VENOUS BLD VENIPUNCTURE: CPT | Performed by: NURSE PRACTITIONER

## 2025-02-03 PROCEDURE — 87081 CULTURE SCREEN ONLY: CPT | Performed by: NURSE PRACTITIONER

## 2025-02-03 PROCEDURE — 83735 ASSAY OF MAGNESIUM: CPT | Performed by: NURSE PRACTITIONER

## 2025-02-03 PROCEDURE — A9698 NON-RAD CONTRAST MATERIALNOC: HCPCS | Performed by: NURSE PRACTITIONER

## 2025-02-03 PROCEDURE — 84100 ASSAY OF PHOSPHORUS: CPT | Performed by: STUDENT IN AN ORGANIZED HEALTH CARE EDUCATION/TRAINING PROGRAM

## 2025-02-03 PROCEDURE — 74176 CT ABD & PELVIS W/O CONTRAST: CPT | Performed by: RADIOLOGY

## 2025-02-03 PROCEDURE — 1200000003 HC ONCOLOGY  ROOM WITH TELEMETRY DAILY

## 2025-02-03 RX ORDER — LIDOCAINE HYDROCHLORIDE 10 MG/ML
5 INJECTION, SOLUTION INFILTRATION; PERINEURAL ONCE
Status: DISCONTINUED | OUTPATIENT
Start: 2025-02-03 | End: 2025-02-10

## 2025-02-03 RX ORDER — HYDROMORPHONE HYDROCHLORIDE 1 MG/ML
0.4 INJECTION, SOLUTION INTRAMUSCULAR; INTRAVENOUS; SUBCUTANEOUS EVERY 2 HOUR PRN
Status: DISCONTINUED | OUTPATIENT
Start: 2025-02-03 | End: 2025-02-07

## 2025-02-03 RX ORDER — INSULIN LISPRO 100 [IU]/ML
0-5 INJECTION, SOLUTION INTRAVENOUS; SUBCUTANEOUS
Status: DISCONTINUED | OUTPATIENT
Start: 2025-02-03 | End: 2025-02-11

## 2025-02-03 RX ORDER — SODIUM CHLORIDE, SODIUM LACTATE, POTASSIUM CHLORIDE, CALCIUM CHLORIDE 600; 310; 30; 20 MG/100ML; MG/100ML; MG/100ML; MG/100ML
50 INJECTION, SOLUTION INTRAVENOUS CONTINUOUS
Status: ACTIVE | OUTPATIENT
Start: 2025-02-03 | End: 2025-02-04

## 2025-02-03 RX ORDER — HEPARIN SODIUM,PORCINE/PF 10 UNIT/ML
50 SYRINGE (ML) INTRAVENOUS EVERY 8 HOURS
Status: DISCONTINUED | OUTPATIENT
Start: 2025-02-03 | End: 2025-02-03

## 2025-02-03 RX ADMIN — IPRATROPIUM BROMIDE AND ALBUTEROL SULFATE 3 ML: .5; 3 SOLUTION RESPIRATORY (INHALATION) at 10:15

## 2025-02-03 RX ADMIN — SODIUM CHLORIDE, POTASSIUM CHLORIDE, SODIUM LACTATE AND CALCIUM CHLORIDE 100 ML/HR: 600; 310; 30; 20 INJECTION, SOLUTION INTRAVENOUS at 15:35

## 2025-02-03 RX ADMIN — SENNOSIDES 17.2 MG: 8.6 TABLET, FILM COATED ORAL at 08:27

## 2025-02-03 RX ADMIN — HEPARIN SODIUM 5000 UNITS: 5000 INJECTION INTRAVENOUS; SUBCUTANEOUS at 15:35

## 2025-02-03 RX ADMIN — MAGNESIUM HYDROXIDE 30 ML: 400 SUSPENSION ORAL at 08:27

## 2025-02-03 RX ADMIN — ASCORBIC ACID, VITAMIN A PALMITATE, CHOLECALCIFEROL, THIAMINE HYDROCHLORIDE, RIBOFLAVIN-5 PHOSPHATE SODIUM, PYRIDOXINE HYDROCHLORIDE, NIACINAMIDE, DEXPANTHENOL, ALPHA-TOCOPHEROL ACETATE, VITAMIN K1, FOLIC ACID, BIOTIN, CYANOCOBALAMIN: 200; 3300; 200; 6; 3.6; 6; 40; 15; 10; 150; 600; 60; 5 INJECTION, SOLUTION INTRAVENOUS at 20:40

## 2025-02-03 RX ADMIN — BISACODYL 10 MG: 10 SUPPOSITORY RECTAL at 08:27

## 2025-02-03 RX ADMIN — HYDROMORPHONE HYDROCHLORIDE 0.4 MG: 1 INJECTION, SOLUTION INTRAMUSCULAR; INTRAVENOUS; SUBCUTANEOUS at 04:28

## 2025-02-03 RX ADMIN — Medication 4 L/MIN: at 11:52

## 2025-02-03 RX ADMIN — HEPARIN SODIUM 5000 UNITS: 5000 INJECTION INTRAVENOUS; SUBCUTANEOUS at 08:27

## 2025-02-03 RX ADMIN — SMOFLIPID 50 G: 6; 6; 5; 3 INJECTION, EMULSION INTRAVENOUS at 21:50

## 2025-02-03 RX ADMIN — SODIUM CHLORIDE, POTASSIUM CHLORIDE, SODIUM LACTATE AND CALCIUM CHLORIDE 100 ML/HR: 600; 310; 30; 20 INJECTION, SOLUTION INTRAVENOUS at 08:44

## 2025-02-03 RX ADMIN — SODIUM CHLORIDE, POTASSIUM CHLORIDE, SODIUM LACTATE AND CALCIUM CHLORIDE 100 ML/HR: 600; 310; 30; 20 INJECTION, SOLUTION INTRAVENOUS at 00:40

## 2025-02-03 RX ADMIN — POLYETHYLENE GLYCOL 3350 17 G: 17 POWDER, FOR SOLUTION ORAL at 08:26

## 2025-02-03 RX ADMIN — IPRATROPIUM BROMIDE AND ALBUTEROL SULFATE 3 ML: .5; 3 SOLUTION RESPIRATORY (INHALATION) at 15:08

## 2025-02-03 RX ADMIN — LEVOTHYROXINE SODIUM ANHYDROUS 12.5 MCG: 100 INJECTION, POWDER, LYOPHILIZED, FOR SOLUTION INTRAVENOUS at 08:29

## 2025-02-03 RX ADMIN — HYDROMORPHONE HYDROCHLORIDE 0.4 MG: 1 INJECTION, SOLUTION INTRAMUSCULAR; INTRAVENOUS; SUBCUTANEOUS at 18:16

## 2025-02-03 RX ADMIN — Medication 1000 UNITS: at 08:27

## 2025-02-03 RX ADMIN — Medication 2 L/MIN: at 10:15

## 2025-02-03 RX ADMIN — LIDOCAINE 4% 3 PATCH: 40 PATCH TOPICAL at 08:26

## 2025-02-03 RX ADMIN — AMLODIPINE BESYLATE 10 MG: 10 TABLET ORAL at 08:27

## 2025-02-03 RX ADMIN — IOHEXOL 500 ML: 12 SOLUTION ORAL at 08:26

## 2025-02-03 RX ADMIN — SENNOSIDES 17.2 MG: 8.6 TABLET, FILM COATED ORAL at 22:01

## 2025-02-03 RX ADMIN — SODIUM CHLORIDE, POTASSIUM CHLORIDE, SODIUM LACTATE AND CALCIUM CHLORIDE 300 ML/HR: 600; 310; 30; 20 INJECTION, SOLUTION INTRAVENOUS at 04:30

## 2025-02-03 RX ADMIN — IPRATROPIUM BROMIDE AND ALBUTEROL SULFATE 3 ML: .5; 3 SOLUTION RESPIRATORY (INHALATION) at 21:45

## 2025-02-03 RX ADMIN — HYDROMORPHONE HYDROCHLORIDE 0.4 MG: 1 INJECTION, SOLUTION INTRAMUSCULAR; INTRAVENOUS; SUBCUTANEOUS at 14:09

## 2025-02-03 RX ADMIN — HEPARIN SODIUM 5000 UNITS: 5000 INJECTION INTRAVENOUS; SUBCUTANEOUS at 00:37

## 2025-02-03 ASSESSMENT — PAIN - FUNCTIONAL ASSESSMENT
PAIN_FUNCTIONAL_ASSESSMENT: 0-10

## 2025-02-03 ASSESSMENT — PAIN DESCRIPTION - ORIENTATION: ORIENTATION: MID

## 2025-02-03 ASSESSMENT — COGNITIVE AND FUNCTIONAL STATUS - GENERAL
STANDING UP FROM CHAIR USING ARMS: A LOT
MOBILITY SCORE: 24
DRESSING REGULAR LOWER BODY CLOTHING: A LITTLE
PERSONAL GROOMING: A LITTLE
EATING MEALS: A LITTLE
DRESSING REGULAR LOWER BODY CLOTHING: A LITTLE
HELP NEEDED FOR BATHING: A LITTLE
DAILY ACTIVITIY SCORE: 20
TOILETING: A LITTLE
MOBILITY SCORE: 14
MOVING FROM LYING ON BACK TO SITTING ON SIDE OF FLAT BED WITH BEDRAILS: A LITTLE
DAILY ACTIVITIY SCORE: 18
HELP NEEDED FOR BATHING: A LITTLE
DRESSING REGULAR UPPER BODY CLOTHING: A LITTLE
DRESSING REGULAR UPPER BODY CLOTHING: A LITTLE
TURNING FROM BACK TO SIDE WHILE IN FLAT BAD: A LITTLE
CLIMB 3 TO 5 STEPS WITH RAILING: A LOT
WALKING IN HOSPITAL ROOM: A LOT
TOILETING: A LITTLE
MOVING TO AND FROM BED TO CHAIR: A LOT

## 2025-02-03 ASSESSMENT — PAIN SCALES - GENERAL
PAINLEVEL_OUTOF10: 9
PAINLEVEL_OUTOF10: 3
PAINLEVEL_OUTOF10: 7
PAINLEVEL_OUTOF10: 9
PAINLEVEL_OUTOF10: 6
PAINLEVEL_OUTOF10: 0 - NO PAIN

## 2025-02-03 ASSESSMENT — PAIN DESCRIPTION - LOCATION: LOCATION: ABDOMEN

## 2025-02-03 NOTE — CONSULTS
"Nutrition Initial Assessment:   Nutrition Assessment    Reason for Assessment: Parenteral assessment/recommendation (TPN/PPN)    Patient is a 75 y.o. male s/p planned Right Radical Nephrectomy, Retroperitoneal Lymph Node Dissection on 1/28/25 d/t RCC. Course complicated by N/V. NGT for LIWS placed 1/31 with high, dark bilious output (>4L). 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 Pt states his baseline is 1 meal/day and will consist of \"something light\" such as oatmeal and eggs. Pt reports his diet being this way for multiple years. Pt meeting <50% EER for >/= 5 days iso poor appetite and NPO status.  Vitamin/Herbal Supplement Use: vit D3 per pt       Anthropometrics:  Height: 180.3 cm (5' 11\")   Weight: 103 kg (226 lb 13.7 oz)   BMI (Calculated): 31.65  IBW/kg (Dietitian Calculated): 78.2 kg  Percent of IBW: 131 %       Weight History:   Wt Readings from Last 20 Encounters:   02/01/25 103 kg (226 lb 13.7 oz) - bed scale   1/31/25 101.8 kg - bed scale   1/30/25 96.6 kg - standing scale   1/28/25 97 kg - bed scale   01/21/25 95.3 kg (210 lb) - unknown measurement method   12/09/24 88.5 kg (195 lb) ?accuracy   10/14/24 93.5 kg (206 lb 2.1 oz)   10/09/24 94.6 kg (208 lb 8.9 oz)   07/24/24 96.4 kg (212 lb 8 oz)   12/02/21 93.4 kg (206 lb)   06/29/21 92.3 kg (203 lb 6.4 oz)       Weight Change %:  Weight History / % Weight " Change: Per chart, pt with fluctuating weight within the past 2 weeks likely d/t fluid/scale discrepencies. Otherwise, weight appears relatively stable within the past 7 months.    Nutrition Focused Physical Exam Findings:    Subcutaneous Fat Loss:   Orbital Fat Pads: Well nourished (slightly bulging fat pads)  Buccal Fat Pads: Well nourished (full, rounded cheeks)  Triceps: Well nourished (ample fat tissue)  Muscle Wasting:  Temporalis: Well nourished (well-defined muscle)  Pectoralis (Clavicular Region): Well nourished (clavicle not visible)  Deltoid/Trapezius: Well nourished (rounded appearance at arm, shoulder, neck)  Interosseous: Mild-Moderate (slightly depressed area between thumb and forefinger)  Quadriceps: Well nourished (well developed, well rounded)  Gastrocnemius: Well nourished (well developed bulbous muscle)  Edema:  Edema: none  Physical Findings:  Skin: Positive (abdominal incision)  Digestive System Findings: Abdominal distension, Nausea, Vomiting    Nutrition Significant Labs:  CBC Trend:   Results from last 7 days   Lab Units 02/03/25  0939 02/02/25 1119 02/01/25  0813 01/31/25  0758   WBC AUTO x10*3/uL 6.9 11.4* 9.9 10.7   RBC AUTO x10*6/uL 3.65* 3.72* 4.27* 4.57   HEMOGLOBIN g/dL 10.4* 10.5* 11.8* 12.8*   HEMATOCRIT % 33.0* 33.1* 38.5* 41.0   MCV fL 90 89 90 90   PLATELETS AUTO x10*3/uL 272 267 265 285    , BG POCT trend:   Results from last 7 days   Lab Units 02/02/25 2029 02/02/25  1628   POCT GLUCOSE mg/dL 106* 99    , Liver Function Trend:    , Renal Lab Trend:   Results from last 7 days   Lab Units 02/02/25  1119 02/01/25  0813 01/31/25  0758 01/30/25  0804   POTASSIUM mmol/L 3.9 4.5 4.1 4.4   PHOSPHORUS mg/dL 2.9  --   --   --    SODIUM mmol/L 141 138 136 135*   MAGNESIUM mg/dL 2.35  --  2.02  --    EGFR mL/min/1.73m*2 33* 23* 30* 33*   BUN mg/dL 39* 40* 26* 20   CREATININE mg/dL 2.06* 2.78* 2.22* 2.07*    , TPN/PPN Labs:   Results from last 7 days   Lab Units 02/02/25  1113  "02/01/25  0813 01/31/25  0758 01/30/25  0804   GLUCOSE mg/dL 73* 102* 113* 100*   POTASSIUM mmol/L 3.9 4.5 4.1 4.4   PHOSPHORUS mg/dL 2.9  --   --   --    MAGNESIUM mg/dL 2.35  --  2.02  --    SODIUM mmol/L 141 138 136 135*   CHLORIDE mmol/L 97* 97* 101 102    , Vit D: No results found for: \"VITD25\" , Vit B12: No results found for: \"YSKCLXXO39\" , Iron Panel: No results found for: \"IRON\", \"TIBC\", \"FERRITIN\" , Folate: No results found for: \"FOLATE\"     Nutrition Specific Medications:  Scheduled medications  amLODIPine, 10 mg, nasogastric tube, Daily  [Held by provider] aspirin, 81 mg, oral, Daily  aspirin, 150 mg, rectal, Daily  bisacodyl, 10 mg, rectal, Daily  cholecalciferol, 1,000 Units, nasogastric tube, Daily  heparin (porcine), 5,000 Units, subcutaneous, q8h  ipratropium-albuteroL, 3 mL, nebulization, TID  levothyroxine, 12.5 mcg, intravenous, q24h ALEN  [Held by provider] levothyroxine, 25 mcg, oral, Daily  lidocaine, 3 patch, transdermal, Daily  magnesium hydroxide, 30 mL, nasogastric tube, Daily  oxygen, , inhalation, Continuous - Inhalation  polyethylene glycol, 17 g, nasogastric tube, Daily  sennosides, 2 tablet, nasogastric tube, BID  [Held by provider] tamsulosin, 0.4 mg, oral, Daily      Continuous medications  lactated Ringer's, 125 mL/hr, Last Rate: 350 mL/hr (02/03/25 0700)  lactated Ringer's, 100 mL/hr, Last Rate: 100 mL/hr (02/03/25 0844)      PRN medications  PRN medications: benzocaine-menthol, calcium carbonate, dextrose, HYDROmorphone, labetaloL, melatonin, ondansetron, [Held by provider] oxyCODONE, [Held by provider] oxyCODONE, phenoL      I/O:    ; Stool Appearance: Soft (02/02/25 1892)    Dietary Orders (From admission, onward)       Start     Ordered    01/31/25 2013  NPO Diet; Effective now  Diet effective now         01/31/25 2013 01/29/25 0008  May Participate in Room Service  ( ROOM SERVICE MAY PARTICIPATE)  Once        Question:  .  Answer:  Yes    01/29/25 0007                   "   Estimated Needs:   Total Energy Estimated Needs in 24 hours (kCal):  (2150 kcal)  Method for Estimating Needs: MSJ 1792 x 1.2  Total Protein Estimated Needs in 24 Hours (g):  (90 g)  Method for Estimating 24 Hour Protein Needs: 1.2 g/kg IBW  Total Fluid Estimated Needs in 24 Hours (mL):  (Per med team)           Nutrition Diagnosis   Malnutrition Diagnosis  Patient has Malnutrition Diagnosis: No (well nourished upon NFPE, no significant weight loss)    Nutrition Diagnosis  Patient has Nutrition Diagnosis: Yes  Diagnosis Status (1): New  Nutrition Diagnosis 1: Altered GI function  Related to (1): possible ileus  As Evidenced by (1): needs for NGT for LIWS and potential PN  Additional Nutrition Diagnosis: Diagnosis 2  Diagnosis Status (2): New  Nutrition Diagnosis 2: Inadequate oral intake  Related to (2): Decreased appetite and NPO status  As Evidenced by (2): pt reports of not being able to eat anything during this admission (x 6 days)       Nutrition Interventions/Recommendations   Nutrition prescription for parenteral nutrition    Nutrition Recommendations:  For TPN (will need central access):   Initiate Clinimix E 5/20 at 25 ml/hr x 24 hrs. If no major changes in lytes or BGs occurs, can advance to 50 ml/hr x 24 hrs on day 2. If no major changes in lytes or BGs occurs, can advance to goal rate of 75 ml/hr on day 3.   Provide 250 ml SMOF lipids @ 20.8 ml/hr x 12 hrs overnight.   At goal, this provides a total volume of 2050 ml, 2085 kcal, 90 g pro, 24% kcal from lipid, and GIR of 2.4 g/kg/min.   Check RFP + Mg daily and replete as indicated.   Check baseline LFTs and triglycerides prior to TPN initiation. Check levels once weekly after initiation.  For PPN (should not run longer than 7-14 days):   Initiate Clinimix E 4.25/5 at 83.3 ml/hr + 250 ml SMOF lipids at 20.8 ml/hr x 12 hrs overnight.   This provides 1180 kcal, and 85 g pro.   Check RFP + Mg daily and replete as indicated.  Check baseline LFTs and  triglycerides prior to PPN initiation. Check levels once weekly after initiation.  Recommend thiamine 100 mg daily iso refeeding risk.   Please obtain daily weights.    Nutrition Interventions/Goals:   Interventions: Parenteral nutrition  Parenteral Nutrition: Management of volume of parenteral nutrition, Management of composition of parenteral nutrition, Management of schedule of parenteral nutrition      Education Documentation  No documentation found.            Nutrition Monitoring and Evaluation   Food/Nutrient Related History Monitoring  Monitoring and Evaluation Plan: Enteral and parenteral nutrition intake determination  Enteral and Parenteral Nutrition Intake Determination: Parenteral nutrition intake - Titrate to goal without metabolic complications, Parenteral nutrition intake - Tolerate TPN at goal rate    Anthropometric Measurements  Monitoring and Evaluation Plan: Body weight  Body Weight: Body weight - Maintain stable weight    Biochemical Data, Medical Tests and Procedures  Monitoring and Evaluation Plan: Electrolyte/renal panel, GI profile, Glucose/endocrine profile  Criteria: WNL         Goal Status: New goal(s) identified    Time Spent (min): 60 minutes

## 2025-02-03 NOTE — PROGRESS NOTES
[facility]  Progress Note      Patient: Swapnil Allen  Age/Sex: 75 y.o., male  MRN: [id number]  Date of surgery: 1/28/25  Admit Date: 1/28/2025   Code Status: Full Code  Length of Stay: 6      Interval History/Overnight Events:   Patient with continued high output >4L from NGT with dark bilious output. Flushed at bedside.   Denies any fevers, chills. Feeling nauseous and lightheaded.   Awad draining clear yellow urine 1.3L   Having 3x Bm, passing gas   Cr 2.06(2.78)      Objective  02/01 1900 - 02/03 0659  In: 4746.1 [I.V.:4746.1]  Out: 6775 [Urine:1875]              10.5     11.4>-----<267              33.1   141  97  39                  ----------------<73     3.9  30  2.06          Ca 9.3 Phos 2.9 Mg 2.35       ALT 25 AST 33 AlkPhos 62 tBili 0.6          Vitals:    02/02/25 1717 02/02/25 1943 02/02/25 2300 02/03/25 0304   BP: 133/76 127/61 129/84 157/90   BP Location: Right arm Right arm Right arm Right arm   Patient Position: Sitting Sitting Lying Lying   Pulse: (!) 113 105 87 87   Resp: 18 18 18 18   Temp: 36.6 °C (97.9 °F) 36.4 °C (97.5 °F) 35.9 °C (96.6 °F) 35.9 °C (96.6 °F)   TempSrc:  Temporal Temporal Temporal   SpO2: 96% 94% 98% 95%   Weight:       Height:              Medications:  Current Facility-Administered Medications   Medication Dose Route Frequency Provider Last Rate Last Admin    amLODIPine (Norvasc) tablet 10 mg  10 mg nasogastric tube Daily Ludwin Phillips MD   10 mg at 02/02/25 0916    [Held by provider] aspirin EC tablet 81 mg  81 mg oral Daily Jannie Hamm MD   81 mg at 01/31/25 0820    aspirin suppository 150 mg  150 mg rectal Daily Deena Medina MD   150 mg at 02/01/25 1456    benzocaine-menthol (Cepastat Sore Throat) lozenge 1 lozenge  1 lozenge Mouth/Throat q2h PRN Ludwin Phillips MD        bisacodyl (Dulcolax) suppository 10 mg  10 mg rectal Daily KYLIE Britt-CNP   10 mg at 02/02/25 0916    calcium carbonate (Tums) chewable tablet 500 mg  500 mg  nasogastric tube 4x daily PRN Ludwin Phillips MD        cholecalciferol (Vitamin D-3) tablet 1,000 Units  1,000 Units nasogastric tube Daily Ludwin Phillips MD   1,000 Units at 02/02/25 0916    dextrose 50 % injection 12.5 g  12.5 g intravenous q15 min PRN Ludwin Phillips MD   12.5 g at 02/02/25 1402    heparin (porcine) injection 5,000 Units  5,000 Units subcutaneous q8h Jannie Hamm MD   5,000 Units at 02/03/25 0037    HYDROmorphone (Dilaudid) injection 0.4 mg  0.4 mg intravenous q2h PRN Gallo Martines MD   0.4 mg at 02/03/25 0428    iohexol (OMNIPaque) 12 mg iodine/mL oral contrast 500 mL  500 mL oral Once in imaging Karen Katz, APRN-CNP        ipratropium-albuteroL (Duo-Neb) 0.5-2.5 mg/3 mL nebulizer solution 3 mL  3 mL nebulization TID Deena Medina MD   3 mL at 02/02/25 1434    labetaloL (Normodyne,Trandate) injection 10 mg  10 mg intravenous q8h PRN Gallo Martines MD        lactated Ringer's infusion  125 mL/hr intravenous Continuous Karma La MD PhD 350 mL/hr at 02/03/25 0700 350 mL/hr at 02/03/25 0700    lactated Ringer's infusion  100 mL/hr intravenous Continuous Karma La MD PhD 100 mL/hr at 02/03/25 0040 100 mL/hr at 02/03/25 0040    levothyroxine (Synthroid) injection 12.5 mcg  12.5 mcg intravenous q24h ALEN Ludwin Phillips MD   12.5 mcg at 02/02/25 0916    [Held by provider] levothyroxine (Synthroid, Levoxyl) tablet 25 mcg  25 mcg oral Daily Jannie Hamm MD   25 mcg at 01/31/25 0531    lidocaine 4 % patch 3 patch  3 patch transdermal Daily Gallo Martines MD        magnesium hydroxide (Milk of Magnesia) 400 mg/5 mL suspension 30 mL  30 mL nasogastric tube Daily Ludwin Phillips MD   30 mL at 02/02/25 1413    melatonin tablet 10 mg  10 mg nasogastric tube Nightly PRN Ludwin Phillips MD   10 mg at 02/02/25 2020    ondansetron (Zofran) injection 4 mg  4 mg intravenous q6h PRN Gallo Martines MD   4 mg at 01/31/25 2057    [Held by provider] oxyCODONE  (Roxicodone) immediate release tablet 10 mg  10 mg oral q6h PRN Jannie Hamm MD   10 mg at 01/29/25 0235    [Held by provider] oxyCODONE (Roxicodone) immediate release tablet 5 mg  5 mg oral q6h PRN Jannie Hamm MD   5 mg at 01/29/25 0840    oxygen (O2) therapy   inhalation Continuous - Inhalation Ludwin Phillips ,000 mL/hr at 02/02/25 1943 3 L/min at 02/02/25 1943    phenoL (Chloraseptic) 1.4 % mouth/throat spray 1 spray  1 spray Mouth/Throat 4x daily PRN Ludwin Phillips MD   1 spray at 02/02/25 1631    polyethylene glycol (Glycolax, Miralax) packet 17 g  17 g nasogastric tube Daily Ludwin Phillips MD   17 g at 02/02/25 0916    sennosides (Senokot) tablet 17.2 mg  2 tablet nasogastric tube BID Ludwin Phillips MD   17.2 mg at 02/02/25 2020    [Held by provider] tamsulosin (Flomax) 24 hr capsule 0.4 mg  0.4 mg oral Daily Jannie Hamm MD   0.4 mg at 01/31/25 0820       Physical Exam                                                                                                                         Gen -  No acute distress  Heent: NGT in place with dark biliou soutput     Neuro - Alert, oriented, conversant     CV -  regular rate , normotensive      Pulm - Symmetric chest rise, non-labored breathing on room air      Abd - Abdomen is soft, mildly-distended, and appropriately tender. Midline incision c/d/I closed with glue.       Ext - Warm, well perfused     MSK- no pain or swelling in LE bilaterally     Skin - without jaunice       Psych - appropriate tone, affect      - Awad draining clear yellow urine      Imaging  No results found.     Assessment & Plan  75 y.o. 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  NGT placed 01/31 overnight.      Plan 2/3:  -Stat CT AP with PO and IV contrast for possible consideration of TPN  -Consult Nutrition   -Follow up labs   -Pain control   -Ambulate/OOB  -Suppository   -Orthostatics   -Maintain NGT. Continue fluid repletion for NGT losses.       Neuro:   -Pain controlled with Scheduled acetaminophen, Oxycodone 5/10 HELD while NPO, Dilaudid 0.4 PRN for breakthrough pain, robaxin 500mg q8hrs   -PRN melatonin for sleep aid     Resp:   -ICS  -On room air   -Appreciate RT recs    Card:   -Monitor vitals  -continue telemetry  -Amlodpine  -Aspirin   -Cholecalciferol     FEN: mIVF LR @ 100ml  Fluid repletion at 0.5:1 of NGT output    GI:   -NPO for NGT  -BR: Miralax, senokat   -PRN Reglan for nausea   - Bisacodyl suppository     :   -Flomax   -Daily BMP to monitor kidney function and replete electrolytes as indicated.   -Continue to monitor Cr     Endo:   -Levothyroxine IV while NPO     Heme/ID:   -Daily CBC to monitor for acute blood loss anemia   -No indication for blood transfusion or antibiotics at this time     Ppy:   -ALEN  -SCD  -IS    Dispo:   RNF. Was recommended SNF, not interested. Might consider home care.     Assessment and plan discussed with chief resident Dr. Gannon and attending Dr. Ana Xiao MD, [facility]  [facility] [place]  Adult Urology Pager: [phone]  Pediatric Urology Pager: [phone]

## 2025-02-03 NOTE — CARE PLAN
The patient's goals for the shift include      The clinical goals for the shift include patient will remain HDS throughout entire shift    Problem: Pain - Adult  Goal: Verbalizes/displays adequate comfort level or baseline comfort level  Outcome: Progressing     Problem: Safety - Adult  Goal: Free from fall injury  Outcome: Progressing     Problem: Discharge Planning  Goal: Discharge to home or other facility with appropriate resources  Outcome: Progressing     Problem: Nutrition  Goal: Nutrient intake appropriate for maintaining nutritional needs  Outcome: Progressing     Problem: Skin  Goal: Prevent/manage excess moisture  Outcome: Progressing  Goal: Prevent/minimize sheer/friction injuries  Outcome: Progressing  Goal: Promote/optimize nutrition  Outcome: Progressing     Problem: Pain  Goal: Takes deep breaths with improved pain control throughout the shift  Outcome: Progressing  Goal: Turns in bed with improved pain control throughout the shift  Outcome: Progressing  Goal: Walks with improved pain control throughout the shift  Outcome: Progressing  Goal: Performs ADL's with improved pain control throughout shift  Outcome: Progressing  Goal: Participates in PT with improved pain control throughout the shift  Outcome: Progressing  Goal: Free from opioid side effects throughout the shift  Outcome: Progressing  Goal: Free from acute confusion related to pain meds throughout the shift  Outcome: Progressing

## 2025-02-03 NOTE — PROGRESS NOTES
Physical Therapy                 Therapy Communication Note    Patient Name: Swapnil Allen  MRN: [id number]  Department: [facility] 3  Room: [facility]300-A  Today's Date: 2/3/2025     Discipline: Physical Therapy    PT Missed Visit: Yes     Missed Visit Reason: Missed Visit Reason: Patient refused (2 attempts this date. 9:09 with transport present for CT. Second attempt at 10:03 with pt declining therapy despite encouragement due to fatigue. Will re-attempt as able and appropriate.)    Missed Time: Attempt

## 2025-02-04 ENCOUNTER — ANESTHESIA EVENT (OUTPATIENT)
Dept: OPERATING ROOM | Facility: HOSPITAL | Age: 75
End: 2025-02-04
Payer: MEDICARE

## 2025-02-04 ENCOUNTER — APPOINTMENT (OUTPATIENT)
Dept: RADIOLOGY | Facility: HOSPITAL | Age: 75
DRG: 656 | End: 2025-02-04
Payer: MEDICARE

## 2025-02-04 PROBLEM — N40.0 BPH (BENIGN PROSTATIC HYPERPLASIA): Status: ACTIVE | Noted: 2025-02-04

## 2025-02-04 PROBLEM — K56.609 SMALL BOWEL OBSTRUCTION (MULTI): Status: ACTIVE | Noted: 2024-12-17

## 2025-02-04 PROBLEM — B19.20 HEPATITIS C: Status: ACTIVE | Noted: 2025-02-04

## 2025-02-04 LAB
ABO GROUP (TYPE) IN BLOOD: NORMAL
ALBUMIN SERPL BCP-MCNC: 2.7 G/DL (ref 3.4–5)
ANION GAP SERPL CALC-SCNC: 11 MMOL/L (ref 10–20)
ANTIBODY SCREEN: NORMAL
ATRIAL RATE: 146 BPM
BUN SERPL-MCNC: 34 MG/DL (ref 6–23)
CALCIUM SERPL-MCNC: 9.1 MG/DL (ref 8.6–10.6)
CHLORIDE SERPL-SCNC: 97 MMOL/L (ref 98–107)
CO2 SERPL-SCNC: 33 MMOL/L (ref 21–32)
CREAT SERPL-MCNC: 1.55 MG/DL (ref 0.5–1.3)
EGFRCR SERPLBLD CKD-EPI 2021: 46 ML/MIN/1.73M*2
ERYTHROCYTE [DISTWIDTH] IN BLOOD BY AUTOMATED COUNT: 16.1 % (ref 11.5–14.5)
GLUCOSE BLD MANUAL STRIP-MCNC: 113 MG/DL (ref 74–99)
GLUCOSE BLD MANUAL STRIP-MCNC: 116 MG/DL (ref 74–99)
GLUCOSE BLD MANUAL STRIP-MCNC: 117 MG/DL (ref 74–99)
GLUCOSE BLD MANUAL STRIP-MCNC: 119 MG/DL (ref 74–99)
GLUCOSE SERPL-MCNC: 119 MG/DL (ref 74–99)
HCT VFR BLD AUTO: 29 % (ref 41–52)
HGB BLD-MCNC: 9.3 G/DL (ref 13.5–17.5)
MAGNESIUM SERPL-MCNC: 2.49 MG/DL (ref 1.6–2.4)
MCH RBC QN AUTO: 28.3 PG (ref 26–34)
MCHC RBC AUTO-ENTMCNC: 32.1 G/DL (ref 32–36)
MCV RBC AUTO: 88 FL (ref 80–100)
NRBC BLD-RTO: 0 /100 WBCS (ref 0–0)
P AXIS: 30 DEGREES
P OFFSET: 183 MS
P ONSET: 151 MS
PHOSPHATE SERPL-MCNC: 2.4 MG/DL (ref 2.5–4.9)
PLATELET # BLD AUTO: 251 X10*3/UL (ref 150–450)
POTASSIUM SERPL-SCNC: 3.4 MMOL/L (ref 3.5–5.3)
PR INTERVAL: 122 MS
Q ONSET: 212 MS
QRS COUNT: 24 BEATS
QRS DURATION: 136 MS
QT INTERVAL: 284 MS
QTC CALCULATION(BAZETT): 442 MS
QTC FREDERICIA: 381 MS
R AXIS: -62 DEGREES
RBC # BLD AUTO: 3.29 X10*6/UL (ref 4.5–5.9)
RH FACTOR (ANTIGEN D): NORMAL
SODIUM SERPL-SCNC: 138 MMOL/L (ref 136–145)
T AXIS: 35 DEGREES
T OFFSET: 354 MS
VENTRICULAR RATE: 146 BPM
WBC # BLD AUTO: 10.7 X10*3/UL (ref 4.4–11.3)

## 2025-02-04 PROCEDURE — 2500000004 HC RX 250 GENERAL PHARMACY W/ HCPCS (ALT 636 FOR OP/ED): Performed by: STUDENT IN AN ORGANIZED HEALTH CARE EDUCATION/TRAINING PROGRAM

## 2025-02-04 PROCEDURE — 2500000004 HC RX 250 GENERAL PHARMACY W/ HCPCS (ALT 636 FOR OP/ED): Performed by: NURSE PRACTITIONER

## 2025-02-04 PROCEDURE — 2580000001 HC RX 258 IV SOLUTIONS: Performed by: NURSE PRACTITIONER

## 2025-02-04 PROCEDURE — 83735 ASSAY OF MAGNESIUM: CPT | Performed by: PHYSICIAN ASSISTANT

## 2025-02-04 PROCEDURE — 82565 ASSAY OF CREATININE: CPT | Performed by: STUDENT IN AN ORGANIZED HEALTH CARE EDUCATION/TRAINING PROGRAM

## 2025-02-04 PROCEDURE — 82947 ASSAY GLUCOSE BLOOD QUANT: CPT

## 2025-02-04 PROCEDURE — 36415 COLL VENOUS BLD VENIPUNCTURE: CPT | Performed by: STUDENT IN AN ORGANIZED HEALTH CARE EDUCATION/TRAINING PROGRAM

## 2025-02-04 PROCEDURE — 2500000002 HC RX 250 W HCPCS SELF ADMINISTERED DRUGS (ALT 637 FOR MEDICARE OP, ALT 636 FOR OP/ED): Performed by: STUDENT IN AN ORGANIZED HEALTH CARE EDUCATION/TRAINING PROGRAM

## 2025-02-04 PROCEDURE — 2500000001 HC RX 250 WO HCPCS SELF ADMINISTERED DRUGS (ALT 637 FOR MEDICARE OP): Performed by: NURSE PRACTITIONER

## 2025-02-04 PROCEDURE — 2500000004 HC RX 250 GENERAL PHARMACY W/ HCPCS (ALT 636 FOR OP/ED)

## 2025-02-04 PROCEDURE — 86901 BLOOD TYPING SEROLOGIC RH(D): CPT

## 2025-02-04 PROCEDURE — 2500000005 HC RX 250 GENERAL PHARMACY W/O HCPCS: Performed by: NURSE PRACTITIONER

## 2025-02-04 PROCEDURE — 2500000005 HC RX 250 GENERAL PHARMACY W/O HCPCS

## 2025-02-04 PROCEDURE — 94640 AIRWAY INHALATION TREATMENT: CPT

## 2025-02-04 PROCEDURE — 74018 RADEX ABDOMEN 1 VIEW: CPT

## 2025-02-04 PROCEDURE — 2500000001 HC RX 250 WO HCPCS SELF ADMINISTERED DRUGS (ALT 637 FOR MEDICARE OP)

## 2025-02-04 PROCEDURE — 85027 COMPLETE CBC AUTOMATED: CPT | Performed by: STUDENT IN AN ORGANIZED HEALTH CARE EDUCATION/TRAINING PROGRAM

## 2025-02-04 PROCEDURE — 36415 COLL VENOUS BLD VENIPUNCTURE: CPT | Performed by: PHYSICIAN ASSISTANT

## 2025-02-04 PROCEDURE — 1170000001 HC PRIVATE ONCOLOGY ROOM DAILY

## 2025-02-04 PROCEDURE — 99221 1ST HOSP IP/OBS SF/LOW 40: CPT | Performed by: COLON & RECTAL SURGERY

## 2025-02-04 PROCEDURE — 74018 RADEX ABDOMEN 1 VIEW: CPT | Performed by: RADIOLOGY

## 2025-02-04 PROCEDURE — 2500000004 HC RX 250 GENERAL PHARMACY W/ HCPCS (ALT 636 FOR OP/ED): Mod: TB

## 2025-02-04 PROCEDURE — 97530 THERAPEUTIC ACTIVITIES: CPT | Mod: GP

## 2025-02-04 RX ORDER — PANTOPRAZOLE SODIUM 40 MG/10ML
40 INJECTION, POWDER, LYOPHILIZED, FOR SOLUTION INTRAVENOUS
Status: DISCONTINUED | OUTPATIENT
Start: 2025-02-05 | End: 2025-02-24

## 2025-02-04 RX ORDER — ESOMEPRAZOLE MAGNESIUM 40 MG/1
40 GRANULE, DELAYED RELEASE ORAL
Status: DISCONTINUED | OUTPATIENT
Start: 2025-02-05 | End: 2025-02-15

## 2025-02-04 RX ORDER — PANTOPRAZOLE SODIUM 40 MG/1
40 TABLET, DELAYED RELEASE ORAL
Status: DISCONTINUED | OUTPATIENT
Start: 2025-02-05 | End: 2025-02-15

## 2025-02-04 RX ORDER — ACETAMINOPHEN 10 MG/ML
1000 INJECTION, SOLUTION INTRAVENOUS EVERY 6 HOURS SCHEDULED
Status: COMPLETED | OUTPATIENT
Start: 2025-02-04 | End: 2025-02-05

## 2025-02-04 RX ORDER — POTASSIUM CHLORIDE 20 MEQ/1
40 TABLET, EXTENDED RELEASE ORAL ONCE
Status: DISCONTINUED | OUTPATIENT
Start: 2025-02-04 | End: 2025-02-04

## 2025-02-04 RX ORDER — SODIUM CHLORIDE, SODIUM LACTATE, POTASSIUM CHLORIDE, CALCIUM CHLORIDE 600; 310; 30; 20 MG/100ML; MG/100ML; MG/100ML; MG/100ML
0-1000 INJECTION, SOLUTION INTRAVENOUS CONTINUOUS
Status: ACTIVE | OUTPATIENT
Start: 2025-02-04 | End: 2025-02-05

## 2025-02-04 RX ORDER — POTASSIUM CHLORIDE 14.9 MG/ML
20 INJECTION INTRAVENOUS
Status: DISPENSED | OUTPATIENT
Start: 2025-02-04 | End: 2025-02-04

## 2025-02-04 RX ADMIN — IPRATROPIUM BROMIDE AND ALBUTEROL SULFATE 3 ML: .5; 3 SOLUTION RESPIRATORY (INHALATION) at 08:10

## 2025-02-04 RX ADMIN — HYDROMORPHONE HYDROCHLORIDE 0.4 MG: 1 INJECTION, SOLUTION INTRAMUSCULAR; INTRAVENOUS; SUBCUTANEOUS at 00:29

## 2025-02-04 RX ADMIN — HYDROMORPHONE HYDROCHLORIDE 0.4 MG: 1 INJECTION, SOLUTION INTRAMUSCULAR; INTRAVENOUS; SUBCUTANEOUS at 13:47

## 2025-02-04 RX ADMIN — Medication 1 SPRAY: at 17:50

## 2025-02-04 RX ADMIN — SENNOSIDES 17.2 MG: 8.6 TABLET, FILM COATED ORAL at 09:29

## 2025-02-04 RX ADMIN — HEPARIN SODIUM 5000 UNITS: 5000 INJECTION INTRAVENOUS; SUBCUTANEOUS at 00:16

## 2025-02-04 RX ADMIN — HYDROMORPHONE HYDROCHLORIDE 0.4 MG: 1 INJECTION, SOLUTION INTRAMUSCULAR; INTRAVENOUS; SUBCUTANEOUS at 06:26

## 2025-02-04 RX ADMIN — AMLODIPINE BESYLATE 10 MG: 10 TABLET ORAL at 09:34

## 2025-02-04 RX ADMIN — POLYETHYLENE GLYCOL 3350 17 G: 17 POWDER, FOR SOLUTION ORAL at 09:29

## 2025-02-04 RX ADMIN — Medication 1000 UNITS: at 09:30

## 2025-02-04 RX ADMIN — SENNOSIDES 17.2 MG: 8.6 TABLET, FILM COATED ORAL at 20:47

## 2025-02-04 RX ADMIN — Medication 10 MG: at 20:47

## 2025-02-04 RX ADMIN — MAGNESIUM HYDROXIDE 30 ML: 400 SUSPENSION ORAL at 09:30

## 2025-02-04 RX ADMIN — ANTACID TABLETS 500 MG: 500 TABLET, CHEWABLE ORAL at 20:47

## 2025-02-04 RX ADMIN — ACETAMINOPHEN 1000 MG: 10 INJECTION INTRAVENOUS at 20:27

## 2025-02-04 RX ADMIN — Medication 1 SPRAY: at 13:48

## 2025-02-04 RX ADMIN — IPRATROPIUM BROMIDE AND ALBUTEROL SULFATE 3 ML: .5; 3 SOLUTION RESPIRATORY (INHALATION) at 14:44

## 2025-02-04 RX ADMIN — SMOFLIPID 50 G: 6; 6; 5; 3 INJECTION, EMULSION INTRAVENOUS at 20:15

## 2025-02-04 RX ADMIN — LEVOTHYROXINE SODIUM ANHYDROUS 12.5 MCG: 100 INJECTION, POWDER, LYOPHILIZED, FOR SOLUTION INTRAVENOUS at 11:03

## 2025-02-04 RX ADMIN — Medication 10 MG: at 00:29

## 2025-02-04 RX ADMIN — HYDROMORPHONE HYDROCHLORIDE 0.4 MG: 1 INJECTION, SOLUTION INTRAMUSCULAR; INTRAVENOUS; SUBCUTANEOUS at 04:35

## 2025-02-04 RX ADMIN — LIDOCAINE 4% 3 PATCH: 40 PATCH TOPICAL at 09:29

## 2025-02-04 RX ADMIN — POTASSIUM CHLORIDE 20 MEQ: 14.9 INJECTION, SOLUTION INTRAVENOUS at 12:34

## 2025-02-04 RX ADMIN — Medication 3 L/MIN: at 21:28

## 2025-02-04 RX ADMIN — IPRATROPIUM BROMIDE AND ALBUTEROL SULFATE 3 ML: .5; 3 SOLUTION RESPIRATORY (INHALATION) at 21:46

## 2025-02-04 RX ADMIN — Medication 1 SPRAY: at 20:18

## 2025-02-04 RX ADMIN — BISACODYL 10 MG: 10 SUPPOSITORY RECTAL at 09:30

## 2025-02-04 RX ADMIN — ASCORBIC ACID, VITAMIN A PALMITATE, CHOLECALCIFEROL, THIAMINE HYDROCHLORIDE, RIBOFLAVIN-5 PHOSPHATE SODIUM, PYRIDOXINE HYDROCHLORIDE, NIACINAMIDE, DEXPANTHENOL, ALPHA-TOCOPHEROL ACETATE, VITAMIN K1, FOLIC ACID, BIOTIN, CYANOCOBALAMIN: 200; 3300; 200; 6; 3.6; 6; 40; 15; 10; 150; 600; 60; 5 INJECTION, SOLUTION INTRAVENOUS at 20:15

## 2025-02-04 RX ADMIN — HEPARIN SODIUM 5000 UNITS: 5000 INJECTION INTRAVENOUS; SUBCUTANEOUS at 17:48

## 2025-02-04 RX ADMIN — SODIUM CHLORIDE, POTASSIUM CHLORIDE, SODIUM LACTATE AND CALCIUM CHLORIDE 600 ML/HR: 600; 310; 30; 20 INJECTION, SOLUTION INTRAVENOUS at 15:19

## 2025-02-04 RX ADMIN — SODIUM CHLORIDE, POTASSIUM CHLORIDE, SODIUM LACTATE AND CALCIUM CHLORIDE 100 ML/HR: 600; 310; 30; 20 INJECTION, SOLUTION INTRAVENOUS at 00:42

## 2025-02-04 RX ADMIN — BENZOCAINE AND MENTHOL 1 LOZENGE: 15; 3.6 LOZENGE ORAL at 09:29

## 2025-02-04 RX ADMIN — Medication 3 L/MIN: at 10:56

## 2025-02-04 ASSESSMENT — COGNITIVE AND FUNCTIONAL STATUS - GENERAL
CLIMB 3 TO 5 STEPS WITH RAILING: A LITTLE
HELP NEEDED FOR BATHING: A LITTLE
WALKING IN HOSPITAL ROOM: A LITTLE
STANDING UP FROM CHAIR USING ARMS: A LITTLE
TURNING FROM BACK TO SIDE WHILE IN FLAT BAD: A LITTLE
MOVING TO AND FROM BED TO CHAIR: A LOT
TURNING FROM BACK TO SIDE WHILE IN FLAT BAD: A LITTLE
MOVING TO AND FROM BED TO CHAIR: A LITTLE
DRESSING REGULAR UPPER BODY CLOTHING: A LITTLE
MOBILITY SCORE: 16
TOILETING: A LITTLE
DRESSING REGULAR LOWER BODY CLOTHING: A LITTLE
PERSONAL GROOMING: A LITTLE
WALKING IN HOSPITAL ROOM: A LITTLE
DAILY ACTIVITIY SCORE: 18
MOBILITY SCORE: 19
CLIMB 3 TO 5 STEPS WITH RAILING: A LOT
EATING MEALS: A LITTLE
MOVING FROM LYING ON BACK TO SITTING ON SIDE OF FLAT BED WITH BEDRAILS: A LITTLE
STANDING UP FROM CHAIR USING ARMS: A LITTLE

## 2025-02-04 ASSESSMENT — ENCOUNTER SYMPTOMS
NAUSEA: 1
EYES NEGATIVE: 1
ENDOCRINE NEGATIVE: 1
HEMATOLOGIC/LYMPHATIC NEGATIVE: 1
ABDOMINAL PAIN: 1
RESPIRATORY NEGATIVE: 1
PSYCHIATRIC NEGATIVE: 1
CONSTITUTIONAL NEGATIVE: 1
CHILLS: 0
MUSCULOSKELETAL NEGATIVE: 1
FEVER: 0
ALLERGIC/IMMUNOLOGIC NEGATIVE: 1
ABDOMINAL DISTENTION: 1
CARDIOVASCULAR NEGATIVE: 1
NEUROLOGICAL NEGATIVE: 1
VOMITING: 1

## 2025-02-04 ASSESSMENT — PAIN - FUNCTIONAL ASSESSMENT: PAIN_FUNCTIONAL_ASSESSMENT: 0-10

## 2025-02-04 ASSESSMENT — PAIN SCALES - GENERAL
PAINLEVEL_OUTOF10: 9
PAINLEVEL_OUTOF10: 5 - MODERATE PAIN
PAINLEVEL_OUTOF10: 7

## 2025-02-04 NOTE — CARE PLAN
Problem: Skin  Goal: Promote/optimize nutrition  Outcome: Progressing     Problem: Nutrition  Goal: Nutrient intake appropriate for maintaining nutritional needs  Outcome: Progressing     Problem: Safety - Adult  Goal: Free from fall injury  Outcome: Progressing   The patient's goals for the shift include      The clinical goals for the shift include Pt will remain free from nausea/vomitting for nexxt 12hours    Problem: Skin  Goal: Promote/optimize nutrition  Outcome: Progressing     Problem: Discharge Planning  Goal: Discharge to home or other facility with appropriate resources  Outcome: Progressing     Problem: Safety - Adult  Goal: Free from fall injury  Outcome: Progressing     Problem: Pain - Adult  Goal: Verbalizes/displays adequate comfort level or baseline comfort level  Outcome: Progressing

## 2025-02-04 NOTE — ANESTHESIA PREPROCEDURE EVALUATION
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 Perez for incidentally found right renal mass on abd US for hepatitis monitoring.  He underwent RMB 12/9 with path c/w renal cell carcinoma, favoring papillary type. He went for open right radical nephrectomy with Dr. Perez on 1/28.   He now presents for exlap, reduction of hernia, and possible small bowel resection with Dr. Perez.    Relevant Problems   Anesthesia (within normal limits)      Cardiac   (+) Essential (primary) hypertension   (+) Hyperlipidemia      Pulmonary (within normal limits)      Neuro   (+) Cerebrovascular accident (CVA) (Multi)      /Renal  S/p r radical nephrectomy    (+) BPH (benign prostatic hyperplasia)      Endocrine   (+) Hypothyroidism (acquired)      Hematology (within normal limits)      Musculoskeletal (within normal limits)      HEENT   (+) Sensorineural hearing loss, bilateral       Clinical information reviewed:   Tobacco  Allergies  Meds   Med Hx  Surg Hx   Fam Hx  Soc Hx        NPO Detail:  No data recorded     Physical Exam    Airway   Cardiovascular    Dental        Pulmonary    Abdominal            Anesthesia Plan    History of general anesthesia?: yes  History of complications of general anesthesia?: no    ASA 3     general     intravenous induction

## 2025-02-04 NOTE — PROGRESS NOTES
Physical Therapy    Physical Therapy Treatment    Patient Name: Swapnil Allen  MRN: [id number]  Department: [facility]  Room: [address] [place], [place] [address]  Today's Date: 2/4/2025  Time Calculation  Start Time: 1248  Stop Time: 1305  Time Calculation (min): 17 min         Assessment/Plan   PT Assessment  End of Session Communication: Bedside nurse  Assessment Comment: Pt able to transfer from bed to chair this session. Pt is limited by pain, weakness, and decreased endurance. Pt remains appropriate for Moderate Intensity Rehab after DC.  End of Session Patient Position: Up in chair, Alarm off, not on at start of session     PT Plan  Treatment/Interventions: Bed mobility, Gait training, Transfer training, Stair training, Balance training, Neuromuscular re-education, Strengthening, Endurance training, Range of motion, Therapeutic exercise, Therapeutic activity, Home exercise program, Positioning, Postural re-education  PT Plan: Ongoing PT  PT Frequency: 3 times per week  PT Discharge Recommendations: Moderate intensity level of continued care  Equipment Recommended upon Discharge: Wheeled walker  PT Recommended Transfer Status: Assist x2  PT - OK to Discharge: Yes      General Visit Information:   PT  Visit  PT Received On: 02/04/25  Response to Previous Treatment: Patient with no complaints from previous session.  General  Family/Caregiver Present: No  Prior to Session Communication: Bedside nurse  Patient Position Received: Bed, 3 rail up, Alarm off, not on at start of session  Preferred Learning Style: kinesthetic, verbal, visual  General Comment: Pt initially refusing, but eventually agreeable to chair transfer    Subjective   Precautions:  Precautions  Hearing/Visual Limitations: WFL  Medical Precautions: Fall precautions     Date/Time Vitals Session Patient Position Pulse Resp SpO2 BP MAP (mmHg)    02/04/25 1300 --  --  109  16  95 %  130/82  98                 Objective   Pain:  Pain Assessment  Pain Assessment: 0-10  0-10 (Numeric)  Pain Score: 7  Pain Type: Acute pain  Pain Location: Throat  Pain Interventions: Repositioned, Ambulation/increased activity  Cognition:  Cognition  Overall Cognitive Status: Within Functional Limits  Orientation Level: Oriented X4  Insight: Mild  Impulsive: Mildly  Coordination:  Movements are Fluid and Coordinated: Yes  Postural Control:  Postural Control  Postural Control: Within Functional Limits  Static Sitting Balance  Static Sitting-Balance Support: Bilateral upper extremity supported, Feet supported  Static Sitting-Level of Assistance: Close supervision  Static Standing Balance  Static Standing-Balance Support: Bilateral upper extremity supported (On FWW)  Static Standing-Level of Assistance: Minimum assistance  Extremity/Trunk Assessments:     RLE   RLE : Within Functional Limits  LLE   LLE : Within Functional Limits  Activity Tolerance:  Activity Tolerance  Endurance: Tolerates 10 - 20 min exercise with multiple rests  Early Mobility/Exercise Safety Screen: Proceed with mobilization - No exclusion criteria met  Treatments:    Bed Mobility  Bed Mobility: Yes  Bed Mobility 1  Bed Mobility 1: Supine to sitting  Level of Assistance 1: Moderate assistance, Moderate verbal cues  Bed Mobility Comments 1: VCs for sequencing    Ambulation/Gait Training  Ambulation/Gait Training Performed: Yes  Ambulation/Gait Training 1  Surface 1: Level tile  Device 1: Rolling walker  Assistance 1: Minimum assistance  Quality of Gait 1: Narrow base of support, Inconsistent stride length, Decreased step length  Comments/Distance (ft) 1: 3ft from bed to chair  Transfers  Transfer: Yes  Transfer 1  Transfer From 1: Sit to, Stand to  Transfer to 1: Sit, Stand  Technique 1: Stand to sit, Sit to stand  Transfer Device 1: Walker  Transfer Level of Assistance 1: Minimum assistance, Moderate verbal cues  Trials/Comments 1: VCs for FWW sequencing    Stairs  Stairs: No    Outcome Measures:  [facility] Basic Mobility  Turning from your back to  your side while in a flat bed without using bedrails: A little  Moving from lying on your back to sitting on the side of a flat bed without using bedrails: A little  Moving to and from bed to chair (including a wheelchair): A lot  Standing up from a chair using your arms (e.g. wheelchair or bedside chair): A little  To walk in hospital room: A little  Climbing 3-5 steps with railing: A lot  Basic Mobility - Total Score: 16    Education Documentation  Precautions, taught by Shanna Hernandez PT at 2/4/2025  2:09 PM.  Learner: Patient  Readiness: Acceptance  Method: Explanation, Demonstration  Response: Verbalizes Understanding, Needs Reinforcement    Body Mechanics, taught by Shanna Hernandez PT at 2/4/2025  2:09 PM.  Learner: Patient  Readiness: Acceptance  Method: Explanation, Demonstration  Response: Verbalizes Understanding, Needs Reinforcement    Mobility Training, taught by Shanna Hernandez PT at 2/4/2025  2:09 PM.  Learner: Patient  Readiness: Acceptance  Method: Explanation, Demonstration  Response: Verbalizes Understanding, Needs Reinforcement    Education Comments  No comments found.      Encounter Problems       Encounter Problems (Active)       Mobility       LTG - Patient will navigate 4-6 steps with rails/device and moderate assistance. (Not Progressing)       Start:  01/30/25    Expected End:  02/13/25            STG - Patient will ambulate ~100 feet with FWW and minimal assistance.  (Progressing)       Start:  01/30/25    Expected End:  02/13/25            Pt will ambulate >/= 100 ft with FWW and minimal assistance and no signs of fatigue or SOB.   (Progressing)       Start:  01/30/25    Expected End:  02/13/25               PT Transfers       LTG - Patient will transfer from one surface to another CGA with FWW. (Progressing)       Start:  01/30/25    Expected End:  02/13/25            STG - Patient will perform bed mobility with close supervision.  (Progressing)       Start:  01/30/25    Expected End:   02/13/25               Pain - Adult

## 2025-02-04 NOTE — PROGRESS NOTES
[facility]  Progress Note      Patient: Swapnil Allen  Age/Sex: 75 y.o., male  MRN: [id number]  Date of surgery: 1/28/25  Admit Date: 1/28/2025   Code Status: Full Code  Length of Stay: 7      Interval History/Overnight Events:   Patient with continued high output >4L from NGT with bilious output. Patient 1x emesis 325 overnight. Feels sore.   Denies any fevers, chills. Endorses nausea and lightheaded.   Awad draining clear yellow urine 1.3L   No BM, passing little gas  Cr 1.80(2.78)      Objective  02/02 1900 - 02/04 0659  In: 9396.8 [P.O.:1410; I.V.:7146.1]  Out: 9375 [Urine:2400]              10.4     6.9>-----<272              33.0   137  96  38                  ----------------<88     6.0  34  1.80          Ca 9.1 Phos 2.5; 2.5 Mg 2.79       ALT 25 AST 33 AlkPhos 62 tBili 0.6          Vitals:    02/03/25 1531 02/03/25 2110 02/04/25 0115 02/04/25 0429   BP: 137/85 130/78 118/76 122/82   BP Location: Right arm Right arm Right arm Right arm   Patient Position: Lying Lying Lying Lying   Pulse: 94 89 95 90   Resp: 16 16 16 16   Temp: 37.1 °C (98.8 °F) 36.9 °C (98.4 °F) 36.5 °C (97.7 °F) 36 °C (96.8 °F)   TempSrc: Temporal Temporal Temporal Temporal   SpO2: 96% 94% 93% 92%   Weight:       Height:              Medications:  Current Facility-Administered Medications   Medication Dose Route Frequency Provider Last Rate Last Admin    Adult Clinimix Parenteral Nutrition Continuous  83 mL/hr intravenous Daily PN KYLIE Britt-CNP 83 mL/hr at 02/03/25 2040 New Bag at 02/03/25 2040    amLODIPine (Norvasc) tablet 10 mg  10 mg nasogastric tube Daily Ludwin Phillips MD   10 mg at 02/03/25 0827    [Held by provider] aspirin EC tablet 81 mg  81 mg oral Daily Jannie Hamm MD   81 mg at 01/31/25 0820    aspirin suppository 150 mg  150 mg rectal Daily Deena Medina MD   150 mg at 02/01/25 1456    benzocaine-menthol (Cepastat Sore Throat) lozenge 1 lozenge  1 lozenge Mouth/Throat q2h PRN Ludwin Phillips,  MD        bisacodyl (Dulcolax) suppository 10 mg  10 mg rectal Daily JOSUÉ Britt   10 mg at 02/03/25 0827    calcium carbonate (Tums) chewable tablet 500 mg  500 mg nasogastric tube 4x daily PRN Ludwin Phillips MD        cholecalciferol (Vitamin D-3) tablet 1,000 Units  1,000 Units nasogastric tube Daily Ludwin Phillips MD   1,000 Units at 02/03/25 0827    dextrose 50 % injection 12.5 g  12.5 g intravenous q15 min PRN Ludwin Phillips MD   12.5 g at 02/02/25 1402    fat emulsion fish oil/plant based (SMOFlipid) 20 % IV infusion 50 g  250 mL intravenous Daily Lipids JOSUÉ Britt 20.8 mL/hr at 02/03/25 2150 50 g at 02/03/25 2150    heparin (porcine) injection 5,000 Units  5,000 Units subcutaneous q8h Jannie Hamm MD   5,000 Units at 02/04/25 0016    HYDROmorphone (Dilaudid) injection 0.4 mg  0.4 mg intravenous q2h PRN Alexys Hood MD   0.4 mg at 02/04/25 0626    insulin lispro injection 0-5 Units  0-5 Units subcutaneous TID AC KYLIE Britt-CNP        ipratropium-albuteroL (Duo-Neb) 0.5-2.5 mg/3 mL nebulizer solution 3 mL  3 mL nebulization TID Deena Medina MD   3 mL at 02/03/25 2145    labetaloL (Normodyne,Trandate) injection 10 mg  10 mg intravenous q8h PRN Gallo Martines MD        lactated Ringer's infusion  50 mL/hr intravenous Continuous Alexys Hood  mL/hr at 02/04/25 0042 100 mL/hr at 02/04/25 0042    levothyroxine (Synthroid) injection 12.5 mcg  12.5 mcg intravenous q24h [facility] Ludwin Phillips MD   12.5 mcg at 02/03/25 0829    [Held by provider] levothyroxine (Synthroid, Levoxyl) tablet 25 mcg  25 mcg oral Daily Jannie Hamm MD   25 mcg at 01/31/25 0531    lidocaine (Xylocaine) 10 mg/mL (1 %) injection 5 mL  5 mL infiltration Once KYLIE Britt-CNP        lidocaine 4 % patch 3 patch  3 patch transdermal Daily Gallo Martines MD   3 patch at 02/03/25 0826    magnesium hydroxide (Milk of Magnesia) 400 mg/5 mL suspension 30 mL  30 mL  nasogastric tube Daily Ludwin Phillips MD   30 mL at 02/03/25 0827    melatonin tablet 10 mg  10 mg nasogastric tube Nightly PRN Ludwin Phillips MD   10 mg at 02/04/25 0029    ondansetron (Zofran) injection 4 mg  4 mg intravenous q6h PRN Gallo Martines MD   4 mg at 01/31/25 2057    [Held by provider] oxyCODONE (Roxicodone) immediate release tablet 10 mg  10 mg oral q6h PRN Jannie Hamm MD   10 mg at 01/29/25 0235    [Held by provider] oxyCODONE (Roxicodone) immediate release tablet 5 mg  5 mg oral q6h PRN Jannie Hamm MD   5 mg at 01/29/25 0840    oxygen (O2) therapy   inhalation Continuous - Inhalation Ludwin Phillips ,000 mL/hr at 02/03/25 1508 2 L/min at 02/03/25 1508    phenoL (Chloraseptic) 1.4 % mouth/throat spray 1 spray  1 spray Mouth/Throat 4x daily PRN Ludwin Phillips MD   1 spray at 02/02/25 1631    polyethylene glycol (Glycolax, Miralax) packet 17 g  17 g nasogastric tube Daily Ludwin Phillips MD   17 g at 02/03/25 0826    sennosides (Senokot) tablet 17.2 mg  2 tablet nasogastric tube BID Ludwin Phillips MD   17.2 mg at 02/03/25 2201    [Held by provider] tamsulosin (Flomax) 24 hr capsule 0.4 mg  0.4 mg oral Daily Jannie Hamm MD   0.4 mg at 01/31/25 0820       Physical Exam                                                                                                                         Gen -  No acute distress  Heent: NGT in place with bilious output      Neuro - Alert, oriented, conversant     CV -  regular rate , normotensive      Pulm - Symmetric chest rise, non-labored breathing on 2L NC     Abd - Abdomen is soft, mildly-distended, and appropriately tender. Midline incision c/d/I closed with glue.       Ext - Warm, well perfused     MSK- no pain or swelling in LE bilaterally     Skin - without jaunice       Psych - appropriate tone, affect      - Awad draining clear yellow urine      Imaging  CT abdomen and pelvis w oral contrast  only    Result Date: 2/4/2025  Interpreted By:  Amando Snyder  and Marcio Roy STUDY: CT ABDOMEN AND PELVIS W ORAL CONTRAST ONLY;  2/3/2025 9:31 am   INDICATION: Signs/Symptoms:prolonged ileus s/p nephrectomy 1/28.     COMPARISON: CT, 09/12/2024   ACCESSION NUMBER(S): [id number]   ORDERING CLINICIAN: HENRY HENRIQUEZ   TECHNIQUE: CT of the abdomen and pelvis was performed. Contiguous axial images were obtained at 3 mm slice thickness through the abdomen and pelvis. Coronal and sagittal reconstructions at 3 mm slice thickness were performed.  No intravenous contrast was administered; positive oral contrast was given.   FINDINGS: Please note that the evaluation of vessels, lymph nodes and organs is limited without intravenous contrast.   LOWER CHEST: The visualized lower lung demonstrates bibasilar dependent airspace opacities. Right middle lobe 0.6 cm pleural-based nodule (series 4, image 10), similar in size compared to prior CT.   The heart is enlarged in size with trace pericardial effusion. Visualized distal esophagus demonstrates a small to moderate-sized hiatal hernia.   ABDOMEN:   LIVER: The liver is normal in size without evidence of focal liver lesions. Hepatic dome calcified granuloma. There is also free intraperitoneal air in the gastrohepatic space and north hepatis space, likely related to recent surgical procedure (series 2, image 17).   BILE DUCTS: The intrahepatic and extrahepatic ducts are not dilated.   GALLBLADDER: No calcified stones. No wall thickening.   PANCREAS: The pancreas appears unremarkable with similar prominence of pancreatic ducts.   SPLEEN: The spleen is normal in size without focal lesions. There is re-demonstration of a 1.1 cm soft tissue nodular focus in the gastro omental region (series 2, image 26) in the left upper quadrant, too small to completely characterize, but likely to represent splenule.   ADRENAL GLANDS: Bilateral adrenal glands appear normal.   KIDNEYS  AND URETERS: Interval right nephrectomy with minimal fat stranding and fascial prominence along the surgical bed. There are also multiple sutures/staples visualized along the surgical bed.   Multiple hypodense left renal lesions, measuring up to 4.5 x 4.0 cm along the superior pole previously characterized as cysts and better characterized on dedicated multiphase CT kidney on 09/12/2024. No hydronephrosis or calculi visualized.   PELVIS:   BLADDER: The urinary bladder is decompressed with a Awad catheter in place.   REPRODUCTIVE ORGANS: The prostate is not enlarged. 1.0 cm calcific focus (series 2, image 129) anterior to the seminal vesicles, likely representing calcified granuloma or calcified vas deferens and is without significant change compared to prior CT.   BOWEL:   Small hiatal hernia is noted. The contrast opacified stomach is otherwise unremarkable with the distal tip of enteric tube seen terminating in the gastric body.   Oral contrast opacification is visualized up to the level of the mid jejunum.   There are multiple air-fluid distended proximal small bowel loops, with a suggestion of a transition point on series 2, image 86, where a small bowel loop and a portion of the sigmoid colon are seen crossing posterior to the mesenteric axis, with associated mesenteric vessel whirl just distal to this point (series 2, image 95).     Distal to this small bowel loop narrowing, there are distended fluid-filled bowel loops visualized without and nodular definite transition point, however the more distal small bowel loops appear collapsed.  The appendix appears normal.   VESSELS: The aorta and IVC appear normal. There is also mild-to-moderate calcified atherosclerosis of the abdominal aorta as mentioned.   PERITONEUM/RETROPERITONEUM/LYMPH NODES: There is trace free air visualized along the surgical bed. Otherwise, no ascites or sizable fluid collection.  There are multiple prominent gastrohepatic lymph nodes.    ABDOMINAL WALL: Postsurgical changes from prior nephrectomy with abdominal soft tissue fat stranding with subcutaneous foci of air.   BONES: Mild degenerative changes of the spine again visualized.       1.  There is moderate fluid distention of the proximal small bowel without progression of oral contrast to the distal small bowel loops with a likely transition point associated with swirling of the mesenteric vessels. There are dilated small bowel loops located distal to this possible transition point, and more distal small bowel loops collapsed/decompressed. Findings are concerning at least for partial small bowel obstruction, with suspicion of internal hernia given the appearance of the mesentery. Evaluation for potential ischemic changes is limited due to noncontrast enhanced CT. 2. Bilateral patchy consolidative to ground-glass opacities in the dependent portion of the long, concerning for infectious etiology with aspiration not excluded. 3. Remaining findings as described above.   I personally reviewed the images/study and agree with the findings as stated by Kirill Buckley MD (Resident Physician). This study was interpreted at [facility], [place], [place].   MACRO: Critical Finding:  See findings. Notification was initiated on 2/3/2025 at 10:51 am to Karen Katz by  Kirill Buckley.  (**-OCF-**) Instructions:   Signed by: Amando Allen 2/4/2025 12:11 AM Dictation workstation:   [id number]      Assessment & Plan  75 y.o. 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  NGT placed 01/31 overnight.      Plan 2/4:  -Tentative plan for OR  tomorrow  2/5 for ex-lap, reduction of hernia, possible small bowel resection   -NPO at midnight/IVF   -Decrease IVF to 50   -Consult colorectal surgery for recommendations regarding OR tomorrow   -Maintain NGT. Continue fluid repletion for NGT losses.   -Maintain rodriguez   -Continue PPN   -Follow up labs   -Ambulate/OOB      Neuro:   -Pain controlled with Scheduled acetaminophen, Oxycodone 5/10 HELD while NPO, Dilaudid 0.4 PRN for breakthrough pain, robaxin 500mg q8hrs   -PRN melatonin for sleep aid     Resp:   -ICS  -On room air   -Appreciate RT recs    Card:   -Monitor vitals  -continue telemetry  -Amlodpine  -Aspirin   -Cholecalciferol     FEN: mIVF LR @ 50ml  Fluid repletion at 0.5:1 of NGT output    GI:   -NPO for NGT  -BR: Miralax, senokat   -PRN Reglan for nausea   - Bisacodyl suppository   -Colorectal surgery consulted, appreciate recommendations  -Nutrition consulted: Currently on PPN     :   -Flomax   -Daily BMP to monitor kidney function and replete electrolytes as indicated.   -Continue to monitor Cr     Endo:   -Levothyroxine IV while NPO     Heme/ID:   -Daily CBC to monitor for acute blood loss anemia   -No indication for blood transfusion or antibiotics at this time     Ppy:   -ALEN  -SCD  -IS    Dispo:   RNF.     Assessment and plan discussed with chief resident Dr. Gannon and attending Dr. Ana Xiao MD, [facility]  [facility] [place]  Adult Urology Pager: [phone]  Pediatric Urology Pager: [phone]

## 2025-02-04 NOTE — CARE PLAN
The patient's goals for the shift include      The clinical goals for the shift include Patient will remain HDS with VSS throughout this shift [id number].    Patient will be free from pain by the end of the shift 0700

## 2025-02-04 NOTE — CONSULTS
Colorectal Surgery Consult H&P  Swapnil Allen  [id number]     Inpatient consult to Colorectal Surgery  Consult performed by: Lea Poe MD  Consult ordered by: Vaughn Perez MD        Reason for consult: small bowel obstruction    Subjective  Patient is a 75 y.o. male with PMH HTN, CVA/TIA 2020 (following cocaine use, on asa), anemia (hgb 12.2), CKD (baseline sCr 1.4-1.5), thyrotoxicosis s/p partial thyroidectomy, left parotid mass c/w warthins tumor, hep C (treated with epclusa 9/2024), hx of substance use (quit 2020), and BPH w/LUTS , RCC now s/p Right Radical Nephrectomy, Retroperitoneal Lymph Node Dissection on 1/28/25. Post operative course complicated by nausea and emesis requiring NG tube on POD3. High NG output and CT concerning for partial bowel obstruction with possible evidence of internal hernia. CRS consulted for further evaluation.   Patient continues to have emesis overnight. Complaining of pain on left side of abdome. No Bms today. Had flatus early in the day. Has had 1 BM per day prior to this. Large volume (>4L) bilious output from NG. Remains HDS on RA. Awad in place.    Review of Systems   Constitutional: Negative.  Negative for chills and fever.   HENT: Negative.     Eyes: Negative.    Respiratory: Negative.     Cardiovascular: Negative.  Negative for chest pain.   Gastrointestinal:  Positive for abdominal distention, abdominal pain, nausea and vomiting.   Endocrine: Negative.    Genitourinary: Negative.    Musculoskeletal: Negative.    Skin: Negative.    Allergic/Immunologic: Negative.    Neurological: Negative.    Hematological: Negative.    Psychiatric/Behavioral: Negative.       Past Medical History:   Diagnosis Date    BPH (benign prostatic hyperplasia)     Cerebral vascular accident (Multi)     CKD (chronic kidney disease)     Colon polyp     Hypertension     managed with Amlodipine    Hypothyroidism     Internal carotid artery stenosis     8/2020--Mild to moderate stenosis of  "right supraclinoid ICA    Kidney mass     Liver disease     Hep C treated with Epclusa 9/2024    Parotid mass     Renal mass, right     Right Kidney mass 8.8 x 10.7 x 10 cm    Stroke (Multi)     CVA -2020     Past Surgical History:   Procedure Laterality Date    COLONOSCOPY      RENAL BIOPSY      THYROID SURGERY      thyroidectomy     Family History   Problem Relation Name Age of Onset    Hypertension Brother       Social History     Tobacco Use    Smoking status: Some Days     Types: Cigars     Passive exposure: Never    Smokeless tobacco: Never    Tobacco comments:     Quit cigarette smoking over 20 years. Occassional cigar   Vaping Use    Vaping status: Never Used   Substance Use Topics    Alcohol use: Not Currently     Comment: former drinker    Drug use: Not Currently     Types: Marijuana     Comment: formerly smoked marijuana     No Known Allergies    Current Outpatient Medications   Medication Instructions    acetaminophen (TYLENOL) 975 mg, oral, Every 6 hours    amLODIPine (Norvasc) 10 mg tablet 1 tablet, Daily    aspirin 81 mg, Daily    cholecalciferol (VITAMIN D-3) 1,000 Units, Daily    levothyroxine (SYNTHROID, LEVOXYL) 25 mcg, Daily    melatonin 5 mg, Nightly    polyethylene glycol (GLYCOLAX, MIRALAX) 17 g, oral, Daily    tamsulosin (FLOMAX) 0.4 mg, Daily           Objective      Constitutional: no acute distress  Neuro: A/O x4, no gross deficits   Psych: normal affect  HEENT: NG in place with bilious output (4.5L in the Last 24 hours)  Cardiac: RRR  Pulmonary: unlabored respirations   Abdomen: soft, distended, mildly tender to plapation LLQ. Midline laparotomy with skin glue. No evidence of erythema or drainage  Skin: warm and dry overall    Extremities: no swelling noted  MSK: moving all four    Vitals    Visit Vitals  /82 (BP Location: Right arm, Patient Position: Lying)   Pulse 90   Temp 36 °C (96.8 °F) (Temporal)   Resp 16   Ht 1.803 m (5' 11\")   Wt 103 kg (226 lb 13.7 oz)   SpO2 94%   BMI " 31.64 kg/m²   Smoking Status Some Days   BSA 2.27 m²          Intake/Output Summary (Last 24 hours) at 2/4/2025 0859  Last data filed at 2/4/2025 0600  Gross per 24 hour   Intake 5735.74 ml   Output 6025 ml   Net -289.26 ml        Medications  Scheduled medications  amLODIPine, 10 mg, nasogastric tube, Daily  [Held by provider] aspirin, 81 mg, oral, Daily  aspirin, 150 mg, rectal, Daily  bisacodyl, 10 mg, rectal, Daily  cholecalciferol, 1,000 Units, nasogastric tube, Daily  fat emulsion fish oil/plant based, 250 mL, intravenous, Daily Lipids  heparin (porcine), 5,000 Units, subcutaneous, q8h  insulin lispro, 0-5 Units, subcutaneous, TID AC  ipratropium-albuteroL, 3 mL, nebulization, TID  levothyroxine, 12.5 mcg, intravenous, q24h ALEN  [Held by provider] levothyroxine, 25 mcg, oral, Daily  lidocaine, 5 mL, infiltration, Once  lidocaine, 3 patch, transdermal, Daily  magnesium hydroxide, 30 mL, nasogastric tube, Daily  oxygen, , inhalation, Continuous - Inhalation  polyethylene glycol, 17 g, nasogastric tube, Daily  sennosides, 2 tablet, nasogastric tube, BID  [Held by provider] tamsulosin, 0.4 mg, oral, Daily      Continuous medications  Adult Clinimix Parenteral Nutrition Continuous, 83 mL/hr, Last Rate: 83 mL/hr (02/03/25 2040)  lactated Ringer's, 50 mL/hr, Last Rate: 100 mL/hr (02/04/25 0042)      PRN medications  PRN medications: benzocaine-menthol, calcium carbonate, dextrose, HYDROmorphone, labetaloL, melatonin, ondansetron, [Held by provider] oxyCODONE, [Held by provider] oxyCODONE, phenoL     Labs            9.3     10.7>-----<251              29.0   138  97  34                  ----------------<119     3.4  33  1.55          Ca 9.1 Phos 2.4 Mg 2.79       ALT 25 AST 33 AlkPhos 62 tBili 0.6          Imaging  CT abdomen and pelvis w oral contrast only    Result Date: 2/4/2025  Interpreted By:  Amando Snyder  and Marcio Roy STUDY: CT ABDOMEN AND PELVIS W ORAL CONTRAST ONLY;  2/3/2025 9:31 am    INDICATION: Signs/Symptoms:prolonged ileus s/p nephrectomy 1/28.     COMPARISON: CT, 09/12/2024   ACCESSION NUMBER(S): [id number]   ORDERING CLINICIAN: HENRY HENRIQUEZ   TECHNIQUE: CT of the abdomen and pelvis was performed. Contiguous axial images were obtained at 3 mm slice thickness through the abdomen and pelvis. Coronal and sagittal reconstructions at 3 mm slice thickness were performed.  No intravenous contrast was administered; positive oral contrast was given.   FINDINGS: Please note that the evaluation of vessels, lymph nodes and organs is limited without intravenous contrast.   LOWER CHEST: The visualized lower lung demonstrates bibasilar dependent airspace opacities. Right middle lobe 0.6 cm pleural-based nodule (series 4, image 10), similar in size compared to prior CT.   The heart is enlarged in size with trace pericardial effusion. Visualized distal esophagus demonstrates a small to moderate-sized hiatal hernia.   ABDOMEN:   LIVER: The liver is normal in size without evidence of focal liver lesions. Hepatic dome calcified granuloma. There is also free intraperitoneal air in the gastrohepatic space and north hepatis space, likely related to recent surgical procedure (series 2, image 17).   BILE DUCTS: The intrahepatic and extrahepatic ducts are not dilated.   GALLBLADDER: No calcified stones. No wall thickening.   PANCREAS: The pancreas appears unremarkable with similar prominence of pancreatic ducts.   SPLEEN: The spleen is normal in size without focal lesions. There is re-demonstration of a 1.1 cm soft tissue nodular focus in the gastro omental region (series 2, image 26) in the left upper quadrant, too small to completely characterize, but likely to represent splenule.   ADRENAL GLANDS: Bilateral adrenal glands appear normal.   KIDNEYS AND URETERS: Interval right nephrectomy with minimal fat stranding and fascial prominence along the surgical bed. There are also multiple sutures/staples visualized  along the surgical bed.   Multiple hypodense left renal lesions, measuring up to 4.5 x 4.0 cm along the superior pole previously characterized as cysts and better characterized on dedicated multiphase CT kidney on 09/12/2024. No hydronephrosis or calculi visualized.   PELVIS:   BLADDER: The urinary bladder is decompressed with a Awad catheter in place.   REPRODUCTIVE ORGANS: The prostate is not enlarged. 1.0 cm calcific focus (series 2, image 129) anterior to the seminal vesicles, likely representing calcified granuloma or calcified vas deferens and is without significant change compared to prior CT.   BOWEL:   Small hiatal hernia is noted. The contrast opacified stomach is otherwise unremarkable with the distal tip of enteric tube seen terminating in the gastric body.   Oral contrast opacification is visualized up to the level of the mid jejunum.   There are multiple air-fluid distended proximal small bowel loops, with a suggestion of a transition point on series 2, image 86, where a small bowel loop and a portion of the sigmoid colon are seen crossing posterior to the mesenteric axis, with associated mesenteric vessel whirl just distal to this point (series 2, image 95).     Distal to this small bowel loop narrowing, there are distended fluid-filled bowel loops visualized without and nodular definite transition point, however the more distal small bowel loops appear collapsed.  The appendix appears normal.   VESSELS: The aorta and IVC appear normal. There is also mild-to-moderate calcified atherosclerosis of the abdominal aorta as mentioned.   PERITONEUM/RETROPERITONEUM/LYMPH NODES: There is trace free air visualized along the surgical bed. Otherwise, no ascites or sizable fluid collection.  There are multiple prominent gastrohepatic lymph nodes.   ABDOMINAL WALL: Postsurgical changes from prior nephrectomy with abdominal soft tissue fat stranding with subcutaneous foci of air.   BONES: Mild degenerative changes  of the spine again visualized.       1.  There is moderate fluid distention of the proximal small bowel without progression of oral contrast to the distal small bowel loops with a likely transition point associated with swirling of the mesenteric vessels. There are dilated small bowel loops located distal to this possible transition point, and more distal small bowel loops collapsed/decompressed. Findings are concerning at least for partial small bowel obstruction, with suspicion of internal hernia given the appearance of the mesentery. Evaluation for potential ischemic changes is limited due to noncontrast enhanced CT. 2. Bilateral patchy consolidative to ground-glass opacities in the dependent portion of the long, concerning for infectious etiology with aspiration not excluded. 3. Remaining findings as described above.   I personally reviewed the images/study and agree with the findings as stated by Kirill Buckley MD (Resident Physician). This study was interpreted at [facility], [place], [place].   MACRO: Critical Finding:  See findings. Notification was initiated on 2/3/2025 at 10:51 am to Karen Katz by  Kirill Buckley.  (**-OCF-**) Instructions:   Signed by: Amando Allen 2/4/2025 12:11 AM Dictation workstation:   [id number]        Assessment & Plan:  Patient is a 75 y.o. male with PMH HTN, CVA/TIA 2020 (following cocaine use, on asa), anemia (hgb 12.2), CKD (baseline sCr 1.4-1.5), thyrotoxicosis s/p partial thyroidectomy, left parotid mass c/w warthins tumor, hep C (treated with epclusa 9/2024), hx of substance use (quit 2020), and BPH w/LUTS , RCC now s/p Right Radical Nephrectomy, Retroperitoneal Lymph Node Dissection on 1/28/25. Post operative course complicated by nausea and emesis requiring NG tube on POD3. High NG output and CT concerning for partial bowel obstruction with possible evidence of internal hernia. CRS consulted for further evaluation.  Patient  currently appears hemodynamically stable without any image findings or clinical findings concerning for ischemic or acutely threatened bowel. At this time, patient is 7 days out from index procedure. Operation during this window may be associated with increased risked of unavoidable injury to bowel during re-operation. The benefits of surgical management are likely outweighed by risk of inadvertent bowel injury.    Recommend:  - would recommend conservative management with NG tube and resuscitations at this time  - patient will likely benefit from PICC/TPN given long term NPO status  - CRS to follow  - please call with any questions or cocnerns    Discussed and seen with attending surgeon, Dr. Alexy Poe MD  General Surgery Resident PGY-3   Colorectal Surgery [id number]

## 2025-02-04 NOTE — CARE PLAN
Problem: Skin  Goal: Prevent/manage excess moisture  2/4/2025 1833 by Guille Barraza RN  Outcome: Progressing  2/4/2025 1833 by Guille Barraza RN  Outcome: Progressing   The patient's goals for the shift include      The clinical goals for the shift include Pt will remain free from nausea/vomitting for nexxt 12hours      Problem: Pain - Adult  Goal: Verbalizes/displays adequate comfort level or baseline comfort level  2/4/2025 1833 by Guille Barraza RN  Outcome: Progressing  2/4/2025 1833 by Guille Barraza RN  Outcome: Progressing     Problem: Skin  Goal: Prevent/minimize sheer/friction injuries  2/4/2025 1833 by Guille Barraza RN  Outcome: Progressing  2/4/2025 1833 by Guille Barraza RN  Outcome: Progressing

## 2025-02-05 ENCOUNTER — APPOINTMENT (OUTPATIENT)
Dept: RADIOLOGY | Facility: HOSPITAL | Age: 75
DRG: 656 | End: 2025-02-05
Payer: MEDICARE

## 2025-02-05 ENCOUNTER — ANESTHESIA (OUTPATIENT)
Dept: OPERATING ROOM | Facility: HOSPITAL | Age: 75
End: 2025-02-05
Payer: MEDICARE

## 2025-02-05 LAB
ALBUMIN SERPL BCP-MCNC: 2.8 G/DL (ref 3.4–5)
ANION GAP SERPL CALC-SCNC: 12 MMOL/L (ref 10–20)
BUN SERPL-MCNC: 32 MG/DL (ref 6–23)
CALCIUM SERPL-MCNC: 9.3 MG/DL (ref 8.6–10.6)
CHLORIDE SERPL-SCNC: 99 MMOL/L (ref 98–107)
CO2 SERPL-SCNC: 29 MMOL/L (ref 21–32)
CREAT SERPL-MCNC: 1.43 MG/DL (ref 0.5–1.3)
EGFRCR SERPLBLD CKD-EPI 2021: 51 ML/MIN/1.73M*2
ERYTHROCYTE [DISTWIDTH] IN BLOOD BY AUTOMATED COUNT: 16.2 % (ref 11.5–14.5)
GLUCOSE BLD MANUAL STRIP-MCNC: 117 MG/DL (ref 74–99)
GLUCOSE BLD MANUAL STRIP-MCNC: 138 MG/DL (ref 74–99)
GLUCOSE SERPL-MCNC: 124 MG/DL (ref 74–99)
HCT VFR BLD AUTO: 31.3 % (ref 41–52)
HGB BLD-MCNC: 9.9 G/DL (ref 13.5–17.5)
MCH RBC QN AUTO: 27.8 PG (ref 26–34)
MCHC RBC AUTO-ENTMCNC: 31.6 G/DL (ref 32–36)
MCV RBC AUTO: 88 FL (ref 80–100)
NRBC BLD-RTO: 0 /100 WBCS (ref 0–0)
PHOSPHATE SERPL-MCNC: 3 MG/DL (ref 2.5–4.9)
PLATELET # BLD AUTO: 263 X10*3/UL (ref 150–450)
POTASSIUM SERPL-SCNC: 3.4 MMOL/L (ref 3.5–5.3)
RBC # BLD AUTO: 3.56 X10*6/UL (ref 4.5–5.9)
SODIUM SERPL-SCNC: 137 MMOL/L (ref 136–145)
STAPHYLOCOCCUS SPEC CULT: ABNORMAL
WBC # BLD AUTO: 12.6 X10*3/UL (ref 4.4–11.3)

## 2025-02-05 PROCEDURE — 36415 COLL VENOUS BLD VENIPUNCTURE: CPT | Performed by: STUDENT IN AN ORGANIZED HEALTH CARE EDUCATION/TRAINING PROGRAM

## 2025-02-05 PROCEDURE — 74018 RADEX ABDOMEN 1 VIEW: CPT

## 2025-02-05 PROCEDURE — 74250 X-RAY XM SM INT 1CNTRST STD: CPT

## 2025-02-05 PROCEDURE — 74019 RADEX ABDOMEN 2 VIEWS: CPT | Performed by: RADIOLOGY

## 2025-02-05 PROCEDURE — 82947 ASSAY GLUCOSE BLOOD QUANT: CPT

## 2025-02-05 PROCEDURE — 2500000005 HC RX 250 GENERAL PHARMACY W/O HCPCS

## 2025-02-05 PROCEDURE — 80069 RENAL FUNCTION PANEL: CPT | Performed by: STUDENT IN AN ORGANIZED HEALTH CARE EDUCATION/TRAINING PROGRAM

## 2025-02-05 PROCEDURE — 2500000004 HC RX 250 GENERAL PHARMACY W/ HCPCS (ALT 636 FOR OP/ED): Performed by: STUDENT IN AN ORGANIZED HEALTH CARE EDUCATION/TRAINING PROGRAM

## 2025-02-05 PROCEDURE — 2580000001 HC RX 258 IV SOLUTIONS: Performed by: NURSE PRACTITIONER

## 2025-02-05 PROCEDURE — 99232 SBSQ HOSP IP/OBS MODERATE 35: CPT

## 2025-02-05 PROCEDURE — 74018 RADEX ABDOMEN 1 VIEW: CPT | Performed by: RADIOLOGY

## 2025-02-05 PROCEDURE — 74250 X-RAY XM SM INT 1CNTRST STD: CPT | Performed by: RADIOLOGY

## 2025-02-05 PROCEDURE — 85027 COMPLETE CBC AUTOMATED: CPT | Performed by: STUDENT IN AN ORGANIZED HEALTH CARE EDUCATION/TRAINING PROGRAM

## 2025-02-05 PROCEDURE — 2500000004 HC RX 250 GENERAL PHARMACY W/ HCPCS (ALT 636 FOR OP/ED)

## 2025-02-05 PROCEDURE — 1170000001 HC PRIVATE ONCOLOGY ROOM DAILY

## 2025-02-05 PROCEDURE — 97116 GAIT TRAINING THERAPY: CPT | Mod: GP,CQ

## 2025-02-05 PROCEDURE — 2500000001 HC RX 250 WO HCPCS SELF ADMINISTERED DRUGS (ALT 637 FOR MEDICARE OP)

## 2025-02-05 PROCEDURE — 97110 THERAPEUTIC EXERCISES: CPT | Mod: GP,CQ

## 2025-02-05 PROCEDURE — 94640 AIRWAY INHALATION TREATMENT: CPT

## 2025-02-05 PROCEDURE — 97530 THERAPEUTIC ACTIVITIES: CPT | Mod: GP,CQ

## 2025-02-05 PROCEDURE — 2500000002 HC RX 250 W HCPCS SELF ADMINISTERED DRUGS (ALT 637 FOR MEDICARE OP, ALT 636 FOR OP/ED): Performed by: STUDENT IN AN ORGANIZED HEALTH CARE EDUCATION/TRAINING PROGRAM

## 2025-02-05 PROCEDURE — 2550000001 HC RX 255 CONTRASTS: Performed by: STUDENT IN AN ORGANIZED HEALTH CARE EDUCATION/TRAINING PROGRAM

## 2025-02-05 RX ORDER — SODIUM CHLORIDE, SODIUM LACTATE, POTASSIUM CHLORIDE, CALCIUM CHLORIDE 600; 310; 30; 20 MG/100ML; MG/100ML; MG/100ML; MG/100ML
0-1000 INJECTION, SOLUTION INTRAVENOUS CONTINUOUS
Status: ACTIVE | OUTPATIENT
Start: 2025-02-05 | End: 2025-02-06

## 2025-02-05 RX ORDER — DIATRIZOATE MEGLUMINE AND DIATRIZOATE SODIUM 660; 100 MG/ML; MG/ML
180 SOLUTION ORAL; RECTAL ONCE
Status: COMPLETED | OUTPATIENT
Start: 2025-02-05 | End: 2025-02-05

## 2025-02-05 RX ORDER — LIDOCAINE HYDROCHLORIDE 20 MG/ML
1 JELLY TOPICAL ONCE
Status: DISCONTINUED | OUTPATIENT
Start: 2025-02-05 | End: 2025-02-16

## 2025-02-05 RX ORDER — POTASSIUM CHLORIDE 14.9 MG/ML
20 INJECTION INTRAVENOUS
Status: DISPENSED | OUTPATIENT
Start: 2025-02-05 | End: 2025-02-05

## 2025-02-05 RX ADMIN — POTASSIUM CHLORIDE 20 MEQ: 14.9 INJECTION, SOLUTION INTRAVENOUS at 11:55

## 2025-02-05 RX ADMIN — Medication 10 MG: at 21:19

## 2025-02-05 RX ADMIN — SODIUM CHLORIDE, POTASSIUM CHLORIDE, SODIUM LACTATE AND CALCIUM CHLORIDE 500 ML/HR: 600; 310; 30; 20 INJECTION, SOLUTION INTRAVENOUS at 09:09

## 2025-02-05 RX ADMIN — SENNOSIDES 17.2 MG: 8.6 TABLET, FILM COATED ORAL at 09:03

## 2025-02-05 RX ADMIN — Medication 1000 UNITS: at 09:03

## 2025-02-05 RX ADMIN — SMOFLIPID 50 G: 6; 6; 5; 3 INJECTION, EMULSION INTRAVENOUS at 20:04

## 2025-02-05 RX ADMIN — POLYETHYLENE GLYCOL 3350 17 G: 17 POWDER, FOR SOLUTION ORAL at 09:14

## 2025-02-05 RX ADMIN — ACETAMINOPHEN 1000 MG: 10 INJECTION INTRAVENOUS at 09:06

## 2025-02-05 RX ADMIN — Medication 21 PERCENT: at 21:14

## 2025-02-05 RX ADMIN — BENZOCAINE AND MENTHOL 1 LOZENGE: 15; 3.6 LOZENGE ORAL at 09:11

## 2025-02-05 RX ADMIN — IPRATROPIUM BROMIDE AND ALBUTEROL SULFATE 3 ML: .5; 3 SOLUTION RESPIRATORY (INHALATION) at 15:49

## 2025-02-05 RX ADMIN — AMLODIPINE BESYLATE 10 MG: 10 TABLET ORAL at 09:10

## 2025-02-05 RX ADMIN — ACETAMINOPHEN 1000 MG: 10 INJECTION INTRAVENOUS at 00:24

## 2025-02-05 RX ADMIN — SODIUM CHLORIDE, POTASSIUM CHLORIDE, SODIUM LACTATE AND CALCIUM CHLORIDE 850 ML/HR: 600; 310; 30; 20 INJECTION, SOLUTION INTRAVENOUS at 21:54

## 2025-02-05 RX ADMIN — LEVOTHYROXINE SODIUM ANHYDROUS 12.5 MCG: 100 INJECTION, POWDER, LYOPHILIZED, FOR SOLUTION INTRAVENOUS at 12:30

## 2025-02-05 RX ADMIN — HEPARIN SODIUM 5000 UNITS: 5000 INJECTION INTRAVENOUS; SUBCUTANEOUS at 00:24

## 2025-02-05 RX ADMIN — IPRATROPIUM BROMIDE AND ALBUTEROL SULFATE 3 ML: .5; 3 SOLUTION RESPIRATORY (INHALATION) at 09:17

## 2025-02-05 RX ADMIN — HEPARIN SODIUM 5000 UNITS: 5000 INJECTION INTRAVENOUS; SUBCUTANEOUS at 16:53

## 2025-02-05 RX ADMIN — PANTOPRAZOLE SODIUM 40 MG: 40 INJECTION, POWDER, FOR SOLUTION INTRAVENOUS at 09:02

## 2025-02-05 RX ADMIN — ACETAMINOPHEN 1000 MG: 10 INJECTION INTRAVENOUS at 16:42

## 2025-02-05 RX ADMIN — Medication 3 L/MIN: at 09:12

## 2025-02-05 RX ADMIN — IPRATROPIUM BROMIDE AND ALBUTEROL SULFATE 3 ML: .5; 3 SOLUTION RESPIRATORY (INHALATION) at 21:13

## 2025-02-05 RX ADMIN — DIATRIZOATE MEGLUMINE AND DIATRIZOATE SODIUM 180 ML: 660; 100 LIQUID ORAL; RECTAL at 15:31

## 2025-02-05 RX ADMIN — HEPARIN SODIUM 5000 UNITS: 5000 INJECTION INTRAVENOUS; SUBCUTANEOUS at 09:02

## 2025-02-05 RX ADMIN — Medication 1 SPRAY: at 09:03

## 2025-02-05 ASSESSMENT — COGNITIVE AND FUNCTIONAL STATUS - GENERAL
STANDING UP FROM CHAIR USING ARMS: A LITTLE
CLIMB 3 TO 5 STEPS WITH RAILING: A LITTLE
DAILY ACTIVITIY SCORE: 18
MOBILITY SCORE: 14
PERSONAL GROOMING: A LITTLE
CLIMB 3 TO 5 STEPS WITH RAILING: TOTAL
DRESSING REGULAR LOWER BODY CLOTHING: A LITTLE
MOVING TO AND FROM BED TO CHAIR: A LITTLE
WALKING IN HOSPITAL ROOM: A LITTLE
WALKING IN HOSPITAL ROOM: A LITTLE
STANDING UP FROM CHAIR USING ARMS: A LITTLE
HELP NEEDED FOR BATHING: A LITTLE
EATING MEALS: A LITTLE
DRESSING REGULAR UPPER BODY CLOTHING: A LITTLE
HELP NEEDED FOR BATHING: A LITTLE
DRESSING REGULAR UPPER BODY CLOTHING: A LITTLE
TURNING FROM BACK TO SIDE WHILE IN FLAT BAD: A LITTLE
TOILETING: A LITTLE
WALKING IN HOSPITAL ROOM: A LITTLE
MOBILITY SCORE: 19
MOVING TO AND FROM BED TO CHAIR: A LITTLE
TOILETING: A LITTLE
MOVING FROM LYING ON BACK TO SITTING ON SIDE OF FLAT BED WITH BEDRAILS: A LITTLE
DRESSING REGULAR LOWER BODY CLOTHING: A LITTLE
TURNING FROM BACK TO SIDE WHILE IN FLAT BAD: A LOT
STANDING UP FROM CHAIR USING ARMS: A LOT
TURNING FROM BACK TO SIDE WHILE IN FLAT BAD: A LITTLE
MOVING TO AND FROM BED TO CHAIR: A LITTLE
PERSONAL GROOMING: A LITTLE

## 2025-02-05 ASSESSMENT — PAIN - FUNCTIONAL ASSESSMENT: PAIN_FUNCTIONAL_ASSESSMENT: 0-10

## 2025-02-05 ASSESSMENT — PAIN SCALES - GENERAL: PAINLEVEL_OUTOF10: 5 - MODERATE PAIN

## 2025-02-05 NOTE — PROGRESS NOTES
Physical Therapy    Physical Therapy Treatment    Patient Name: Swapnil Allen  MRN: [id number]  Department: [facility]  Room: [address] [place], [place] [address]  Today's Date: 2/5/2025  Time Calculation  Start Time: 0927  Stop Time: 1005  Time Calculation (min): 38 min         Assessment/Plan   PT Assessment  Barriers to Discharge Home: Caregiver assistance, Cognition needs, Physical needs  Caregiver Assistance: Patient lives alone and/or does not have reliable caregiver assistance  Cognition Needs: 24hr supervision for safety awareness needed, Insight of patient limited regarding functional ability/needs  Physical Needs: 24hr mobility assistance needed, 24hr ADL assistance needed  End of Session Communication: Bedside nurse  Assessment Comment: Pt tolerated PT session well, able to complete bed level exercises, and increased ambulation within room with encouragement provided. Pt continues to remain appropriate for Moderate intensity PT upon D/C from hospital.  End of Session Patient Position: Up in chair, Alarm on  PT Plan  Inpatient/Swing Bed or Outpatient: Inpatient  PT Plan  Treatment/Interventions: Bed mobility, Gait training, Transfer training, Stair training, Balance training, Neuromuscular re-education, Strengthening, Endurance training, Range of motion, Therapeutic exercise, Therapeutic activity, Home exercise program, Positioning, Postural re-education  PT Plan: Ongoing PT  PT Frequency: 3 times per week  PT Discharge Recommendations: Moderate intensity level of continued care  Equipment Recommended upon Discharge: Wheeled walker  PT Recommended Transfer Status: Assist x1, Assistive device  PT - OK to Discharge: Yes      General Visit Information:   PT  Visit  PT Received On: 02/05/25  General  Missed Visit Reason:  (n/a)  Family/Caregiver Present: No  Prior to Session Communication: Bedside nurse  Patient Position Received: Bed, 3 rail up, Alarm on  General Comment: Pt supine in bed, increased encouragement provided for OOB activity.  Pt agreeable to work with PT.    Subjective   Precautions:  Precautions  Medical Precautions: Fall precautions, Oxygen therapy device and L/min (3L)  Precautions Comment: Protective precautions     02/05/25 0928   Vital Signs   Vitals Session During PT   Heart Rate (!) 116   SpO2 92 %     Objective   Pain:  Pain Assessment  Pain Assessment: 0-10  0-10 (Numeric) Pain Score: 5 - Moderate pain  Pain Location: Abdomen  Cognition:  Cognition  Overall Cognitive Status: Within Functional Limits  Insight: Mild  Impulsive: Mildly    Activity Tolerance:  Activity Tolerance  Endurance: Tolerates 10 - 20 min exercise with multiple rests  Treatments:  Therapeutic Exercise  Therapeutic Exercise Performed: Yes  Therapeutic Exercise Activity 1: Supine: AP, QS, HS, Hip ABD, SAQ x10 BLE    Bed Mobility  Bed Mobility: Yes  Bed Mobility 1  Bed Mobility 1: Supine to sitting  Level of Assistance 1: Moderate assistance, Moderate verbal cues  Bed Mobility Comments 1: HOB elevated, cues for log roll, assist at trunk    Ambulation/Gait Training  Ambulation/Gait Training Performed: Yes  Ambulation/Gait Training 1  Surface 1: Level tile  Device 1: Rolling walker  Assistance 1: Minimum assistance, Minimal verbal cues  Quality of Gait 1: Narrow base of support, Diminished heel strike, Decreased step length, Inconsistent stride length, Forward flexed posture (Decreased nena, decreased endurance, FF posture,)  Comments/Distance (ft) 1: x3ft, x10ft, cues for posture and walker placement for promotion of upright posture, Pt reports moderate level of fatigue post ambulation trial,    Transfers  Transfer: Yes  Transfer 1  Transfer From 1: Bed to  Transfer to 1: Stand  Technique 1: Sit to stand  Transfer Device 1: Walker  Transfer Level of Assistance 1: Moderate assistance, Minimal verbal cues  Trials/Comments 1: x1 trial, cues for sequencing and safe hand placement, elevated surface height,  Transfers 2  Transfer From 2: Chair with arms  "to  Transfer to 2: Stand  Technique 2: Sit to stand  Transfer Device 2: Walker  Transfer Level of Assistance 2: Minimum assistance, Moderate verbal cues  Trials/Comments 2: x2 trials, cues for safe hand placement and sequencing, pt repetively stating \"I can't do it without a second person on this side\", however with increased encourageent and cues for sequencing pt able to complete x2 STS transfers from chair.  Transfers 3  Transfer From 3: Stand to  Transfer to 3: Chair with arms  Technique 3: Stand to sit  Transfer Device 3: Walker  Transfer Level of Assistance 3: Moderate assistance, Moderate verbal cues  Trials/Comments 3: x2 trials, increased cues for safety in regards to proper alignment to chair and position of self, walker and chair prior to sitting. Pt with decreased eccentric control when transitioning from standing to sitting.    Stairs  Stairs: No    Outcome Measures:  [facility] Basic Mobility  Turning from your back to your side while in a flat bed without using bedrails: A little  Moving from lying on your back to sitting on the side of a flat bed without using bedrails: A lot  Moving to and from bed to chair (including a wheelchair): A little  Standing up from a chair using your arms (e.g. wheelchair or bedside chair): A lot  To walk in hospital room: A little  Climbing 3-5 steps with railing: Total  Basic Mobility - Total Score: 14    Education Documentation  Precautions, taught by Sunitha Cortes PTA at 2/5/2025 11:39 AM.  Learner: Patient  Readiness: Acceptance  Method: Explanation  Response: Verbalizes Understanding, Needs Reinforcement  Comment: Safety with transfers, increassing OOB activity and progression of mobility,    Body Mechanics, taught by Sunitha Cortes PTA at 2/5/2025 11:39 AM.  Learner: Patient  Readiness: Acceptance  Method: Explanation  Response: Verbalizes Understanding, Needs Reinforcement  Comment: Safety with transfers, increassing OOB activity and progression of " mobility,    Home Exercise Program, taught by Sunitha Cortes PTA at 2/5/2025 11:39 AM.  Learner: Patient  Readiness: Acceptance  Method: Explanation  Response: Verbalizes Understanding, Needs Reinforcement  Comment: Safety with transfers, increassing OOB activity and progression of mobility,    Mobility Training, taught by Sunitha Cortes PTA at 2/5/2025 11:39 AM.  Learner: Patient  Readiness: Acceptance  Method: Explanation  Response: Verbalizes Understanding, Needs Reinforcement  Comment: Safety with transfers, increassing OOB activity and progression of mobility,    Education Comments  No comments found.        OP EDUCATION:       Encounter Problems       Encounter Problems (Active)       Mobility       LTG - Patient will navigate 4-6 steps with rails/device and moderate assistance. (Not Progressing)       Start:  01/30/25    Expected End:  02/13/25            STG - Patient will ambulate ~100 feet with FWW and minimal assistance.  (Progressing)       Start:  01/30/25    Expected End:  02/13/25            Pt will ambulate >/= 100 ft with FWW and minimal assistance and no signs of fatigue or SOB.   (Progressing)       Start:  01/30/25    Expected End:  02/13/25               PT Transfers       LTG - Patient will transfer from one surface to another CGA with FWW. (Progressing)       Start:  01/30/25    Expected End:  02/13/25            STG - Patient will perform bed mobility with close supervision.  (Progressing)       Start:  01/30/25    Expected End:  02/13/25               Pain - Adult            02/05/25 at 11:42 AM   Sunitha Cortes PTA   Rehab Office: [phone]

## 2025-02-05 NOTE — PROGRESS NOTES
[facility]  Progress Note      Patient: Swapnil Allen  Age/Sex: 75 y.o., male  MRN: [id number]  Date of surgery: 1/28/25  Admit Date: 1/28/2025   Code Status: Full Code  Length of Stay: 8      Interval History/Overnight Events:   NAEON  Patient with continued high output >2.4L from NGT with brown output. Patient 1x emesis 325 overnight. Feels sore.   Denies any fevers, chills. Endorses nausea and lightheaded.   Awad draining clear yellow urine 2.1L   Cr 1.43(1.80)      Objective  02/03 1900 - 02/05 0659  In: 7855.8 [P.O.:1200; I.V.:4186.1]  Out: 6925 [Urine:3075]              9.9     12.6>-----<263              31.3   137  99  32                  ----------------<124     3.4  29  1.43          Ca 9.3 Phos 3.0 Mg 2.49       ALT 25 AST 33 AlkPhos 62 tBili 0.6          Vitals:    02/04/25 2149 02/05/25 0241 02/05/25 0456 02/05/25 0857   BP: 136/83 126/79 129/81 138/86   BP Location:  Right arm Right arm    Patient Position:  Lying Lying    Pulse: 99 85 84 84   Resp: 18 18 18 18   Temp: 35.9 °C (96.6 °F) 36 °C (96.8 °F) 36.3 °C (97.3 °F) 36.8 °C (98.2 °F)   TempSrc: Temporal Temporal Temporal Temporal   SpO2: 94% 96% 97% 97%   Weight:       Height:              Medications:  Current Facility-Administered Medications   Medication Dose Route Frequency Provider Last Rate Last Admin    acetaminophen (Ofirmev) injection 1,000 mg  1,000 mg intravenous q6h [facility] Elmo Monteiro  mL/hr at 02/05/25 0906 1,000 mg at 02/05/25 0906    Adult [employer] Parenteral Nutrition Continuous  83 mL/hr intravenous Daily PN Karen Katz, APRN-CNP 83 mL/hr at 02/04/25 2015 New Bag at 02/04/25 2015    amLODIPine (Norvasc) tablet 10 mg  10 mg nasogastric tube Daily Ludwin Phillips MD   10 mg at 02/05/25 0910    [Held by provider] aspirin EC tablet 81 mg  81 mg oral Daily Jannie Hamm MD   81 mg at 01/31/25 0820    aspirin suppository 150 mg  150 mg rectal Daily Deena Medina MD   150 mg at 02/01/25 9646    benzocaine-menthol  (Cepastat Sore Throat) lozenge 1 lozenge  1 lozenge Mouth/Throat q2h PRN Ludwin Phillips MD   1 lozenge at 02/05/25 0911    bisacodyl (Dulcolax) suppository 10 mg  10 mg rectal Daily JOSUÉ Britt   10 mg at 02/04/25 0930    calcium carbonate (Tums) chewable tablet 500 mg  500 mg nasogastric tube 4x daily PRN Ludwin Phillips MD   500 mg at 02/04/25 2047    cholecalciferol (Vitamin D-3) tablet 1,000 Units  1,000 Units nasogastric tube Daily Ludwin Phillips MD   1,000 Units at 02/05/25 0903    dextrose 50 % injection 12.5 g  12.5 g intravenous q15 min PRN Ludwin Phillips MD   12.5 g at 02/02/25 1402    pantoprazole (ProtoNix) EC tablet 40 mg  40 mg oral Daily before breakfast Elmo Monteiro MD        Or    esomeprazole (NexIUM) suspension 40 mg  40 mg nasoduodenal tube Daily before breakfast Elmo Monteiro MD        Or    pantoprazole (ProtoNix) injection 40 mg  40 mg intravenous Daily before breakfast Elmo Monteiro MD   40 mg at 02/05/25 0902    fat emulsion fish oil/plant based (SMOFlipid) 20 % IV infusion 50 g  250 mL intravenous Daily Lipids JOSUÉ Britt 20.8 mL/hr at 02/04/25 2015 50 g at 02/04/25 2015    heparin (porcine) injection 5,000 Units  5,000 Units subcutaneous q8h Jannie Hamm MD   5,000 Units at 02/05/25 0902    HYDROmorphone (Dilaudid) injection 0.4 mg  0.4 mg intravenous q2h PRN Alexys Hood MD   0.4 mg at 02/04/25 1347    insulin lispro injection 0-5 Units  0-5 Units subcutaneous TID AC JOSUÉ Britt        ipratropium-albuteroL (Duo-Neb) 0.5-2.5 mg/3 mL nebulizer solution 3 mL  3 mL nebulization TID Deena Medina MD   3 mL at 02/04/25 2146    labetaloL (Normodyne,Trandate) injection 10 mg  10 mg intravenous q8h PRN Gallo Martines MD        lactated Ringer's infusion  0-1,000 mL/hr intravenous Continuous Ajay Gannon  mL/hr at 02/05/25 0909 500 mL/hr at 02/05/25 0909    levothyroxine (Synthroid) injection 12.5 mcg  12.5 mcg  intravenous q24h ALEN Ludwin Phillips MD   12.5 mcg at 02/04/25 1103    [Held by provider] levothyroxine (Synthroid, Levoxyl) tablet 25 mcg  25 mcg oral Daily Jannie Hamm MD   25 mcg at 01/31/25 0531    lidocaine (Xylocaine) 10 mg/mL (1 %) injection 5 mL  5 mL infiltration Once Karen Katz, APRN-CNP        lidocaine 4 % patch 3 patch  3 patch transdermal Daily Gallo Martines MD   3 patch at 02/04/25 0929    magnesium hydroxide (Milk of Magnesia) 400 mg/5 mL suspension 30 mL  30 mL nasogastric tube Daily Ludwin Phillips MD   30 mL at 02/04/25 0930    melatonin tablet 10 mg  10 mg nasogastric tube Nightly PRN Ludwin Phillips MD   10 mg at 02/04/25 2047    ondansetron (Zofran) injection 4 mg  4 mg intravenous q6h PRN Gallo Martines MD   4 mg at 01/31/25 2057    [Held by provider] oxyCODONE (Roxicodone) immediate release tablet 10 mg  10 mg oral q6h PRN Jannie Hamm MD   10 mg at 01/29/25 0235    [Held by provider] oxyCODONE (Roxicodone) immediate release tablet 5 mg  5 mg oral q6h PRN Jannie Hamm MD   5 mg at 01/29/25 0840    oxygen (O2) therapy   inhalation Continuous - Inhalation Ludwin Phillips ,000 mL/hr at 02/05/25 0912 3 L/min at 02/05/25 0912    phenoL (Chloraseptic) 1.4 % mouth/throat spray 1 spray  1 spray Mouth/Throat 4x daily PRN Ludwin Phillips MD   1 spray at 02/05/25 0903    polyethylene glycol (Glycolax, Miralax) packet 17 g  17 g nasogastric tube Daily Ludwin Phillips MD   17 g at 02/05/25 0914    sennosides (Senokot) tablet 17.2 mg  2 tablet nasogastric tube BID Ludwin Phillips MD   17.2 mg at 02/05/25 0903    [Held by provider] tamsulosin (Flomax) 24 hr capsule 0.4 mg  0.4 mg oral Daily Jannie Hamm MD   0.4 mg at 01/31/25 0820       Physical Exam                                                                                                                         Gen -  No acute distress  Heent: NGT in place with brown output       Neuro - Alert, oriented, conversant     CV -  regular rate , normotensive      Pulm - Symmetric chest rise, non-labored breathing on 3L NC     Abd - Abdomen is soft, mildly-distended, and appropriately tender. Midline incision c/d/I closed with glue.       Ext - Warm, well perfused     MSK- no pain or swelling in LE bilaterally     Skin - without jaunice       Psych - appropriate tone, affect      - Awad draining clear yellow urine      Imaging  XR abdomen 1 view    Result Date: 2/4/2025  Interpreted By:  Heather Lopes,  and Mariaa Fraser STUDY: XR ABDOMEN 1 VIEW;  2/4/2025 8:42 am   INDICATION: Signs/Symptoms:evaluate contrast transition.     COMPARISON: CT abdomen and pelvis from 02/03/2025   ACCESSION NUMBER(S): [id number]   ORDERING CLINICIAN: FILIPE ROA   FINDINGS: 2 AP images of the abdomen were obtained. Dilated loops of bowel are seen similar to prior CT abdomen. No contrast is seen within the bowel loops. Multiple surgical clips overlie the abdomen. Enteric tube with tip in the gastric body. Limited evaluation of pneumoperitoneum on supine imaging, however no gross evidence of free air is noted.   Partially visualized hazy opacities of the bilateral lung bases.   Osseous structures demonstrate no acute bony changes.       1.  Dilated loops of bowel are seen and correlate with concern for obstruction. No oral contrast is seen within the visualized bowel loops. 2. Partially visualized hazy opacity of the bilateral lung base, better seen on prior chest x-ray, correlate for infection or aspiration   I personally reviewed the images/study and I agree with the findings as stated by Evelio Leroy MD, PGY-2 this study was interpreted at [facility], [place], [place].   MACRO: None   Signed by: Heather Zheng 2/4/2025 10:19 AM Dictation workstation:   [id number]    CT abdomen and pelvis w oral contrast only    Result Date: 2/4/2025  Interpreted By:  Enid  Amando Allen,  and Marcio Roy STUDY: CT ABDOMEN AND PELVIS W ORAL CONTRAST ONLY;  2/3/2025 9:31 am   INDICATION: Signs/Symptoms:prolonged ileus s/p nephrectomy 1/28.     COMPARISON: CT, 09/12/2024   ACCESSION NUMBER(S): [id number]   ORDERING CLINICIAN: HENRY HENRIQUEZ   TECHNIQUE: CT of the abdomen and pelvis was performed. Contiguous axial images were obtained at 3 mm slice thickness through the abdomen and pelvis. Coronal and sagittal reconstructions at 3 mm slice thickness were performed.  No intravenous contrast was administered; positive oral contrast was given.   FINDINGS: Please note that the evaluation of vessels, lymph nodes and organs is limited without intravenous contrast.   LOWER CHEST: The visualized lower lung demonstrates bibasilar dependent airspace opacities. Right middle lobe 0.6 cm pleural-based nodule (series 4, image 10), similar in size compared to prior CT.   The heart is enlarged in size with trace pericardial effusion. Visualized distal esophagus demonstrates a small to moderate-sized hiatal hernia.   ABDOMEN:   LIVER: The liver is normal in size without evidence of focal liver lesions. Hepatic dome calcified granuloma. There is also free intraperitoneal air in the gastrohepatic space and north hepatis space, likely related to recent surgical procedure (series 2, image 17).   BILE DUCTS: The intrahepatic and extrahepatic ducts are not dilated.   GALLBLADDER: No calcified stones. No wall thickening.   PANCREAS: The pancreas appears unremarkable with similar prominence of pancreatic ducts.   SPLEEN: The spleen is normal in size without focal lesions. There is re-demonstration of a 1.1 cm soft tissue nodular focus in the gastro omental region (series 2, image 26) in the left upper quadrant, too small to completely characterize, but likely to represent splenule.   ADRENAL GLANDS: Bilateral adrenal glands appear normal.   KIDNEYS AND URETERS: Interval right nephrectomy with minimal fat  stranding and fascial prominence along the surgical bed. There are also multiple sutures/staples visualized along the surgical bed.   Multiple hypodense left renal lesions, measuring up to 4.5 x 4.0 cm along the superior pole previously characterized as cysts and better characterized on dedicated multiphase CT kidney on 09/12/2024. No hydronephrosis or calculi visualized.   PELVIS:   BLADDER: The urinary bladder is decompressed with a Awad catheter in place.   REPRODUCTIVE ORGANS: The prostate is not enlarged. 1.0 cm calcific focus (series 2, image 129) anterior to the seminal vesicles, likely representing calcified granuloma or calcified vas deferens and is without significant change compared to prior CT.   BOWEL:   Small hiatal hernia is noted. The contrast opacified stomach is otherwise unremarkable with the distal tip of enteric tube seen terminating in the gastric body.   Oral contrast opacification is visualized up to the level of the mid jejunum.   There are multiple air-fluid distended proximal small bowel loops, with a suggestion of a transition point on series 2, image 86, where a small bowel loop and a portion of the sigmoid colon are seen crossing posterior to the mesenteric axis, with associated mesenteric vessel whirl just distal to this point (series 2, image 95).     Distal to this small bowel loop narrowing, there are distended fluid-filled bowel loops visualized without and nodular definite transition point, however the more distal small bowel loops appear collapsed.  The appendix appears normal.   VESSELS: The aorta and IVC appear normal. There is also mild-to-moderate calcified atherosclerosis of the abdominal aorta as mentioned.   PERITONEUM/RETROPERITONEUM/LYMPH NODES: There is trace free air visualized along the surgical bed. Otherwise, no ascites or sizable fluid collection.  There are multiple prominent gastrohepatic lymph nodes.   ABDOMINAL WALL: Postsurgical changes from prior nephrectomy  with abdominal soft tissue fat stranding with subcutaneous foci of air.   BONES: Mild degenerative changes of the spine again visualized.       1.  There is moderate fluid distention of the proximal small bowel without progression of oral contrast to the distal small bowel loops with a likely transition point associated with swirling of the mesenteric vessels. There are dilated small bowel loops located distal to this possible transition point, and more distal small bowel loops collapsed/decompressed. Findings are concerning at least for partial small bowel obstruction, with suspicion of internal hernia given the appearance of the mesentery. Evaluation for potential ischemic changes is limited due to noncontrast enhanced CT. 2. Bilateral patchy consolidative to ground-glass opacities in the dependent portion of the long, concerning for infectious etiology with aspiration not excluded. 3. Remaining findings as described above.   I personally reviewed the images/study and agree with the findings as stated by Kirill Buckley MD (Resident Physician). This study was interpreted at [facility], [place], [place].   MACRO: Critical Finding:  See findings. Notification was initiated on 2/3/2025 at 10:51 am to Karen Katz by  Kirill Buckley.  (**-OCF-**) Instructions:   Signed by: Amando Allen 2/4/2025 12:11 AM Dictation workstation:   [id number]      Assessment & Plan  75 y.o. 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  NGT placed 01/31 overnight.      Plan 2/5:  -SBFT today   -Continue 1/2:1 fluid replacements for high NG output  -CRS  following, appreciate recommendations   -Maintain NGT   -Awad out, follow up TOV  -Continue PPN   -Follow up labs   -Work with PT: Ambulate/OOB  -tele off   -Continue pulse oximetry       Neuro:   -Pain controlled with Scheduled acetaminophen, Oxycodone 5/10 HELD while NPO, Dilaudid 0.4 PRN for breakthrough pain, robaxin 500mg q8hrs   -PRN melatonin for sleep aid     Resp:   -ICS  -On room air   -Appreciate RT recs    Card:   -Monitor vitals  -discontinue telemetry  -Amlodpine  -Aspirin   -Cholecalciferol     FEN:   Fluid repletion at 0.5:1 of NGT output    GI:   -NPO for NGT  - IV PPI  - HOLD BR: Miralax, senokat   -PRN Reglan for nausea   -HOLD Bisacodyl suppository   -Colorectal surgery following, appreciate recommendations  -Recommend small bowel follow through for better evaluation of motility.  Will follow up results to assist with possible surgical planning.  -Can consider 1/2:1 fluid replacements for high NG output; balancing with already ordered fluid replacements for polyuria  -Will DC miralax/senna/milk of mag in setting of pSBO  -Would continue IV PPI in setting of NG tube  -Nutrition consulted: Currently on PPN     :   -Awad out, follow up TOV  -Flomax   -Daily BMP to monitor kidney function and replete electrolytes as indicated, currently with ANISHA on CKD 2/2 to volume losses from NGT  -Continue to monitor Cr     Endo:   -Levothyroxine IV while NPO     Heme/ID:   -Daily CBC to monitor acute blood loss anemia   -No indication for blood transfusion or antibiotics at this time     MSK:  PT: OOB/ambulate    Ppy:   -ALEN  -SCD  -IS    Dispo:   RNF.     Assessment and plan discussed with chief resident Dr. Gannon and attending Dr. Ana Xiao MD, [facility]  [facility] [place]  Adult Urology Pager: [phone]  Pediatric Urology Pager: [phone]

## 2025-02-05 NOTE — PROGRESS NOTES
Colorectal Surgery Progress Note      HPI: Swapnil Allen is a 75 y.o. 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 Post operative course complicated by nausea and emesis requiring NG tube on POD3. High NG output and CT concerning for partial bowel obstruction with possible evidence of internal hernia. CRS consulted for further evaluation.       Subjective  No acute events overnight.  Denies nausea/vomiting with NG tube in place.  Reports passing flatus.  NG output green bilious with 2400ml over past 24 hours.      Objective  Physical Exam:  Gen: in no physical distress and alert, conversational, obese, lying in bed  HENT: Head normocephalic, atraumatic.  Mucous membranes dry.  Neuro: Alert and oriented x3.  Moves all extremities spontaneously x4.  CV: Normal rate and rhythm on palpated radial pulse. +1 BLE edema.  Resp: No acute respiratory distress.  Normal effort on on 3L O2 NC. Chest expansion symmetrical.   GI: Abdomen is obese, soft, nontender, and nondistended. No rebound or guarding.  NGT in place to LIWS draining green bilious output  : Awad catheter in place draining clear urine      I/O last 3 completed shifts:  In: 7855.8 (76.3 mL/kg) [P.O.:1200; I.V.:4186.1 (40.7 mL/kg); IV Piggyback:100]  Out: 6925 (67.3 mL/kg) [Urine:3075 (0.8 mL/kg/hr); Emesis/NG output:3850]  Weight: 102.9 kg   No intake/output data recorded.           Data Review:  CBC:   Lab Results   Component Value Date    WBC 12.6 (H) 02/05/2025    RBC 3.56 (L) 02/05/2025     BMP:   Lab Results   Component Value Date    GLUCOSE 124 (H) 02/05/2025    CO2 29 02/05/2025    BUN 32 (H) 02/05/2025     "CREATININE 1.43 (H) 02/05/2025    CALCIUM 9.3 02/05/2025     Coagulation: No results found for: \"INR\", \"APTT\"    Imaging:   CT A/P 2/4/2025 showing moderate fluid distention of proximal small bowel without progression of oral contrast to distal small bowel with transition point, c/f small bowel obstruction/hernia    Assessment/Plan:   75 year old male with notable medical history/co-morbidities presenting with partial bowel obstruction on imaging.  He remains hemodynamically stable but with high, bilious NG output (~2.5L/24 hours)    -Recommend small bowel follow through for better evaluation of motility.  Will follow up results to assist with possible surgical planning.  -Can consider 1/2:1 fluid replacements for high NG output; balancing with already ordered fluid replacements for polyuria  -Would absolutely DC miralax/senna/milk of mag in setting of pSBO  -Would continue IV PPI in setting of NG tube    Will continue to follow.    Plan of care discussed with Colorectal surgical team, Dr. Tracey, and patient.    Ivonne ESPINLA-CNP  Colorectal Surgery NP   [facility] Service Pager #[phone]        "

## 2025-02-06 ENCOUNTER — APPOINTMENT (OUTPATIENT)
Dept: RADIOLOGY | Facility: HOSPITAL | Age: 75
DRG: 656 | End: 2025-02-06
Payer: MEDICARE

## 2025-02-06 LAB
ALBUMIN SERPL BCP-MCNC: 2.8 G/DL (ref 3.4–5)
ANION GAP SERPL CALC-SCNC: 10 MMOL/L (ref 10–20)
BUN SERPL-MCNC: 34 MG/DL (ref 6–23)
CALCIUM SERPL-MCNC: 9.2 MG/DL (ref 8.6–10.6)
CHLORIDE SERPL-SCNC: 104 MMOL/L (ref 98–107)
CO2 SERPL-SCNC: 27 MMOL/L (ref 21–32)
CREAT SERPL-MCNC: 1.6 MG/DL (ref 0.5–1.3)
EGFRCR SERPLBLD CKD-EPI 2021: 45 ML/MIN/1.73M*2
ERYTHROCYTE [DISTWIDTH] IN BLOOD BY AUTOMATED COUNT: 16.6 % (ref 11.5–14.5)
GLUCOSE BLD MANUAL STRIP-MCNC: 106 MG/DL (ref 74–99)
GLUCOSE BLD MANUAL STRIP-MCNC: 109 MG/DL (ref 74–99)
GLUCOSE BLD MANUAL STRIP-MCNC: 117 MG/DL (ref 74–99)
GLUCOSE SERPL-MCNC: 123 MG/DL (ref 74–99)
HCT VFR BLD AUTO: 31.8 % (ref 41–52)
HGB BLD-MCNC: 9.7 G/DL (ref 13.5–17.5)
MAGNESIUM SERPL-MCNC: 2.22 MG/DL (ref 1.6–2.4)
MCH RBC QN AUTO: 27.7 PG (ref 26–34)
MCHC RBC AUTO-ENTMCNC: 30.5 G/DL (ref 32–36)
MCV RBC AUTO: 91 FL (ref 80–100)
NRBC BLD-RTO: 0 /100 WBCS (ref 0–0)
PHOSPHATE SERPL-MCNC: 3.3 MG/DL (ref 2.5–4.9)
PLATELET # BLD AUTO: 273 X10*3/UL (ref 150–450)
POTASSIUM SERPL-SCNC: 3.8 MMOL/L (ref 3.5–5.3)
RBC # BLD AUTO: 3.5 X10*6/UL (ref 4.5–5.9)
SODIUM SERPL-SCNC: 137 MMOL/L (ref 136–145)
WBC # BLD AUTO: 12.3 X10*3/UL (ref 4.4–11.3)

## 2025-02-06 PROCEDURE — 36415 COLL VENOUS BLD VENIPUNCTURE: CPT | Performed by: STUDENT IN AN ORGANIZED HEALTH CARE EDUCATION/TRAINING PROGRAM

## 2025-02-06 PROCEDURE — 82947 ASSAY GLUCOSE BLOOD QUANT: CPT

## 2025-02-06 PROCEDURE — 74018 RADEX ABDOMEN 1 VIEW: CPT

## 2025-02-06 PROCEDURE — 1170000001 HC PRIVATE ONCOLOGY ROOM DAILY

## 2025-02-06 PROCEDURE — 2500000004 HC RX 250 GENERAL PHARMACY W/ HCPCS (ALT 636 FOR OP/ED)

## 2025-02-06 PROCEDURE — 80069 RENAL FUNCTION PANEL: CPT | Performed by: STUDENT IN AN ORGANIZED HEALTH CARE EDUCATION/TRAINING PROGRAM

## 2025-02-06 PROCEDURE — 2500000001 HC RX 250 WO HCPCS SELF ADMINISTERED DRUGS (ALT 637 FOR MEDICARE OP): Performed by: NURSE PRACTITIONER

## 2025-02-06 PROCEDURE — 85027 COMPLETE CBC AUTOMATED: CPT | Performed by: STUDENT IN AN ORGANIZED HEALTH CARE EDUCATION/TRAINING PROGRAM

## 2025-02-06 PROCEDURE — 74177 CT ABD & PELVIS W/CONTRAST: CPT | Performed by: RADIOLOGY

## 2025-02-06 PROCEDURE — 2550000001 HC RX 255 CONTRASTS: Performed by: STUDENT IN AN ORGANIZED HEALTH CARE EDUCATION/TRAINING PROGRAM

## 2025-02-06 PROCEDURE — 87081 CULTURE SCREEN ONLY: CPT

## 2025-02-06 PROCEDURE — 36415 COLL VENOUS BLD VENIPUNCTURE: CPT | Performed by: PHYSICIAN ASSISTANT

## 2025-02-06 PROCEDURE — 83735 ASSAY OF MAGNESIUM: CPT | Performed by: PHYSICIAN ASSISTANT

## 2025-02-06 PROCEDURE — 2500000005 HC RX 250 GENERAL PHARMACY W/O HCPCS: Performed by: NURSE PRACTITIONER

## 2025-02-06 PROCEDURE — 2500000001 HC RX 250 WO HCPCS SELF ADMINISTERED DRUGS (ALT 637 FOR MEDICARE OP)

## 2025-02-06 PROCEDURE — 2580000001 HC RX 258 IV SOLUTIONS: Performed by: NURSE PRACTITIONER

## 2025-02-06 PROCEDURE — 74019 RADEX ABDOMEN 2 VIEWS: CPT | Performed by: RADIOLOGY

## 2025-02-06 PROCEDURE — 99232 SBSQ HOSP IP/OBS MODERATE 35: CPT

## 2025-02-06 PROCEDURE — 74177 CT ABD & PELVIS W/CONTRAST: CPT

## 2025-02-06 PROCEDURE — 2500000004 HC RX 250 GENERAL PHARMACY W/ HCPCS (ALT 636 FOR OP/ED): Performed by: STUDENT IN AN ORGANIZED HEALTH CARE EDUCATION/TRAINING PROGRAM

## 2025-02-06 PROCEDURE — 2500000001 HC RX 250 WO HCPCS SELF ADMINISTERED DRUGS (ALT 637 FOR MEDICARE OP): Performed by: STUDENT IN AN ORGANIZED HEALTH CARE EDUCATION/TRAINING PROGRAM

## 2025-02-06 RX ORDER — SODIUM CHLORIDE, SODIUM LACTATE, POTASSIUM CHLORIDE, CALCIUM CHLORIDE 600; 310; 30; 20 MG/100ML; MG/100ML; MG/100ML; MG/100ML
50 INJECTION, SOLUTION INTRAVENOUS CONTINUOUS
Status: ACTIVE | OUTPATIENT
Start: 2025-02-06 | End: 2025-02-07

## 2025-02-06 RX ORDER — IPRATROPIUM BROMIDE AND ALBUTEROL SULFATE 2.5; .5 MG/3ML; MG/3ML
3 SOLUTION RESPIRATORY (INHALATION) 3 TIMES DAILY PRN
Status: DISCONTINUED | OUTPATIENT
Start: 2025-02-06 | End: 2025-02-24

## 2025-02-06 RX ORDER — ACETAMINOPHEN 10 MG/ML
1000 INJECTION, SOLUTION INTRAVENOUS EVERY 6 HOURS SCHEDULED
Status: COMPLETED | OUTPATIENT
Start: 2025-02-06 | End: 2025-02-07

## 2025-02-06 RX ORDER — LIDOCAINE HYDROCHLORIDE 10 MG/ML
5 INJECTION, SOLUTION INFILTRATION; PERINEURAL ONCE
Status: DISCONTINUED | OUTPATIENT
Start: 2025-02-06 | End: 2025-02-10

## 2025-02-06 RX ADMIN — ASCORBIC ACID, VITAMIN A PALMITATE, CHOLECALCIFEROL, THIAMINE HYDROCHLORIDE, RIBOFLAVIN-5 PHOSPHATE SODIUM, PYRIDOXINE HYDROCHLORIDE, NIACINAMIDE, DEXPANTHENOL, ALPHA-TOCOPHEROL ACETATE, VITAMIN K1, FOLIC ACID, BIOTIN, CYANOCOBALAMIN: 200; 3300; 200; 6; 3.6; 6; 40; 15; 10; 150; 600; 60; 5 INJECTION, SOLUTION INTRAVENOUS at 20:09

## 2025-02-06 RX ADMIN — HEPARIN SODIUM 5000 UNITS: 5000 INJECTION INTRAVENOUS; SUBCUTANEOUS at 08:53

## 2025-02-06 RX ADMIN — HYDROMORPHONE HYDROCHLORIDE 0.4 MG: 1 INJECTION, SOLUTION INTRAMUSCULAR; INTRAVENOUS; SUBCUTANEOUS at 05:22

## 2025-02-06 RX ADMIN — MAGNESIUM HYDROXIDE 30 ML: 400 SUSPENSION ORAL at 10:33

## 2025-02-06 RX ADMIN — Medication 1000 UNITS: at 10:33

## 2025-02-06 RX ADMIN — HYDROMORPHONE HYDROCHLORIDE 0.4 MG: 1 INJECTION, SOLUTION INTRAMUSCULAR; INTRAVENOUS; SUBCUTANEOUS at 09:44

## 2025-02-06 RX ADMIN — SODIUM CHLORIDE, POTASSIUM CHLORIDE, SODIUM LACTATE AND CALCIUM CHLORIDE 275 ML/HR: 600; 310; 30; 20 INJECTION, SOLUTION INTRAVENOUS at 10:43

## 2025-02-06 RX ADMIN — LEVOTHYROXINE SODIUM ANHYDROUS 12.5 MCG: 100 INJECTION, POWDER, LYOPHILIZED, FOR SOLUTION INTRAVENOUS at 13:15

## 2025-02-06 RX ADMIN — SENNOSIDES 17.2 MG: 8.6 TABLET, FILM COATED ORAL at 10:33

## 2025-02-06 RX ADMIN — ACETAMINOPHEN 1000 MG: 10 INJECTION INTRAVENOUS at 13:58

## 2025-02-06 RX ADMIN — ASPIRIN 150 MG: 300 SUPPOSITORY RECTAL at 16:52

## 2025-02-06 RX ADMIN — HEPARIN SODIUM 5000 UNITS: 5000 INJECTION INTRAVENOUS; SUBCUTANEOUS at 16:52

## 2025-02-06 RX ADMIN — SODIUM CHLORIDE, POTASSIUM CHLORIDE, SODIUM LACTATE AND CALCIUM CHLORIDE 237.5 ML/HR: 600; 310; 30; 20 INJECTION, SOLUTION INTRAVENOUS at 14:03

## 2025-02-06 RX ADMIN — HYDROMORPHONE HYDROCHLORIDE 0.4 MG: 1 INJECTION, SOLUTION INTRAMUSCULAR; INTRAVENOUS; SUBCUTANEOUS at 00:22

## 2025-02-06 RX ADMIN — HYDROMORPHONE HYDROCHLORIDE 0.4 MG: 1 INJECTION, SOLUTION INTRAMUSCULAR; INTRAVENOUS; SUBCUTANEOUS at 17:14

## 2025-02-06 RX ADMIN — SODIUM CHLORIDE, POTASSIUM CHLORIDE, SODIUM LACTATE AND CALCIUM CHLORIDE 250 ML/HR: 600; 310; 30; 20 INJECTION, SOLUTION INTRAVENOUS at 18:13

## 2025-02-06 RX ADMIN — IOHEXOL 75 ML: 350 INJECTION, SOLUTION INTRAVENOUS at 10:03

## 2025-02-06 RX ADMIN — BISACODYL 10 MG: 10 SUPPOSITORY RECTAL at 10:46

## 2025-02-06 RX ADMIN — AMLODIPINE BESYLATE 10 MG: 10 TABLET ORAL at 10:32

## 2025-02-06 RX ADMIN — HEPARIN SODIUM 5000 UNITS: 5000 INJECTION INTRAVENOUS; SUBCUTANEOUS at 00:22

## 2025-02-06 RX ADMIN — ACETAMINOPHEN 1000 MG: 10 INJECTION INTRAVENOUS at 20:01

## 2025-02-06 RX ADMIN — SMOFLIPID 50 G: 6; 6; 5; 3 INJECTION, EMULSION INTRAVENOUS at 20:09

## 2025-02-06 RX ADMIN — PANTOPRAZOLE SODIUM 40 MG: 40 INJECTION, POWDER, FOR SOLUTION INTRAVENOUS at 06:00

## 2025-02-06 RX ADMIN — SODIUM CHLORIDE, POTASSIUM CHLORIDE, SODIUM LACTATE AND CALCIUM CHLORIDE 50 ML/HR: 600; 310; 30; 20 INJECTION, SOLUTION INTRAVENOUS at 10:33

## 2025-02-06 ASSESSMENT — COGNITIVE AND FUNCTIONAL STATUS - GENERAL
DRESSING REGULAR LOWER BODY CLOTHING: A LITTLE
MOVING FROM LYING ON BACK TO SITTING ON SIDE OF FLAT BED WITH BEDRAILS: A LITTLE
CLIMB 3 TO 5 STEPS WITH RAILING: A LITTLE
PERSONAL GROOMING: A LITTLE
EATING MEALS: A LITTLE
DAILY ACTIVITIY SCORE: 18
MOVING TO AND FROM BED TO CHAIR: A LITTLE
MOBILITY SCORE: 18
DRESSING REGULAR UPPER BODY CLOTHING: A LITTLE
TOILETING: A LITTLE
STANDING UP FROM CHAIR USING ARMS: A LITTLE
WALKING IN HOSPITAL ROOM: A LITTLE
HELP NEEDED FOR BATHING: A LITTLE
TURNING FROM BACK TO SIDE WHILE IN FLAT BAD: A LITTLE

## 2025-02-06 ASSESSMENT — PAIN - FUNCTIONAL ASSESSMENT
PAIN_FUNCTIONAL_ASSESSMENT: 0-10

## 2025-02-06 ASSESSMENT — PAIN SCALES - GENERAL
PAINLEVEL_OUTOF10: 4
PAINLEVEL_OUTOF10: 8
PAINLEVEL_OUTOF10: 7
PAINLEVEL_OUTOF10: 9
PAINLEVEL_OUTOF10: 8
PAINLEVEL_OUTOF10: 8
PAINLEVEL_OUTOF10: 9

## 2025-02-06 NOTE — CARE PLAN
The patient's goals for the shift include      The clinical goals for the shift include patient will remain free from N/V throughout shift.    Patient was safe and injury free throughout shift. VS WNL. Patient remained free from N/V this shift. NG tube in place with output. Patient sat in chair for greater than 1 hour today.       Problem: Pain - Adult  Goal: Verbalizes/displays adequate comfort level or baseline comfort level  Outcome: Progressing     Problem: Safety - Adult  Goal: Free from fall injury  Outcome: Progressing     Problem: Discharge Planning  Goal: Discharge to home or other facility with appropriate resources  Outcome: Progressing     Problem: Nutrition  Goal: Nutrient intake appropriate for maintaining nutritional needs  Outcome: Progressing     Problem: Skin  Goal: Prevent/manage excess moisture  Outcome: Progressing  Goal: Prevent/minimize sheer/friction injuries  Outcome: Progressing  Goal: Promote/optimize nutrition  Outcome: Progressing     Problem: Pain  Goal: Takes deep breaths with improved pain control throughout the shift  Outcome: Progressing  Goal: Turns in bed with improved pain control throughout the shift  Outcome: Progressing  Goal: Walks with improved pain control throughout the shift  Outcome: Progressing  Goal: Performs ADL's with improved pain control throughout shift  Outcome: Progressing  Goal: Participates in PT with improved pain control throughout the shift  Outcome: Progressing  Goal: Free from opioid side effects throughout the shift  Outcome: Progressing  Goal: Free from acute confusion related to pain meds throughout the shift  Outcome: Progressing

## 2025-02-06 NOTE — PROGRESS NOTES
Colorectal Surgery Progress Note      HPI: Swapnil Allen is a 75 y.o. 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 Post operative course complicated by nausea and emesis requiring NG tube on POD3. High NG output and CT concerning for partial bowel obstruction with possible evidence of internal hernia. CRS consulted for further evaluation.       Subjective  No acute events overnight.  Denies abdominal pain, nausea/vomiting with NG tube in place.  Reports feeling bloated and is passing flatus.  NG output green bilious with 2200ml over past 24 hours.      Objective  Physical Exam:  Gen: in no physical distress and alert, conversational, obese, lying in bed  HENT: Head normocephalic, atraumatic.  Mucous membranes dry.  Neuro: Alert and oriented x3.  Moves all extremities spontaneously x4.  CV: Normal rate and rhythm on palpated radial pulse. +1 BLE edema.  Resp: No acute respiratory distress.  Normal effort on on 1L O2 NC. Productive cough. Chest expansion symmetrical.   GI: Abdomen is obese, soft, nontender to palpation, and moderately distended. No rebound or guarding.  NGT in place to LIWS draining green bilious output.      I/O last 3 completed shifts:  In: 2008.9 (19.5 mL/kg) [I.V.:900 (8.7 mL/kg)]  Out: 4550 (44.2 mL/kg) [Urine:1150 (0.3 mL/kg/hr); Emesis/NG output:3400]  Weight: 102.9 kg   I/O this shift:  In: 124.4 [NG/GT:10]  Out: 675 [Urine:275; Emesis/NG output:400]           Data Review:  CBC:   Lab Results   Component Value Date    WBC 12.3 (H) 02/06/2025    RBC 3.50 (L) 02/06/2025     BMP:   Lab Results   Component Value Date    GLUCOSE 123 (H) 02/06/2025    CO2 27  "02/06/2025    BUN 34 (H) 02/06/2025    CREATININE 1.60 (H) 02/06/2025    CALCIUM 9.2 02/06/2025     Coagulation: No results found for: \"INR\", \"APTT\"    Imaging:   SBFT 2/5/2025 with no contrast visualized past the small bowel.    CT A/P 2/4/2025 showing moderate fluid distention of proximal small bowel without progression of oral contrast to distal small bowel with transition point, c/f small bowel obstruction/hernia    Assessment/Plan:   75 year old male with notable medical history/co-morbidities presenting with partial bowel obstruction on imaging.  NG  output remains bilious and high (~2.4L/24 hours) and with abdominal distention.    -No surgical intervention planned at this time; Dr. Tracey to discuss possibility of PEG with Dr. Perez  -Can consider 1/2:1 fluid replacements for high NG output; balancing with already ordered fluid replacements for polyuria  -Would absolutely DC miralax/senna/milk of mag in setting of pSBO.  Can keep enemas, although unlikely to be effective.  -Would continue IV PPI in setting of NG tube    Will continue to follow.    Plan of care discussed with Colorectal surgical team, Dr. Tracey, and patient.    Ivonne ESPINAL-CNP  Colorectal Surgery NP   [facility] Service Pager #[phone]        "

## 2025-02-07 ENCOUNTER — APPOINTMENT (OUTPATIENT)
Dept: RADIOLOGY | Facility: HOSPITAL | Age: 75
DRG: 656 | End: 2025-02-07
Payer: MEDICARE

## 2025-02-07 LAB
ALBUMIN SERPL BCP-MCNC: 2.8 G/DL (ref 3.4–5)
ANION GAP BLDA CALCULATED.4IONS-SCNC: 11 MMO/L (ref 10–25)
ANION GAP SERPL CALC-SCNC: 13 MMOL/L (ref 10–20)
ANION GAP SERPL CALC-SCNC: 16 MMOL/L (ref 10–20)
BACTERIA SPEC RESP CULT: ABNORMAL
BASE EXCESS BLDA CALC-SCNC: -1.8 MMOL/L (ref -2–3)
BODY TEMPERATURE: 37 DEGREES CELSIUS
BUN SERPL-MCNC: 29 MG/DL (ref 6–23)
BUN SERPL-MCNC: 32 MG/DL (ref 6–23)
CA-I BLDA-SCNC: 1.25 MMOL/L (ref 1.1–1.33)
CALCIUM SERPL-MCNC: 8.7 MG/DL (ref 8.6–10.6)
CALCIUM SERPL-MCNC: 9.2 MG/DL (ref 8.6–10.6)
CHLORIDE BLDA-SCNC: 104 MMOL/L (ref 98–107)
CHLORIDE SERPL-SCNC: 100 MMOL/L (ref 98–107)
CHLORIDE SERPL-SCNC: 103 MMOL/L (ref 98–107)
CO2 SERPL-SCNC: 21 MMOL/L (ref 21–32)
CO2 SERPL-SCNC: 23 MMOL/L (ref 21–32)
CREAT SERPL-MCNC: 1.45 MG/DL (ref 0.5–1.3)
CREAT SERPL-MCNC: 1.6 MG/DL (ref 0.5–1.3)
EGFRCR SERPLBLD CKD-EPI 2021: 45 ML/MIN/1.73M*2
EGFRCR SERPLBLD CKD-EPI 2021: 50 ML/MIN/1.73M*2
ERYTHROCYTE [DISTWIDTH] IN BLOOD BY AUTOMATED COUNT: 16 % (ref 11.5–14.5)
ERYTHROCYTE [DISTWIDTH] IN BLOOD BY AUTOMATED COUNT: 16.5 % (ref 11.5–14.5)
GLUCOSE BLD MANUAL STRIP-MCNC: 114 MG/DL (ref 74–99)
GLUCOSE BLD MANUAL STRIP-MCNC: 116 MG/DL (ref 74–99)
GLUCOSE BLD MANUAL STRIP-MCNC: 119 MG/DL (ref 74–99)
GLUCOSE BLDA-MCNC: 108 MG/DL (ref 74–99)
GLUCOSE SERPL-MCNC: 103 MG/DL (ref 74–99)
GLUCOSE SERPL-MCNC: 116 MG/DL (ref 74–99)
GRAM STN SPEC: ABNORMAL
HCO3 BLDA-SCNC: 20.9 MMOL/L (ref 22–26)
HCT VFR BLD AUTO: 31.3 % (ref 41–52)
HCT VFR BLD AUTO: 31.3 % (ref 41–52)
HCT VFR BLD EST: 32 % (ref 41–52)
HGB BLD-MCNC: 10.2 G/DL (ref 13.5–17.5)
HGB BLD-MCNC: 9.7 G/DL (ref 13.5–17.5)
HGB BLDA-MCNC: 10.7 G/DL (ref 13.5–17.5)
INHALED O2 CONCENTRATION: 0 %
LACTATE BLDA-SCNC: 0.9 MMOL/L (ref 0.4–2)
LACTATE SERPL-SCNC: 1.3 MMOL/L (ref 0.4–2)
MAGNESIUM SERPL-MCNC: 2.1 MG/DL (ref 1.6–2.4)
MCH RBC QN AUTO: 27.2 PG (ref 26–34)
MCH RBC QN AUTO: 27.5 PG (ref 26–34)
MCHC RBC AUTO-ENTMCNC: 31 G/DL (ref 32–36)
MCHC RBC AUTO-ENTMCNC: 32.6 G/DL (ref 32–36)
MCV RBC AUTO: 84 FL (ref 80–100)
MCV RBC AUTO: 88 FL (ref 80–100)
NRBC BLD-RTO: 0 /100 WBCS (ref 0–0)
NRBC BLD-RTO: 0 /100 WBCS (ref 0–0)
OXYHGB MFR BLDA: 96.1 % (ref 94–98)
PCO2 BLDA: 28 MM HG (ref 38–42)
PH BLDA: 7.48 PH (ref 7.38–7.42)
PHOSPHATE SERPL-MCNC: 3.3 MG/DL (ref 2.5–4.9)
PLATELET # BLD AUTO: 241 X10*3/UL (ref 150–450)
PLATELET # BLD AUTO: 267 X10*3/UL (ref 150–450)
PO2 BLDA: 80 MM HG (ref 85–95)
POTASSIUM BLDA-SCNC: 4.4 MMOL/L (ref 3.5–5.3)
POTASSIUM SERPL-SCNC: 4 MMOL/L (ref 3.5–5.3)
POTASSIUM SERPL-SCNC: 4.5 MMOL/L (ref 3.5–5.3)
RBC # BLD AUTO: 3.57 X10*6/UL (ref 4.5–5.9)
RBC # BLD AUTO: 3.71 X10*6/UL (ref 4.5–5.9)
SAO2 % BLDA: 99 % (ref 94–100)
SODIUM BLDA-SCNC: 131 MMOL/L (ref 136–145)
SODIUM SERPL-SCNC: 132 MMOL/L (ref 136–145)
SODIUM SERPL-SCNC: 135 MMOL/L (ref 136–145)
WBC # BLD AUTO: 12.4 X10*3/UL (ref 4.4–11.3)
WBC # BLD AUTO: 12.5 X10*3/UL (ref 4.4–11.3)

## 2025-02-07 PROCEDURE — 87205 SMEAR GRAM STAIN: CPT | Performed by: STUDENT IN AN ORGANIZED HEALTH CARE EDUCATION/TRAINING PROGRAM

## 2025-02-07 PROCEDURE — 2500000004 HC RX 250 GENERAL PHARMACY W/ HCPCS (ALT 636 FOR OP/ED)

## 2025-02-07 PROCEDURE — 84132 ASSAY OF SERUM POTASSIUM: CPT | Performed by: PHYSICIAN ASSISTANT

## 2025-02-07 PROCEDURE — 2500000004 HC RX 250 GENERAL PHARMACY W/ HCPCS (ALT 636 FOR OP/ED): Performed by: NURSE PRACTITIONER

## 2025-02-07 PROCEDURE — 84132 ASSAY OF SERUM POTASSIUM: CPT | Performed by: NURSE PRACTITIONER

## 2025-02-07 PROCEDURE — 36415 COLL VENOUS BLD VENIPUNCTURE: CPT | Performed by: STUDENT IN AN ORGANIZED HEALTH CARE EDUCATION/TRAINING PROGRAM

## 2025-02-07 PROCEDURE — 83735 ASSAY OF MAGNESIUM: CPT | Performed by: PHYSICIAN ASSISTANT

## 2025-02-07 PROCEDURE — 2500000005 HC RX 250 GENERAL PHARMACY W/O HCPCS: Performed by: NURSE PRACTITIONER

## 2025-02-07 PROCEDURE — 97110 THERAPEUTIC EXERCISES: CPT | Mod: GP,CQ

## 2025-02-07 PROCEDURE — 83605 ASSAY OF LACTIC ACID: CPT | Performed by: PHYSICIAN ASSISTANT

## 2025-02-07 PROCEDURE — 85027 COMPLETE CBC AUTOMATED: CPT | Performed by: PHYSICIAN ASSISTANT

## 2025-02-07 PROCEDURE — 97116 GAIT TRAINING THERAPY: CPT | Mod: GP,CQ

## 2025-02-07 PROCEDURE — 1170000001 HC PRIVATE ONCOLOGY ROOM DAILY

## 2025-02-07 PROCEDURE — 71045 X-RAY EXAM CHEST 1 VIEW: CPT

## 2025-02-07 PROCEDURE — 74018 RADEX ABDOMEN 1 VIEW: CPT

## 2025-02-07 PROCEDURE — 2500000001 HC RX 250 WO HCPCS SELF ADMINISTERED DRUGS (ALT 637 FOR MEDICARE OP): Performed by: NURSE PRACTITIONER

## 2025-02-07 PROCEDURE — 85027 COMPLETE CBC AUTOMATED: CPT | Performed by: STUDENT IN AN ORGANIZED HEALTH CARE EDUCATION/TRAINING PROGRAM

## 2025-02-07 PROCEDURE — 82947 ASSAY GLUCOSE BLOOD QUANT: CPT

## 2025-02-07 PROCEDURE — 36415 COLL VENOUS BLD VENIPUNCTURE: CPT | Performed by: PHYSICIAN ASSISTANT

## 2025-02-07 PROCEDURE — 2580000001 HC RX 258 IV SOLUTIONS: Performed by: NURSE PRACTITIONER

## 2025-02-07 PROCEDURE — 80069 RENAL FUNCTION PANEL: CPT | Performed by: STUDENT IN AN ORGANIZED HEALTH CARE EDUCATION/TRAINING PROGRAM

## 2025-02-07 PROCEDURE — 2500000001 HC RX 250 WO HCPCS SELF ADMINISTERED DRUGS (ALT 637 FOR MEDICARE OP)

## 2025-02-07 PROCEDURE — 87040 BLOOD CULTURE FOR BACTERIA: CPT | Performed by: STUDENT IN AN ORGANIZED HEALTH CARE EDUCATION/TRAINING PROGRAM

## 2025-02-07 PROCEDURE — 2500000004 HC RX 250 GENERAL PHARMACY W/ HCPCS (ALT 636 FOR OP/ED): Performed by: STUDENT IN AN ORGANIZED HEALTH CARE EDUCATION/TRAINING PROGRAM

## 2025-02-07 RX ORDER — VANCOMYCIN HYDROCHLORIDE 750 MG/150ML
750 INJECTION, SOLUTION INTRAVENOUS EVERY 12 HOURS
Status: DISCONTINUED | OUTPATIENT
Start: 2025-02-08 | End: 2025-02-09

## 2025-02-07 RX ORDER — VANCOMYCIN HYDROCHLORIDE 1 G/20ML
INJECTION, POWDER, LYOPHILIZED, FOR SOLUTION INTRAVENOUS DAILY PRN
Status: DISCONTINUED | OUTPATIENT
Start: 2025-02-07 | End: 2025-02-09

## 2025-02-07 RX ORDER — ACETAMINOPHEN 10 MG/ML
1000 INJECTION, SOLUTION INTRAVENOUS EVERY 6 HOURS SCHEDULED
Status: COMPLETED | OUTPATIENT
Start: 2025-02-07 | End: 2025-02-08

## 2025-02-07 RX ORDER — SODIUM CHLORIDE, SODIUM LACTATE, POTASSIUM CHLORIDE, CALCIUM CHLORIDE 600; 310; 30; 20 MG/100ML; MG/100ML; MG/100ML; MG/100ML
50 INJECTION, SOLUTION INTRAVENOUS CONTINUOUS
Status: ACTIVE | OUTPATIENT
Start: 2025-02-07 | End: 2025-02-08

## 2025-02-07 RX ORDER — HYDROMORPHONE HYDROCHLORIDE 1 MG/ML
0.2 INJECTION, SOLUTION INTRAMUSCULAR; INTRAVENOUS; SUBCUTANEOUS
Status: DISCONTINUED | OUTPATIENT
Start: 2025-02-07 | End: 2025-02-15

## 2025-02-07 RX ORDER — HYDROMORPHONE HYDROCHLORIDE 1 MG/ML
0.4 INJECTION, SOLUTION INTRAMUSCULAR; INTRAVENOUS; SUBCUTANEOUS EVERY 4 HOURS PRN
Status: DISCONTINUED | OUTPATIENT
Start: 2025-02-07 | End: 2025-02-15

## 2025-02-07 RX ORDER — SODIUM CHLORIDE, SODIUM LACTATE, POTASSIUM CHLORIDE, CALCIUM CHLORIDE 600; 310; 30; 20 MG/100ML; MG/100ML; MG/100ML; MG/100ML
0-100 INJECTION, SOLUTION INTRAVENOUS CONTINUOUS
Status: ACTIVE | OUTPATIENT
Start: 2025-02-07 | End: 2025-02-08

## 2025-02-07 RX ORDER — VANCOMYCIN HYDROCHLORIDE 1 G/200ML
1000 INJECTION, SOLUTION INTRAVENOUS ONCE
Status: COMPLETED | OUTPATIENT
Start: 2025-02-07 | End: 2025-02-07

## 2025-02-07 RX ADMIN — HYDROMORPHONE HYDROCHLORIDE 0.4 MG: 1 INJECTION, SOLUTION INTRAMUSCULAR; INTRAVENOUS; SUBCUTANEOUS at 02:13

## 2025-02-07 RX ADMIN — AMLODIPINE BESYLATE 10 MG: 10 TABLET ORAL at 09:09

## 2025-02-07 RX ADMIN — SODIUM CHLORIDE, POTASSIUM CHLORIDE, SODIUM LACTATE AND CALCIUM CHLORIDE 50 ML/HR: 600; 310; 30; 20 INJECTION, SOLUTION INTRAVENOUS at 11:29

## 2025-02-07 RX ADMIN — HEPARIN SODIUM 5000 UNITS: 5000 INJECTION INTRAVENOUS; SUBCUTANEOUS at 17:20

## 2025-02-07 RX ADMIN — PIPERACILLIN SODIUM AND TAZOBACTAM SODIUM 3.38 G: 3; .375 INJECTION, SOLUTION INTRAVENOUS at 17:20

## 2025-02-07 RX ADMIN — LEVOTHYROXINE SODIUM ANHYDROUS 12.5 MCG: 100 INJECTION, POWDER, LYOPHILIZED, FOR SOLUTION INTRAVENOUS at 10:36

## 2025-02-07 RX ADMIN — HEPARIN SODIUM 5000 UNITS: 5000 INJECTION INTRAVENOUS; SUBCUTANEOUS at 09:09

## 2025-02-07 RX ADMIN — SODIUM CHLORIDE, POTASSIUM CHLORIDE, SODIUM LACTATE AND CALCIUM CHLORIDE 50 ML/HR: 600; 310; 30; 20 INJECTION, SOLUTION INTRAVENOUS at 09:09

## 2025-02-07 RX ADMIN — ACETAMINOPHEN 1000 MG: 10 INJECTION INTRAVENOUS at 09:23

## 2025-02-07 RX ADMIN — PIPERACILLIN SODIUM AND TAZOBACTAM SODIUM 3.38 G: 3; .375 INJECTION, SOLUTION INTRAVENOUS at 23:24

## 2025-02-07 RX ADMIN — PANTOPRAZOLE SODIUM 40 MG: 40 INJECTION, POWDER, FOR SOLUTION INTRAVENOUS at 09:08

## 2025-02-07 RX ADMIN — SODIUM CHLORIDE, POTASSIUM CHLORIDE, SODIUM LACTATE AND CALCIUM CHLORIDE 175 ML/HR: 600; 310; 30; 20 INJECTION, SOLUTION INTRAVENOUS at 14:40

## 2025-02-07 RX ADMIN — SODIUM CHLORIDE, POTASSIUM CHLORIDE, SODIUM LACTATE AND CALCIUM CHLORIDE 250 ML/HR: 600; 310; 30; 20 INJECTION, SOLUTION INTRAVENOUS at 18:19

## 2025-02-07 RX ADMIN — ASCORBIC ACID, VITAMIN A PALMITATE, CHOLECALCIFEROL, THIAMINE HYDROCHLORIDE, RIBOFLAVIN-5 PHOSPHATE SODIUM, PYRIDOXINE HYDROCHLORIDE, NIACINAMIDE, DEXPANTHENOL, ALPHA-TOCOPHEROL ACETATE, VITAMIN K1, FOLIC ACID, BIOTIN, CYANOCOBALAMIN: 200; 3300; 200; 6; 3.6; 6; 40; 15; 10; 150; 600; 60; 5 INJECTION, SOLUTION INTRAVENOUS at 20:00

## 2025-02-07 RX ADMIN — SODIUM CHLORIDE, POTASSIUM CHLORIDE, SODIUM LACTATE AND CALCIUM CHLORIDE 50 ML/HR: 600; 310; 30; 20 INJECTION, SOLUTION INTRAVENOUS at 02:35

## 2025-02-07 RX ADMIN — ACETAMINOPHEN 1000 MG: 1000 INJECTION, SOLUTION INTRAVENOUS at 22:21

## 2025-02-07 RX ADMIN — ACETAMINOPHEN 1000 MG: 1000 INJECTION, SOLUTION INTRAVENOUS at 15:42

## 2025-02-07 RX ADMIN — VANCOMYCIN HYDROCHLORIDE 1000 MG: 1 INJECTION, SOLUTION INTRAVENOUS at 18:18

## 2025-02-07 RX ADMIN — BISACODYL 10 MG: 10 SUPPOSITORY RECTAL at 09:26

## 2025-02-07 RX ADMIN — HYDROMORPHONE HYDROCHLORIDE 0.2 MG: 1 INJECTION, SOLUTION INTRAMUSCULAR; INTRAVENOUS; SUBCUTANEOUS at 19:23

## 2025-02-07 RX ADMIN — Medication 1000 UNITS: at 09:07

## 2025-02-07 RX ADMIN — SMOFLIPID 50 G: 6; 6; 5; 3 INJECTION, EMULSION INTRAVENOUS at 20:23

## 2025-02-07 RX ADMIN — PIPERACILLIN SODIUM AND TAZOBACTAM SODIUM 3.38 G: 3; .375 INJECTION, SOLUTION INTRAVENOUS at 11:30

## 2025-02-07 RX ADMIN — HEPARIN SODIUM 5000 UNITS: 5000 INJECTION INTRAVENOUS; SUBCUTANEOUS at 02:46

## 2025-02-07 RX ADMIN — ACETAMINOPHEN 1000 MG: 10 INJECTION INTRAVENOUS at 02:45

## 2025-02-07 ASSESSMENT — COGNITIVE AND FUNCTIONAL STATUS - GENERAL
CLIMB 3 TO 5 STEPS WITH RAILING: A LITTLE
CLIMB 3 TO 5 STEPS WITH RAILING: TOTAL
WALKING IN HOSPITAL ROOM: A LITTLE
TURNING FROM BACK TO SIDE WHILE IN FLAT BAD: A LOT
STANDING UP FROM CHAIR USING ARMS: A LITTLE
TURNING FROM BACK TO SIDE WHILE IN FLAT BAD: A LITTLE
TOILETING: A LITTLE
HELP NEEDED FOR BATHING: A LITTLE
EATING MEALS: A LITTLE
WALKING IN HOSPITAL ROOM: A LITTLE
MOVING FROM LYING ON BACK TO SITTING ON SIDE OF FLAT BED WITH BEDRAILS: A LITTLE
MOVING TO AND FROM BED TO CHAIR: A LITTLE
MOBILITY SCORE: 19
DAILY ACTIVITIY SCORE: 18
DRESSING REGULAR LOWER BODY CLOTHING: A LITTLE
PERSONAL GROOMING: A LITTLE
STANDING UP FROM CHAIR USING ARMS: A LOT
MOVING TO AND FROM BED TO CHAIR: A LITTLE
MOBILITY SCORE: 14
DRESSING REGULAR UPPER BODY CLOTHING: A LITTLE

## 2025-02-07 ASSESSMENT — PAIN SCALES - GENERAL
PAINLEVEL_OUTOF10: 6
PAINLEVEL_OUTOF10: 5 - MODERATE PAIN
PAINLEVEL_OUTOF10: 6
PAINLEVEL_OUTOF10: 4
PAINLEVEL_OUTOF10: 5 - MODERATE PAIN
PAINLEVEL_OUTOF10: 7

## 2025-02-07 ASSESSMENT — PAIN - FUNCTIONAL ASSESSMENT
PAIN_FUNCTIONAL_ASSESSMENT: 0-10

## 2025-02-07 NOTE — PROGRESS NOTES
Physical Therapy    Physical Therapy Treatment    Patient Name: Swapnil Allen  MRN: [id number]  Department: [facility]  Room: [facility]5028-A  Today's Date: 2/7/2025  Time Calculation  Start Time: 0938  Stop Time: 1003  Time Calculation (min): 25 min         Assessment/Plan   PT Assessment  Barriers to Discharge Home: Caregiver assistance, Cognition needs, Physical needs  Caregiver Assistance: Patient lives alone and/or does not have reliable caregiver assistance  Cognition Needs: 24hr supervision for safety awareness needed, Insight of patient limited regarding functional ability/needs  Physical Needs: 24hr mobility assistance needed, 24hr ADL assistance needed  End of Session Communication: Bedside nurse  Assessment Comment: Pt tolerated PT session well, continues to require increased encouragement for increaesd OOB activity. Pt continues to remain appropriate for Moderate intensity PT upon D/C from hospital.  End of Session Patient Position: Up in chair, Alarm on  PT Plan  Inpatient/Swing Bed or Outpatient: Inpatient  PT Plan  Treatment/Interventions: Bed mobility, Gait training, Transfer training, Stair training, Balance training, Neuromuscular re-education, Strengthening, Endurance training, Range of motion, Therapeutic exercise, Therapeutic activity, Home exercise program, Positioning, Postural re-education  PT Plan: Ongoing PT  PT Frequency: 3 times per week  PT Discharge Recommendations: Moderate intensity level of continued care  Equipment Recommended upon Discharge: Wheeled walker  PT Recommended Transfer Status: Assist x1, Assistive device  PT - OK to Discharge: Yes      General Visit Information:   PT  Visit  PT Received On: 02/07/25  General  Missed Visit Reason:  (n/a)  Family/Caregiver Present: No  Prior to Session Communication: Bedside nurse  Patient Position Received: Bed, 3 rail up, Alarm on  General Comment: Pt supine in bed, required increased encouragement to perform OOB activities, agreeable to work with  PT.    Subjective   Precautions:  Precautions  Medical Precautions: Fall precautions  Precautions Comment: Protective precautions     Date/Time Vitals Session Patient Position Pulse Resp SpO2 BP MAP (mmHg)    02/07/25 0938 Pre PT  --  90  --  95 %  --  --     02/07/25 0950 During PT  --  103  --  93 %  --  --                 Objective   Pain:  Pain Assessment  Pain Assessment: 0-10  0-10 (Numeric) Pain Score: 6  Pain Location: Abdomen  Cognition:  Cognition  Orientation Level: Oriented X4    Activity Tolerance:  Activity Tolerance  Endurance: Tolerates 10 - 20 min exercise with multiple rests  Treatments:  Therapeutic Exercise  Therapeutic Exercise Performed: Yes  Therapeutic Exercise Activity 1: Supine: AP, QS, HS, SAQ, Hip ABD x10 BLE    Bed Mobility  Bed Mobility: Yes  Bed Mobility 1  Bed Mobility 1: Supine to sitting  Level of Assistance 1: Moderate assistance, Minimal verbal cues  Bed Mobility Comments 1: Assist with trunk    Ambulation/Gait Training  Ambulation/Gait Training Performed: Yes  Ambulation/Gait Training 1  Surface 1: Level tile  Device 1: Rolling walker  Assistance 1: Minimum assistance, Minimal verbal cues  Quality of Gait 1: Narrow base of support, Diminished heel strike, Decreased step length, Inconsistent stride length, Forward flexed posture (Decreaed nena, decreased endurance,)  Comments/Distance (ft) 1: x8ft, cues for safe sequencing and maintaining within frame of walker.    Transfers  Transfer: Yes  Transfer 1  Transfer From 1: Bed to  Transfer to 1: Stand  Technique 1: Sit to stand  Transfer Device 1: Walker  Transfer Level of Assistance 1: Moderate assistance, Minimal verbal cues  Trials/Comments 1: x1 trial, cues for sequencing and hand placement.  Transfers 2  Transfer From 2: Stand to  Transfer to 2: Chair with arms  Technique 2: Stand to sit  Transfer Device 2: Walker  Transfer Level of Assistance 2: Moderate assistance, Moderate verbal cues  Trials/Comments 2: x1 trial, pt  sitting prior to safe alignment to chair, increased time spent on education for safety with transfers to/from chair to prevent fall risks.    Stairs  Stairs: No    Outcome Measures:  [facility] Basic Mobility  Turning from your back to your side while in a flat bed without using bedrails: A little  Moving from lying on your back to sitting on the side of a flat bed without using bedrails: A lot  Moving to and from bed to chair (including a wheelchair): A little  Standing up from a chair using your arms (e.g. wheelchair or bedside chair): A lot  To walk in hospital room: A little  Climbing 3-5 steps with railing: Total  Basic Mobility - Total Score: 14    Education Documentation  Precautions, taught by Sunitha Cortes PTA at 2/7/2025 10:57 AM.  Learner: Patient  Readiness: Acceptance  Method: Explanation  Response: Verbalizes Understanding, Needs Reinforcement  Comment: Increasing OOB activity and importance of participation in PT.    Body Mechanics, taught by Sunitha Cortes PTA at 2/7/2025 10:57 AM.  Learner: Patient  Readiness: Acceptance  Method: Explanation  Response: Verbalizes Understanding, Needs Reinforcement  Comment: Increasing OOB activity and importance of participation in PT.    Home Exercise Program, taught by Sunitha Cortes PTA at 2/7/2025 10:57 AM.  Learner: Patient  Readiness: Acceptance  Method: Explanation  Response: Verbalizes Understanding, Needs Reinforcement  Comment: Increasing OOB activity and importance of participation in PT.    Mobility Training, taught by Sunitha Cortes PTA at 2/7/2025 10:57 AM.  Learner: Patient  Readiness: Acceptance  Method: Explanation  Response: Verbalizes Understanding, Needs Reinforcement  Comment: Increasing OOB activity and importance of participation in PT.    Education Comments  No comments found.        OP EDUCATION:       Encounter Problems       Encounter Problems (Active)       Mobility       LTG - Patient will navigate 4-6 steps with rails/device and  moderate assistance. (Not Progressing)       Start:  01/30/25    Expected End:  02/13/25            STG - Patient will ambulate ~100 feet with FWW and minimal assistance.  (Progressing)       Start:  01/30/25    Expected End:  02/13/25            Pt will ambulate >/= 100 ft with FWW and minimal assistance and no signs of fatigue or SOB.   (Progressing)       Start:  01/30/25    Expected End:  02/13/25               PT Transfers       LTG - Patient will transfer from one surface to another CGA with FWW. (Progressing)       Start:  01/30/25    Expected End:  02/13/25            STG - Patient will perform bed mobility with close supervision.  (Progressing)       Start:  01/30/25    Expected End:  02/13/25               Pain - Adult            02/07/25 at 10:58 AM   Sunitha Cortes [facility]   Rehab Office: [phone]

## 2025-02-07 NOTE — CARE PLAN
The patient's goals for the shift include      The clinical goals for the shift include patient will remain free from N/V throughout shift.    Patient was safe and injury free throughout shift. Patient febrile this shift. Patient out of bed today.      Problem: Pain - Adult  Goal: Verbalizes/displays adequate comfort level or baseline comfort level  Outcome: Progressing     Problem: Safety - Adult  Goal: Free from fall injury  Outcome: Progressing     Problem: Discharge Planning  Goal: Discharge to home or other facility with appropriate resources  Outcome: Progressing     Problem: Nutrition  Goal: Nutrient intake appropriate for maintaining nutritional needs  Outcome: Progressing     Problem: Skin  Goal: Prevent/manage excess moisture  Outcome: Progressing  Flowsheets (Taken 2/7/2025 1711)  Prevent/manage excess moisture: Monitor for/manage infection if present  Goal: Prevent/minimize sheer/friction injuries  Outcome: Progressing  Flowsheets (Taken 2/7/2025 1711)  Prevent/minimize sheer/friction injuries:   Use pull sheet   Turn/reposition every 2 hours/use positioning/transfer devices  Goal: Promote/optimize nutrition  Outcome: Progressing  Flowsheets (Taken 2/7/2025 1711)  Promote/optimize nutrition: Discuss with provider if NPO > 2 days     Problem: Pain  Goal: Takes deep breaths with improved pain control throughout the shift  Outcome: Progressing  Goal: Turns in bed with improved pain control throughout the shift  Outcome: Progressing  Goal: Walks with improved pain control throughout the shift  Outcome: Progressing  Goal: Performs ADL's with improved pain control throughout shift  Outcome: Progressing  Goal: Participates in PT with improved pain control throughout the shift  Outcome: Progressing  Goal: Free from opioid side effects throughout the shift  Outcome: Progressing  Goal: Free from acute confusion related to pain meds throughout the shift  Outcome: Progressing

## 2025-02-07 NOTE — SIGNIFICANT EVENT
Rapid Response called    14:51 Bedside RN and Rapid Response RN poked patient a total of 5 times between the two of us. Both of us were not able to get the orders labs. MD Mcdonnell notified at this time. Labs are in for lab to collect. RN will follow up.

## 2025-02-07 NOTE — PROGRESS NOTES
[facility] [place]  Progress Note      Patient: Swapnil Allen  Age/Sex: 75 y.o., male  MRN: [id number]  Date of surgery: 1/28/25  Admit Date: 1/28/2025   Code Status: Full Code  Length of Stay: 10      Interval History/Overnight Events:   Patient underwent CT yesterday with continued pSBO in region of mesenteric swirling, and interval worsening of ground-glass to consolidative opacities   Patient with 2.225cc from NGT with bilious output. No emesis episodes.   Denies any fevers, chills. Endorses abdominal soreness and discomfort that has been stable  UOP 1800cc, no rodriguez   Passing little gas, 2x BM   Cr 1.45 (1.6)      Objective  02/05 1900 - 02/07 0659  In: 4897.6 [I.V.:1865.3]  Out: 6475 [Urine:2050]              9.7     12.5>-----<241              31.3   135  103  32                  ----------------<116     4.0  23  1.45          Ca 9.2 Phos 3.3 Mg 2.10       ALT 25 AST 33 AlkPhos 62 tBili 0.6          Vitals:    02/06/25 2004 02/07/25 0143 02/07/25 0425 02/07/25 0853   BP: 126/80 123/64 146/80 142/87   BP Location: Right arm Left arm Right arm Left arm   Patient Position: Lying Lying Lying Lying   Pulse: 78 78 76 86   Resp: 16 16 16 16   Temp: 36.4 °C (97.5 °F) 36.4 °C (97.5 °F) 36.4 °C (97.5 °F) 36.5 °C (97.7 °F)   TempSrc: Temporal Temporal Temporal Temporal   SpO2: 94% 95% 96% 95%   Weight:       Height:              Medications:  Current Facility-Administered Medications   Medication Dose Route Frequency Provider Last Rate Last Admin    acetaminophen (Ofirmev) injection 1,000 mg  1,000 mg intravenous q6h [facility] Alexys Hood  mL/hr at 02/07/25 0923 1,000 mg at 02/07/25 0923    Adult Clinimix Parenteral Nutrition Continuous  83 mL/hr intravenous Daily PN Karen Katz APRN-CNP 83 mL/hr at 02/06/25 2009 New Bag at 02/06/25 2009    alteplase (Cathflo Activase) injection 2 mg  2 mg intra-catheter PRN Alexys Hood MD        amLODIPine (Norvasc) tablet 10 mg  10 mg nasogastric tube Daily Ludwin VALERIO  MD Alan   10 mg at 02/07/25 0909    [Held by provider] aspirin EC tablet 81 mg  81 mg oral Daily Jannie Hamm MD   81 mg at 01/31/25 0820    aspirin suppository 150 mg  150 mg rectal Daily Deena Medina MD   150 mg at 02/06/25 1652    benzocaine-menthol (Cepastat Sore Throat) lozenge 1 lozenge  1 lozenge Mouth/Throat q2h PRN Ludwin Phillips MD   1 lozenge at 02/05/25 0911    bisacodyl (Dulcolax) suppository 10 mg  10 mg rectal Daily KYLIE Britt-CNP   10 mg at 02/06/25 1046    calcium carbonate (Tums) chewable tablet 500 mg  500 mg nasogastric tube 4x daily PRN Ludwin Phillips MD   500 mg at 02/04/25 2047    cholecalciferol (Vitamin D-3) tablet 1,000 Units  1,000 Units nasogastric tube Daily Ludwin Phillips MD   1,000 Units at 02/07/25 0907    dextrose 50 % injection 12.5 g  12.5 g intravenous q15 min PRN Ludwin Phillips MD   12.5 g at 02/02/25 1402    pantoprazole (ProtoNix) EC tablet 40 mg  40 mg oral Daily before breakfast Elmo Monteiro MD        Or    esomeprazole (NexIUM) suspension 40 mg  40 mg nasoduodenal tube Daily before breakfast Elmo Monteiro MD        Or    pantoprazole (ProtoNix) injection 40 mg  40 mg intravenous Daily before breakfast Elmo Monteiro MD   40 mg at 02/07/25 0908    fat emulsion fish oil/plant based (SMOFlipid) 20 % IV infusion 50 g  250 mL intravenous Daily Lipids KYLIE Britt-CNP   Stopped at 02/07/25 0906    heparin (porcine) injection 5,000 Units  5,000 Units subcutaneous q8h Jannie Hamm MD   5,000 Units at 02/07/25 0909    HYDROmorphone (Dilaudid) injection 0.4 mg  0.4 mg intravenous q2h PRN Alexys Hood MD   0.4 mg at 02/07/25 0213    insulin lispro injection 0-5 Units  0-5 Units subcutaneous TID AC Karen Katz, APRN-CNP        ipratropium-albuteroL (Duo-Neb) 0.5-2.5 mg/3 mL nebulizer solution 3 mL  3 mL nebulization TID PRN Alexys Hood MD        labetaloL (Normodyne,Trandate) injection 10 mg  10 mg intravenous q8h PRN  Gallo Martines MD        lactated Ringer's infusion  50 mL/hr intravenous Continuous Alexys Hood MD 50 mL/hr at 02/07/25 0235 50 mL/hr at 02/07/25 0235    lactated Ringer's infusion  50 mL/hr intravenous Continuous Alexys Hood MD 50 mL/hr at 02/07/25 0909 50 mL/hr at 02/07/25 0909    levothyroxine (Synthroid) injection 12.5 mcg  12.5 mcg intravenous q24h ALEN Ludwin Phillips MD   12.5 mcg at 02/06/25 1315    [Held by provider] levothyroxine (Synthroid, Levoxyl) tablet 25 mcg  25 mcg oral Daily Jannie Hamm MD   25 mcg at 01/31/25 0531    lidocaine (Xylocaine) 10 mg/mL (1 %) injection 5 mL  5 mL infiltration Once KYLIE Britt-CNP        lidocaine (Xylocaine) 10 mg/mL (1 %) injection 5 mL  5 mL infiltration Once Alexys Hood MD        lidocaine 2 % mucosal jelly (Uro-Jet) 1 Application  1 Application nasal Once Elmo Monteiro MD        lidocaine 4 % patch 3 patch  3 patch transdermal Daily Gallo Martines MD   3 patch at 02/04/25 0929    melatonin tablet 10 mg  10 mg nasogastric tube Nightly PRN Ludwin Phillips MD   10 mg at 02/05/25 2119    ondansetron (Zofran) injection 4 mg  4 mg intravenous q6h PRN Gallo Martines MD   4 mg at 01/31/25 2057    [Held by provider] oxyCODONE (Roxicodone) immediate release tablet 10 mg  10 mg oral q6h PRN Jannie Hamm MD   10 mg at 01/29/25 0235    [Held by provider] oxyCODONE (Roxicodone) immediate release tablet 5 mg  5 mg oral q6h PRN Jannie Hamm MD   5 mg at 01/29/25 0840    oxygen (O2) therapy   inhalation Continuous - Inhalation Ludwin Phillips ,000 mL/hr at 02/05/25 0912 21 percent at 02/05/25 2114    phenoL (Chloraseptic) 1.4 % mouth/throat spray 1 spray  1 spray Mouth/Throat 4x daily PRN Ludwin Phillips MD   1 spray at 02/05/25 0903    [Held by provider] tamsulosin (Flomax) 24 hr capsule 0.4 mg  0.4 mg oral Daily Jannie Hamm MD   0.4 mg at 01/31/25 0820       Physical Exam                                                                                                                          Gen -  No acute distress  Heent: NGT in place with bilious output      Neuro - Alert, oriented, conversant     CV -  regular rate , normotensive      Pulm - Symmetric chest rise, non-labored breathing on room air      Abd - Abdomen is soft, mildly-distended, and appropriately tender. Midline incision c/d/I closed with glue.       Ext - Warm, well perfused     MSK- no pain or swelling in LE bilaterally     Skin - without jaunice       Psych - appropriate tone, affect      Imaging  FL small bowel series    Result Date: 2/6/2025  Interpreted By:  Dannie Dean,  and Enoch Feldman STUDY: FL SMALL BOWEL SERIES;  2/5/2025 3:33 pm   INDICATION: Signs/Symptoms:concern for small bowel obstruction.   COMPARISON: None.   ACCESSION NUMBER(S): [id number]   ORDERING CLINICIAN: FILIPE ROA   TECHNIQUE: An initial radiograph of the abdomen and pelvis was obtained.  This was followed by  injection through the NG tube approximately   180 mL mL of contrast  Gastrografin. Multiple dynamic and static fluoroscopic images of the esophagus, stomach and duodenum were obtained. Delayed static images of the abdomen in the posteroanterior projection were obtained at 5, 10, 15, 30, 45, and 60 minute intervals. Total fluoroscopy time: 0.4 minutes.   FINDINGS: Initial  image demonstrates postsurgical changes of right nephrectomy in the sidewall of the NG tube projecting over the gastroesophageal junction. Multiple dilated small bowel loops are seen throughout the abdomen measuring up to 6 cm in diameter. Large amount of colonic stool burden is also noted.   Initial fluoro images demonstrate reflux of contrast into the esophagus. Subsequently the NG tube was advanced approximately 1 inch after discussion with the clinical team. The contrast subsequently is seen within the stomach and reaches the duodenum by 60 minutes. The patient was returned to the floor  to be followed with serial KUBs.       1.  Multiple gas-filled dilated small bowel loops throughout the abdomen. Contrast is seen within the duodenum at 60 minutes with no opacification of the jejunal loops. Findings likely represent small-bowel obstruction. The patient was returned to the floor to be followed with serial KUBs.   I personally reviewed the images/study and I agree with the findings as stated by resident physician Gaston Kendall MD. This study was interpreted at [facility], [place], [place].   MACRO: None   Signed by: Dannie Dean 2/6/2025 7:00 PM Dictation workstation:   [id number]    CT abdomen pelvis w IV contrast    Result Date: 2/6/2025  Interpreted By:  Kirill Buckley, STUDY: CT ABDOMEN PELVIS W IV CONTRAST;  2/6/2025 10:03 am   INDICATION: Signs/Symptoms:postop s/p nephrectomy on 01/28/2025 ileus.     COMPARISON: CT, 02/03/2025   ACCESSION NUMBER(S): [id number]   ORDERING CLINICIAN: FILIPE ROA   TECHNIQUE: CT of the abdomen and pelvis was performed.  Standard contiguous axial images were obtained at 3 mm slice thickness through the abdomen and pelvis. Coronal and sagittal reconstructions at 3 mm slice thickness were performed.  75 ML of Omnipaque 350 was administered intravenously without immediate complication.   FINDINGS: LOWER CHEST: The visualized lung base demonstrates slightly increased bilateral pleural effusions with associated atelectasis as well as ground-glass to consolidative opacities along the dependent portions of the lower lobes.   The heart is enlarged in size with trace pericardial effusion. Visualized distal esophagus demonstrates moderate-size hiatal hernia with some fluid visualized in the distal esophagus/hiatal hernia.   ABDOMEN:   LIVER: The liver demonstrates homogeneous attenuation without evidence of focal liver lesions. The previously described perihepatic free intraperitoneal air is no longer visualized.   BILE DUCTS: The  intrahepatic and extrahepatic ducts are not dilated.   GALLBLADDER: No calcified stones. No wall thickening.   PANCREAS: The pancreas appears unremarkable without evidence of ductal dilatation or masses.   SPLEEN: The spleen is normal in size without focal lesions. Splenule is again visualized (series 2, image 21).   ADRENAL GLANDS: Bilateral adrenal glands appear normal.   KIDNEYS AND URETERS: Postsurgical changes from prior right nephrectomy.   The left kidney again demonstrates multiple cysts measuring up to 4.7 x 4.5 cm, similar in size compared to multiple prior CTs dating back 09/12/2024 which previously measured 4.8 x 4.3 cm. No left calculi or hydronephrosis evident.   PELVIS:   BLADDER: The urinary bladder appears normal without abnormal wall thickening.   REPRODUCTIVE ORGANS: The prostate is enlarged measuring 4.3 cm, to be correlate with PSA nonemergent/outpatient basis.   BOWEL: The distal tip of the enteric tube terminates in the gastric antrum. Moderate-size hiatal hernia as described above. The stomach is slightly decompressed limiting evaluation.   There is again a focal area of narrowing along proximal to mid jejunal bowel loop (series 2, image 88) with slightly dilated and fluid-filled small bowel proximal and distal to this point. There is also again associated mesenteric vessel whirling without discrete focal obstruction evident.   Contrast from prior small-bowel follow-through is seen throughout the entire small bowel. No contrast is seen along the large bowel.   The appendix appears normal.   VESSELS: The aorta and IVC appear normal.   PERITONEUM/RETROPERITONEUM/LYMPH NODES: There is again trace pelvic free fluid. There is also layering fluid along the right nephrectomy surgical bed (series 2, image 78). Slightly prominent perigastric lymph nodes, likely postsurgical in nature.   No significantly enlarged mesenteric lymph nodes.   BONES AND ABDOMINAL WALL: No suspicious osseous lesions are  identified.  Slight midline abdominal fat thickening/stranding, likely postsurgical in nature.       1.  Findings compatible with partial small bowel obstruction in the region of mesenteric swirling. 2. Interval worsening of ground-glass to consolidative opacities in dependent portions of the lung, which may be related to aspiration and/or worsening infectious pneumonia. 3. Moderate-sized hiatal hernia with fluid visualized in the distal esophagus, which places the patient at risk for aspiration.   I personally reviewed the images/study and agree with the findings as stated by Kirill Buckley MD (Resident Physician). This study was interpreted at [facility], [place], [place].   MACRO: None     Dictation workstation:   [id number]    XR abdomen 1 view    Result Date: 2/6/2025  Interpreted By:  Heather Lopes, STUDY: XR ABDOMEN 1 VIEW; 2/6/2025 8:20 am   INDICATION: Signs/Symptoms:Extend SBFT.   COMPARISON: 02/06/2025, 2:28 a.m.   ACCESSION NUMBER(S): [id number]   ORDERING CLINICIAN: FILIPE ROA   FINDINGS: Two views of the abdomen and pelvis.   Enteric tube is in place with the tip projecting over the stomach. Multiple dilated loops of small bowel in the abdomen. Positive contrast is identified in the proximal small bowel. No definite evidence of contrast in the colon. Limited evaluation of pneumoperitoneum on supine imaging, however no gross evidence of free air is noted.   Atelectasis/scarring/consolidation in lung bases.   Osseous structures demonstrate no acute bony changes.       1.  As described above.   Signed by: Heather Zheng 2/6/2025 9:05 AM Dictation workstation:   [id number]    XR abdomen 1 view    Result Date: 2/6/2025  Interpreted By:  Heather Lopes, STUDY: XR ABDOMEN 1 VIEW; 2/6/2025 2:41 am   INDICATION: Signs/Symptoms:Extend SBFT.   COMPARISON: 02/05/2025   ACCESSION NUMBER(S): [id number]   ORDERING CLINICIAN: FILIPE ROA   FINDINGS:  Two views of the abdomen and pelvis.   Enteric tube is in place with the tip projecting over the stomach. Multiple dilated loops of small bowel in the abdomen. Positive contrast is identified in the proximal small bowel. No definite evidence of contrast in the colon. Limited evaluation of pneumoperitoneum on supine imaging, however no gross evidence of free air is noted.   Atelectasis/infiltrate in lung bases.   Osseous structures demonstrate no acute bony changes.       1.  As described above.   Signed by: Heather Zhegn 2/6/2025 9:05 AM Dictation workstation:   [id number]    XR abdomen 1 view    Result Date: 2/6/2025  Interpreted By:  Heather Lopes, STUDY: XR ABDOMEN 1 VIEW; 2/5/2025 10:10 pm   INDICATION: Signs/Symptoms:extend SBFT.   COMPARISON: 02/05/2025   ACCESSION NUMBER(S): [id number]   ORDERING CLINICIAN: FILIPE ROA   FINDINGS: Two views of the abdomen and pelvis.   Enteric tube is in place with the tip projecting over the stomach. Multiple dilated loops of small bowel in the abdomen. Positive contrast is identified in the proximal small bowel. No definite evidence of contrast in the colon. Limited evaluation of pneumoperitoneum on supine imaging, however no gross evidence of free air is noted.   Visualized lungs are clear.   Osseous structures demonstrate no acute bony changes.       1.  As described above   Signed by: Heather Zheng 2/6/2025 9:04 AM Dictation workstation:   [id number]    XR abdomen 1 view    Result Date: 2/6/2025  Interpreted By:  Heather Lopes,  and Stephania Blanco STUDY: XR ABDOMEN 1 VIEW; ;  2/5/2025 7:31 pm   INDICATION: Signs/Symptoms:NGT displaced, confirm replacement.   COMPARISON: Abdominal radiograph 02/05/2025 (6:16 p.m.)   ACCESSION NUMBER(S): [id number]   ORDERING CLINICIAN: FILIPE ROA   FINDINGS: Upright AP radiograph of the abdomen was provided.   Enteric tube courses below the level of the diaphragm with distal tip  overlying expected location of the gastric fundus. Surgical clips overlie the right hemiabdomen.   A few dilated loops of bowel in the visualized portion of the upper abdomen. No large pneumoperitoneum.   Faint opacity in lung bases which is atelectatic.   No acute osseous abnormality is evident.       1. Enteric tube with distal tip overlying the expected location of the gastric fundus. 2. Dilated loops of bowel in the visualized portion of the upper abdomen with positive contrast identified in the proximal small bowel. No definite evidence of contrast in the colon.       I personally reviewed the images/study and I agree with the findings as stated by Francis Reilly MD (Radiology Resident). This study was interpreted at [place], [place].   MACRO: None   Signed by: Heather Zheng 2/6/2025 9:02 AM Dictation workstation:   [id number]    XR abdomen 1 view    Result Date: 2/6/2025  Interpreted By:  Heather Lopes, STUDY: XR ABDOMEN 1 VIEW; 2/5/2025 6:15 pm   INDICATION: Signs/Symptoms:Extend SBFT.   COMPARISON: Radiograph dated 02/04/2025   ACCESSION NUMBER(S): [id number]   ORDERING CLINICIAN: FILIPE ROA   FINDINGS: Two views of the abdomen and pelvis. Positive contrast is identified in the stomach, duodenum and proximal jejunum. Multiple dilated loops of small bowel measuring up to 5.4 cm, unchanged. No definite evidence of contrast in the colon. Limited evaluation of pneumoperitoneum on supine imaging, however no gross evidence of free air is noted.   Visualized lungs are clear.   Osseous structures demonstrate no acute bony changes.       1.  As described above.   Signed by: Heather Zheng 2/6/2025 9:00 AM Dictation workstation:   [id number]      Assessment & Plan  75 y.o. 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 Postoperative course complicated by prolonged ileus vs small bowel obstruction. NGT placed 01/31. Patient now with slowly resolving ANISHA and new mild leukocytosis 12.5      Plan 2/7:  -NPO w/ mIVF LR 50cc/hr  -Follow up AXR  -Continue PPN  -F/U CRS recs  -Maintain NGT. Continue fluid repletion for NGT losses.   -Follow up labs   -Ambulate/OOB  -PT  -Possible PEG next week if NGT continues to have high output   -Possible PICC Sunday/Monday       Neuro:   -Pain controlled with Scheduled acetaminophen, Oxycodone 5/10 HELD while NPO, Dilaudid 0.4 PRN for breakthrough pain, robaxin 500mg q8hrs   -PRN melatonin for sleep aid     Resp:   -ICS  -On room air   -Appreciate RT recs    Card:   -Monitor vitals  -Amlodpine  -Aspirin   -Cholecalciferol     FEN: mIVF LR @ 50ml  Fluid repletion at 0.5:1 of NGT output    GI:   -NPO for NGT  -BR: Miralax, senokat   -PRN Reglan for nausea   - Bisacodyl suppository   -Colorectal surgery consulted, appreciate recommendations  -AXR 2/7  -Nutrition consulted: Currently on PPN     :   -Flomax   -Daily BMP to monitor kidney function and replete electrolytes as indicated.   -Continue to monitor Cr     Endo:   -Levothyroxine IV while NPO     Heme/ID:   -Daily CBC to monitor for acute blood loss anemia   -No indication for blood transfusion or antibiotics at this time     Ppy:   -ALEN  -SCD  -IS    Dispo:   RNF.     Assessment and plan discussed with chief resident Dr. Gannon and attending Dr. Ana Xiao MD, [facility]  Urology [place]  Adult Urology Pager: [phone]  Pediatric Urology Pager: [phone]

## 2025-02-07 NOTE — CONSULTS
Reason For Consult  PICC    PICC order received yesterday. Patient refused plan for PICC yesterday. Upon following up with team today. Hold off on PICC at this time.       Amy Suarez RN

## 2025-02-07 NOTE — CONSULTS
Pharmacy to Dose Vancomycin:  Swapnil Allen is a 75 y.o. male ordered pharmacy to dose vancomycin for pneumonia. Today is day 1   of therapy.  Goal: -600mg/L*hr  Results from last 7 days   Lab Units 02/07/25  0655 02/06/25  0656 02/05/25  0709   BUN mg/dL 32* 34* 32*   CREATININE mg/dL 1.45* 1.60* 1.43*   WBC AUTO x10*3/uL 12.5* 12.3* 12.6*      Staph/MRSA Screen Culture   Date/Time Value Ref Range Status   02/03/2025 11:47 AM (A)  Final    Isolated: Methicillin Resistant Staphylococcus aureus (MRSA)      Susceptibility data from last 90 days.  Collected Specimen Info Organism   02/03/25 Swab from Anterior Nares Methicillin Resistant Staphylococcus aureus (MRSA)     Antibiotics: Zosyn (Day 1).  Pertinent Medications: none.  Renal function remains stable.    Plan:  Start with 1G vanco load then start vanco [id number].  The above dosing regimen is predicted by [employer] to result in the following pharmacokinetic parameters:  Loading dose: 1000 mg at 16:47 02/07/2025.  Regimen: 750 mg IV every 12 hours.  Start time: 04:47 on 02/08/2025  Exposure target: AUC24 (range)400-600 mg/L.hr   [id number]: 396 mg/L.hr  AUC24,ss: 521 mg/L.hr  Probability of AUC24 > 400: 78 %  Ctrough,ss: 18.5 mg/L  Probability of Ctrough,ss > 20: 42 %  Follow-up level ordered for 2/9 AM unless clinically indicated sooner.  Pharmacy will continue daily vancomycin monitoring. Please contact the pharmacy with any questions.    Thank you,  Tian Aguirre [facility]

## 2025-02-07 NOTE — SIGNIFICANT EVENT
Rapid Response Nurse Note: Rapid Response    Pager time:   Arrival time:   Event end time:   Location: [facility] 5  [] Triage by phone or secure messaging    Rapid response initiated by:  [] Rapid response RN [] Family [] Nursing Supervisor [] Physician   [] RADAR auto page [] Sepsis auto-page [] RN [] RT   [] NP/PA [] Other:     Primary reason for call:   [] BAT [] New CPAP/BiPAP [] Bleeding [] Change in mental status   [] Chest pain [] Code blue [] FiO2 >/= 50% [] HR </= 40 bpm   [] HR >/= 130 bpm [] Hyperglycemia [] Hypoglycemia [] RADAR    [] RR </= 8 bpm [] RR >/= 30 bpm [] SBP </= 90 mmHg [] SpO2 < 90%   [] Seizure [x] Sepsis [] Shortness of breath  [x] Staff concern: see comments     Initial VS and/or RADAR VS: see flow sheet    Providers present at bedside (if applicable):     Name of ICU Provider contacted (if applicable):     Interventions:  [] None [] ABG/VBG [] Assist w/ICU transfer [] BAT paged    [] Bag mask [] Blood [] Cardioversion [] Code Blue   [] Code blue for intubation [] Code status changed [] Chest x-ray [] EKG   [] IV fluid/bolus [] KUB x-ray [x] Labs/cultures [] Medication   [] Nebulizer treatment [] NIPPV (CPAP/BiPAP) [] Oxygen [] Oral airway   [] Peripheral IV [] Palliative care consult [] CT/MRI [] Sepsis protocol    [] Suctioned [] Other:     Outcome:  [] Coded and  [] Code blue for intubation [] Coded and transferred to ICU []  on division   [x] Remained on division (no change)tele and cont pulse ox [] Remained on division + additional monitoring [] Remained in ED [] Transferred to ED   [] Transferred to ICU [] Transferred to inpatient status [] Transferred for interventions (procedure) [] Transferred to ICU stepdown    [] Transferred to surgery [] Transferred to telemetry [] Sepsis protocol [] STEMI protocol   [] Stroke protocol [x] Bedside nurse instructed to page rapid response for any concerns or acute change in condition/VS     Additional Comments: Rapid  response initiated for sepsis alert. Pt febrile- @ 38.8. Labs ordered including blood cxs x 2. Pt had received a dose of Zosyn this am. Multiple attempts were made for the lab draw and unable to obtain. Primary team- urology notified and suggested an ABG for lactate monitoring. Labs to remain for phlebotomy. VSS. Pt to remain on the floor with tele/pulse ox monitoring at this time.

## 2025-02-08 ENCOUNTER — APPOINTMENT (OUTPATIENT)
Dept: RADIOLOGY | Facility: HOSPITAL | Age: 75
DRG: 656 | End: 2025-02-08
Payer: MEDICARE

## 2025-02-08 LAB
ALBUMIN SERPL BCP-MCNC: 2.9 G/DL (ref 3.4–5)
ANION GAP SERPL CALC-SCNC: 13 MMOL/L (ref 10–20)
BUN SERPL-MCNC: 29 MG/DL (ref 6–23)
CALCIUM SERPL-MCNC: 9.1 MG/DL (ref 8.6–10.6)
CHLORIDE SERPL-SCNC: 102 MMOL/L (ref 98–107)
CO2 SERPL-SCNC: 22 MMOL/L (ref 21–32)
CREAT SERPL-MCNC: 1.8 MG/DL (ref 0.5–1.3)
EGFRCR SERPLBLD CKD-EPI 2021: 39 ML/MIN/1.73M*2
ERYTHROCYTE [DISTWIDTH] IN BLOOD BY AUTOMATED COUNT: 16.4 % (ref 11.5–14.5)
GLUCOSE BLD MANUAL STRIP-MCNC: 102 MG/DL (ref 74–99)
GLUCOSE BLD MANUAL STRIP-MCNC: 107 MG/DL (ref 74–99)
GLUCOSE BLD MANUAL STRIP-MCNC: 132 MG/DL (ref 74–99)
GLUCOSE SERPL-MCNC: 110 MG/DL (ref 74–99)
HCT VFR BLD AUTO: 31.6 % (ref 41–52)
HGB BLD-MCNC: 9.9 G/DL (ref 13.5–17.5)
MCH RBC QN AUTO: 27.3 PG (ref 26–34)
MCHC RBC AUTO-ENTMCNC: 31.3 G/DL (ref 32–36)
MCV RBC AUTO: 87 FL (ref 80–100)
NRBC BLD-RTO: 0 /100 WBCS (ref 0–0)
PHOSPHATE SERPL-MCNC: 3.5 MG/DL (ref 2.5–4.9)
PLATELET # BLD AUTO: 243 X10*3/UL (ref 150–450)
POTASSIUM SERPL-SCNC: 4 MMOL/L (ref 3.5–5.3)
RBC # BLD AUTO: 3.62 X10*6/UL (ref 4.5–5.9)
SODIUM SERPL-SCNC: 133 MMOL/L (ref 136–145)
STAPHYLOCOCCUS SPEC CULT: NORMAL
WBC # BLD AUTO: 11.9 X10*3/UL (ref 4.4–11.3)

## 2025-02-08 PROCEDURE — 2500000004 HC RX 250 GENERAL PHARMACY W/ HCPCS (ALT 636 FOR OP/ED)

## 2025-02-08 PROCEDURE — 2500000004 HC RX 250 GENERAL PHARMACY W/ HCPCS (ALT 636 FOR OP/ED): Performed by: STUDENT IN AN ORGANIZED HEALTH CARE EDUCATION/TRAINING PROGRAM

## 2025-02-08 PROCEDURE — 85027 COMPLETE CBC AUTOMATED: CPT | Performed by: STUDENT IN AN ORGANIZED HEALTH CARE EDUCATION/TRAINING PROGRAM

## 2025-02-08 PROCEDURE — 2500000001 HC RX 250 WO HCPCS SELF ADMINISTERED DRUGS (ALT 637 FOR MEDICARE OP): Performed by: NURSE PRACTITIONER

## 2025-02-08 PROCEDURE — 1170000001 HC PRIVATE ONCOLOGY ROOM DAILY

## 2025-02-08 PROCEDURE — 36415 COLL VENOUS BLD VENIPUNCTURE: CPT | Performed by: STUDENT IN AN ORGANIZED HEALTH CARE EDUCATION/TRAINING PROGRAM

## 2025-02-08 PROCEDURE — 2580000001 HC RX 258 IV SOLUTIONS: Performed by: NURSE PRACTITIONER

## 2025-02-08 PROCEDURE — 2500000001 HC RX 250 WO HCPCS SELF ADMINISTERED DRUGS (ALT 637 FOR MEDICARE OP)

## 2025-02-08 PROCEDURE — 74018 RADEX ABDOMEN 1 VIEW: CPT

## 2025-02-08 PROCEDURE — 82947 ASSAY GLUCOSE BLOOD QUANT: CPT

## 2025-02-08 PROCEDURE — 80069 RENAL FUNCTION PANEL: CPT | Performed by: STUDENT IN AN ORGANIZED HEALTH CARE EDUCATION/TRAINING PROGRAM

## 2025-02-08 PROCEDURE — 2500000005 HC RX 250 GENERAL PHARMACY W/O HCPCS: Performed by: NURSE PRACTITIONER

## 2025-02-08 PROCEDURE — 2500000004 HC RX 250 GENERAL PHARMACY W/ HCPCS (ALT 636 FOR OP/ED): Mod: TB

## 2025-02-08 RX ORDER — SODIUM CHLORIDE, SODIUM LACTATE, POTASSIUM CHLORIDE, CALCIUM CHLORIDE 600; 310; 30; 20 MG/100ML; MG/100ML; MG/100ML; MG/100ML
50 INJECTION, SOLUTION INTRAVENOUS CONTINUOUS
Status: ACTIVE | OUTPATIENT
Start: 2025-02-08 | End: 2025-02-10

## 2025-02-08 RX ORDER — ACETAMINOPHEN 10 MG/ML
1000 INJECTION, SOLUTION INTRAVENOUS EVERY 6 HOURS SCHEDULED
Status: COMPLETED | OUTPATIENT
Start: 2025-02-08 | End: 2025-02-09

## 2025-02-08 RX ADMIN — SMOFLIPID 50 G: 6; 6; 5; 3 INJECTION, EMULSION INTRAVENOUS at 21:14

## 2025-02-08 RX ADMIN — BISACODYL 10 MG: 10 SUPPOSITORY RECTAL at 08:40

## 2025-02-08 RX ADMIN — PANTOPRAZOLE SODIUM 40 MG: 40 INJECTION, POWDER, FOR SOLUTION INTRAVENOUS at 08:40

## 2025-02-08 RX ADMIN — AMLODIPINE BESYLATE 10 MG: 10 TABLET ORAL at 08:40

## 2025-02-08 RX ADMIN — ASCORBIC ACID, VITAMIN A PALMITATE, CHOLECALCIFEROL, THIAMINE HYDROCHLORIDE, RIBOFLAVIN-5 PHOSPHATE SODIUM, PYRIDOXINE HYDROCHLORIDE, NIACINAMIDE, DEXPANTHENOL, ALPHA-TOCOPHEROL ACETATE, VITAMIN K1, FOLIC ACID, BIOTIN, CYANOCOBALAMIN: 200; 3300; 200; 6; 3.6; 6; 40; 15; 10; 150; 600; 60; 5 INJECTION, SOLUTION INTRAVENOUS at 21:14

## 2025-02-08 RX ADMIN — HEPARIN SODIUM 5000 UNITS: 5000 INJECTION INTRAVENOUS; SUBCUTANEOUS at 01:59

## 2025-02-08 RX ADMIN — ACETAMINOPHEN 1000 MG: 1000 INJECTION, SOLUTION INTRAVENOUS at 08:40

## 2025-02-08 RX ADMIN — HYDROMORPHONE HYDROCHLORIDE 0.4 MG: 1 INJECTION, SOLUTION INTRAMUSCULAR; INTRAVENOUS; SUBCUTANEOUS at 18:21

## 2025-02-08 RX ADMIN — HEPARIN SODIUM 5000 UNITS: 5000 INJECTION INTRAVENOUS; SUBCUTANEOUS at 08:41

## 2025-02-08 RX ADMIN — HYDROMORPHONE HYDROCHLORIDE 0.4 MG: 1 INJECTION, SOLUTION INTRAMUSCULAR; INTRAVENOUS; SUBCUTANEOUS at 02:06

## 2025-02-08 RX ADMIN — VANCOMYCIN HYDROCHLORIDE 750 MG: 750 INJECTION, SOLUTION INTRAVENOUS at 04:20

## 2025-02-08 RX ADMIN — LEVOTHYROXINE SODIUM ANHYDROUS 12.5 MCG: 100 INJECTION, POWDER, LYOPHILIZED, FOR SOLUTION INTRAVENOUS at 08:40

## 2025-02-08 RX ADMIN — PIPERACILLIN SODIUM AND TAZOBACTAM SODIUM 3.38 G: 3; .375 INJECTION, SOLUTION INTRAVENOUS at 18:15

## 2025-02-08 RX ADMIN — PIPERACILLIN SODIUM AND TAZOBACTAM SODIUM 3.38 G: 3; .375 INJECTION, SOLUTION INTRAVENOUS at 04:34

## 2025-02-08 RX ADMIN — ACETAMINOPHEN 1000 MG: 1000 INJECTION, SOLUTION INTRAVENOUS at 03:59

## 2025-02-08 RX ADMIN — HYDROMORPHONE HYDROCHLORIDE 0.2 MG: 1 INJECTION, SOLUTION INTRAMUSCULAR; INTRAVENOUS; SUBCUTANEOUS at 13:59

## 2025-02-08 RX ADMIN — VANCOMYCIN HYDROCHLORIDE 750 MG: 750 INJECTION, SOLUTION INTRAVENOUS at 18:15

## 2025-02-08 RX ADMIN — PIPERACILLIN SODIUM AND TAZOBACTAM SODIUM 3.38 G: 3; .375 INJECTION, SOLUTION INTRAVENOUS at 11:51

## 2025-02-08 RX ADMIN — HEPARIN SODIUM 5000 UNITS: 5000 INJECTION INTRAVENOUS; SUBCUTANEOUS at 18:16

## 2025-02-08 RX ADMIN — BENZOCAINE AND MENTHOL 1 LOZENGE: 15; 3.6 LOZENGE ORAL at 21:32

## 2025-02-08 RX ADMIN — Medication 10 MG: at 22:01

## 2025-02-08 RX ADMIN — ACETAMINOPHEN 1000 MG: 1000 INJECTION, SOLUTION INTRAVENOUS at 18:15

## 2025-02-08 RX ADMIN — Medication 1000 UNITS: at 08:40

## 2025-02-08 ASSESSMENT — PAIN SCALES - GENERAL
PAINLEVEL_OUTOF10: 4
PAINLEVEL_OUTOF10: 4

## 2025-02-08 NOTE — CARE PLAN
The patient's goals for the shift include      The clinical goals for the shift include patient will remain free from N/V throughout shift.    Patient will be HD stable during the shift

## 2025-02-08 NOTE — PROGRESS NOTES
[facility] [place]  Progress Note      Patient: Swapnil Allen  Age/Sex: 75 y.o., male  MRN: [id number]  Date of surgery: 1/28/25  Admit Date: 1/28/2025   Code Status: Full Code  Length of Stay: 11      Interval History/Overnight Events:   Patient put on gravity trial per CRS this am  Patient with 1900cc from NGT with bilious output. No emesis episodes.   Denies any fevers, chills. Endorses abdominal soreness and discomfort that has been stable  UOP 1025cc, no rodriguez   Passing gas, 2x BM       Objective  02/06 1900 - 02/08 0659  In: 4657.5 [I.V.:1579.8]  Out: 4625 [Urine:2025]              10.2     12.4>-----<267              31.3   132  100  29                  ----------------<103     4.5  21  1.60          Ca 8.7 Phos 3.3 Mg 2.10       ALT 25 AST 33 AlkPhos 62 tBili 0.6          Vitals:    02/07/25 2039 02/08/25 0059 02/08/25 0524 02/08/25 0851   BP: 126/79 126/71 134/79 125/76   BP Location: Right arm Right arm Right arm Right arm   Patient Position: Lying Lying Lying Lying   Pulse: 90 84 78 79   Resp: 16 16 16 16   Temp: 37.8 °C (100 °F) 36.8 °C (98.2 °F) 36.8 °C (98.2 °F) 36.8 °C (98.2 °F)   TempSrc: Temporal Temporal Temporal Temporal   SpO2: 94% 95% 100% 95%   Weight:       Height:              Medications:  Current Facility-Administered Medications   Medication Dose Route Frequency Provider Last Rate Last Admin    Adult Clinimix Parenteral Nutrition Continuous  83 mL/hr intravenous Daily PN Karen Katz APRN-CNP 83 mL/hr at 02/07/25 2000 New Bag at 02/07/25 2000    alteplase (Cathflo Activase) injection 2 mg  2 mg intra-catheter PRN Alexys Hood MD        amLODIPine (Norvasc) tablet 10 mg  10 mg nasogastric tube Daily Ludwin Phillips MD   10 mg at 02/08/25 0840    [Held by provider] aspirin EC tablet 81 mg  81 mg oral Daily Jannie Hamm MD   81 mg at 01/31/25 0820    aspirin suppository 150 mg  150 mg rectal Daily Deena Medina MD   150 mg at 02/06/25 1652    benzocaine-menthol (Cepastat Sore  Throat) lozenge 1 lozenge  1 lozenge Mouth/Throat q2h PRN Ludwin Phillips MD   1 lozenge at 02/05/25 0911    bisacodyl (Dulcolax) suppository 10 mg  10 mg rectal Daily JOSUÉ Britt   10 mg at 02/08/25 0840    calcium carbonate (Tums) chewable tablet 500 mg  500 mg nasogastric tube 4x daily PRN Ludwin Phillips MD   500 mg at 02/04/25 2047    cholecalciferol (Vitamin D-3) tablet 1,000 Units  1,000 Units nasogastric tube Daily Ludwin Phillips MD   1,000 Units at 02/08/25 0840    dextrose 50 % injection 12.5 g  12.5 g intravenous q15 min PRN Ludwin Phillips MD   12.5 g at 02/02/25 1402    pantoprazole (ProtoNix) EC tablet 40 mg  40 mg oral Daily before breakfast Elmo Monteiro MD        Or    esomeprazole (NexIUM) suspension 40 mg  40 mg nasoduodenal tube Daily before breakfast Elmo Monteiro MD        Or    pantoprazole (ProtoNix) injection 40 mg  40 mg intravenous Daily before breakfast Elmo Monteiro MD   40 mg at 02/08/25 0840    fat emulsion fish oil/plant based (SMOFlipid) 20 % IV infusion 50 g  250 mL intravenous Daily Lipids JOSUÉ Britt   Stopped at 02/08/25 0831    heparin (porcine) injection 5,000 Units  5,000 Units subcutaneous q8h Jannie Hamm MD   5,000 Units at 02/08/25 0841    HYDROmorphone (Dilaudid) injection 0.2 mg  0.2 mg intravenous q3h PRN Alexys Hood MD   0.2 mg at 02/07/25 1923    HYDROmorphone (Dilaudid) injection 0.4 mg  0.4 mg intravenous q4h PRN Alexys Hood MD   0.4 mg at 02/08/25 0206    insulin lispro injection 0-5 Units  0-5 Units subcutaneous TID AC JOSUÉ Britt        ipratropium-albuteroL (Duo-Neb) 0.5-2.5 mg/3 mL nebulizer solution 3 mL  3 mL nebulization TID PRN Alexys Hood MD        labetaloL (Normodyne,Trandate) injection 10 mg  10 mg intravenous q8h PRN Gallo Martines MD        lactated Ringer's infusion  50 mL/hr intravenous Continuous Karen Katz, KYLIE-CNP 50 mL/hr at 02/07/25 1129 50 mL/hr at 02/07/25 1129     levothyroxine (Synthroid) injection 12.5 mcg  12.5 mcg intravenous q24h [facility] Ludwin Phillips MD   12.5 mcg at 02/08/25 0840    [Held by provider] levothyroxine (Synthroid, Levoxyl) tablet 25 mcg  25 mcg oral Daily Jannie Hamm MD   25 mcg at 01/31/25 0531    lidocaine (Xylocaine) 10 mg/mL (1 %) injection 5 mL  5 mL infiltration Once KYLIE Britt-CNP        lidocaine (Xylocaine) 10 mg/mL (1 %) injection 5 mL  5 mL infiltration Once Alexys Hood MD        lidocaine 2 % mucosal jelly (Uro-Jet) 1 Application  1 Application nasal Once Elmo Monteiro MD        lidocaine 4 % patch 3 patch  3 patch transdermal Daily Gallo Martines MD   3 patch at 02/04/25 0929    melatonin tablet 10 mg  10 mg nasogastric tube Nightly PRN Ludwin Phillips MD   10 mg at 02/05/25 2119    ondansetron (Zofran) injection 4 mg  4 mg intravenous q6h PRN Gallo Martines MD   4 mg at 01/31/25 2057    [Held by provider] oxyCODONE (Roxicodone) immediate release tablet 10 mg  10 mg oral q6h PRN Jannie Hamm MD   10 mg at 01/29/25 0235    [Held by provider] oxyCODONE (Roxicodone) immediate release tablet 5 mg  5 mg oral q6h PRN Jannie Hamm MD   5 mg at 01/29/25 0840    oxygen (O2) therapy   inhalation Continuous - Inhalation Ludwin Phillips ,000 mL/hr at 02/05/25 0912 21 percent at 02/05/25 2114    phenoL (Chloraseptic) 1.4 % mouth/throat spray 1 spray  1 spray Mouth/Throat 4x daily PRN Ludwin Phillips MD   1 spray at 02/05/25 0903    piperacillin-tazobactam (Zosyn) 3.375 g in dextrose (iso) IV 50 mL  3.375 g intravenous q6h Alexys Hood MD   Stopped at 02/08/25 0512    [Held by provider] tamsulosin (Flomax) 24 hr capsule 0.4 mg  0.4 mg oral Daily Jannie Hamm MD   0.4 mg at 01/31/25 0820    vancomycin (Vancocin) 750 mg in dextrose 5%  mL  750 mg intravenous q12h Tian Aguirre RPh   Stopped at 02/08/25 0510    vancomycin (Vancocin) pharmacy to dose - pharmacy monitoring   miscellaneous  Daily PRN Ajay Gannon MD           Physical Exam                                                                                                                         Gen -  No acute distress  Heent: NGT in place with brown output      Neuro - Alert, oriented, conversant     CV -  regular rate , normotensive      Pulm - Symmetric chest rise, non-labored breathing on room air      Abd - Abdomen is soft, mildly-distended, and appropriately tender. Midline incision c/d/I closed with glue.       Ext - Warm, well perfused     MSK- no pain or swelling in LE bilaterally     Skin - without jaunice       Psych - appropriate tone, affect      Imaging  XR abdomen 1 view    Result Date: 2/7/2025  Interpreted By:  Tyree Tabares and Dulla Kireeti STUDY: XR ABDOMEN 1 VIEW;  2/7/2025 12:23 pm   INDICATION: Signs/Symptoms:s/p SBFT assessing contrast movement.     COMPARISON: Abdominal x-ray 02/06/2025   ACCESSION NUMBER(S): [id number]   ORDERING CLINICIAN: HENRY HENRIQUEZ   FINDINGS: AP view of the abdomen is obtained. 2 images total.   Enteric tube is seen with tip projecting over the gastric bubble. Multiple loops of dilated bowel are seen. Positive contrast is seen within the small bowel without evidence of contrast within colon. Limited evaluation of pneumoperitoneum on supine imaging, however no gross evidence of free air is noted.   Stable bibasilar hazy opacities..   Osseous structures demonstrate no acute bony changes.       1.  Dilated loops of bowel without contrast in the colon, correlate for obstruction.   I personally reviewed the images/study and I agree with the findings as stated by Evelio Leroy MD, PGY-2 this study was interpreted at [facility], [place], [place].   MACRO: None   Signed by: Tyree Tabares 2/7/2025 4:39 PM Dictation workstation:   [id number]    XR chest 1 view    Result Date: 2/7/2025  Interpreted By:  Tyree Tabares and Dulla Kireeti STUDY: XR CHEST 1 VIEW;   2/7/2025 3:14 pm   INDICATION: Signs/Symptoms:Possible pneumonia.     COMPARISON: Chest x-ray from 01/31/2025   ACCESSION NUMBER(S): [id number]   ORDERING CLINICIAN: JOSEFA CALDWELL   FINDINGS: AP radiograph of the chest was provided.   Enteric tube is seen with tip terminating in the gastric body.   CARDIOMEDIASTINAL SILHOUETTE: Cardiomediastinal silhouette is stable in size and configuration.   LUNGS: Low lung volumes with bronchovascular crowding. There is no pulmonary edema, pneumothorax, or consolidation. Bibasilar opacities and retrocardiac opacities are present..   ABDOMEN: Moderate hiatal hernia.   BONES: No acute osseous changes.       1. Bibasilar and retrocardiac opacities likely represent atelectasis, however infection can not be excluded.   I personally reviewed the images/study and I agree with the findings as stated by Evelio Leroy MD, PGY-2 this study was interpreted at [place], [place].   MACRO: None   Signed by: Tyree Tabares 2/7/2025 4:30 PM Dictation workstation:   [id number]    CT abdomen pelvis w IV contrast    Result Date: 2/6/2025  Interpreted By:  Kirill Buckley, STUDY: CT ABDOMEN PELVIS W IV CONTRAST;  2/6/2025 10:03 am   INDICATION: Signs/Symptoms:postop s/p nephrectomy on 01/28/2025 ileus.     COMPARISON: CT, 02/03/2025   ACCESSION NUMBER(S): [id number]   ORDERING CLINICIAN: FILIPE ROA   TECHNIQUE: CT of the abdomen and pelvis was performed.  Standard contiguous axial images were obtained at 3 mm slice thickness through the abdomen and pelvis. Coronal and sagittal reconstructions at 3 mm slice thickness were performed.  75 ML of Omnipaque 350 was administered intravenously without immediate complication.   FINDINGS: LOWER CHEST: The visualized lung base demonstrates slightly increased bilateral pleural effusions with associated atelectasis as well as ground-glass to consolidative opacities along the dependent portions of the lower  lobes.   The heart is enlarged in size with trace pericardial effusion. Visualized distal esophagus demonstrates moderate-size hiatal hernia with some fluid visualized in the distal esophagus/hiatal hernia.   ABDOMEN:   LIVER: The liver demonstrates homogeneous attenuation without evidence of focal liver lesions. The previously described perihepatic free intraperitoneal air is no longer visualized.   BILE DUCTS: The intrahepatic and extrahepatic ducts are not dilated.   GALLBLADDER: No calcified stones. No wall thickening.   PANCREAS: The pancreas appears unremarkable without evidence of ductal dilatation or masses.   SPLEEN: The spleen is normal in size without focal lesions. Splenule is again visualized (series 2, image 21).   ADRENAL GLANDS: Bilateral adrenal glands appear normal.   KIDNEYS AND URETERS: Postsurgical changes from prior right nephrectomy.   The left kidney again demonstrates multiple cysts measuring up to 4.7 x 4.5 cm, similar in size compared to multiple prior CTs dating back 09/12/2024 which previously measured 4.8 x 4.3 cm. No left calculi or hydronephrosis evident.   PELVIS:   BLADDER: The urinary bladder appears normal without abnormal wall thickening.   REPRODUCTIVE ORGANS: The prostate is enlarged measuring 4.3 cm, to be correlate with PSA nonemergent/outpatient basis.   BOWEL: The distal tip of the enteric tube terminates in the gastric antrum. Moderate-size hiatal hernia as described above. The stomach is slightly decompressed limiting evaluation.   There is again a focal area of narrowing along proximal to mid jejunal bowel loop (series 2, image 88) with slightly dilated and fluid-filled small bowel proximal and distal to this point. There is also again associated mesenteric vessel whirling without discrete focal obstruction evident.   Contrast from prior small-bowel follow-through is seen throughout the entire small bowel. No contrast is seen along the large bowel.   The appendix appears  normal.   VESSELS: The aorta and IVC appear normal.   PERITONEUM/RETROPERITONEUM/LYMPH NODES: There is again trace pelvic free fluid. There is also layering fluid along the right nephrectomy surgical bed (series 2, image 78). Slightly prominent perigastric lymph nodes, likely postsurgical in nature.   No significantly enlarged mesenteric lymph nodes.   BONES AND ABDOMINAL WALL: No suspicious osseous lesions are identified.  Slight midline abdominal fat thickening/stranding, likely postsurgical in nature.       1.  Findings compatible with partial small bowel obstruction in the region of mesenteric swirling. 2. Interval worsening of ground-glass to consolidative opacities in dependent portions of the lung, which may be related to aspiration and/or worsening infectious pneumonia. 3. Moderate-sized hiatal hernia with fluid visualized in the distal esophagus, which places the patient at risk for aspiration.   I personally reviewed the images/study and agree with the findings as stated by Kirill Buckley MD (Resident Physician). This study was interpreted at [facility], [place], [place].   MACRO: None     Dictation workstation:   [id number]      Assessment & Plan  75 y.o. 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 Postoperative course complicated by prolonged ileus vs small bowel obstruction. NGT placed 01/31. Patient now with slowly resolving ANISHA and new mild leukocytosis 12.5      Plan 2/7:  -NPO w/ mIVF LR 50cc/hr  -Follow up AXR  -Continue PPN  -F/U CRS recs - gravity trial on NGT today  -Maintain NGT. Continue fluid repletion for NGT losses.   -Follow up  labs   -Ambulate/OOB  -PT  -Possible PEG next week if NGT continues to have high output   -Possible PICC Sunday/Monday       Neuro:   -Pain controlled with Scheduled acetaminophen, Oxycodone 5/10 HELD while NPO, Dilaudid 0.4 PRN for breakthrough pain, robaxin 500mg q8hrs   -PRN melatonin for sleep aid     Resp:   -ICS  -On room air   -Appreciate RT recs    Card:   -Monitor vitals  -Amlodpine  -Aspirin   -Cholecalciferol     FEN: mIVF LR @ 50ml  Fluid repletion at 0.5:1 of NGT output    GI:   -NPO for NGT  -BR: Miralax, senokat   -PRN Reglan for nausea   - Bisacodyl suppository   -Colorectal surgery consulted, appreciate recommendations  -AXR 2/7  -Nutrition consulted: Currently on PPN     :   -Flomax   -Daily BMP to monitor kidney function and replete electrolytes as indicated.   -Continue to monitor Cr     Endo:   -Levothyroxine IV while NPO     Heme/ID:   -Daily CBC to monitor for acute blood loss anemia   -No indication for blood transfusion or antibiotics at this time     Ppy:   -ALEN  -SCD  -IS    Dispo:   RNF.     Assessment and plan discussed with chief resident Dr. Gannon and attending Dr. Ana Phillips MD, [facility]  [facility] [place]  Adult Urology Pager: [phone]  Pediatric Urology Pager: [phone]

## 2025-02-08 NOTE — PROGRESS NOTES
"Swapnil Allen is a 75 y.o. male on day 11 of admission presenting with Renal mass.    Subjective   Febrile yesterday, infectious workup in process.  Patient still continuing to pass gas and have bowel movements.  He denies any abdominal pain.  He is frustrated with NG tube.  No other new symptoms at this time       Objective     Physical Exam  /79 (BP Location: Right arm, Patient Position: Lying)   Pulse 78   Temp 36.8 °C (98.2 °F) (Temporal)   Resp 16   Ht 1.803 m (5' 11\")   Wt 103 kg (226 lb 13.7 oz)   SpO2 100%   BMI 31.64 kg/m²   NAD  NG tube in place with thin/light bilious output  Nontachycardic  Nonlabored respirations  Abdomen soft, nontender, nondistended, well-healed midline incision covered with glue, no generalized peritonitis      Last Recorded Vitals  Blood pressure 134/79, pulse 78, temperature 36.8 °C (98.2 °F), temperature source Temporal, resp. rate 16, height 1.803 m (5' 11\"), weight 103 kg (226 lb 13.7 oz), SpO2 100%.  Intake/Output last 3 Shifts:  I/O last 3 completed shifts:  In: 4657.5 (45.3 mL/kg) [I.V.:1579.8 (15.4 mL/kg); NG/GT:20; IV Piggyback:1050]  Out: 4625 (44.9 mL/kg) [Urine:2025 (0.5 mL/kg/hr); Emesis/NG output:2600]  Weight: 102.9 kg     2 bowel movements  N.9 L  Urine output: 1 L    Relevant Results  Results for orders placed or performed during the hospital encounter of 25 (from the past 24 hours)   POCT GLUCOSE   Result Value Ref Range    POCT Glucose 114 (H) 74 - 99 mg/dL   Blood Gas Arterial Full Panel   Result Value Ref Range    POCT pH, Arterial 7.48 (H) 7.38 - 7.42 pH    POCT pCO2, Arterial 28 (L) 38 - 42 mm Hg    POCT pO2, Arterial 80 (L) 85 - 95 mm Hg    POCT SO2, Arterial 99 94 - 100 %    POCT Oxy Hemoglobin, Arterial 96.1 94.0 - 98.0 %    POCT Hematocrit Calculated, Arterial 32.0 (L) 41.0 - 52.0 %    POCT Sodium, Arterial 131 (L) 136 - 145 mmol/L    POCT Potassium, Arterial 4.4 3.5 - 5.3 mmol/L    POCT Chloride, Arterial 104 98 - 107 mmol/L    POCT " Ionized Calcium, Arterial 1.25 1.10 - 1.33 mmol/L    POCT Glucose, Arterial 108 (H) 74 - 99 mg/dL    POCT Lactate, Arterial 0.9 0.4 - 2.0 mmol/L    POCT Base Excess, Arterial -1.8 -2.0 - 3.0 mmol/L    POCT HCO3 Calculated, Arterial 20.9 (L) 22.0 - 26.0 mmol/L    POCT Hemoglobin, Arterial 10.7 (L) 13.5 - 17.5 g/dL    POCT Anion Gap, Arterial 11 10 - 25 mmo/L    Patient Temperature 37.0 degrees Celsius    FiO2 0 %   Blood Culture    Specimen: Peripheral Venipuncture; Blood culture   Result Value Ref Range    Blood Culture Loaded on Instrument - Culture in progress    CBC   Result Value Ref Range    WBC 12.4 (H) 4.4 - 11.3 x10*3/uL    nRBC 0.0 0.0 - 0.0 /100 WBCs    RBC 3.71 (L) 4.50 - 5.90 x10*6/uL    Hemoglobin 10.2 (L) 13.5 - 17.5 g/dL    Hematocrit 31.3 (L) 41.0 - 52.0 %    MCV 84 80 - 100 fL    MCH 27.5 26.0 - 34.0 pg    MCHC 32.6 32.0 - 36.0 g/dL    RDW 16.0 (H) 11.5 - 14.5 %    Platelets 267 150 - 450 x10*3/uL   Basic metabolic panel   Result Value Ref Range    Glucose 103 (H) 74 - 99 mg/dL    Sodium 132 (L) 136 - 145 mmol/L    Potassium 4.5 3.5 - 5.3 mmol/L    Chloride 100 98 - 107 mmol/L    Bicarbonate 21 21 - 32 mmol/L    Anion Gap 16 10 - 20 mmol/L    Urea Nitrogen 29 (H) 6 - 23 mg/dL    Creatinine 1.60 (H) 0.50 - 1.30 mg/dL    eGFR 45 (L) >60 mL/min/1.73m*2    Calcium 8.7 8.6 - 10.6 mg/dL   Lactate   Result Value Ref Range    Lactate 1.3 0.4 - 2.0 mmol/L   Blood Culture    Specimen: Peripheral Venipuncture; Blood culture   Result Value Ref Range    Blood Culture Loaded on Instrument - Culture in progress    Respiratory Culture/Smear    Specimen: SPUTUM; Fluid   Result Value Ref Range    Respiratory Culture/Smear (A)      Culture not performed. See Gram stain findings. Recollect if clinically indicated.    Gram Stain (A)      Gram stain indicates specimen contains significant salivary contamination.   POCT GLUCOSE   Result Value Ref Range    POCT Glucose 116 (H) 74 - 99 mg/dL   POCT GLUCOSE   Result Value  Ref Range    POCT Glucose 107 (H) 74 - 99 mg/dL   POCT GLUCOSE   Result Value Ref Range    POCT Glucose 132 (H) 74 - 99 mg/dL         Assessment/Plan   Assessment & Plan  Renal mass    Post-op pain    BPH (benign prostatic hyperplasia)    Small bowel obstruction (Multi)      75 year old male with notable medical history/co-morbidities presenting with partial bowel obstruction following open right radical nephrectomy (1/28/25). Reassuring abdominal exam, no signs of threatened bowel on CT scan, having some bowel obstruction     - No plan for return to OR at this time  -Given appearance of NG output and ongoing bowel function we will attempt NG gravity trial this morning; will be reexamined later on this morning to see if appropriate for NG removal  - If patient does not progress over the weekend may consider decompressive PEG early next week  - Continue TPN     Will continue to follow.     Discussed with Dr. Spohy Zapata Service Pager #[phone]

## 2025-02-08 NOTE — SIGNIFICANT EVENT
Patient seen and examined multiple times at the morning and afternoon.  Patient was placed on NG gravity trial during the morning.  Upon my first examination at approximately 1130 this morning he was noted to have bilious contents refluxing through the tube.  Patient denied any nausea, vomiting, worsening abdominal pain during this period.  Upon hooking the NG tube back up to suction there was scant bilious fluid out.  Given the patient's CT findings and overall clinical picture he was left to suction for another 2 hours.  Upon reexamination at approximately 230 this afternoon there is already another 200-300 cc of bilious contents in the canister.  Decision was made to maintain NG tube in place for the rest of the day.  Will reexamine in the morning or sooner if needed.    Patient is understandable but obviously frustrated with ongoing need for NG tube decompression    JUDIE Morillo  CRS

## 2025-02-09 VITALS
DIASTOLIC BLOOD PRESSURE: 79 MMHG | HEART RATE: 74 BPM | BODY MASS INDEX: 31.76 KG/M2 | OXYGEN SATURATION: 95 % | TEMPERATURE: 96.4 F | HEIGHT: 71 IN | SYSTOLIC BLOOD PRESSURE: 111 MMHG | WEIGHT: 226.85 LBS | RESPIRATION RATE: 18 BRPM

## 2025-02-09 LAB
ALBUMIN SERPL BCP-MCNC: 2.9 G/DL (ref 3.4–5)
ANION GAP SERPL CALC-SCNC: 15 MMOL/L (ref 10–20)
BACTERIA BLD CULT: NORMAL
BACTERIA BLD CULT: NORMAL
BUN SERPL-MCNC: 29 MG/DL (ref 6–23)
CALCIUM SERPL-MCNC: 9.5 MG/DL (ref 8.6–10.6)
CHLORIDE SERPL-SCNC: 105 MMOL/L (ref 98–107)
CO2 SERPL-SCNC: 20 MMOL/L (ref 21–32)
CREAT SERPL-MCNC: 1.88 MG/DL (ref 0.5–1.3)
EGFRCR SERPLBLD CKD-EPI 2021: 37 ML/MIN/1.73M*2
ERYTHROCYTE [DISTWIDTH] IN BLOOD BY AUTOMATED COUNT: 16.5 % (ref 11.5–14.5)
GLUCOSE BLD MANUAL STRIP-MCNC: 110 MG/DL (ref 74–99)
GLUCOSE BLD MANUAL STRIP-MCNC: 122 MG/DL (ref 74–99)
GLUCOSE BLD MANUAL STRIP-MCNC: 125 MG/DL (ref 74–99)
GLUCOSE BLD MANUAL STRIP-MCNC: 133 MG/DL (ref 74–99)
GLUCOSE BLD MANUAL STRIP-MCNC: 141 MG/DL (ref 74–99)
GLUCOSE SERPL-MCNC: 123 MG/DL (ref 74–99)
HCT VFR BLD AUTO: 31.6 % (ref 41–52)
HGB BLD-MCNC: 10 G/DL (ref 13.5–17.5)
MCH RBC QN AUTO: 27.9 PG (ref 26–34)
MCHC RBC AUTO-ENTMCNC: 31.6 G/DL (ref 32–36)
MCV RBC AUTO: 88 FL (ref 80–100)
NRBC BLD-RTO: 0 /100 WBCS (ref 0–0)
PHOSPHATE SERPL-MCNC: 3.6 MG/DL (ref 2.5–4.9)
PLATELET # BLD AUTO: 245 X10*3/UL (ref 150–450)
POTASSIUM SERPL-SCNC: 4 MMOL/L (ref 3.5–5.3)
RBC # BLD AUTO: 3.59 X10*6/UL (ref 4.5–5.9)
SODIUM SERPL-SCNC: 136 MMOL/L (ref 136–145)
VANCOMYCIN SERPL-MCNC: 39.4 UG/ML (ref 5–20)
WBC # BLD AUTO: 13.5 X10*3/UL (ref 4.4–11.3)

## 2025-02-09 PROCEDURE — 2500000005 HC RX 250 GENERAL PHARMACY W/O HCPCS

## 2025-02-09 PROCEDURE — 80069 RENAL FUNCTION PANEL: CPT | Performed by: STUDENT IN AN ORGANIZED HEALTH CARE EDUCATION/TRAINING PROGRAM

## 2025-02-09 PROCEDURE — 80202 ASSAY OF VANCOMYCIN: CPT

## 2025-02-09 PROCEDURE — 85027 COMPLETE CBC AUTOMATED: CPT | Performed by: STUDENT IN AN ORGANIZED HEALTH CARE EDUCATION/TRAINING PROGRAM

## 2025-02-09 PROCEDURE — 82947 ASSAY GLUCOSE BLOOD QUANT: CPT

## 2025-02-09 PROCEDURE — 1170000001 HC PRIVATE ONCOLOGY ROOM DAILY

## 2025-02-09 PROCEDURE — 2500000004 HC RX 250 GENERAL PHARMACY W/ HCPCS (ALT 636 FOR OP/ED)

## 2025-02-09 PROCEDURE — 2500000001 HC RX 250 WO HCPCS SELF ADMINISTERED DRUGS (ALT 637 FOR MEDICARE OP)

## 2025-02-09 PROCEDURE — 87081 CULTURE SCREEN ONLY: CPT

## 2025-02-09 PROCEDURE — 2500000004 HC RX 250 GENERAL PHARMACY W/ HCPCS (ALT 636 FOR OP/ED): Performed by: STUDENT IN AN ORGANIZED HEALTH CARE EDUCATION/TRAINING PROGRAM

## 2025-02-09 PROCEDURE — 36415 COLL VENOUS BLD VENIPUNCTURE: CPT | Performed by: STUDENT IN AN ORGANIZED HEALTH CARE EDUCATION/TRAINING PROGRAM

## 2025-02-09 PROCEDURE — 2580000001 HC RX 258 IV SOLUTIONS: Performed by: NURSE PRACTITIONER

## 2025-02-09 PROCEDURE — 2500000005 HC RX 250 GENERAL PHARMACY W/O HCPCS: Performed by: NURSE PRACTITIONER

## 2025-02-09 RX ORDER — SODIUM CHLORIDE, SODIUM LACTATE, POTASSIUM CHLORIDE, CALCIUM CHLORIDE 600; 310; 30; 20 MG/100ML; MG/100ML; MG/100ML; MG/100ML
50 INJECTION, SOLUTION INTRAVENOUS CONTINUOUS
Status: DISCONTINUED | OUTPATIENT
Start: 2025-02-09 | End: 2025-02-10 | Stop reason: SDUPTHER

## 2025-02-09 RX ADMIN — BENZOCAINE AND MENTHOL 1 LOZENGE: 15; 3.6 LOZENGE ORAL at 17:21

## 2025-02-09 RX ADMIN — HYDROMORPHONE HYDROCHLORIDE 0.4 MG: 1 INJECTION, SOLUTION INTRAMUSCULAR; INTRAVENOUS; SUBCUTANEOUS at 17:07

## 2025-02-09 RX ADMIN — Medication 1000 UNITS: at 08:28

## 2025-02-09 RX ADMIN — SMOFLIPID 50 G: 6; 6; 5; 3 INJECTION, EMULSION INTRAVENOUS at 20:31

## 2025-02-09 RX ADMIN — ACETAMINOPHEN 1000 MG: 1000 INJECTION, SOLUTION INTRAVENOUS at 12:08

## 2025-02-09 RX ADMIN — VANCOMYCIN HYDROCHLORIDE 750 MG: 750 INJECTION, SOLUTION INTRAVENOUS at 05:13

## 2025-02-09 RX ADMIN — BENZOCAINE AND MENTHOL 1 LOZENGE: 15; 3.6 LOZENGE ORAL at 12:08

## 2025-02-09 RX ADMIN — AMLODIPINE BESYLATE 10 MG: 10 TABLET ORAL at 08:28

## 2025-02-09 RX ADMIN — PIPERACILLIN SODIUM AND TAZOBACTAM SODIUM 3.38 G: 3; .375 INJECTION, SOLUTION INTRAVENOUS at 12:08

## 2025-02-09 RX ADMIN — HEPARIN SODIUM 5000 UNITS: 5000 INJECTION INTRAVENOUS; SUBCUTANEOUS at 08:28

## 2025-02-09 RX ADMIN — HEPARIN SODIUM 5000 UNITS: 5000 INJECTION INTRAVENOUS; SUBCUTANEOUS at 17:07

## 2025-02-09 RX ADMIN — ESOMEPRAZOLE MAGNESIUM 40 MG: 40 GRANULE, DELAYED RELEASE ORAL at 08:28

## 2025-02-09 RX ADMIN — HYDROMORPHONE HYDROCHLORIDE 0.4 MG: 1 INJECTION, SOLUTION INTRAMUSCULAR; INTRAVENOUS; SUBCUTANEOUS at 21:42

## 2025-02-09 RX ADMIN — BENZOCAINE AND MENTHOL 1 LOZENGE: 15; 3.6 LOZENGE ORAL at 22:26

## 2025-02-09 RX ADMIN — ACETAMINOPHEN 1000 MG: 1000 INJECTION, SOLUTION INTRAVENOUS at 00:23

## 2025-02-09 RX ADMIN — LEVOTHYROXINE SODIUM ANHYDROUS 12.5 MCG: 100 INJECTION, POWDER, LYOPHILIZED, FOR SOLUTION INTRAVENOUS at 08:28

## 2025-02-09 RX ADMIN — PIPERACILLIN SODIUM AND TAZOBACTAM SODIUM 3.38 G: 3; .375 INJECTION, SOLUTION INTRAVENOUS at 00:23

## 2025-02-09 RX ADMIN — ASCORBIC ACID, VITAMIN A PALMITATE, CHOLECALCIFEROL, THIAMINE HYDROCHLORIDE, RIBOFLAVIN-5 PHOSPHATE SODIUM, PYRIDOXINE HYDROCHLORIDE, NIACINAMIDE, DEXPANTHENOL, ALPHA-TOCOPHEROL ACETATE, VITAMIN K1, FOLIC ACID, BIOTIN, CYANOCOBALAMIN: 200; 3300; 200; 6; 3.6; 6; 40; 15; 10; 150; 600; 60; 5 INJECTION, SOLUTION INTRAVENOUS at 20:37

## 2025-02-09 RX ADMIN — Medication 1 L/MIN: at 12:15

## 2025-02-09 RX ADMIN — ACETAMINOPHEN 1000 MG: 1000 INJECTION, SOLUTION INTRAVENOUS at 05:13

## 2025-02-09 RX ADMIN — SODIUM CHLORIDE, POTASSIUM CHLORIDE, SODIUM LACTATE AND CALCIUM CHLORIDE 50 ML/HR: 600; 310; 30; 20 INJECTION, SOLUTION INTRAVENOUS at 00:41

## 2025-02-09 RX ADMIN — HEPARIN SODIUM 5000 UNITS: 5000 INJECTION INTRAVENOUS; SUBCUTANEOUS at 00:23

## 2025-02-09 RX ADMIN — PIPERACILLIN SODIUM AND TAZOBACTAM SODIUM 3.38 G: 3; .375 INJECTION, SOLUTION INTRAVENOUS at 17:18

## 2025-02-09 RX ADMIN — PIPERACILLIN SODIUM AND TAZOBACTAM SODIUM 3.38 G: 3; .375 INJECTION, SOLUTION INTRAVENOUS at 05:41

## 2025-02-09 RX ADMIN — SODIUM CHLORIDE 500 ML: 9 INJECTION, SOLUTION INTRAVENOUS at 08:58

## 2025-02-09 RX ADMIN — SODIUM CHLORIDE, POTASSIUM CHLORIDE, SODIUM LACTATE AND CALCIUM CHLORIDE 50 ML/HR: 600; 310; 30; 20 INJECTION, SOLUTION INTRAVENOUS at 18:17

## 2025-02-09 RX ADMIN — Medication 1 SPRAY: at 23:24

## 2025-02-09 RX ADMIN — PIPERACILLIN SODIUM AND TAZOBACTAM SODIUM 3.38 G: 3; .375 INJECTION, SOLUTION INTRAVENOUS at 23:23

## 2025-02-09 ASSESSMENT — COGNITIVE AND FUNCTIONAL STATUS - GENERAL
TURNING FROM BACK TO SIDE WHILE IN FLAT BAD: A LITTLE
HELP NEEDED FOR BATHING: A LITTLE
STANDING UP FROM CHAIR USING ARMS: A LITTLE
CLIMB 3 TO 5 STEPS WITH RAILING: A LITTLE
DRESSING REGULAR UPPER BODY CLOTHING: A LITTLE
WALKING IN HOSPITAL ROOM: A LITTLE
EATING MEALS: A LITTLE
MOBILITY SCORE: 19
DAILY ACTIVITIY SCORE: 18
MOVING TO AND FROM BED TO CHAIR: A LITTLE
DRESSING REGULAR LOWER BODY CLOTHING: A LITTLE
TOILETING: A LITTLE
PERSONAL GROOMING: A LITTLE

## 2025-02-09 ASSESSMENT — PAIN SCALES - GENERAL
PAINLEVEL_OUTOF10: 7
PAINLEVEL_OUTOF10: 9
PAINLEVEL_OUTOF10: 9

## 2025-02-09 NOTE — PROGRESS NOTES
Vancomycin Dosing by Pharmacy- Cessation of Therapy    Consult to pharmacy for vancomycin dosing has been discontinued by the prescriber, pharmacy will sign off at this time.    Please call pharmacy if there are further questions or re-enter a consult if vancomycin is resumed.     Martin Chung, BarryD

## 2025-02-09 NOTE — PROGRESS NOTES
"Swapnil Allen is a 75 y.o. male on day 12 of admission presenting with Renal mass.    Subjective   Failed gravity trial yesterday.  Still having intermittent flatus and bowel movements.  Denies any abdominal pain.  Frustrated with NG tube       Objective     Physical Exam  /78 (BP Location: Left arm, Patient Position: Lying)   Pulse 76   Temp 36.1 °C (97 °F) (Temporal)   Resp 18   Ht 1.803 m (5' 11\")   Wt 103 kg (226 lb 13.7 oz)   SpO2 94%   BMI 31.64 kg/m²   NAD  NG tube in place with bilious output  Nontachycardic  Nonlabored respirations  Abdomen soft, nontender, nondistended, well-healed midline incision covered with glue, no generalized peritonitis      Last Recorded Vitals  Blood pressure 134/78, pulse 76, temperature 36.1 °C (97 °F), temperature source Temporal, resp. rate 18, height 1.803 m (5' 11\"), weight 103 kg (226 lb 13.7 oz), SpO2 94%.  Intake/Output last 3 Shifts:  I/O last 3 completed shifts:  In: 970 (9.4 mL/kg) [I.V.:400 (3.9 mL/kg); NG/GT:120; IV Piggyback:450]  Out: 4775 (46.4 mL/kg) [Urine:2375 (0.6 mL/kg/hr); Emesis/NG output:2400]  Weight: 102.9 kg     2 bowel movements  N.8 L  Urine output: 2.1 L    Relevant Results  Results for orders placed or performed during the hospital encounter of 25 (from the past 24 hours)   POCT GLUCOSE   Result Value Ref Range    POCT Glucose 102 (H) 74 - 99 mg/dL   POCT GLUCOSE   Result Value Ref Range    POCT Glucose 122 (H) 74 - 99 mg/dL   POCT GLUCOSE   Result Value Ref Range    POCT Glucose 141 (H) 74 - 99 mg/dL         Assessment/Plan   Assessment & Plan  Renal mass    Post-op pain    BPH (benign prostatic hyperplasia)    Small bowel obstruction (Multi)      75 year old male with notable medical history/co-morbidities presenting with partial bowel obstruction following open right radical nephrectomy (25). Reassuring abdominal exam, no signs of threatened bowel on CT scan, having some bowel obstruction     -Given failure of " progression, would recommend pursuing decompressive PEG early this coming week.  This was briefly discussed with patient who seemed in agreement.  -No plans to return to the operating room  -Continue TPN     Will continue to follow.     To be discussed with Dr. Sophy Zapata Service Pager #[phone]

## 2025-02-09 NOTE — CARE PLAN
The patient's goals for the shift include      The clinical goals for the shift include pt will have adequate pain control throughout shift

## 2025-02-09 NOTE — PROGRESS NOTES
[facility] [place]  Progress Note      Patient: Swapnil Allen  Age/Sex: 75 y.o., male  MRN: [id number]  Date of surgery: 1/28/25  Admit Date: 1/28/2025   Code Status: Full Code  Length of Stay: 12      Interval History/Overnight Events:   Patient failed NGT clamp trial yesterday   Patient with 1600cc from NGT with bilious output. No emesis episodes.   Denies any fevers, chills. Endorses abdominal soreness and discomfort that has been stable  UOP 2.1L no rodriguez   Having bowel function       Objective  02/07 1900 - 02/09 0659  In: 970 [I.V.:400]  Out: 4775 [Urine:2375]              9.9     11.9>-----<243              31.6   136  105  29                  ----------------<123     4.0  20  1.88          Ca 9.5 Phos 3.6 Mg 2.10       ALT 25 AST 33 AlkPhos 62 tBili 0.6          Vitals:    02/08/25 1839 02/09/25 0043 02/09/25 0414 02/09/25 0814   BP: 126/81 137/85 134/78 118/73   BP Location: Right arm Left arm Left arm Right arm   Patient Position: Lying Lying Lying Lying   Pulse: 83 76 76 90   Resp: 17 18 18 18   Temp: 37 °C (98.6 °F) 37.3 °C (99.1 °F) 36.1 °C (97 °F) 36.5 °C (97.7 °F)   TempSrc: Temporal Temporal Temporal Temporal   SpO2: 95% 95% 94% 95%   Weight:       Height:              Medications:  Current Facility-Administered Medications   Medication Dose Route Frequency Provider Last Rate Last Admin    acetaminophen (Ofirmev) injection 1,000 mg  1,000 mg intravenous q6h [facility] Ludwin Phillips MD   Stopped at 02/09/25 0531    Adult Clin[employer] Parenteral Nutrition Continuous  83 mL/hr intravenous Daily PN Karen Katz APRN-CNP 83 mL/hr at 02/08/25 2114 New Bag at 02/08/25 2114    alteplase (Cathflo Activase) injection 2 mg  2 mg intra-catheter PRN Alexys Hood MD        alteplase (Cathflo Activase) injection 2 mg  2 mg intra-catheter PRN Ludwin Phillips MD        amLODIPine (Norvasc) tablet 10 mg  10 mg nasogastric tube Daily Ludwin Phillips MD   10 mg at 02/09/25 0828    [Held by provider]  aspirin EC tablet 81 mg  81 mg oral Daily Jannie Hamm MD   81 mg at 01/31/25 0820    aspirin suppository 150 mg  150 mg rectal Daily Deena Medina MD   150 mg at 02/06/25 1652    benzocaine-menthol (Cepastat Sore Throat) lozenge 1 lozenge  1 lozenge Mouth/Throat q2h PRN Ludwin Phillips MD   1 lozenge at 02/08/25 2132    bisacodyl (Dulcolax) suppository 10 mg  10 mg rectal Daily KYLIE Britt-CNP   10 mg at 02/08/25 0840    calcium carbonate (Tums) chewable tablet 500 mg  500 mg nasogastric tube 4x daily PRN Ludwin Phillips MD   500 mg at 02/04/25 2047    cholecalciferol (Vitamin D-3) tablet 1,000 Units  1,000 Units nasogastric tube Daily Ludwin Phillips MD   1,000 Units at 02/09/25 0828    dextrose 50 % injection 12.5 g  12.5 g intravenous q15 min PRN Ludwin Phillips MD   12.5 g at 02/02/25 1402    pantoprazole (ProtoNix) EC tablet 40 mg  40 mg oral Daily before breakfast Elmo Monteiro MD        Or    esomeprazole (NexIUM) suspension 40 mg  40 mg nasoduodenal tube Daily before breakfast Elmo Monteiro MD   40 mg at 02/09/25 0828    Or    pantoprazole (ProtoNix) injection 40 mg  40 mg intravenous Daily before breakfast Elmo Monteiro MD   40 mg at 02/08/25 0840    fat emulsion fish oil/plant based (SMOFlipid) 20 % IV infusion 50 g  250 mL intravenous Daily Lipids KATHERINE BrittCNP 20.8 mL/hr at 02/08/25 2114 50 g at 02/08/25 2114    heparin (porcine) injection 5,000 Units  5,000 Units subcutaneous q8h Jannie Hamm MD   5,000 Units at 02/09/25 0828    HYDROmorphone (Dilaudid) injection 0.2 mg  0.2 mg intravenous q3h PRN Alexys Hood MD   0.2 mg at 02/08/25 1359    HYDROmorphone (Dilaudid) injection 0.4 mg  0.4 mg intravenous q4h PRN Alexys Hood MD   0.4 mg at 02/08/25 1821    insulin lispro injection 0-5 Units  0-5 Units subcutaneous TID AC Karen Katz, APRN-CNP        ipratropium-albuteroL (Duo-Neb) 0.5-2.5 mg/3 mL nebulizer solution 3 mL  3 mL nebulization TID PRN Alexys  MD Sana        labetaloL (Normodyne,Trandate) injection 10 mg  10 mg intravenous q8h PRN Gallo Martines MD        lactated Ringer's infusion  50 mL/hr intravenous Continuous Ludwin Phillips MD 50 mL/hr at 02/09/25 0832 50 mL/hr at 02/09/25 0832    levothyroxine (Synthroid) injection 12.5 mcg  12.5 mcg intravenous q24h ALEN Ludwin Phillips MD   12.5 mcg at 02/09/25 0828    [Held by provider] levothyroxine (Synthroid, Levoxyl) tablet 25 mcg  25 mcg oral Daily Jannie Hamm MD   25 mcg at 01/31/25 0531    lidocaine (Xylocaine) 10 mg/mL (1 %) injection 5 mL  5 mL infiltration Once KYLIE Britt-CNP        lidocaine (Xylocaine) 10 mg/mL (1 %) injection 5 mL  5 mL infiltration Once Alexys Hood MD        lidocaine 2 % mucosal jelly (Uro-Jet) 1 Application  1 Application nasal Once Elmo Monteiro MD        lidocaine 4 % patch 3 patch  3 patch transdermal Daily Gallo Martines MD   3 patch at 02/04/25 0929    melatonin tablet 10 mg  10 mg nasogastric tube Nightly PRN Ludwin Phillips MD   10 mg at 02/08/25 2201    ondansetron (Zofran) injection 4 mg  4 mg intravenous q6h PRN Gallo Martines MD   4 mg at 01/31/25 2057    [Held by provider] oxyCODONE (Roxicodone) immediate release tablet 10 mg  10 mg oral q6h PRN Jannie Hamm MD   10 mg at 01/29/25 0235    [Held by provider] oxyCODONE (Roxicodone) immediate release tablet 5 mg  5 mg oral q6h PRN Jannie Hamm MD   5 mg at 01/29/25 0840    oxygen (O2) therapy   inhalation Continuous - Inhalation Ludwin Phillips MD   Stopped at 02/09/25 0759    phenoL (Chloraseptic) 1.4 % mouth/throat spray 1 spray  1 spray Mouth/Throat 4x daily PRN Ludwin Phillips MD   1 spray at 02/05/25 0903    piperacillin-tazobactam (Zosyn) 3.375 g in dextrose (iso) IV 50 mL  3.375 g intravenous q6h Alexys Hood MD   Stopped at 02/09/25 0616    sodium chloride 0.9 % bolus 500 mL  500 mL intravenous Once Alexys Hood MD        [Held by provider]  tamsulosin (Flomax) 24 hr capsule 0.4 mg  0.4 mg oral Daily Jannie Hamm MD   0.4 mg at 01/31/25 0820       Physical Exam                                                                                                                         Gen -  No acute distress  Heent: NGT in place with green output      Neuro - Alert, oriented, conversant     CV -  regular rate , normotensive      Pulm - Symmetric chest rise, non-labored breathing on room air      Abd - Abdomen is soft, mildly-distended, and appropriately tender. Midline incision c/d/I closed with glue.       Ext - Warm, well perfused     MSK- no pain or swelling in LE bilaterally     Skin - without jaunice       Psych - appropriate tone, affect      Imaging  XR abdomen 1 view    Result Date: 2/9/2025  STUDY: XR ABDOMEN 1 VIEW;  2/8/2025 11:31 pm   INDICATION: Signs/Symptoms:Confirm NG tube placement.     COMPARISON: Abdominal radiograph 02/07/2025   ACCESSION NUMBER(S): [id number]   ORDERING CLINICIAN: FILIPE ROA   FINDINGS: Enteric tube courses below the left hemidiaphragm with its tip projecting over the gastric pylorus.   Numerous distended small bowel loops measuring up to 4.8 cm, slightly improved when compared to prior.   Stable bibasilar airspace opacities. No pleural effusion.   Osseous structures demonstrate no acute bony changes.       1. Enteric tube courses below the left hemidiaphragm with its tip projecting over the gastric pylorus, in appropriate positioning. 2. Numerous distended small bowel loops measuring up to 4.8 cm, slightly improved when compared to prior.     I personally reviewed the images/study and I agree with the findings as stated by Dr. Brett Phillips. This study was interpreted at [facility], [place], [place].   MACRO: None     Dictation workstation:   [id number]    XR abdomen 1 view    Result Date: 2/7/2025  Interpreted By:  Tyree Tabares,  and Mariaa Fraser STUDY: XR ABDOMEN 1 VIEW;   2/7/2025 12:23 pm   INDICATION: Signs/Symptoms:s/p SBFT assessing contrast movement.     COMPARISON: Abdominal x-ray 02/06/2025   ACCESSION NUMBER(S): [id number]   ORDERING CLINICIAN: HENRY HENRIQUEZ   FINDINGS: AP view of the abdomen is obtained. 2 images total.   Enteric tube is seen with tip projecting over the gastric bubble. Multiple loops of dilated bowel are seen. Positive contrast is seen within the small bowel without evidence of contrast within colon. Limited evaluation of pneumoperitoneum on supine imaging, however no gross evidence of free air is noted.   Stable bibasilar hazy opacities..   Osseous structures demonstrate no acute bony changes.       1.  Dilated loops of bowel without contrast in the colon, correlate for obstruction.   I personally reviewed the images/study and I agree with the findings as stated by Evelio Leroy MD, PGY-2 this study was interpreted at [place], [place].   MACRO: None   Signed by: Tyree Tabares 2/7/2025 4:39 PM Dictation workstation:   [id number]    XR chest 1 view    Result Date: 2/7/2025  Interpreted By:  Tyree Tabares,  and Mariaa Fraser STUDY: XR CHEST 1 VIEW;  2/7/2025 3:14 pm   INDICATION: Signs/Symptoms:Possible pneumonia.     COMPARISON: Chest x-ray from 01/31/2025   ACCESSION NUMBER(S): [id number]   ORDERING CLINICIAN: JOSEFA CALDWELL   FINDINGS: AP radiograph of the chest was provided.   Enteric tube is seen with tip terminating in the gastric body.   CARDIOMEDIASTINAL SILHOUETTE: Cardiomediastinal silhouette is stable in size and configuration.   LUNGS: Low lung volumes with bronchovascular crowding. There is no pulmonary edema, pneumothorax, or consolidation. Bibasilar opacities and retrocardiac opacities are present..   ABDOMEN: Moderate hiatal hernia.   BONES: No acute osseous changes.       1. Bibasilar and retrocardiac opacities likely represent atelectasis, however infection can not be excluded.   I personally  reviewed the images/study and I agree with the findings as stated by Evelio Leroy MD, PGY-2 this study was interpreted at [facility], [place], [place].   MACRO: None   Signed by: Tyree Tabares 2/7/2025 4:30 PM Dictation workstation:   [id number]      Assessment & Plan  75 y.o. 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 Postoperative course complicated by prolonged ileus vs small bowel obstruction. NGT placed 01/31. Patient now with slowly resolving ANISHA and new mild leukocytosis.      Plan 2/7:  -NPO w/ mIVF LR 50cc/hr  - NS bolus 500cc   -Continue PPN  -F/U CRS recs - keep NGT - plan for decompressive PEG next week  -Maintain NGT. Continue fluid repletion for NGT losses.   -Follow up labs   -Ambulate/OOB  -PT  -Possible PEG next week if NGT continues to have high output   -Possible PICC Sunday/Monday       Neuro:   -Pain controlled with Scheduled acetaminophen, Oxycodone 5/10 HELD while NPO, Dilaudid 0.4 PRN for breakthrough pain, robaxin 500mg q8hrs   -PRN melatonin for sleep aid     Resp:   -ICS  -On room air   -Appreciate RT recs    Card:   -Monitor vitals  -Amlodpine  -Aspirin   -Cholecalciferol     FEN: mIVF LR @ 50ml  NS bolus 500cc  Fluid repletion at 0.5:1 of NGT output    GI:   -NPO for NGT  -BR: Miralax, senokat   -PRN Reglan for nausea   - Bisacodyl suppository   -Colorectal surgery consulted, appreciate recommendations  -AXR 2/7  -Nutrition consulted: Currently on PPN     :   -Flomax   -Daily BMP to monitor kidney function and replete electrolytes as indicated.   -Continue to monitor Cr     Endo:   -Levothyroxine IV while NPO     Heme/ID:    -Daily CBC to monitor for acute blood loss anemia   -No indication for blood transfusion or antibiotics at this time     Ppy:   -ALEN  -SCD  -IS    Dispo:   RNF.     Assessment and plan discussed with chief resident Dr. Gannon and attending Dr. Ana Hood MD, [facility]  [facility] [place]  Adult Urology Pager: [phone]  Pediatric Urology Pager: [phone]

## 2025-02-10 ENCOUNTER — APPOINTMENT (OUTPATIENT)
Dept: RADIOLOGY | Facility: HOSPITAL | Age: 75
DRG: 656 | End: 2025-02-10
Payer: MEDICARE

## 2025-02-10 LAB
ALBUMIN SERPL BCP-MCNC: 3.1 G/DL (ref 3.4–5)
ALBUMIN SERPL BCP-MCNC: 3.1 G/DL (ref 3.4–5)
ALP SERPL-CCNC: 94 U/L (ref 33–136)
ALT SERPL W P-5'-P-CCNC: 19 U/L (ref 10–52)
ANION GAP SERPL CALC-SCNC: 14 MMOL/L (ref 10–20)
AST SERPL W P-5'-P-CCNC: 24 U/L (ref 9–39)
BILIRUB DIRECT SERPL-MCNC: 0.1 MG/DL (ref 0–0.3)
BILIRUB SERPL-MCNC: 0.3 MG/DL (ref 0–1.2)
BUN SERPL-MCNC: 32 MG/DL (ref 6–23)
CALCIUM SERPL-MCNC: 9.5 MG/DL (ref 8.6–10.6)
CHLORIDE SERPL-SCNC: 106 MMOL/L (ref 98–107)
CO2 SERPL-SCNC: 20 MMOL/L (ref 21–32)
CREAT SERPL-MCNC: 1.78 MG/DL (ref 0.5–1.3)
EGFRCR SERPLBLD CKD-EPI 2021: 39 ML/MIN/1.73M*2
ERYTHROCYTE [DISTWIDTH] IN BLOOD BY AUTOMATED COUNT: 16.7 % (ref 11.5–14.5)
GLUCOSE BLD MANUAL STRIP-MCNC: 104 MG/DL (ref 74–99)
GLUCOSE BLD MANUAL STRIP-MCNC: 111 MG/DL (ref 74–99)
GLUCOSE BLD MANUAL STRIP-MCNC: 126 MG/DL (ref 74–99)
GLUCOSE SERPL-MCNC: 104 MG/DL (ref 74–99)
HCT VFR BLD AUTO: 33.4 % (ref 41–52)
HGB BLD-MCNC: 10.3 G/DL (ref 13.5–17.5)
MAGNESIUM SERPL-MCNC: 2.37 MG/DL (ref 1.6–2.4)
MCH RBC QN AUTO: 27 PG (ref 26–34)
MCHC RBC AUTO-ENTMCNC: 30.8 G/DL (ref 32–36)
MCV RBC AUTO: 88 FL (ref 80–100)
NRBC BLD-RTO: 0 /100 WBCS (ref 0–0)
PHOSPHATE SERPL-MCNC: 3.4 MG/DL (ref 2.5–4.9)
PLATELET # BLD AUTO: 254 X10*3/UL (ref 150–450)
POTASSIUM SERPL-SCNC: 4.2 MMOL/L (ref 3.5–5.3)
PROT SERPL-MCNC: 8.1 G/DL (ref 6.4–8.2)
RBC # BLD AUTO: 3.81 X10*6/UL (ref 4.5–5.9)
SODIUM SERPL-SCNC: 136 MMOL/L (ref 136–145)
STAPHYLOCOCCUS SPEC CULT: NORMAL
TRIGL SERPL-MCNC: 294 MG/DL (ref 0–149)
WBC # BLD AUTO: 15.2 X10*3/UL (ref 4.4–11.3)

## 2025-02-10 PROCEDURE — 83735 ASSAY OF MAGNESIUM: CPT | Performed by: STUDENT IN AN ORGANIZED HEALTH CARE EDUCATION/TRAINING PROGRAM

## 2025-02-10 PROCEDURE — 2780000003 HC OR 278 NO HCPCS

## 2025-02-10 PROCEDURE — 2500000004 HC RX 250 GENERAL PHARMACY W/ HCPCS (ALT 636 FOR OP/ED)

## 2025-02-10 PROCEDURE — C1751 CATH, INF, PER/CENT/MIDLINE: HCPCS

## 2025-02-10 PROCEDURE — 2500000004 HC RX 250 GENERAL PHARMACY W/ HCPCS (ALT 636 FOR OP/ED): Mod: TB

## 2025-02-10 PROCEDURE — 2500000005 HC RX 250 GENERAL PHARMACY W/O HCPCS

## 2025-02-10 PROCEDURE — 71045 X-RAY EXAM CHEST 1 VIEW: CPT

## 2025-02-10 PROCEDURE — 2500000004 HC RX 250 GENERAL PHARMACY W/ HCPCS (ALT 636 FOR OP/ED): Performed by: RADIOLOGY

## 2025-02-10 PROCEDURE — 49440 PLACE GASTROSTOMY TUBE PERC: CPT

## 2025-02-10 PROCEDURE — 87081 CULTURE SCREEN ONLY: CPT

## 2025-02-10 PROCEDURE — 49440 PLACE GASTROSTOMY TUBE PERC: CPT | Performed by: RADIOLOGY

## 2025-02-10 PROCEDURE — 2720000007 HC OR 272 NO HCPCS

## 2025-02-10 PROCEDURE — 84478 ASSAY OF TRIGLYCERIDES: CPT

## 2025-02-10 PROCEDURE — 87517 HEPATITIS B DNA QUANT: CPT

## 2025-02-10 PROCEDURE — 97530 THERAPEUTIC ACTIVITIES: CPT | Mod: GP,CQ

## 2025-02-10 PROCEDURE — C1729 CATH, DRAINAGE: HCPCS

## 2025-02-10 PROCEDURE — 2500000001 HC RX 250 WO HCPCS SELF ADMINISTERED DRUGS (ALT 637 FOR MEDICARE OP)

## 2025-02-10 PROCEDURE — 36573 INSJ PICC RS&I 5 YR+: CPT

## 2025-02-10 PROCEDURE — 99153 MOD SED SAME PHYS/QHP EA: CPT

## 2025-02-10 PROCEDURE — 87522 HEPATITIS C REVRS TRNSCRPJ: CPT

## 2025-02-10 PROCEDURE — 85027 COMPLETE CBC AUTOMATED: CPT | Performed by: STUDENT IN AN ORGANIZED HEALTH CARE EDUCATION/TRAINING PROGRAM

## 2025-02-10 PROCEDURE — 7100000010 HC PHASE TWO TIME - EACH INCREMENTAL 1 MINUTE

## 2025-02-10 PROCEDURE — 99152 MOD SED SAME PHYS/QHP 5/>YRS: CPT

## 2025-02-10 PROCEDURE — 1170000001 HC PRIVATE ONCOLOGY ROOM DAILY

## 2025-02-10 PROCEDURE — 2580000001 HC RX 258 IV SOLUTIONS

## 2025-02-10 PROCEDURE — 82040 ASSAY OF SERUM ALBUMIN: CPT

## 2025-02-10 PROCEDURE — C1773 RET DEV, INSERTABLE: HCPCS

## 2025-02-10 PROCEDURE — 82947 ASSAY GLUCOSE BLOOD QUANT: CPT

## 2025-02-10 PROCEDURE — 80069 RENAL FUNCTION PANEL: CPT | Performed by: STUDENT IN AN ORGANIZED HEALTH CARE EDUCATION/TRAINING PROGRAM

## 2025-02-10 PROCEDURE — 99152 MOD SED SAME PHYS/QHP 5/>YRS: CPT | Performed by: RADIOLOGY

## 2025-02-10 PROCEDURE — 97116 GAIT TRAINING THERAPY: CPT | Mod: GP,CQ

## 2025-02-10 PROCEDURE — 99222 1ST HOSP IP/OBS MODERATE 55: CPT | Performed by: INTERNAL MEDICINE

## 2025-02-10 PROCEDURE — 2500000004 HC RX 250 GENERAL PHARMACY W/ HCPCS (ALT 636 FOR OP/ED): Performed by: STUDENT IN AN ORGANIZED HEALTH CARE EDUCATION/TRAINING PROGRAM

## 2025-02-10 PROCEDURE — 2500000005 HC RX 250 GENERAL PHARMACY W/O HCPCS: Performed by: NURSE PRACTITIONER

## 2025-02-10 PROCEDURE — 7100000009 HC PHASE TWO TIME - INITIAL BASE CHARGE

## 2025-02-10 PROCEDURE — 36415 COLL VENOUS BLD VENIPUNCTURE: CPT | Performed by: STUDENT IN AN ORGANIZED HEALTH CARE EDUCATION/TRAINING PROGRAM

## 2025-02-10 RX ORDER — MIDAZOLAM HYDROCHLORIDE 1 MG/ML
INJECTION INTRAMUSCULAR; INTRAVENOUS
Status: COMPLETED | OUTPATIENT
Start: 2025-02-10 | End: 2025-02-10

## 2025-02-10 RX ORDER — FENTANYL CITRATE 50 UG/ML
INJECTION, SOLUTION INTRAMUSCULAR; INTRAVENOUS
Status: COMPLETED | OUTPATIENT
Start: 2025-02-10 | End: 2025-02-10

## 2025-02-10 RX ORDER — SODIUM CHLORIDE, SODIUM LACTATE, POTASSIUM CHLORIDE, CALCIUM CHLORIDE 600; 310; 30; 20 MG/100ML; MG/100ML; MG/100ML; MG/100ML
50 INJECTION, SOLUTION INTRAVENOUS CONTINUOUS
Status: DISCONTINUED | OUTPATIENT
Start: 2025-02-10 | End: 2025-02-10

## 2025-02-10 RX ORDER — SODIUM CHLORIDE, SODIUM LACTATE, POTASSIUM CHLORIDE, CALCIUM CHLORIDE 600; 310; 30; 20 MG/100ML; MG/100ML; MG/100ML; MG/100ML
50 INJECTION, SOLUTION INTRAVENOUS CONTINUOUS
Status: ACTIVE | OUTPATIENT
Start: 2025-02-10 | End: 2025-02-10

## 2025-02-10 RX ORDER — LIDOCAINE HYDROCHLORIDE 10 MG/ML
5 INJECTION, SOLUTION INFILTRATION; PERINEURAL ONCE
Status: DISCONTINUED | OUTPATIENT
Start: 2025-02-10 | End: 2025-02-16

## 2025-02-10 RX ORDER — SODIUM CHLORIDE, SODIUM LACTATE, POTASSIUM CHLORIDE, CALCIUM CHLORIDE 600; 310; 30; 20 MG/100ML; MG/100ML; MG/100ML; MG/100ML
50 INJECTION, SOLUTION INTRAVENOUS CONTINUOUS
Status: ACTIVE | OUTPATIENT
Start: 2025-02-10 | End: 2025-02-11

## 2025-02-10 RX ADMIN — PIPERACILLIN SODIUM AND TAZOBACTAM SODIUM 3.38 G: 3; .375 INJECTION, SOLUTION INTRAVENOUS at 12:16

## 2025-02-10 RX ADMIN — AMLODIPINE BESYLATE 10 MG: 10 TABLET ORAL at 08:40

## 2025-02-10 RX ADMIN — PIPERACILLIN SODIUM AND TAZOBACTAM SODIUM 3.38 G: 3; .375 INJECTION, SOLUTION INTRAVENOUS at 17:27

## 2025-02-10 RX ADMIN — ONDANSETRON 4 MG: 2 INJECTION INTRAMUSCULAR; INTRAVENOUS at 15:09

## 2025-02-10 RX ADMIN — Medication 1000 UNITS: at 08:40

## 2025-02-10 RX ADMIN — HEPARIN SODIUM 5000 UNITS: 5000 INJECTION INTRAVENOUS; SUBCUTANEOUS at 01:07

## 2025-02-10 RX ADMIN — PANTOPRAZOLE SODIUM 40 MG: 40 INJECTION, POWDER, FOR SOLUTION INTRAVENOUS at 05:22

## 2025-02-10 RX ADMIN — MIDAZOLAM HYDROCHLORIDE 1 MG: 1 INJECTION, SOLUTION INTRAMUSCULAR; INTRAVENOUS at 15:21

## 2025-02-10 RX ADMIN — SMOFLIPID 50 G: 6; 6; 5; 3 INJECTION, EMULSION INTRAVENOUS at 20:29

## 2025-02-10 RX ADMIN — SODIUM CHLORIDE, POTASSIUM CHLORIDE, SODIUM LACTATE AND CALCIUM CHLORIDE 50 ML/HR: 600; 310; 30; 20 INJECTION, SOLUTION INTRAVENOUS at 08:44

## 2025-02-10 RX ADMIN — LEVOTHYROXINE SODIUM ANHYDROUS 12.5 MCG: 100 INJECTION, POWDER, LYOPHILIZED, FOR SOLUTION INTRAVENOUS at 08:40

## 2025-02-10 RX ADMIN — Medication 1 L/MIN: at 08:44

## 2025-02-10 RX ADMIN — Medication 10 MG: at 20:40

## 2025-02-10 RX ADMIN — MIDAZOLAM HYDROCHLORIDE 1 MG: 1 INJECTION, SOLUTION INTRAMUSCULAR; INTRAVENOUS at 15:10

## 2025-02-10 RX ADMIN — PIPERACILLIN SODIUM AND TAZOBACTAM SODIUM 3.38 G: 3; .375 INJECTION, SOLUTION INTRAVENOUS at 05:22

## 2025-02-10 RX ADMIN — ASCORBIC ACID, VITAMIN A PALMITATE, CHOLECALCIFEROL, THIAMINE HYDROCHLORIDE, RIBOFLAVIN-5 PHOSPHATE SODIUM, PYRIDOXINE HYDROCHLORIDE, NIACINAMIDE, DEXPANTHENOL, ALPHA-TOCOPHEROL ACETATE, VITAMIN K1, FOLIC ACID, BIOTIN, CYANOCOBALAMIN: 200; 3300; 200; 6; 3.6; 6; 40; 15; 10; 150; 600; 60; 5 INJECTION, SOLUTION INTRAVENOUS at 20:29

## 2025-02-10 RX ADMIN — HYDROMORPHONE HYDROCHLORIDE 0.4 MG: 1 INJECTION, SOLUTION INTRAMUSCULAR; INTRAVENOUS; SUBCUTANEOUS at 19:23

## 2025-02-10 RX ADMIN — HYDROMORPHONE HYDROCHLORIDE 0.4 MG: 1 INJECTION, SOLUTION INTRAMUSCULAR; INTRAVENOUS; SUBCUTANEOUS at 08:45

## 2025-02-10 RX ADMIN — FENTANYL CITRATE 50 MCG: 50 INJECTION, SOLUTION INTRAMUSCULAR; INTRAVENOUS at 15:21

## 2025-02-10 RX ADMIN — HEPARIN SODIUM 5000 UNITS: 5000 INJECTION INTRAVENOUS; SUBCUTANEOUS at 17:27

## 2025-02-10 RX ADMIN — HEPARIN SODIUM 5000 UNITS: 5000 INJECTION INTRAVENOUS; SUBCUTANEOUS at 08:40

## 2025-02-10 RX ADMIN — FENTANYL CITRATE 50 MCG: 50 INJECTION, SOLUTION INTRAMUSCULAR; INTRAVENOUS at 15:10

## 2025-02-10 ASSESSMENT — PAIN - FUNCTIONAL ASSESSMENT
PAIN_FUNCTIONAL_ASSESSMENT: 0-10

## 2025-02-10 ASSESSMENT — COGNITIVE AND FUNCTIONAL STATUS - GENERAL
MOBILITY SCORE: 14
MOVING TO AND FROM BED TO CHAIR: A LITTLE
PERSONAL GROOMING: A LITTLE
TURNING FROM BACK TO SIDE WHILE IN FLAT BAD: A LITTLE
HELP NEEDED FOR BATHING: A LITTLE
MOBILITY SCORE: 19
MOVING FROM LYING ON BACK TO SITTING ON SIDE OF FLAT BED WITH BEDRAILS: A LITTLE
WALKING IN HOSPITAL ROOM: A LITTLE
EATING MEALS: A LITTLE
CLIMB 3 TO 5 STEPS WITH RAILING: A LITTLE
DAILY ACTIVITIY SCORE: 18
DRESSING REGULAR LOWER BODY CLOTHING: A LITTLE
MOVING TO AND FROM BED TO CHAIR: A LITTLE
TURNING FROM BACK TO SIDE WHILE IN FLAT BAD: A LOT
STANDING UP FROM CHAIR USING ARMS: A LOT
CLIMB 3 TO 5 STEPS WITH RAILING: TOTAL
WALKING IN HOSPITAL ROOM: A LITTLE
STANDING UP FROM CHAIR USING ARMS: A LITTLE
DRESSING REGULAR UPPER BODY CLOTHING: A LITTLE
TOILETING: A LITTLE

## 2025-02-10 ASSESSMENT — PAIN SCALES - GENERAL
PAINLEVEL_OUTOF10: 5 - MODERATE PAIN
PAINLEVEL_OUTOF10: 9
PAINLEVEL_OUTOF10: 9
PAINLEVEL_OUTOF10: 0 - NO PAIN

## 2025-02-10 NOTE — NURSING NOTE
"VAST RN called to bedside for second IV. Patient agreeable to one stick as he states, \" I have been poked too many times\". Attempted one IV on the left arm, IV not patent. Patient does not want another IV at this time and perhaps will consider a central line (alternative access). This nurse and bedside nurse in room, she is aware patient is a candidate for central line due to his condition and medical needs. He does have over vein options, visible on ultra sound, this nurse scanned left arm only. Patient would like to think about it as this time. Bedside RN aware and will request alternative access should patient be agreeable. Patient has no further questions at this time.   "

## 2025-02-10 NOTE — POST-PROCEDURE NOTE
Interventional Radiology Brief Postprocedure Note    Attending: Dr. Emely Quiroga.    Assistant: Dr. Jose Antonio Kramer.    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     Description of procedure: Image guided gastrostomy tube placement.     Anesthesia:  MAC Moderate    Complications: None    Estimated Blood Loss: none    Medications  As of 02/10/25 1634      ceFAZolin (Ancef) 2 g in dextrose (iso)  mL (g) Total dose:  0 g* Dosing weight:  88.5   *From user-documented volume     Date/Time Rate/Dose/Volume Action       01/28/25  2145 0 mL                heparin (porcine) injection 5,000 Units (Units) Total dose:  190,000 Units Dosing weight:  102      Date/Time Rate/Dose/Volume Action       01/28/25  1215 5,000 Units Given      2151 5,000 Units Given     01/29/25  0609 5,000 Units Given      1356 5,000 Units Given      2047 5,000 Units Given     01/30/25  0458 5,000 Units Given      1349 5,000 Units Given      2151 5,000 Units Given     01/31/25  0531 5,000 Units Given      1345 5,000 Units Given      2057 5,000 Units Given     02/01/25  0851 5,000 Units Given      1641 5,000 Units Given     02/02/25  0053 5,000 Units Given      0915 5,000 Units Given      1628 5,000 Units Given     02/03/25  0037 5,000 Units Given      0827 5,000 Units Given      1535 5,000 Units Given     02/04/25  0016 5,000 Units Given      1748 5,000 Units Given     02/05/25  0024 5,000 Units Given      0902 5,000 Units Given      1653 5,000 Units Given     02/06/25  0022 5,000 Units Given      0853 5,000 Units Given      1652 5,000 Units Given     02/07/25  0246 5,000 Units Given      0909 5,000 Units Given      1720 5,000 Units Given     02/08/25  0159 5,000 Units Given      0841 5,000 Units Given      1816 5,000 Units Given     02/09/25  0023 5,000 Units Given      0828 5,000 Units Given      1707 5,000 Units Given     02/10/25  0107 5,000 Units Given      0840 5,000 Units Given               HYDROmorphone  PF (Dilaudid) injection 0.2 mg (mg) Total dose:  0.6 mg Dosing weight:  102      Date/Time Rate/Dose/Volume Action       01/28/25 1956 0.2 mg Given      2151 0.2 mg Given     01/29/25 0524 0.2 mg Given               HYDROmorphone (Dilaudid) injection 0.5 mg (mg) Total dose:  0.5 mg Dosing weight:  102      Date/Time Rate/Dose/Volume Action       01/28/25  1945 0.5 mg Given               oxyCODONE (Roxicodone) immediate release tablet 10 mg (mg) Total dose:  20 mg Dosing weight:  102      Date/Time Rate/Dose/Volume Action       01/28/25 2014 10 mg Given     01/29/25  0235 10 mg Given     01/31/25  0739 *Not included in total Held by provider               oxyCODONE (Roxicodone) immediate release tablet 5 mg (mg) Total dose:  5 mg Dosing weight:  102      Date/Time Rate/Dose/Volume Action       01/29/25  0840 5 mg Given     01/31/25  0739 *Not included in total Held by provider               ondansetron (Zofran) injection 4 mg (mg) Total dose:  4 mg Dosing weight:  102      Date/Time Rate/Dose/Volume Action       01/28/25  1938 4 mg Given               ondansetron (Zofran) injection 4 mg (mg) Total dose:  8 mg Dosing weight:  96.6      Date/Time Rate/Dose/Volume Action       01/31/25  2057 4 mg Given     02/10/25  1509 4 mg Given               amLODIPine (Norvasc) tablet 10 mg (mg) Total dose:  30 mg Dosing weight:  102      Date/Time Rate/Dose/Volume Action       01/29/25  0827 10 mg Given     01/30/25  0959 10 mg Given     01/31/25  0820 10 mg Given               amLODIPine (Norvasc) tablet 10 mg (mg) Total dose:  100 mg Dosing weight:  102      Date/Time Rate/Dose/Volume Action       02/01/25  0851 10 mg Given     02/02/25  0916 10 mg Given     02/03/25  0827 10 mg Given     02/04/25  0934 10 mg Given     02/05/25  0910 10 mg Given     02/06/25  1032 10 mg Given     02/07/25  0909 10 mg Given     02/08/25  0840 10 mg Given     02/09/25  0828 10 mg Given     02/10/25  0840 10 mg Given               aspirin EC  tablet 81 mg (mg) Total dose:  243 mg* Dosing weight:  102   *Administration not included in total     Date/Time Rate/Dose/Volume Action       01/29/25  0827 81 mg Given     01/30/25  0959 81 mg Given     01/31/25  0820 81 mg Given     02/01/25  0436 *Not included in total Held by provider      0900 *81 mg Missed     02/02/25  0900 *81 mg Missed     02/03/25  0900 *81 mg Missed     02/04/25  0900 *81 mg Missed     02/05/25  0900 *81 mg Missed     02/06/25  0900 *81 mg Missed     02/07/25  0900 *81 mg Missed     02/08/25  0900 *81 mg Missed     02/09/25  0900 *81 mg Missed     02/10/25  0900 *81 mg Missed               cholecalciferol (Vitamin D-3) tablet 1,000 Units (Units) Total dose:  3,000 Units Dosing weight:  102      Date/Time Rate/Dose/Volume Action       01/29/25  0827 1,000 Units Given     01/30/25  0959 1,000 Units Given     01/31/25  0820 1,000 Units Given               cholecalciferol (Vitamin D-3) tablet 1,000 Units (Units) Total dose:  10,000 Units Dosing weight:  102      Date/Time Rate/Dose/Volume Action       02/01/25  0851 1,000 Units Given     02/02/25  0916 1,000 Units Given     02/03/25  0827 1,000 Units Given     02/04/25  0930 1,000 Units Given     02/05/25  0903 1,000 Units Given     02/06/25  1033 1,000 Units Given     02/07/25  0907 1,000 Units Given     02/08/25  0840 1,000 Units Given     02/09/25  0828 1,000 Units Given     02/10/25  0840 1,000 Units Given               levothyroxine (Synthroid, Levoxyl) tablet 25 mcg (mcg) Total dose:  75 mcg* Dosing weight:  102   *Administration not included in total     Date/Time Rate/Dose/Volume Action       01/29/25  0609 25 mcg Given     01/30/25  0501 25 mcg Given     01/31/25  0531 25 mcg Given     02/01/25  0436 *Not included in total Held by provider      0600 *25 mcg Missed     02/02/25  0600 *25 mcg Missed     02/03/25  0600 *25 mcg Missed     02/04/25  0600 *25 mcg Missed     02/05/25  0600 *25 mcg Missed     02/06/25  0600 *25 mcg Missed      02/07/25  0600 *25 mcg Missed     02/08/25  0600 *25 mcg Missed     02/09/25  0600 *25 mcg Missed     02/10/25  0600 *25 mcg Missed               tamsulosin (Flomax) 24 hr capsule 0.4 mg (mg) Total dose:  1.2 mg* Dosing weight:  102   *Administration not included in total     Date/Time Rate/Dose/Volume Action       01/29/25  0827 0.4 mg Given     01/30/25  0959 0.4 mg Given     01/31/25  0820 0.4 mg Given     02/01/25  0436 *Not included in total Held by provider      0900 *0.4 mg Missed     02/02/25  0900 *0.4 mg Missed     02/03/25  0900 *0.4 mg Missed     02/04/25  0900 *0.4 mg Missed     02/05/25  0900 *0.4 mg Missed     02/06/25  0900 *0.4 mg Missed     02/07/25  0900 *0.4 mg Missed     02/08/25  0900 *0.4 mg Missed     02/09/25  0900 *0.4 mg Missed     02/10/25  0900 *0.4 mg Missed               lactated Ringer's infusion (mL/hr) Total volume:  1,031.24 mL* Dosing weight:  102   *From user-documented volume     Date/Time Rate/Dose/Volume Action       01/28/25  2145 0 mL       2158 125 mL/hr New Bag     01/29/25  0011 277.08 mL       0235 300 mL       0521 345.83 mL       0613 125 mL/hr - 108.33 mL New Bag      1225 100 mL/hr Rate Change      1532 *100 mL/hr Handoff      1817  Stopped               lactated Ringer's infusion (mL/hr) Total volume:  2,826.67 mL* Dosing weight:  97   *From user-documented volume     Date/Time Rate/Dose/Volume Action       01/29/25  1309 *100 mL/hr Missed      2128 100 mL/hr New Bag     01/30/25  0308 100 mL/hr - 566.67 mL Rate Verify      0344 100 mL/hr New Bag      0347 100 mL/hr - 65 mL Rate Verify      0512 100 mL/hr - 141.67 mL Rate Verify      1035 100 mL/hr - 538.33 mL Rate Verify      1848 100 mL/hr - 821.67 mL Rate Verify     01/31/25  0144 693.33 mL                lactated Ringer's infusion (mL/hr) Total volume:  1,000 mL* Dosing weight:  97   *From user-documented volume     Date/Time Rate/Dose/Volume Action       01/30/25  0959 100 mL/hr New Bag      1035 100 mL/hr -  60 mL Rate Verify      1629 50 mL/hr Rate Change      1848 50 mL/hr - 705.83 mL Rate Verify     01/31/25  0144 50 mL/hr - 234.17 mL Rate Verify      0608 50 mL/hr - 0 mL Rate Verify               lactated Ringer's infusion (mL/hr) Total volume:  855 mL* Dosing weight:  96.6   *From user-documented volume     Date/Time Rate/Dose/Volume Action       01/31/25  1019 100 mL/hr New Bag      1852 855 mL      02/01/25  0850 100 mL/hr New Bag     02/02/25  1258  Stopped               lactated Ringer's infusion (mL/hr) Total volume:  Not documented* Dosing weight:  102   *Total volume has not been documented. View each administration to see the amount administered.     Date/Time Rate/Dose/Volume Action       02/01/25  1020 *100 mL/hr Missed               lactated Ringer's infusion (mL/hr) Total volume:  999 mL* Dosing weight:  103   *From user-documented volume     Date/Time Rate/Dose/Volume Action       02/01/25  1020 *100 mL/hr Missed      1640 100 mL/hr New Bag     02/02/25  2204 999 mL                lactated Ringer's infusion (mL/hr) Total volume:  168.75 mL* Dosing weight:  103   *From user-documented volume     Date/Time Rate/Dose/Volume Action       02/01/25  2316 125 mL/hr (over 60 min) New Bag     02/02/25  0037  (over 60 min) Stopped      2204 168.75 mL                lactated Ringer's infusion (mL/hr) Total volume:  2,755 mL* Dosing weight:  103   *From user-documented volume     Date/Time Rate/Dose/Volume Action       02/02/25  1258 125 mL/hr (over 60 min) New Bag      1358  (over 60 min) Stopped      2204 125 mL       2346 950 mL/hr (over 60 min) - 950 mL New Bag     02/03/25  0044  (over 60 min) Stopped      0430 300 mL/hr (over 60 min) - 300 mL New Bag      0533  (over 60 min) Stopped      0700 350 mL/hr (over 60 min) - 315 mL Rate Change      1147 400 mL       1341 665 mL                lactated Ringer's infusion (mL/hr) Total volume:  1,593.33 mL* Dosing weight:  103   *From user-documented volume      Date/Time Rate/Dose/Volume Action       02/02/25  1402 100 mL/hr New Bag      2204 100 mL/hr - 803.33 mL Rate Verify     02/03/25  0040 100 mL/hr New Bag      0844 100 mL/hr New Bag      1147 600 mL       1300  Stopped      Canceled Entry               lactated Ringer's infusion (mL/hr) Total volume:  2,520.83 mL* Dosing weight:  103   *From user-documented volume     Date/Time Rate/Dose/Volume Action       02/03/25  1535 100 mL/hr New Bag     02/04/25  0042 100 mL/hr - 600 mL New Bag      0500 430 mL       0600 600 mL       1117 50 mL/hr - 528.33 mL Rate Change      1648 275.83 mL       1832 86.67 mL                lactated Ringer's infusion (mL/hr) Total volume:  1,600 mL* Dosing weight:  103   *From user-documented volume     Date/Time Rate/Dose/Volume Action       02/04/25  1519 600 mL/hr (over 60 min) - 600 mL New Bag      1812  (over 60 min) Stopped      1832 400 mL       2145 0 mL      02/05/25  0400 600 mL       0909 500 mL/hr (over 60 min) New Bag      1312  (over 60 min) Stopped               lactated Ringer's infusion (mL/hr) Total volume:  1,212.5 mL* Dosing weight:  103   *From user-documented volume     Date/Time Rate/Dose/Volume Action       02/05/25  2154 850 mL/hr (over 60 min) New Bag     02/06/25  0344 300 mL       1043 275 mL/hr (over 60 min) New Bag      1146 275 mL Stopped      1403 237.5 mL/hr (over 60 min) New Bag      1703 237.5 mL Stopped      1813 250 mL/hr (over 60 min) New Bag      1927 250 mL Stopped     02/07/25  0000 150 mL                lactated Ringer's infusion (mL/hr) Total volume:  675.96 mL* Dosing weight:  103   *From user-documented volume     Date/Time Rate/Dose/Volume Action       02/06/25  1033 50 mL/hr New Bag      1041 5.27 mL       1401 165.03 mL       1755 282.47 mL      02/07/25  0235 50 mL/hr New Bag      1437 223.19 mL                lactated Ringer's infusion (mL/hr) Total volume:  757.1 mL* Dosing weight:  103   *From user-documented volume     Date/Time  Rate/Dose/Volume Action       02/07/25  0600 200 mL       0909 50 mL/hr New Bag      1032 132.1 mL       1129 25 mL/hr Rate Change      1249 25 mL Stopped      1440 175 mL/hr New Bag      1819 250 mL/hr New Bag      2200 100 mL/hr - 200 mL Rate Change     02/08/25  0000 200 mL                lactated Ringer's infusion (mL/hr) Total volume:  199.51 mL* Dosing weight:  103   *From user-documented volume     Date/Time Rate/Dose/Volume Action       02/07/25  1129 50 mL/hr New Bag      1815 199.51 mL                lactated Ringer's infusion (mL/hr) Total volume:  879.17 mL* Dosing weight:  103   *From user-documented volume     Date/Time Rate/Dose/Volume Action       02/09/25  0041 50 mL/hr New Bag      0832 50 mL/hr - 392.5 mL Rate Verify      1010 50 mL/hr - 81.67 mL Rate Verify      1351 50 mL/hr - 184.17 mL Rate Verify      1537 50 mL/hr - 88.33 mL Rate Verify      1816 50 mL/hr - 132.5 mL Rate Verify               lactated Ringer's infusion (mL/hr) Total volume:  722.5 mL* Dosing weight:  103   *From user-documented volume     Date/Time Rate/Dose/Volume Action       02/09/25  1817 50 mL/hr New Bag     02/10/25  0844  Stopped      1231 722.5 mL                lactated Ringer's infusion (mL/hr) Total volume:  189.17 mL* Dosing weight:  103   *From user-documented volume     Date/Time Rate/Dose/Volume Action       02/10/25  0844 50 mL/hr New Bag      1231 50 mL/hr - 189.17 mL Rate Verify               polyethylene glycol (Glycolax, Miralax) packet 17 g (g) Total dose:  17 g* Dosing weight:  102   *Administration not included in total     Date/Time Rate/Dose/Volume Action       01/29/25  0828 *17 g Missed     01/30/25  1003 *17 g Missed     01/31/25  0820 17 g Given               polyethylene glycol (Glycolax, Miralax) packet 17 g (g) Total dose:  85 g* Dosing weight:  102   *Administration not included in total     Date/Time Rate/Dose/Volume Action       02/01/25  0851 17 g Given     02/02/25  0916 17 g Given      02/03/25  0826 17 g Given     02/04/25  0929 17 g Given     02/05/25  0914 17 g Given     02/06/25  1106 *17 g Missed               sennosides (Senokot) tablet 17.2 mg (mg) Total dose:  4 tablet* Dosing weight:  102   *Administration not included in total     Date/Time Rate/Dose/Volume Action       01/28/25  2129 *17.2 mg Missed     01/29/25  0827 17.2 mg Given      2053 *17.2 mg Missed     01/30/25  1003 *17.2 mg Missed      2152 *17.2 mg Missed     01/31/25  0820 17.2 mg Given      2308 *17.2 mg Missed               sennosides (Senokot) tablet 17.2 mg (mg) Total dose:  20 tablet* Dosing weight:  102   *Administration not included in total     Date/Time Rate/Dose/Volume Action       02/01/25  0851 17.2 mg Given      2102 17.2 mg Given     02/02/25  0916 17.2 mg Given      2020 17.2 mg Given     02/03/25  0827 17.2 mg Given      2201 17.2 mg Given     02/04/25  0929 17.2 mg Given      2047 17.2 mg Given     02/05/25  0903 17.2 mg Given      2019 *17.2 mg Missed     02/06/25  1033 17.2 mg Given               metoclopramide (Reglan) tablet 10 mg (mg) Total dose:  Cannot be calculated* Dosing weight:  102   *Administration dose not documented     Date/Time Rate/Dose/Volume Action       01/29/25 0318 *Not included in total See Alternative     01/31/25  0233 *Not included in total See Alternative               metoclopramide (Reglan) injection 10 mg (mg) Total dose:  10 mg* Dosing weight:  102   *Administration not included in total     Date/Time Rate/Dose/Volume Action       01/29/25  0318 *10 mg Missed     01/31/25  0233 10 mg Given               acetaminophen (Tylenol) tablet 975 mg (mg) Total dose:  9,750 mg* Dosing weight:  102   *Administration not included in total     Date/Time Rate/Dose/Volume Action       01/28/25  2151 975 mg Given     01/29/25  0317 975 mg Given      0827 975 mg Given      1354 975 mg Given      2046 975 mg Given     01/30/25  0342 *975 mg Missed      0959 975 mg Given      1510 975 mg Given       2151 975 mg Given     01/31/25  0302 *975 mg Missed      1019 975 mg Given      1600 975 mg Given               calcium carbonate (Tums) chewable tablet 1,000 mg (mg) Total dose:  1,000 mg Dosing weight:  97      Date/Time Rate/Dose/Volume Action       01/29/25  0317 1,000 mg Given               calcium carbonate (Tums) chewable tablet 500 mg (mg) Total dose:  500 mg Dosing weight:  97      Date/Time Rate/Dose/Volume Action       01/29/25  1356 500 mg Given               calcium carbonate (Tums) chewable tablet 500 mg (mg) Total dose:  500 mg Dosing weight:  102      Date/Time Rate/Dose/Volume Action       02/04/25  2047 500 mg Given               HYDROmorphone (Dilaudid) injection 0.4 mg (mg) Total dose:  4.8 mg Dosing weight:  97      Date/Time Rate/Dose/Volume Action       01/29/25  1712 0.4 mg Given     01/31/25  0821 0.4 mg Given      1345 0.4 mg Given      1600 0.4 mg Given     02/01/25  1047 0.4 mg Given      1640 0.4 mg Given     02/02/25  0059 0.4 mg Given      0317 0.4 mg Given      0950 0.4 mg Given      1755 0.4 mg Given      2251 0.4 mg Given     02/03/25  0428 0.4 mg Given               HYDROmorphone (Dilaudid) injection 0.4 mg (mg) Total dose:  4.4 mg Dosing weight:  103      Date/Time Rate/Dose/Volume Action       02/03/25  1409 0.4 mg Given      1816 0.4 mg Given     02/04/25  0029 0.4 mg Given      0435 0.4 mg Given      0626 0.4 mg Given      1347 0.4 mg Given     02/06/25  0022 0.4 mg Given      0522 0.4 mg Given      0944 0.4 mg Given      1714 0.4 mg Given     02/07/25  0213 0.4 mg Given               HYDROmorphone (Dilaudid) injection 0.4 mg (mg) Total dose:  2 mg Dosing weight:  103      Date/Time Rate/Dose/Volume Action       02/08/25  0206 0.4 mg Given      1821 0.4 mg Given     02/09/25  1707 0.4 mg Given      2142 0.4 mg Given     02/10/25  0845 0.4 mg Given               HYDROmorphone (Dilaudid) injection 0.2 mg (mg) Total dose:  0.4 mg Dosing weight:  103      Date/Time  Rate/Dose/Volume Action       02/07/25  1923 0.2 mg Given     02/08/25  1359 0.2 mg Given               methocarbamol (Robaxin) tablet 500 mg (mg) Total dose:  3,500 mg Dosing weight:  97      Date/Time Rate/Dose/Volume Action       01/29/25  1355 500 mg Given      2054 500 mg Given     01/30/25  0501 500 mg Given      1349 500 mg Given      2151 500 mg Given     01/31/25  0531 500 mg Given      1345 500 mg Given               pantoprazole (ProtoNix) injection 40 mg (mg) Total dose:  40 mg Dosing weight:  97      Date/Time Rate/Dose/Volume Action       01/29/25  1453 40 mg Given               pantoprazole (ProtoNix) injection 40 mg (mg) Total dose:  200 mg Dosing weight:  103      Date/Time Rate/Dose/Volume Action       02/05/25  0902 40 mg Given     02/06/25  0600 40 mg Given     02/07/25  0908 40 mg Given     02/08/25  0840 40 mg Given     02/09/25  0828 *Not included in total See Alternative     02/10/25  0522 40 mg Given               sodium chloride 0.9 % bolus 500 mL (mL/hr) Total volume:  Not documented* Dosing weight:  97   *Total volume has not been documented. View each administration to see the amount administered.     Date/Time Rate/Dose/Volume Action       01/29/25  1929 500 mL - 500 mL/hr (over 60 min) New Bag      2042  (over 60 min) Stopped               sodium chloride 0.9 % bolus 500 mL (mL/hr) Total volume:  500 mL* Dosing weight:  103   *From user-documented volume     Date/Time Rate/Dose/Volume Action       02/09/25  0858 500 mL - 500 mL/hr (over 60 min) New Bag      1008  (over 60 min) Stopped      1010 500 mL                iohexol (OMNIPaque) 350 mg iodine/mL solution 80 mL (mL) Total volume:  80 mL Dosing weight:  97      Date/Time Rate/Dose/Volume Action       01/29/25  1953 80 mL Given               iohexol (OMNIPaque) 350 mg iodine/mL solution 75 mL (mL) Total volume:  75 mL Dosing weight:  103      Date/Time Rate/Dose/Volume Action       02/06/25  1003 75 mL Given               labetaloL  (Normodyne,Trandate) injection 10 mg (mg) Total dose:  10 mg Dosing weight:  97      Date/Time Rate/Dose/Volume Action       01/30/25  0220 10 mg Given               melatonin tablet 5 mg (mg) Total dose:  10 mg Dosing weight:  97      Date/Time Rate/Dose/Volume Action       01/30/25  0043 5 mg Given      0458 5 mg Given               melatonin tablet 10 mg (mg) Total dose:  10 mg Dosing weight:  96.6      Date/Time Rate/Dose/Volume Action       01/30/25  2151 10 mg Given               melatonin tablet 10 mg (mg) Total dose:  50 mg Dosing weight:  102      Date/Time Rate/Dose/Volume Action       02/02/25  2020 10 mg Given     02/04/25  0029 10 mg Given      2047 10 mg Given     02/05/25 2119 10 mg Given     02/08/25  2201 10 mg Given               LORazepam (Ativan) tablet 1 mg (mg) Total dose:  1 mg Dosing weight:  96.6      Date/Time Rate/Dose/Volume Action       01/31/25  0106 1 mg Given               magnesium hydroxide (Milk of Magnesia) 400 mg/5 mL suspension 30 mL (mL) Total volume:  30 mL Dosing weight:  96.6      Date/Time Rate/Dose/Volume Action       01/31/25  0821 30 mL Given               magnesium hydroxide (Milk of Magnesia) 400 mg/5 mL suspension 30 mL (mL) Total volume:  150 mL* Dosing weight:  102   *Administration not included in total     Date/Time Rate/Dose/Volume Action       02/01/25  0850 30 mL Given     02/02/25  1413 30 mL Given     02/03/25  0827 30 mL Given     02/04/25  0930 30 mL Given     02/05/25  1755 *30 mL Missed     02/06/25  1033 30 mL Given               bisacodyl (Dulcolax) suppository 10 mg (mg) Total dose:  70 mg* Dosing weight:  96.6   *Administration not included in total     Date/Time Rate/Dose/Volume Action       01/31/25  0826 *10 mg Missed     02/01/25  1053 10 mg Given     02/02/25  0916 10 mg Given     02/03/25  0827 10 mg Given     02/04/25  0930 10 mg Given     02/05/25  1754 *10 mg Missed     02/06/25  1046 10 mg Given     02/07/25  0926 10 mg Given     02/08/25   0840 10 mg Given     02/09/25  0808 *10 mg Missed     02/10/25  0842 *10 mg Missed               lidocaine 4 % patch 3 patch (patch) Total dose:  6 patch* Dosing weight:  102   *Administration not included in total     Date/Time Rate/Dose/Volume Action       01/31/25  1443 *3 patch (over 720 min) Missed     02/01/25  0853 *3 patch (over 720 min) Missed     02/02/25  1004 *3 patch (over 720 min) Missed     02/03/25  0826 3 patch (over 720 min) Medication Applied      2054  (over 720 min) Medication Removed     02/04/25  0929 3 patch (over 720 min) Medication Applied      2030  (over 720 min) Medication Removed     02/05/25  1140 *3 patch (over 720 min) Missed     02/06/25  1044 *3 patch (over 720 min) Missed     02/07/25  0907 *3 patch (over 720 min) Missed     02/08/25  1154 *3 patch (over 720 min) Missed     02/09/25  0830 *3 patch (over 720 min) Missed     02/10/25  0845 *3 patch (over 720 min) Missed               metoprolol tartrate (Lopressor) tablet 12.5 mg (mg) Total dose:  0 mg* Dosing weight:  102   *Administration not included in total     Date/Time Rate/Dose/Volume Action       01/31/25  2045 *12.5 mg Missed               acetaminophen (Ofirmev) injection 1,000 mg (mL/hr) Total volume:  Not documented* Dosing weight:  102   *Total volume has not been documented. View each administration to see the amount administered.     Date/Time Rate/Dose/Volume Action       02/01/25  0048 1,000 mg - 400 mL/hr (over 15 min) New Bag      0151  (over 15 min) Stopped      0850 1,000 mg - 400 mL/hr (over 15 min) New Bag      0913  (over 15 min) Stopped      1309 1,000 mg - 400 mL/hr (over 15 min) New Bag      1411  (over 15 min) Stopped      1839 1,000 mg - 400 mL/hr (over 15 min) New Bag      1854  (over 15 min) Stopped               acetaminophen (Ofirmev) injection 1,000 mg (mL/hr) Total volume:  Not documented* Dosing weight:  103   *Total volume has not been documented. View each administration to see the amount  administered.     Date/Time Rate/Dose/Volume Action       02/04/25  2027 1,000 mg - 400 mL/hr (over 15 min) New Bag      2048  (over 15 min) Stopped     02/05/25  0024 1,000 mg - 400 mL/hr (over 15 min) New Bag      0040  (over 15 min) Stopped      0906 1,000 mg - 400 mL/hr (over 15 min) New Bag      1141  (over 15 min) Stopped      1642 1,000 mg - 400 mL/hr (over 15 min) New Bag      1754  (over 15 min) Stopped               acetaminophen (Ofirmev) injection 1,000 mg (mL/hr) Total dose:  6,000 mg* Dosing weight:  103   *From user-documented volume     Date/Time Rate/Dose/Volume Action       02/06/25  1358 1,000 mg - 400 mL/hr (over 15 min) New Bag      1420 100 mL Stopped      2001 1,000 mg - 400 mL/hr (over 15 min) New Bag      2019  (over 15 min) Stopped     02/07/25  0245 1,000 mg - 400 mL/hr (over 15 min) New Bag      0300 100 mL Stopped      0923 1,000 mg - 400 mL/hr (over 15 min) New Bag      1048 100 mL Stopped      1542 1,000 mg - 400 mL/hr (over 15 min) New Bag      1602 100 mL Stopped      2221 1,000 mg - 400 mL/hr (over 15 min) New Bag      2236 100 mL Stopped     02/08/25  0359 1,000 mg - 400 mL/hr (over 15 min) New Bag      0419 100 mL Stopped      0840 1,000 mg - 400 mL/hr (over 15 min) New Bag      0857  (over 15 min) Stopped      1631 *1,000 mg - 400 mL/hr (over 15 min) Missed               acetaminophen (Ofirmev) injection 1,000 mg (mL/hr) Total volume:  Not documented* Dosing weight:  103   *Total volume has not been documented. View each administration to see the amount administered.     Date/Time Rate/Dose/Volume Action       02/08/25  1815 1,000 mg - 400 mL/hr (over 15 min) New Bag      1839  (over 15 min) Stopped     02/09/25  0023 1,000 mg - 400 mL/hr (over 15 min) New Bag      0041  (over 15 min) Stopped      0513 1,000 mg - 400 mL/hr (over 15 min) New Bag      0531  (over 15 min) Stopped      1208 1,000 mg - 400 mL/hr (over 15 min) New Bag      1231  (over 15 min) Stopped                methocarbamol (Robaxin) injection 500 mg (mg) Total dose:  2,000 mg* Dosing weight:  102   *Administration not included in total     Date/Time Rate/Dose/Volume Action       02/01/25  0328 500 mg Given      0606 *500 mg Missed      1309 500 mg Given      2102 500 mg Given     02/02/25  0635 500 mg Given      1222 *Not included in total Held by provider      1330 *500 mg Missed               oxygen (O2) therapy (L/min) Total volume:  Not documented* Dosing weight:  102   *Total volume has not been documented. View each administration to see the amount administered.     Date/Time Rate/Dose/Volume Action       01/31/25  2309 5 L/min - 300,000 mL/hr Start     02/01/25  0853 5 L/min - 300,000 mL/hr Start      2057 5 L/min - 300,000 mL/hr Start     02/02/25  0900 5 L/min - 300,000 mL/hr Start      0921 5 L/min - 300,000 mL/hr Rate Verify Medical Gas      0949 5 L/min - 300,000 mL/hr Rate Verify Medical Gas      1943 3 L/min - 180,000 mL/hr Start     02/03/25  1015 2 L/min - 120,000 mL/hr Rate Verify Medical Gas      1152 4 L/min - 240,000 mL/hr Rate Verify Medical Gas      1508 2 L/min - 120,000 mL/hr Rate Change Medical Gas     02/04/25  1056 3 L/min - 180,000 mL/hr Start      2128 3 L/min - 180,000 mL/hr Start     02/05/25  0912 3 L/min - 180,000 mL/hr Start      2114 *21 percent Start     02/09/25  0759  Stopped      0830  Stopped      1215 1 L/min - 60,000 mL/hr Start     02/10/25  0844 1 L/min - 60,000 mL/hr Start               levothyroxine (Synthroid) injection 12.5 mcg (mcg) Total dose:  125 mcg Dosing weight:  102      Date/Time Rate/Dose/Volume Action       02/01/25  0850 12.5 mcg (over 3 min) Given     02/02/25  0916 12.5 mcg (over 3 min) Given     02/03/25  0829 12.5 mcg (over 5 min) Given     02/04/25  1103 12.5 mcg (over 3 min) Given     02/05/25  1230 12.5 mcg (over 3 min) Given     02/06/25  1315 12.5 mcg (over 5 min) Given     02/07/25  1036 12.5 mcg (over 4 min) Given     02/08/25  0840 12.5 mcg (over 4  min) Given     02/09/25  0828 12.5 mcg (over 3 min) Given     02/10/25  0840 12.5 mcg (over 3 min) Given               benzocaine-menthol (Cepastat Sore Throat) lozenge 1 lozenge (lozenge) Total dose:  6 lozenge Dosing weight:  103      Date/Time Rate/Dose/Volume Action       02/04/25  0929 1 lozenge Given     02/05/25  0911 1 lozenge Given     02/08/25  2132 1 lozenge Given     02/09/25  1208 1 lozenge Given      1721 1 lozenge Given      2226 1 lozenge Given               phenoL (Chloraseptic) 1.4 % mouth/throat spray 1 spray (spray) Total dose:  7 spray Dosing weight:  103      Date/Time Rate/Dose/Volume Action       02/01/25  1120 1 spray Given     02/02/25  1631 1 spray Given     02/04/25  1348 1 spray Given      1750 1 spray Given      2018 1 spray Given     02/05/25  0903 1 spray Given     02/09/25  2324 1 spray Given               ipratropium-albuteroL (Duo-Neb) 0.5-2.5 mg/3 mL nebulizer solution 3 mL (mL) Total volume:  39 mL Dosing weight:  103      Date/Time Rate/Dose/Volume Action       02/01/25  1642 3 mL Given      2231 3 mL Given     02/02/25  0753 3 mL Given      1434 3 mL Given      2028 *3 mL Missed     02/03/25  1015 3 mL Given      1508 3 mL Given      2145 3 mL Given     02/04/25  0810 3 mL Given      1444 3 mL Given      2146 3 mL Given     02/05/25  0917 3 mL Given      1549 3 mL Given      2113 3 mL Given     02/06/25  0900 *3 mL Missed               aspirin suppository 150 mg (mg) Total dose:  300 mg* Dosing weight:  103   *Administration not included in total     Date/Time Rate/Dose/Volume Action       02/01/25  1456 150 mg Given     02/02/25  1358 *150 mg Missed     02/04/25  0255 *150 mg Missed     02/05/25  1215 *150 mg Missed     02/06/25  1652 150 mg Given     02/07/25  1523 *150 mg Missed     02/08/25  1202 *150 mg Missed     02/09/25  0857 *150 mg Missed     02/10/25  0842 *150 mg Missed               lactated Ringer's bolus 500 mL (mL/hr) Total volume:  Not documented* Dosing  weight:  103   *Total volume has not been documented. View each administration to see the amount administered.     Date/Time Rate/Dose/Volume Action       02/01/25  1120 500 mL - 250 mL/hr (over 120 min) New Bag      1326  (over 120 min) Stopped               lactated Ringer's bolus 1,000 mL (mL/hr) Total volume:  Not documented* Dosing weight:  103   *Total volume has not been documented. View each administration to see the amount administered.     Date/Time Rate/Dose/Volume Action       02/01/25  2316 1,000 mL - 333.3 mL/hr (over 180 min) New Bag     02/02/25  0216  (over 180 min) Stopped               dextrose 50 % injection 12.5 g (g) Total dose:  12.5 g Dosing weight:  103      Date/Time Rate/Dose/Volume Action       02/02/25  1402 12.5 g Given               iohexol (OMNIPaque) 12 mg iodine/mL oral contrast 500 mL (mL) Total volume:  500 mL Dosing weight:  103      Date/Time Rate/Dose/Volume Action       02/03/25  0826 500 mL Given               lidocaine (Xylocaine) 10 mg/mL (1 %) injection 5 mL (mL) Total volume:  0 mL* Dosing weight:  103   *Administration not included in total     Date/Time Rate/Dose/Volume Action       02/10/25  1225 *5 mL Missed               insulin lispro injection 0-5 Units (Units) Total dose:  Cannot be calculated* Dosing weight:  103   *Administration dose not documented     Date/Time Rate/Dose/Volume Action       02/03/25  1352 *Not included in total Missed      1542 *Not included in total Missed     02/04/25  1338 *Not included in total Missed      2030 *Not included in total Missed     02/05/25  1311 *Not included in total Missed      2243 *Not included in total Missed      2245 *Not included in total Missed     02/06/25  0759 *Not included in total Missed      1323 *Not included in total Missed      1739 *Not included in total Missed     02/07/25  0907 *Not included in total Missed      1309 *Not included in total Missed      1813 *Not included in total Missed     02/08/25  0838  *Not included in total Missed      1759 *Not included in total Missed      1816 *Not included in total Missed     02/09/25  0601 *Not included in total Missed      1214 *Not included in total Missed      1715 *Not included in total Missed     02/10/25  0608 *Not included in total Missed      1309 *Not included in total Missed               Adult Clinimix Parenteral Nutrition Continuous (mL/hr) Total volume:  5,190.83 mL* Dosing weight:  103   *From user-documented volume     Date/Time Rate/Dose/Volume Action       02/03/25  2040 83 mL/hr (over 1440 min) New Bag     02/04/25  0500 691.67 mL       1648 979.4 mL       1832 143.87 mL       2002  (over 1440 min) Stopped      2015 83 mL/hr (over 1440 min) New Bag      2200 269.75 mL      02/05/25 2018  (over 1440 min) Stopped      2020 83 mL/hr (over 1440 min) Restarted     02/06/25  0000 332 mL       0344 309.87 mL       0717 *83 mL/hr (over 1440 min) Handoff      0851 84.01 mL       1041 34.07 mL       1401 258.82 mL       1755 284.53 mL       2009 83 mL/hr (over 1440 min) New Bag     02/07/25  0600 1,002.92 mL       1032 199.7 mL       1437 310.49 mL       1815 289.73 mL       1959  (over 1440 min) Stopped      2000 83 mL/hr (over 1440 min) New Bag     02/08/25 2028  (over 1440 min) Stopped      2114 83 mL/hr (over 1440 min) New Bag     02/09/25 2031  (over 1440 min) Stopped      2037 83 mL/hr (over 1440 min) New Bag               fat emulsion fish oil/plant based (SMOFlipid) 20 % IV infusion 50 g (mL/hr) Total volume:  1,592.48 mL* Dosing weight:  103   *From user-documented volume     Date/Time Rate/Dose/Volume Action       02/03/25  2150 50 g - 20.8 mL/hr (over 720 min) New Bag     02/04/25  0500 149.07 mL       0947  (over 720 min) Stopped      1832 99.49 mL       2015 50 g - 20.8 mL/hr (over 720 min) New Bag      2200 36.4 mL      02/05/25  1311  (over 720 min) Stopped      2004 50 g - 20.8 mL/hr (over 720 min) New Bag     02/06/25  0000 83.2 mL       0344  77.65 mL       0851 30.41 mL Stopped      2009 50 g - 20.8 mL/hr (over 720 min) New Bag     02/07/25  0600 204.88 mL       0906  (over 720 min) Stopped      2023 50 g - 20.8 mL/hr (over 720 min) New Bag     02/08/25  0831  (over 720 min) Stopped      2114 50 g - 20.8 mL/hr (over 720 min) New Bag     02/09/25  1008  (over 720 min) Stopped      1010 585.17 mL       2031 50 g - 20.8 mL/hr (over 720 min) New Bag     02/10/25  1212  (over 720 min) Stopped      1231 326.21 mL                potassium chloride 20 mEq in sterile water for injection 100 mL (mL/hr) Total dose:  20 mEq* Dosing weight:  103   *From user-documented volume     Date/Time Rate/Dose/Volume Action       02/04/25  1234 20 mEq - 50 mL/hr (over 120 min) New Bag      1440 50 mL/hr (over 120 min) Restarted      1648  (over 120 min) Stopped      1710  (over 120 min) Stopped      1755  (over 120 min) Stopped      1810  (over 120 min) Stopped      1832 100 mL                potassium chloride 20 mEq in sterile water for injection 100 mL (mL/hr) Total volume:  Not documented* Dosing weight:  103   *Total volume has not been documented. View each administration to see the amount administered.     Date/Time Rate/Dose/Volume Action       02/05/25  1155 20 mEq - 50 mL/hr (over 120 min) New Bag      1643 50 mL/hr (over 120 min) Restarted      2242  (over 120 min) Stopped               pantoprazole (ProtoNix) EC tablet 40 mg (mg) Total dose:  Cannot be calculated* Dosing weight:  103   *Administration dose not documented     Date/Time Rate/Dose/Volume Action       02/05/25  0902 *Not included in total See Alternative     02/06/25  0600 *Not included in total See Alternative     02/07/25  0908 *Not included in total See Alternative     02/08/25  0840 *Not included in total See Alternative     02/09/25  0828 *Not included in total See Alternative     02/10/25  0522 *Not included in total See Alternative               esomeprazole (NexIUM) suspension 40 mg (mg) Total  dose:  40 mg Dosing weight:  103      Date/Time Rate/Dose/Volume Action       02/05/25  0902 *Not included in total See Alternative     02/06/25  0600 *Not included in total See Alternative     02/07/25  0908 *Not included in total See Alternative     02/08/25  0840 *Not included in total See Alternative     02/09/25  0828 40 mg Given     02/10/25  0522 *Not included in total See Alternative               diatrizoate pieter-diatrizoat sod (Gastrografin 37% organic bound iodine) solution 180 mL (mL) Total volume:  180 mL Dosing weight:  103      Date/Time Rate/Dose/Volume Action       02/05/25  1531 180 mL Given               lidocaine 2 % mucosal jelly (Uro-Jet) 1 Application (Application) Total dose:  0 Application* Dosing weight:  103   *Administration not included in total     Date/Time Rate/Dose/Volume Action       02/05/25 2047 *1 Application Missed               piperacillin-tazobactam (Zosyn) 3.375 g in dextrose (iso) IV 50 mL (g) Total dose:  40.5 g* Dosing weight:  103   *From user-documented volume     Date/Time Rate/Dose/Volume Action       02/07/25  1130 3.375 g (over 30 min) New Bag      1211 50 mL Stopped      1720 3.375 g (over 30 min) New Bag      1809 50 mL Stopped      2324 3.375 g (over 30 min) New Bag     02/08/25  0000 50 mL Stopped      0434 3.375 g (over 30 min) New Bag      0512 50 mL Stopped      1151 3.375 g (over 30 min) New Bag      1232  (over 30 min) Stopped      1815 3.375 g (over 30 min) New Bag      1839  (over 30 min) Stopped     02/09/25  0023 3.375 g (over 30 min) New Bag      0100  (over 30 min) Stopped      0541 3.375 g (over 30 min) New Bag      0616  (over 30 min) Stopped      1208 3.375 g (over 30 min) New Bag      1238  (over 30 min) Stopped      1718 3.375 g (over 30 min) New Bag      1750  (over 30 min) Stopped      2323 3.375 g (over 30 min) New Bag      2336 350 mL Stopped     02/10/25  0522 3.375 g (over 30 min) New Bag      0531 50 mL Stopped      1216 3.375 g (over 30  min) New Bag      1309  (over 30 min) Stopped               vancomycin (Vancocin) 1,000 mg in dextrose 5%  mL (mL/hr) Total dose:  1,000 mg* Dosing weight:  103   *From user-documented volume     Date/Time Rate/Dose/Volume Action       02/07/25  1818 1,000 mg - 200 mL/hr (over 60 min) New Bag      1857 200 mL Stopped               vancomycin (Vancocin) 750 mg in dextrose 5%  mL (mL/hr) Total dose:  750 mg* Dosing weight:  103   *From user-documented volume     Date/Time Rate/Dose/Volume Action       02/08/25  0420 750 mg - 200 mL/hr (over 45 min) New Bag      0510 150 mL Stopped      1815 750 mg - 200 mL/hr (over 45 min) New Bag      1854  (over 45 min) Stopped     02/09/25  0513 750 mg - 200 mL/hr (over 45 min) New Bag      0556  (over 45 min) Stopped               midazolam (Versed) injection (mg) Total dose:  2 mg      Date/Time Rate/Dose/Volume Action       02/10/25  1510 1 mg Given      1521 1 mg Given               fentaNYL PF (Sublimaze) injection (mcg) Total dose:  100 mcg      Date/Time Rate/Dose/Volume Action       02/10/25  1510 50 mcg Given      1521 50 mcg Given                   No specimens collected      See detailed result report with images in PACS.    The patient tolerated the procedure well without incident or complication and is in stable condition.

## 2025-02-10 NOTE — PROGRESS NOTES
Physical Therapy    Physical Therapy Treatment    Patient Name: Swapnil Allen  MRN: [id number]  Department: [facility]  Room: [facility] [place]  Today's Date: 2/10/2025  Time Calculation  Start Time: 0928  Stop Time: 0952  Time Calculation (min): 24 min         Assessment/Plan   PT Assessment  Barriers to Discharge Home: Caregiver assistance, Cognition needs, Physical needs  Caregiver Assistance: Patient lives alone and/or does not have reliable caregiver assistance  Cognition Needs: 24hr supervision for safety awareness needed, Insight of patient limited regarding functional ability/needs  Physical Needs: 24hr mobility assistance needed, 24hr ADL assistance needed  End of Session Communication: Bedside nurse  Assessment Comment: Pt tolerated PT session well, continues to require enouragement for increased activity, however able to complete EOB activities, STS transfers and ambulation this date. Pt continues to remain appropriate for Moderate intensity PT upon D/C from hospital.  End of Session Patient Position: Bed, 3 rail up, Alarm on  PT Plan  Inpatient/Swing Bed or Outpatient: Inpatient  PT Plan  Treatment/Interventions: Bed mobility, Gait training, Transfer training, Stair training, Balance training, Neuromuscular re-education, Strengthening, Endurance training, Range of motion, Therapeutic exercise, Therapeutic activity, Home exercise program, Positioning, Postural re-education  PT Plan: Ongoing PT  PT Frequency: 3 times per week  PT Discharge Recommendations: Moderate intensity level of continued care  Equipment Recommended upon Discharge: Wheeled walker  PT Recommended Transfer Status: Assist x1, Assistive device  PT - OK to Discharge: Yes      General Visit Information:   PT  Visit  PT Received On: 02/10/25  General  Missed Visit Reason:  (n/a)  Family/Caregiver Present: No  Prior to Session Communication: Bedside nurse  Patient Position Received: Bed, 3 rail up, Alarm on  General Comment: Pt supine in bed, agreeable to  work with PT. Pt with increased frustration from discomfort of NG tube,    Subjective   Precautions:  Precautions  Medical Precautions: Fall precautions  Precautions Comment: Protective precautions,       02/10/25 0928 02/10/25 0935 02/10/25 0950   Vital Signs   Vitals Session Pre PT During PT Post PT   Heart Rate 105 (!) 119 100   SpO2 95 % 97 % 94 %       Objective   Pain:  Pain Assessment  Pain Assessment: 0-10  0-10 (Numeric) Pain Score:  (Pt did not rate pain however reports discomfort from NG tube)  Cognition:  Cognition  Overall Cognitive Status: Within Functional Limits  Orientation Level: Oriented X4  Impulsive: Mildly    Activity Tolerance:  Activity Tolerance  Endurance: Tolerates 10 - 20 min exercise with multiple rests  Treatments:  Therapeutic Exercise  Therapeutic Exercise Performed: Yes  Therapeutic Exercise Activity 1: Seated: Heel/Toe Raises, LAQ, Hip Flexion x10 BLE (Cues for core control maintaining good postural positioning.)    Bed Mobility  Bed Mobility: Yes  Bed Mobility 1  Bed Mobility 1: Supine to sitting  Level of Assistance 1: Moderate assistance, Minimal verbal cues  Bed Mobility Comments 1: HOB elevated, assist at trunk, increased time and effort to complete  Bed Mobility 2  Bed Mobility  2: Sitting to supine  Level of Assistance 2: Moderate assistance  Bed Mobility Comments 2: Assist with BLEs to advance into bed    Ambulation/Gait Training  Ambulation/Gait Training Performed: Yes  Ambulation/Gait Training 1  Surface 1: Level tile  Device 1: Rolling walker  Assistance 1: Minimum assistance, Minimal verbal cues  Quality of Gait 1: Narrow base of support, Diminished heel strike, Decreased step length, Forward flexed posture (Decreased nena, decreased endurance, unsteady,)  Comments/Distance (ft) 1: x20ft, cues for pacing for safe ambulation with use of FWW.    Transfers  Transfer: Yes  Transfer 1  Transfer From 1: Bed to  Transfer to 1: Stand  Technique 1: Sit to stand  Transfer  Device 1: Walker  Transfer Level of Assistance 1: Moderate assistance, Minimal verbal cues  Trials/Comments 1: x2 trials, cues for safe hand placement and sequencing,  Transfers 2  Transfer From 2: Stand to  Transfer to 2: Bed  Technique 2: Stand to sit  Transfer Device 2: Walker  Transfer Level of Assistance 2: Moderate assistance, Minimum assistance, Minimal verbal cues  Trials/Comments 2: x2 trials, cues for slow and controlled descent for  increased safety with transfers.    Stairs  Stairs: No    Outcome Measures:  [facility] Basic Mobility  Turning from your back to your side while in a flat bed without using bedrails: A little  Moving from lying on your back to sitting on the side of a flat bed without using bedrails: A lot  Moving to and from bed to chair (including a wheelchair): A little  Standing up from a chair using your arms (e.g. wheelchair or bedside chair): A lot  To walk in hospital room: A little  Climbing 3-5 steps with railing: Total  Basic Mobility - Total Score: 14    Education Documentation  Precautions, taught by Sunitha Cortes PTA at 2/10/2025 12:11 PM.  Learner: Patient  Readiness: Acceptance  Method: Explanation  Response: Verbalizes Understanding, Needs Reinforcement  Comment: HEP, increasing OOB activity and ambulation tolerance.    Body Mechanics, taught by Sunitha Cortes PTA at 2/10/2025 12:11 PM.  Learner: Patient  Readiness: Acceptance  Method: Explanation  Response: Verbalizes Understanding, Needs Reinforcement  Comment: HEP, increasing OOB activity and ambulation tolerance.    Home Exercise Program, taught by Sunitha Cortes PTA at 2/10/2025 12:11 PM.  Learner: Patient  Readiness: Acceptance  Method: Explanation  Response: Verbalizes Understanding, Needs Reinforcement  Comment: HEP, increasing OOB activity and ambulation tolerance.    Mobility Training, taught by Sunitha Cortes PTA at 2/10/2025 12:11 PM.  Learner: Patient  Readiness: Acceptance  Method: Explanation  Response:  Verbalizes Understanding, Needs Reinforcement  Comment: HEP, increasing OOB activity and ambulation tolerance.    Education Comments  No comments found.        OP EDUCATION:       Encounter Problems       Encounter Problems (Active)       Mobility       LTG - Patient will navigate 4-6 steps with rails/device and moderate assistance. (Not Progressing)       Start:  01/30/25    Expected End:  02/13/25            STG - Patient will ambulate ~100 feet with FWW and minimal assistance.  (Progressing)       Start:  01/30/25    Expected End:  02/13/25            Pt will ambulate >/= 100 ft with FWW and minimal assistance and no signs of fatigue or SOB.   (Progressing)       Start:  01/30/25    Expected End:  02/13/25               PT Transfers       LTG - Patient will transfer from one surface to another CGA with FWW. (Progressing)       Start:  01/30/25    Expected End:  02/13/25            STG - Patient will perform bed mobility with close supervision.  (Progressing)       Start:  01/30/25    Expected End:  02/13/25               Pain - Adult            02/10/25 at 12:13 PM   Sunitha Cortes PTA   Rehab Office: [phone]

## 2025-02-10 NOTE — PROGRESS NOTES
"Nutrition Follow Up Assessment:   Nutrition Assessment    The patient is a 75 y.o. male who is hospital day #13.   s/p planned Right Radical Nephrectomy, Retroperitoneal Lymph Node Dissection on 1/28/25 d/t RCC. Course complicated by ileus.   Since last RD visit:  - SBFT on 02/06>> contrast not reaching duodenum at 60 minutes. Overnight contrast reached small bowel.   - passing gas and having bowel movements.   - continued high NGT output. Failed gravity trial on 02/08.   - Plan for venting PEG placement on 02/11 with GI.     PMHx:HTN, CVA/TIA 2020 (following cocaine use, on asa), anemia (hgb 12.2), CKD (baseline sCr 1.4-1.5), thyrotoxicosis s/p partial thyroidectomy, left parotid mass c/w warthins tumor, hep C (treated with epclusa 9/2024), hx of substance use (quit 2020), and BPH w/LUTS     Nutrition History:  Energy Intake: Fair 50-75 %  Food and Nutrient History: PPN started on 02/03. Running at 83 ml/hr since then. 250 ml SMOF lipids ordered daily. Pt continues to be NPO. Pt getting 1177 kcal and 85g protein per day for the past week. This is about 55% of kcal needs and 94% protein needs. PICC placed today. Plan to transition to TPN. Per team, pt will discharge on TPN to SNF.    Anthropometrics:  Start of admission anthropometrics:  Height: 180.3 cm (5' 11\")  Weight: 97 kg (213 lb 13.5 oz)  BMI (Calculated): 29.8  IBW/kg (Dietitian Calculated): 78.2 kg  Percent of IBW: 131 %    Date/Time  Weight  02/01/25 0741  103 kg (226 lb 13.7 oz)  01/31/25 0932  102 kg (224 lb 6.9 oz)  01/30/25 0913  96.6 kg (212 lb 15.4 oz)  01/28/25 2151  97 kg (213 lb 13.5 oz)      Weight History / % Weight Change: No updated weights since last visit.    Nutrition Focused Physical Exam Findings:  NFPE done 02/03/25    Edema  Edema: none  Physical Findings   Skin: Positive (abdominal incision)      Last BM Date: 02/10/25    Nutrition Significant Labs:    Results from last 7 days   Lab Units 02/10/25  0542 02/09/25  0619 02/08/25  0644 " 02/07/25  1553 02/07/25  0655   GLUCOSE mg/dL 104* 123* 110*   < > 116*   POTASSIUM mmol/L 4.2 4.0 4.0   < > 4.0   SODIUM mmol/L 136 136 133*   < > 135*   PHOSPHORUS mg/dL 3.4 3.6 3.5  --  3.3   MAGNESIUM mg/dL 2.37  --   --   --  2.10   CREATININE mg/dL 1.78* 1.88* 1.80*   < > 1.45*   BUN mg/dL 32* 29* 29*   < > 32*   ALT U/L 19  --   --   --   --    AST U/L 24  --   --   --   --    ALK PHOS U/L 94  --   --   --   --    TRIGLYCERIDES mg/dL 294*  --   --   --   --     < > = values in this interval not displayed.   Trig at start of PN: 02/03/25 254 mg/dL     Nutrition Specific Medications:  bisacodyl, 10 mg, rectal, Daily  cholecalciferol, 1,000 Units, nasogastric tube, Daily  pantoprazole, 40 mg, oral, Daily before breakfast  fat emulsion fish oil/plant based, 250 mL, intravenous, Daily Lipids  insulin lispro, 0-5 Units, subcutaneous, TID AC  levothyroxine, 12.5 mcg, intravenous, q24h ALEN  lidocaine, 5 mL, infiltration, Once  lidocaine, 1 Application, nasal, Once  lidocaine, 3 patch, transdermal, Daily  piperacillin-tazobactam, 3.375 g, intravenous, q6h  Adult Clinimix Parenteral Nutrition Continuous, 83 mL/hr, Last Rate: 83 mL/hr (02/09/25 2037)  Adult Clinimix Parenteral Nutrition Continuous, 50 mL/hr  lactated Ringer's, 50 mL/hr, Last Rate: 50 mL/hr (02/10/25 1231)  lactated Ringer's, 75 mL/hr      I/O:   I/O last 2 completed shifts:  In: 2864.3 (27.8 mL/kg) [I.V.:1379.2 (13.4 mL/kg); IV Piggyback:900]  Out: 3750 (36.4 mL/kg) [Urine:2050 (0.8 mL/kg/hr); Emesis/NG output:1700]  Weight: 102.9 kg     Dietary Orders (From admission, onward)       Start     Ordered    02/03/25 1132  NPO Diet; Effective now  Diet effective now         02/03/25 1139    01/29/25 0008  May Participate in Room Service  ( ROOM SERVICE MAY PARTICIPATE)  Once        Question:  .  Answer:  Yes    01/29/25 0007                     Estimated Needs:   Total Energy Estimated Needs in 24 hours (kCal):  (2150 kcal)  Method for Estimating Needs:  MSJ 1792 x 1.2    Total Protein Estimated Needs in 24 Hours (g):  (90 g)  Method for Estimating 24 Hour Protein Needs: 1.2 g/kg IBW    Total Fluid Estimated Needs in 24 Hours (mL):  (Per med team)          Nutrition Diagnosis   Malnutrition Diagnosis  Patient has Malnutrition Diagnosis: No (well nourished upon NFPE, no significant weight loss)    Nutrition Diagnosis  Patient has Nutrition Diagnosis: Yes  Diagnosis Status (1): Active  Nutrition Diagnosis 1: Altered GI function  Related to (1): altered GI structure  As Evidenced by (1): prolonged ileus, high NGT output, need for venting PEG and PN  Additional Nutrition Diagnosis: Diagnosis 2  Diagnosis Status (2): Active  Nutrition Diagnosis 2: Inadequate oral intake  Related to (2): Decreased appetite and NPO status  As Evidenced by (2): pt reports of not being able to eat anything during start of admission (x 6 days), PPN only meeting ~55% of kcal needs x7 days.       Nutrition Interventions/Recommendations   Nutrition prescription for parenteral nutrition    Nutrition Recommendations:   Continuous TPN:  Formula: TPN standard - AA 5%, dextrose 20%  Additives: Multivitamin, Trace elements  Start rate (day 1,starting 02/10 PM): 50 ml/hr  Goal rate (day 2, starting 02/11): 75 mL/hr                --> Only increase rate if lytes are stable and BG <180  Continue with:  Fat Emulsion 20%: SMOF lipid, 250 mL/day (infused overnight, 21 ml/hr x12 hrs)    This regimen provides:1800 mL/day, 2084 kcal/day (24% from lipids), 90 g protein, 2.58 mg/kg/min (360 g dex/d)    Cyclic TPN:  Can transition to cyclic regimen after 24 hrs at continuous goal rate with stable BG and lytes.   Formula: TPN standard - AA 5%, dextrose 20%  Volume: 1760 ml  Additives: MVI + trace elements  Taper up/down for: 1 hour  Rate:  ml/hr  Admin duration: 12 hours  Continue with:  Fat Emulsion 20%: SMOF lipid, 250 mL/day (infused overnight, 21 ml/hr x12 hrs)    This regimen provides:1760 mL/day, 2049  kcal/day (24% from lipids), 88 g protein, 352 g dex/d    TPN monitoring:    - daily weights    - RFP + Mg daily; replete lytes PRN    - LFTs + TGs weekly    - accuchecks q6h      Nutrition Goals:   Parenteral Nutrition: Management of composition of parenteral nutrition, Management of delivery rate of parenteral nutrition  Goal: Transition from PPN to TPN         Nutrition Monitoring and Evaluation   Monitoring and Evaluation Plan: Enteral and parenteral nutrition intake determination  Enteral and Parenteral Nutrition Intake Determination: Parenteral nutrition intake - Titrate to goal without metabolic complications, Parenteral nutrition intake - Tolerate TPN at goal rate    Monitoring and Evaluation Plan: Body weight  Body Weight: Body weight - Maintain stable weight    Monitoring and Evaluation Plan: Electrolyte/renal panel, GI profile, Glucose/endocrine profile  Criteria: WNL         Some progress toward goal(s)         Time Spent (min): 45 minutes

## 2025-02-10 NOTE — CONSULTS
[facility]   [facility] [place]  INITIAL CONSULT NOTE       Reason For Consult  Venting PEG, prolonged ileus    SUBJECTIVE     History Of Present Illness  Swapnil Allen is a 75 y.o. male with a past medical history of HCV (genotype 1b) s/p epclusa 9/2024,  HTN, CVA/TIA 2020, CKD, thyrotoxicosis s/p partial thyroidectomy, Right RCC (dx by bx 12/2024) admitted on 1/28/2025 for right radical nephrectomy for RCC. Hepatology is consulted for venting PEG iso prolonged ileus.     Patient underwent successful R Radical nephrectomy with retroperitoneal lymph node dissection on 1/28/25 with Dr. Perez for papillary RCC. By POD 3 patient had not had BM and developed N/V and episode of aspirationand KUB showing dilated bowel loops c/f ileus thus NGT placed. Patient then had high output from NGT and CT on 2/3/25 with c/f pSBO thus CRS was consulted who recommended conservative management. On 2/5 patient had SBFT with contrast in the duodenum but not past after 60 minutes, with follow up imaging again not showing progression past small bowel. Patient continued to have high output from NGT and failed a clamping trial on 2/8. CRS ultimately recommended a venting PEG given prolonged ileus and high output from NGT while patient is given time to recover and continued on TPN.     Hepatitis ,C genotype 1b   HCV RNA 8,230,000   HBsAg negative   HBsAb <3.1  HBcAb positive   HBV DNA negativve   Hepatitis A Ab- negative     Review of Systems  Negative but noted in HPI        Past Medical History:    Past Medical History:   Diagnosis Date    BPH (benign prostatic hyperplasia)     Cerebral vascular accident (Multi)     CKD (chronic kidney disease)     Colon polyp     Hypertension     managed with Amlodipine    Hypothyroidism     Internal carotid artery stenosis     8/2020--Mild to moderate stenosis of right supraclinoid ICA    Kidney mass     Liver disease     Hep C treated with Epclusa 9/2024     Parotid mass     Renal mass, right     Right Kidney mass 8.8 x 10.7 x 10 cm    Stroke (Multi)     CVA -2020       Home Medications  Medications Prior to Admission   Medication Sig Dispense Refill Last Dose/Taking    amLODIPine (Norvasc) 10 mg tablet Take 1 tablet (10 mg) by mouth once daily.   1/27/2025    aspirin 81 mg EC tablet Take 1 tablet (81 mg) by mouth once daily.   1/27/2025    cholecalciferol (Vitamin D-3) 25 MCG (1000 UT) tablet Take 1 tablet (1,000 Units) by mouth once daily.   1/27/2025    levothyroxine (Synthroid, Levoxyl) 25 mcg tablet Take 1 tablet (25 mcg) by mouth once daily.   1/27/2025    melatonin 5 mg tablet Take 1 tablet (5 mg) by mouth once daily at bedtime.   1/27/2025    tamsulosin (Flomax) 0.4 mg 24 hr capsule Take 1 capsule (0.4 mg) by mouth once daily.   1/27/2025         Surgical History:    Past Surgical History:   Procedure Laterality Date    COLONOSCOPY      RENAL BIOPSY      THYROID SURGERY      thyroidectomy       Allergies:  No Known Allergies    Social History:    Social History     Socioeconomic History    Marital status: Single     Spouse name: Not on file    Number of children: Not on file    Years of education: Not on file    Highest education level: Not on file   Occupational History    Not on file   Tobacco Use    Smoking status: Some Days     Types: Cigars     Passive exposure: Never    Smokeless tobacco: Never    Tobacco comments:     Quit cigarette smoking over 20 years. Occassional cigar   Vaping Use    Vaping status: Never Used   Substance and Sexual Activity    Alcohol use: Not Currently     Comment: former drinker    Drug use: Not Currently     Types: Marijuana     Comment: formerly smoked marijuana    Sexual activity: Defer   Other Topics Concern    Not on file   Social History Narrative    Not on file     Social Drivers of Health     Financial Resource Strain: Low Risk  (1/30/2025)    Overall Financial Resource Strain (CARDIA)     Difficulty of Paying Living  Expenses: Not very hard   Food Insecurity: No Food Insecurity (1/29/2025)    Hunger Vital Sign     Worried About Running Out of Food in the Last Year: Never true     Ran Out of Food in the Last Year: Never true   Transportation Needs: No Transportation Needs (1/30/2025)    PRAPARE - Transportation     Lack of Transportation (Medical): No     Lack of Transportation (Non-Medical): No   Physical Activity: Not on File (1/30/2023)    Received from [facility]    Physical Activity     Physical Activity: 0   Stress: Not on File (1/30/2023)    Received from [facility]    Stress     Stress: 0   Social Connections: Not on File (9/26/2024)    Received from [facility]    Social Connections     Connectedness: 0   Intimate Partner Violence: Not At Risk (1/29/2025)    Humiliation, Afraid, Rape, and Kick questionnaire     Fear of Current or Ex-Partner: No     Emotionally Abused: No     Physically Abused: No     Sexually Abused: No   Housing Stability: Low Risk  (1/30/2025)    Housing Stability Vital Sign     Unable to Pay for Housing in the Last Year: No     Number of Times Moved in the Last Year: 0     Homeless in the Last Year: No       Family History:    Family History   Problem Relation Name Age of Onset    Hypertension Brother         EXAM     Vitals:    Vitals:    02/09/25 2144 02/09/25 2327 02/10/25 0358 02/10/25 0833   BP: 135/87 111/79 130/82 119/75   BP Location: Right arm Right arm Right arm Right arm   Patient Position: Lying Lying Lying Lying   Pulse: 80 74 81    Resp: 18 18 18    Temp: 36.5 °C (97.7 °F) 35.8 °C (96.4 °F) 36 °C (96.8 °F) 36 °C (96.8 °F)   TempSrc: Temporal Temporal Temporal Temporal   SpO2: 95% 95% 96%    Weight:       Height:         Failed to redirect to the Timeline version of the [employer] SmartLink.    Intake/Output Summary (Last 24 hours) at 2/10/2025 0913  Last data filed at 2/10/2025 0700  Gross per 24 hour   Intake 2596.84 ml   Output 3025 ml   Net -428.16 ml         Physical Exam  General: well-nourished, no  acute distress  HEENT: PERRLA, EOM intact, no scleral icterus, moist MM  Respiratory: CTA bilaterally, normal work of breathing  Cardiovascular: RRR, no murmurs/rubs/gallops  Abdomen: Soft, nontender, nondistended, bowel sounds present. No masses palpated  Extremities: no edema, no asterixis  Neuro: alert and oriented, CNII-XII grossly intact, moves all 4 extremities with no focal deficits    OBJECTIVE                                                                              Medications       Current Facility-Administered Medications:     Adult Clinimix Parenteral Nutrition Continuous, 83 mL/hr, intravenous, Daily PN, JOSUÉ Britt, Last Rate: 83 mL/hr at 02/09/25 2037, New Bag at 02/09/25 2037    alteplase (Cathflo Activase) injection 2 mg, 2 mg, intra-catheter, PRN, Ludwin Phillips MD    amLODIPine (Norvasc) tablet 10 mg, 10 mg, nasogastric tube, Daily, Ludwin Phillips MD, 10 mg at 02/10/25 0840    [Held by provider] aspirin EC tablet 81 mg, 81 mg, oral, Daily, Jannie Hamm MD, 81 mg at 01/31/25 0820    aspirin suppository 150 mg, 150 mg, rectal, Daily, Denea Medina MD, 150 mg at 02/06/25 1652    benzocaine-menthol (Cepastat Sore Throat) lozenge 1 lozenge, 1 lozenge, Mouth/Throat, q2h PRN, Ludwin Phillips MD, 1 lozenge at 02/09/25 2226    bisacodyl (Dulcolax) suppository 10 mg, 10 mg, rectal, Daily, JOSUÉ Britt, 10 mg at 02/08/25 0840    calcium carbonate (Tums) chewable tablet 500 mg, 500 mg, nasogastric tube, 4x daily PRN, Ludwin Phillips MD, 500 mg at 02/04/25 2047    cholecalciferol (Vitamin D-3) tablet 1,000 Units, 1,000 Units, nasogastric tube, Daily, Ludwin Phillips MD, 1,000 Units at 02/10/25 0840    dextrose 50 % injection 12.5 g, 12.5 g, intravenous, q15 min PRN, Ludwin Phillips MD, 12.5 g at 02/02/25 1402    pantoprazole (ProtoNix) EC tablet 40 mg, 40 mg, oral, Daily before breakfast **OR** esomeprazole (NexIUM) suspension 40 mg, 40 mg,  nasoduodenal tube, Daily before breakfast, 40 mg at 02/09/25 0828 **OR** pantoprazole (ProtoNix) injection 40 mg, 40 mg, intravenous, Daily before breakfast, Elmo Monteiro MD, 40 mg at 02/10/25 0522    fat emulsion fish oil/plant based (SMOFlipid) 20 % IV infusion 50 g, 250 mL, intravenous, Daily Lipids, Karen Katz, KYLIE-CNP, Last Rate: 20.8 mL/hr at 02/09/25 2031, 50 g at 02/09/25 2031    heparin (porcine) injection 5,000 Units, 5,000 Units, subcutaneous, q8h, Jannie Hamm MD, 5,000 Units at 02/10/25 0840    HYDROmorphone (Dilaudid) injection 0.2 mg, 0.2 mg, intravenous, q3h PRN, Alexys Hood MD, 0.2 mg at 02/08/25 1359    HYDROmorphone (Dilaudid) injection 0.4 mg, 0.4 mg, intravenous, q4h PRN, Alexys Hood MD, 0.4 mg at 02/10/25 0845    insulin lispro injection 0-5 Units, 0-5 Units, subcutaneous, TID AC, KYLIE Britt-CNP    ipratropium-albuteroL (Duo-Neb) 0.5-2.5 mg/3 mL nebulizer solution 3 mL, 3 mL, nebulization, TID PRN, Alexys Hood MD    labetaloL (Normodyne,Trandate) injection 10 mg, 10 mg, intravenous, q8h PRN, Gallo Martines MD    lactated Ringer's infusion, 50 mL/hr, intravenous, Continuous, Deena Kilpatrick PA-C, Last Rate: 50 mL/hr at 02/10/25 0844, 50 mL/hr at 02/10/25 0844    levothyroxine (Synthroid) injection 12.5 mcg, 12.5 mcg, intravenous, q24h ALEN, Ludwin Phillips MD, 12.5 mcg at 02/10/25 0840    [Held by provider] levothyroxine (Synthroid, Levoxyl) tablet 25 mcg, 25 mcg, oral, Daily, Jannie Hamm MD, 25 mcg at 01/31/25 0531    lidocaine (Xylocaine) 10 mg/mL (1 %) injection 5 mL, 5 mL, infiltration, Once, Karen Katz, APRN-CNP    lidocaine (Xylocaine) 10 mg/mL (1 %) injection 5 mL, 5 mL, infiltration, Once, Alexys Hood MD    lidocaine 2 % mucosal jelly (Uro-Jet) 1 Application, 1 Application, nasal, Once, Elmo Monteiro MD    lidocaine 4 % patch 3 patch, 3 patch, transdermal, Daily, Gallo Martines MD, 3 patch at 02/04/25 0969    melatonin tablet 10 mg,  10 mg, nasogastric tube, Nightly PRN, Ludwin Phillips MD, 10 mg at 02/08/25 2201    ondansetron (Zofran) injection 4 mg, 4 mg, intravenous, q6h PRN, Gallo Martines MD, 4 mg at 01/31/25 2057    [Held by provider] oxyCODONE (Roxicodone) immediate release tablet 10 mg, 10 mg, oral, q6h PRN, Jannie Hamm MD, 10 mg at 01/29/25 0235    [Held by provider] oxyCODONE (Roxicodone) immediate release tablet 5 mg, 5 mg, oral, q6h PRN, Jannie Hamm MD, 5 mg at 01/29/25 0840    oxygen (O2) therapy, , inhalation, Continuous - Inhalation, Ludwin Phillips MD, Last Rate: 60,000 mL/hr at 02/10/25 0844, 1 L/min at 02/10/25 0844    phenoL (Chloraseptic) 1.4 % mouth/throat spray 1 spray, 1 spray, Mouth/Throat, 4x daily PRN, Ludwin Phillips MD, 1 spray at 02/09/25 2324    piperacillin-tazobactam (Zosyn) 3.375 g in dextrose (iso) IV 50 mL, 3.375 g, intravenous, q6h, Alexys Hood MD, Stopped at 02/10/25 0531    [Held by provider] tamsulosin (Flomax) 24 hr capsule 0.4 mg, 0.4 mg, oral, Daily, Jannie Hamm MD, 0.4 mg at 01/31/25 0820                                                                            Labs     Results for orders placed or performed during the hospital encounter of 01/28/25 (from the past 24 hours)   POCT GLUCOSE   Result Value Ref Range    POCT Glucose 133 (H) 74 - 99 mg/dL   POCT GLUCOSE   Result Value Ref Range    POCT Glucose 125 (H) 74 - 99 mg/dL   POCT GLUCOSE   Result Value Ref Range    POCT Glucose 110 (H) 74 - 99 mg/dL   POCT GLUCOSE   Result Value Ref Range    POCT Glucose 104 (H) 74 - 99 mg/dL   CBC   Result Value Ref Range    WBC 15.2 (H) 4.4 - 11.3 x10*3/uL    nRBC 0.0 0.0 - 0.0 /100 WBCs    RBC 3.81 (L) 4.50 - 5.90 x10*6/uL    Hemoglobin 10.3 (L) 13.5 - 17.5 g/dL    Hematocrit 33.4 (L) 41.0 - 52.0 %    MCV 88 80 - 100 fL    MCH 27.0 26.0 - 34.0 pg    MCHC 30.8 (L) 32.0 - 36.0 g/dL    RDW 16.7 (H) 11.5 - 14.5 %    Platelets 254 150 - 450 x10*3/uL   Renal Function Panel   Result  Value Ref Range    Glucose 104 (H) 74 - 99 mg/dL    Sodium 136 136 - 145 mmol/L    Potassium 4.2 3.5 - 5.3 mmol/L    Chloride 106 98 - 107 mmol/L    Bicarbonate 20 (L) 21 - 32 mmol/L    Anion Gap 14 10 - 20 mmol/L    Urea Nitrogen 32 (H) 6 - 23 mg/dL    Creatinine 1.78 (H) 0.50 - 1.30 mg/dL    eGFR 39 (L) >60 mL/min/1.73m*2    Calcium 9.5 8.6 - 10.6 mg/dL    Phosphorus 3.4 2.5 - 4.9 mg/dL    Albumin 3.1 (L) 3.4 - 5.0 g/dL   Magnesium   Result Value Ref Range    Magnesium 2.37 1.60 - 2.40 mg/dL                                                                              Imaging   KUB  2/8/24  IMPRESSION:  1. Enteric tube courses below the left hemidiaphragm with its tip  projecting over the gastric body/pylorus, in appropriate positioning.  2. Numerous distended small bowel loops measuring up to 4.8 cm,  slightly improved when compared to prior.    CT AP w/IV Contrast 2/6/24  BOWEL:  The distal tip of the enteric tube terminates in the gastric antrum.  Moderate-size hiatal hernia as described above. The stomach is  slightly decompressed limiting evaluation.      There is again a focal area of narrowing along proximal to mid  jejunal bowel loop (series 2, image 88) with slightly dilated and  fluid-filled small bowel proximal and distal to this point. There is  also again associated mesenteric vessel whirling without discrete  focal obstruction evident. The bowel wall enhances normally without  evidence of ischemia.      Contrast from prior small-bowel follow-through is seen throughout the  entire small bowel. No contrast is seen along the large bowel.      The appendix appears normal.    IMPRESSION:  1.  Findings compatible with partial small bowel obstruction in the  region of mesenteric swirling. Normal enhancement of the bowel wall  without evidence of ischemia.  2. Interval worsening of ground-glass to consolidative opacities in  dependent portions of the lung, which may be related to aspiration  and/or worsening  infectious pneumonia.  3. Moderate-sized hiatal hernia with fluid visualized in the distal  esophagus, which places the patient at risk for aspiration.    SBFT 2/5/25  IMPRESSION:  1.  Multiple gas-filled dilated small bowel loops throughout the  abdomen. Contrast is seen within the duodenum at 60 minutes with no  opacification of the jejunal loops. Findings likely represent  small-bowel obstruction. The patient was returned to the floor to be  followed with serial KUBs.    CT AP w Oral contrast 2/3/25    BOWEL:  Small hiatal hernia is noted. The contrast opacified stomach is  otherwise unremarkable with the distal tip of enteric tube seen  terminating in the gastric body.      Oral contrast opacification is visualized up to the level of the mid  jejunum.      There are multiple air-fluid distended proximal small bowel loops,  with a suggestion of a transition point on series 2, image 86, where  a small bowel loop and a portion of the sigmoid colon are seen  crossing posterior to the mesenteric axis, with associated mesenteric  vessel whirl just distal to this point (series 2, image 95).          Distal to this small bowel loop narrowing, there are distended  fluid-filled bowel loops visualized without and nodular definite  transition point, however the more distal small bowel loops appear  collapsed.  The appendix appears normal.    IMPRESSION:  1.  There is moderate fluid distention of the proximal small bowel  without progression of oral contrast to the distal small bowel loops  with a likely transition point associated with swirling of the  mesenteric vessels. There are dilated small bowel loops located  distal to this possible transition point, and more distal small bowel  loops collapsed/decompressed. Findings are concerning at least for  partial small bowel obstruction, with suspicion of internal hernia  given the appearance of the mesentery. Evaluation for potential  ischemic changes is limited due to  noncontrast enhanced CT.  2. Bilateral patchy consolidative to ground-glass opacities in the  dependent portion of the long, concerning for infectious etiology  with aspiration not excluded.  3. Remaining findings as described above.    KUB 1/31/25  IMPRESSION:  1. Gas-filled prominent to mildly dilated small and large bowel loops  throughout the abdomen.  2. Persistent small volume pneumoperitoneum beneath right  hemidiaphragm.                                                                       GI Procedures     Fibroscan 8/7/24  Results:  E (median liver stiffness measurement): 9.0 kPa  CAP (controlled attenuation parameter):  219 dB/m     Interpretation:  This was a technically adequate study. 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 The CAP score is consistent with 0 - 5 % hepatocyte steatosis (steatosis stage 0 of 3) .     Colonoscopy 12/5/18  Impression:           - Diverticulosis in the sigmoid colon, in the                       descending colon, in the transverse colon and in the                       ascending colon.                       - The distal rectum and anal verge are normal on                       retroflexion view.                       - No specimens collected.       ASSESSMENT / PLAN                  ASSESSMENT/PLAN:  Swapnil Allen is a 75 y.o. male with a past medical history of HCV (genotype 1b) s/p epclusa 9/2024,  HTN, CVA/TIA 2020, CKD, thyrotoxicosis s/p partial thyroidectomy, Right RCC (dx by bx 12/2024) admitted on 1/28/2025 for right radical nephrectomy for RCC. Hepatology is consulted for venting PEG iso prolonged ileus.     Patient without any overt contraindication to PEG pending normal INR. Patient with recent abdominal surgery but with midline incision which should be amenable to PEG placement, no ascites, PLT >50, patient currently on zosyn for possible aspiration PNA but with blood cultures NGTD from 2/7/25. Appears that CRS suspects this ileus will be prolonged and  patient would benefit from PEG.    Initial plan for PEG placement on 2/11/25 however IR placed PEG on 2/10 prior to GI opportunity. Thus will sign off.     Recommendations  -Order HCV RNA & HBV RNA to follow up recent HCV treatment and assess for reactivation of HBV    Patient was seen& discussed with Dr. Morris    Thank you for the consultation. Hepatology will SIGN OFF.  - During weekday hours of 7am- 5pm, please do not hesitate to contact me on [employer] Chat or page [phone] if there are any further questions   - After hours, on weekends, and on holidays, please page the on-call GI fellow at [phone]. Thank you.       Mariella Flores MD  PGY4 Gastroenterology Fellow  [facility] [place]

## 2025-02-10 NOTE — POST-PROCEDURE NOTE
Pre-Procedure Checklist:  Emergent Line Insertion: No  Type of Line to be Placed: PICC  Consent Obtained: Yes  Emergency Medication Necessary: No  Patient Identified with 2 Independent Identifiers: Yes  Review of Allergies, Anticoagulation, Relevant Labs, ECG/Telemetry: Yes  Risks/Benefits/Alternatives Discussed with Patient/POA/Legal Representative: Yes  Stop Sign on Door: Yes  Time Out Performed: Yes  Catheter Exchange: No    Positioning Checklist:  All People, Including Patient, in the Room with Cap and Mask: Yes  Fluoroscopy Used to Identify Vessel and Guide Insertion: No   Sterile Cover Used: Yes  Full Barrier Precautions Followed (Mask, Cap, Gown, Gloves): Yes  Hands Washed: Yes  Monitors Attached with Sound Alarms On: No  Full Body Sterile Drape (Head-to-Toe) Used to Cover Patient: Yes  Trendelenburg Position (For IJ and Subclavian): No  CHG Skin Prep Used and Allowed to Air Dry to Skin Procedure: Yes    Procedure Checklist:  Blood Aspirated From All Lumens, All Ports Subsequently Flushed: Yes  Catheter Caps Placed on All Lumens; Lumens Clamped: Yes  Maintain Guidewire Control Throughout, Ensuring Guidewire Removal: Yes  Maintain Sterile Field Throughout Insertion: Yes  Catheter Secured: Yes  Confirmatory Test of Venous Placement: Non-Pulsatile Blood    Post Procedure Checklist:  Date and Time Written on Dressing: Yes  Sharp and Wire Count and Safe Disposal of all Sharps/Wires: Yes  Sterile Dressing Applied Per Protocol: Yes  X-ray Ordered or ECG Image: Yes    PICC Insertion Details:  Size (Fr): 4  Lumen Type: double lumen   Catheter to Vein Ratio Less Than 50%: Yes  Total Length (cm): 45  External Length (cm): 0  Orientation: left upper arm   Location: brachial vein  Site Prep: Chlorohexidine; Usual sterile procedure followed  Local Anesthetic: Injectable/Subcutaneous  Indication: TPN and other IV medications  Insertion Team Members in the Room: Nurse, LPN  Initial Extremity Circumference (cm): 38  Insertion  Attempts: 1  Patient Tolerance: Tolerated Well, Age Appropriate  Comfort Measures: Subcutaneous anesthetic; Verbal  Procedure Location: Bedside  Safety Measures: Patient specific safety measures addressed with RN  Estimated Blood Loss (mL): 0.5   Vessel Fully Compressible Proximally and Distally to Insertion Site: Yes  Brisk Blood Return Obtained and Line Draws Easily: Yes  Tip Location: SVC  Line Confirmation: ECG  Lot #: [id number]  : BD  PICC Line Exp Date: 10/31/2025  Securement: Stat Lock  Post Procedure Checklist: Handoff with RN; Obtain all new IV tubing prior to use; Bed at lowest level and wheels locked; Line discharge information at bedside.  Additional Details: Line was inserted using Modified Seldinger's Technique.     Dr. Hood confirms that there are no concerns for vein preservation at this time. Okay to place PICC line at this time.   Placed by: Amy Suarez RN

## 2025-02-10 NOTE — PROGRESS NOTES
"Swapnil Allen is a 75 y.o. male on day 13 of admission presenting with Renal mass.    Subjective   Failed gravity trial 2/8, continues to have high NG output that is bilious  No BM in last 24 hours , passing flatus intermittently.  Denies any abdominal pain.    No nausea  No chest pain or SOB  No headaches , lightheadedness, dizziness. No other complaints this AM       Objective     Physical Exam  /75 (BP Location: Right arm, Patient Position: Lying)   Pulse 81   Temp 36 °C (96.8 °F) (Temporal)   Resp 18   Ht 1.803 m (5' 11\")   Wt 103 kg (226 lb 13.7 oz)   SpO2 96%   BMI 31.64 kg/m²   NAD, sitting in bed with HOB 30  NG tube in place with bilious output, drainage from sump port as well but whistling after  troubleshooting  Nontachycardic  Nonlabored respirations  Abdomen soft, nontender, nondistended, well-healed midline incision covered with glue, no generalized peritonitis      Last Recorded Vitals  Blood pressure 119/75, pulse 81, temperature 36 °C (96.8 °F), temperature source Temporal, resp. rate 18, height 1.803 m (5' 11\"), weight 103 kg (226 lb 13.7 oz), SpO2 96%.    I/O last 2 completed shifts:  In: 2864.3 (27.8 mL/kg) [I.V.:1379.2 (13.4 mL/kg); IV Piggyback:900]  Out: 3750 (36.4 mL/kg) [Urine:2050 (0.8 mL/kg/hr); Emesis/NG output:1700]  Weight: 102.9 kg    Intake:  IVF - 2179,   Output:  UOP - 2050, NG 1700 ( 1600), BM 0    Relevant Results  Results for orders placed or performed during the hospital encounter of 01/28/25 (from the past 24 hours)   POCT GLUCOSE   Result Value Ref Range    POCT Glucose 133 (H) 74 - 99 mg/dL   POCT GLUCOSE   Result Value Ref Range    POCT Glucose 125 (H) 74 - 99 mg/dL   POCT GLUCOSE   Result Value Ref Range    POCT Glucose 110 (H) 74 - 99 mg/dL   POCT GLUCOSE   Result Value Ref Range    POCT Glucose 104 (H) 74 - 99 mg/dL   CBC   Result Value Ref Range    WBC 15.2 (H) 4.4 - 11.3 x10*3/uL    nRBC 0.0 0.0 - 0.0 /100 WBCs    RBC 3.81 (L) 4.50 - 5.90 x10*6/uL    " Hemoglobin 10.3 (L) 13.5 - 17.5 g/dL    Hematocrit 33.4 (L) 41.0 - 52.0 %    MCV 88 80 - 100 fL    MCH 27.0 26.0 - 34.0 pg    MCHC 30.8 (L) 32.0 - 36.0 g/dL    RDW 16.7 (H) 11.5 - 14.5 %    Platelets 254 150 - 450 x10*3/uL   Renal Function Panel   Result Value Ref Range    Glucose 104 (H) 74 - 99 mg/dL    Sodium 136 136 - 145 mmol/L    Potassium 4.2 3.5 - 5.3 mmol/L    Chloride 106 98 - 107 mmol/L    Bicarbonate 20 (L) 21 - 32 mmol/L    Anion Gap 14 10 - 20 mmol/L    Urea Nitrogen 32 (H) 6 - 23 mg/dL    Creatinine 1.78 (H) 0.50 - 1.30 mg/dL    eGFR 39 (L) >60 mL/min/1.73m*2    Calcium 9.5 8.6 - 10.6 mg/dL    Phosphorus 3.4 2.5 - 4.9 mg/dL    Albumin 3.1 (L) 3.4 - 5.0 g/dL   Magnesium   Result Value Ref Range    Magnesium 2.37 1.60 - 2.40 mg/dL         Assessment/Plan   Assessment & Plan  Renal mass    Post-op pain    BPH (benign prostatic hyperplasia)    Small bowel obstruction (Multi)      75 year old male with notable medical history/co-morbidities presenting with partial bowel obstruction following open right radical nephrectomy (1/28/25). Reassuring abdominal exam, no signs of threatened bowel on CT scan, having some bowel obstruction     Recommendations:  -Given failure of progression, would recommend pursuing decompressive PEG early this coming week.  This was briefly discussed with patient who seemed in agreement.  -No plans to return to the operating room  -Continue TPN  - remainder of care per primary team    Will continue to follow.      D/w attending Dr. Tracey.     Ibeth Ortiz  Colorectal surgery   [id number]

## 2025-02-10 NOTE — PROGRESS NOTES
[facility] [place]  Progress Note      Patient: Swapnil Allen  Age/Sex: 75 y.o., male  MRN: [id number]  Date of surgery: 1/28/25  Admit Date: 1/28/2025   Code Status: Full Code  Length of Stay: 13      Interval History/Overnight Events:   NAONE  Patient with 1700cc from NGT with bilious output. No emesis episodes.   Denies any fevers, chills. Endorses abdominal soreness and discomfort that has been stable  UOP 2L no rodriguez   Having bowel function, feels bloated      Objective  02/08 1900 - 02/10 0659  In: 2984.3 [I.V.:1379.2]  Out: 6125 [Urine:3325]              10.3     15.2>-----<254              33.4   136  106  32                  ----------------<104     4.2  20  1.78          Ca 9.5 Phos 3.4 Mg 2.37       ALT 25 AST 33 AlkPhos 62 tBili 0.6          Vitals:    02/09/25 2144 02/09/25 2327 02/10/25 0358 02/10/25 0833   BP: 135/87 111/79 130/82 119/75   BP Location: Right arm Right arm Right arm Right arm   Patient Position: Lying Lying Lying Lying   Pulse: 80 74 81    Resp: 18 18 18    Temp: 36.5 °C (97.7 °F) 35.8 °C (96.4 °F) 36 °C (96.8 °F) 36 °C (96.8 °F)   TempSrc: Temporal Temporal Temporal Temporal   SpO2: 95% 95% 96%    Weight:       Height:              Medications:  Current Facility-Administered Medications   Medication Dose Route Frequency Provider Last Rate Last Admin    Adult Clinimix Parenteral Nutrition Continuous  83 mL/hr intravenous Daily PN Karen Katz, APRN-CNP 83 mL/hr at 02/09/25 2037 New Bag at 02/09/25 2037    alteplase (Cathflo Activase) injection 2 mg  2 mg intra-catheter PRN Ludwin Phillips MD        amLODIPine (Norvasc) tablet 10 mg  10 mg nasogastric tube Daily Ludwin Phillips MD   10 mg at 02/09/25 0828    [Held by provider] aspirin EC tablet 81 mg  81 mg oral Daily Jannie Hamm MD   81 mg at 01/31/25 0820    aspirin suppository 150 mg  150 mg rectal Daily Deena Medina MD   150 mg at 02/06/25 1652    benzocaine-menthol (Cepastat Sore Throat) lozenge 1 lozenge  1  lozenge Mouth/Throat q2h PRN Ludwin Phillips MD   1 lozenge at 02/09/25 2226    bisacodyl (Dulcolax) suppository 10 mg  10 mg rectal Daily JOSUÉ Britt   10 mg at 02/08/25 0840    calcium carbonate (Tums) chewable tablet 500 mg  500 mg nasogastric tube 4x daily PRN Ludwin Phillips MD   500 mg at 02/04/25 2047    cholecalciferol (Vitamin D-3) tablet 1,000 Units  1,000 Units nasogastric tube Daily Ludwin Phillips MD   1,000 Units at 02/09/25 0828    dextrose 50 % injection 12.5 g  12.5 g intravenous q15 min PRN Ludwin Phillips MD   12.5 g at 02/02/25 1402    pantoprazole (ProtoNix) EC tablet 40 mg  40 mg oral Daily before breakfast Elmo Monteiro MD        Or    esomeprazole (NexIUM) suspension 40 mg  40 mg nasoduodenal tube Daily before breakfast Elmo Monteiro MD   40 mg at 02/09/25 0828    Or    pantoprazole (ProtoNix) injection 40 mg  40 mg intravenous Daily before breakfast Elmo Monteiro MD   40 mg at 02/10/25 0522    fat emulsion fish oil/plant based (SMOFlipid) 20 % IV infusion 50 g  250 mL intravenous Daily Lipids JOSUÉ Britt 20.8 mL/hr at 02/09/25 2031 50 g at 02/09/25 2031    heparin (porcine) injection 5,000 Units  5,000 Units subcutaneous q8h Jannie Hamm MD   5,000 Units at 02/10/25 0107    HYDROmorphone (Dilaudid) injection 0.2 mg  0.2 mg intravenous q3h PRN Alexys Hood MD   0.2 mg at 02/08/25 1359    HYDROmorphone (Dilaudid) injection 0.4 mg  0.4 mg intravenous q4h PRN Alexys Hood MD   0.4 mg at 02/09/25 2142    insulin lispro injection 0-5 Units  0-5 Units subcutaneous TID AC JOSUÉ Britt        ipratropium-albuteroL (Duo-Neb) 0.5-2.5 mg/3 mL nebulizer solution 3 mL  3 mL nebulization TID PRN Alexys Hood MD        labetaloL (Normodyne,Trandate) injection 10 mg  10 mg intravenous q8h PRN Gallo Martines MD        lactated Ringer's infusion  50 mL/hr intravenous Continuous CHAI CamposC        levothyroxine (Synthroid)  injection 12.5 mcg  12.5 mcg intravenous q24h ALEN Ludwin Phillips MD   12.5 mcg at 02/09/25 0828    [Held by provider] levothyroxine (Synthroid, Levoxyl) tablet 25 mcg  25 mcg oral Daily Jannie Hamm MD   25 mcg at 01/31/25 0531    lidocaine (Xylocaine) 10 mg/mL (1 %) injection 5 mL  5 mL infiltration Once KYLIE Britt-CNP        lidocaine (Xylocaine) 10 mg/mL (1 %) injection 5 mL  5 mL infiltration Once Alexys Hood MD        lidocaine 2 % mucosal jelly (Uro-Jet) 1 Application  1 Application nasal Once Elmo Monteiro MD        lidocaine 4 % patch 3 patch  3 patch transdermal Daily Gallo Martines MD   3 patch at 02/04/25 0929    melatonin tablet 10 mg  10 mg nasogastric tube Nightly PRN Ludwin Phillips MD   10 mg at 02/08/25 2201    ondansetron (Zofran) injection 4 mg  4 mg intravenous q6h PRN Gallo Martines MD   4 mg at 01/31/25 2057    [Held by provider] oxyCODONE (Roxicodone) immediate release tablet 10 mg  10 mg oral q6h PRN Jannie Hamm MD   10 mg at 01/29/25 0235    [Held by provider] oxyCODONE (Roxicodone) immediate release tablet 5 mg  5 mg oral q6h PRN Jannie Hamm MD   5 mg at 01/29/25 0840    oxygen (O2) therapy   inhalation Continuous - Inhalation Ludwin Phillips MD 60,000 mL/hr at 02/09/25 1215 1 L/min at 02/09/25 1215    phenoL (Chloraseptic) 1.4 % mouth/throat spray 1 spray  1 spray Mouth/Throat 4x daily PRN Ludwin Phillips MD   1 spray at 02/09/25 2324    piperacillin-tazobactam (Zosyn) 3.375 g in dextrose (iso) IV 50 mL  3.375 g intravenous q6h Alexys Hood MD   Stopped at 02/10/25 0531    [Held by provider] tamsulosin (Flomax) 24 hr capsule 0.4 mg  0.4 mg oral Daily Jannie Hamm MD   0.4 mg at 01/31/25 0820       Physical Exam                                                                                                                         Gen -  No acute distress  Heent: NGT in place with green output      Neuro - Alert, oriented,  conversant     CV -  regular rate , normotensive      Pulm - Symmetric chest rise, non-labored breathing on room air      Abd - Abdomen is soft, mildly-distended, and appropriately tender. Midline incision c/d/I closed with glue.       Ext - Warm, well perfused     MSK- no pain or swelling in LE bilaterally     Skin - without jaunice       Psych - appropriate tone, affect      Imaging  XR abdomen 1 view    Result Date: 2/9/2025  Interpreted By:  Tyree Tabares and Sheng Max STUDY: XR ABDOMEN 1 VIEW;  2/8/2025 11:31 pm   INDICATION: Signs/Symptoms:Confirm NG tube placement.     COMPARISON: Abdominal radiograph 02/07/2025   ACCESSION NUMBER(S): [id number]   ORDERING CLINICIAN: FILIPE ROA   FINDINGS: Enteric tube courses below the left hemidiaphragm with its tip projecting over the gastric body/pylorus.   Numerous distended small bowel loops measuring up to 4.8 cm, slightly improved when compared to prior.   Stable bibasilar airspace opacities. No pleural effusion.   Osseous structures demonstrate no acute bony changes.       1. Enteric tube courses below the left hemidiaphragm with its tip projecting over the gastric body/pylorus, in appropriate positioning. 2. Numerous distended small bowel loops measuring up to 4.8 cm, slightly improved when compared to prior.     I personally reviewed the images/study and I agree with the findings as stated by Dr. Brett Phillips. This study was interpreted at [place], [place].   MACRO: None   Signed by: Tyree Tabares 2/9/2025 9:32 AM Dictation workstation:   [id number]      Assessment & Plan  75 y.o. 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 Postoperative course complicated by prolonged ileus vs small bowel obstruction. NGT placed 01/31. Patient now with slowly resolving ANISHA and new mild leukocytosis.      Plan 2/10:  - PEG tube consult placed, likely to be done today  - Possible PICC today for TPN  - Follow up CXR  - NPO w/ mIVF LR 50cc/hr  -Continue PPN  -F/U CRS recs - keep NGT - plan for decompressive PEG today  -Maintain NGT. Continue fluid repletion for NGT losses.   -Follow up labs   -Ambulate/OOB  -PT        Neuro:   -Pain controlled with Scheduled acetaminophen, Oxycodone 5/10 HELD while NPO, Dilaudid 0.4 PRN for breakthrough pain, robaxin 500mg q8hrs   -PRN melatonin for sleep aid     Resp:   -ICS  -On room air   -Appreciate RT recs  -CXR 2/10    Card:   -Monitor vitals  -Amlodpine  -HELD Aspirin   -Cholecalciferol     FEN: mIVF LR @ 50ml  Fluid repletion at 0.5:1 of NGT output    GI:   -NPO for NGT  -PEG tube consult placed, likely today  -BR: Miralax, senokat   -PRN Reglan for nausea   - Bisacodyl suppository   -Colorectal surgery consulted, appreciate recommendations  -KUB 2/7  -Nutrition consulted: Currently on PPN   - Possible PICC today for TPN    :   -HELD Flomax   -Daily BMP to monitor kidney function and replete electrolytes as indicated.   -Continue to monitor Cr     Endo:   -Levothyroxine IV while NPO  -SSI #1     Heme/ID:   -Daily CBC to monitor for acute blood loss anemia   -No indication for blood transfusion at this time   -Zosyn for c/f pneumonia     Ppy:   -ALEN  -SCD  -IS    Dispo:   RNF.     Assessment and plan discussed with chief resident Dr. Ortez and attending Dr. Ana Xiao MD, [facility]  [facility]  Adult Urology Pager: [phone]  Pediatric Urology Pager: [phone]

## 2025-02-10 NOTE — PRE-PROCEDURE NOTE
Interventional Radiology Preprocedure Note    Indication for procedure: The primary encounter diagnosis was Renal mass. Diagnoses of Post-op pain and Small bowel obstruction (Multi) were also pertinent to this visit.    Relevant review of systems: NA    Relevant Labs:   Lab Results   Component Value Date    CREATININE 1.78 (H) 02/10/2025    EGFR 39 (L) 02/10/2025    INR 1.1 11/25/2024    PROTIME 12.0 11/25/2024       Planned Sedation/Anesthesia: Moderate    Airway assessment: normal    Directed physical examination:    Aox3.  Resting comfortably in bed.  Respiratory rate/effort within normal limits.    Mallampati: II (hard and soft palate, upper portion of tonsils and uvula visible)    ASA Score: ASA 3 - Patient with moderate systemic disease with functional limitations    Benefits, risks and alternatives of procedure and planned sedation have been discussed with the patient and/or their representative. All questions answered and they agree to proceed.

## 2025-02-11 ENCOUNTER — DOCUMENTATION (OUTPATIENT)
Dept: HOME HEALTH SERVICES | Facility: HOME HEALTH | Age: 75
End: 2025-02-11
Payer: MEDICARE

## 2025-02-11 ENCOUNTER — APPOINTMENT (OUTPATIENT)
Dept: GASTROENTEROLOGY | Facility: HOSPITAL | Age: 75
DRG: 656 | End: 2025-02-11
Payer: MEDICARE

## 2025-02-11 ENCOUNTER — APPOINTMENT (OUTPATIENT)
Dept: RADIOLOGY | Facility: HOSPITAL | Age: 75
DRG: 656 | End: 2025-02-11
Payer: MEDICARE

## 2025-02-11 LAB
ALBUMIN SERPL BCP-MCNC: 2.8 G/DL (ref 3.4–5)
ANION GAP SERPL CALC-SCNC: 14 MMOL/L (ref 10–20)
BACTERIA BLD CULT: NORMAL
BACTERIA BLD CULT: NORMAL
BUN SERPL-MCNC: 34 MG/DL (ref 6–23)
CALCIUM SERPL-MCNC: 8.8 MG/DL (ref 8.6–10.6)
CHLORIDE SERPL-SCNC: 106 MMOL/L (ref 98–107)
CO2 SERPL-SCNC: 21 MMOL/L (ref 21–32)
CREAT SERPL-MCNC: 1.95 MG/DL (ref 0.5–1.3)
EGFRCR SERPLBLD CKD-EPI 2021: 35 ML/MIN/1.73M*2
ERYTHROCYTE [DISTWIDTH] IN BLOOD BY AUTOMATED COUNT: 16.4 % (ref 11.5–14.5)
GLUCOSE BLD MANUAL STRIP-MCNC: 112 MG/DL (ref 74–99)
GLUCOSE BLD MANUAL STRIP-MCNC: 117 MG/DL (ref 74–99)
GLUCOSE BLD MANUAL STRIP-MCNC: 120 MG/DL (ref 74–99)
GLUCOSE BLD MANUAL STRIP-MCNC: 135 MG/DL (ref 74–99)
GLUCOSE SERPL-MCNC: 119 MG/DL (ref 74–99)
HCT VFR BLD AUTO: 29.4 % (ref 41–52)
HCV RNA SERPL NAA+PROBE-ACNC: NOT DETECTED K[IU]/ML
HCV RNA SERPL NAA+PROBE-LOG IU: NORMAL {LOG_IU}/ML
HGB BLD-MCNC: 9.5 G/DL (ref 13.5–17.5)
INR PPP: 1.3 (ref 0.9–1.1)
LAB AP BLOCK FOR ADDITIONAL STUDIES: NORMAL
LABORATORY COMMENT REPORT: NORMAL
MAGNESIUM SERPL-MCNC: 2.25 MG/DL (ref 1.6–2.4)
MCH RBC QN AUTO: 27.1 PG (ref 26–34)
MCHC RBC AUTO-ENTMCNC: 32.3 G/DL (ref 32–36)
MCV RBC AUTO: 84 FL (ref 80–100)
NRBC BLD-RTO: 0 /100 WBCS (ref 0–0)
PATH REPORT.FINAL DX SPEC: NORMAL
PATH REPORT.GROSS SPEC: NORMAL
PATH REPORT.RELEVANT HX SPEC: NORMAL
PATH REPORT.TOTAL CANCER: NORMAL
PATHOLOGY SYNOPTIC REPORT: NORMAL
PHOSPHATE SERPL-MCNC: 3.7 MG/DL (ref 2.5–4.9)
PLATELET # BLD AUTO: 261 X10*3/UL (ref 150–450)
POTASSIUM SERPL-SCNC: 4.4 MMOL/L (ref 3.5–5.3)
PROTHROMBIN TIME: 14.1 SECONDS (ref 9.8–12.8)
RBC # BLD AUTO: 3.51 X10*6/UL (ref 4.5–5.9)
SODIUM SERPL-SCNC: 137 MMOL/L (ref 136–145)
WBC # BLD AUTO: 13.3 X10*3/UL (ref 4.4–11.3)

## 2025-02-11 PROCEDURE — 2500000005 HC RX 250 GENERAL PHARMACY W/O HCPCS

## 2025-02-11 PROCEDURE — 80069 RENAL FUNCTION PANEL: CPT | Performed by: STUDENT IN AN ORGANIZED HEALTH CARE EDUCATION/TRAINING PROGRAM

## 2025-02-11 PROCEDURE — 2500000004 HC RX 250 GENERAL PHARMACY W/ HCPCS (ALT 636 FOR OP/ED)

## 2025-02-11 PROCEDURE — 2580000001 HC RX 258 IV SOLUTIONS

## 2025-02-11 PROCEDURE — 74018 RADEX ABDOMEN 1 VIEW: CPT

## 2025-02-11 PROCEDURE — 83735 ASSAY OF MAGNESIUM: CPT

## 2025-02-11 PROCEDURE — 2500000001 HC RX 250 WO HCPCS SELF ADMINISTERED DRUGS (ALT 637 FOR MEDICARE OP)

## 2025-02-11 PROCEDURE — 2500000004 HC RX 250 GENERAL PHARMACY W/ HCPCS (ALT 636 FOR OP/ED): Performed by: STUDENT IN AN ORGANIZED HEALTH CARE EDUCATION/TRAINING PROGRAM

## 2025-02-11 PROCEDURE — 74018 RADEX ABDOMEN 1 VIEW: CPT | Performed by: RADIOLOGY

## 2025-02-11 PROCEDURE — 1170000001 HC PRIVATE ONCOLOGY ROOM DAILY

## 2025-02-11 PROCEDURE — 85610 PROTHROMBIN TIME: CPT

## 2025-02-11 PROCEDURE — 85027 COMPLETE CBC AUTOMATED: CPT | Performed by: STUDENT IN AN ORGANIZED HEALTH CARE EDUCATION/TRAINING PROGRAM

## 2025-02-11 PROCEDURE — 82947 ASSAY GLUCOSE BLOOD QUANT: CPT

## 2025-02-11 RX ORDER — ACETAMINOPHEN 10 MG/ML
650 INJECTION, SOLUTION INTRAVENOUS EVERY 6 HOURS PRN
Status: DISCONTINUED | OUTPATIENT
Start: 2025-02-11 | End: 2025-02-11

## 2025-02-11 RX ORDER — LIDOCAINE 560 MG/1
1 PATCH PERCUTANEOUS; TOPICAL; TRANSDERMAL DAILY
Status: DISCONTINUED | OUTPATIENT
Start: 2025-02-11 | End: 2025-02-11

## 2025-02-11 RX ORDER — ACETAMINOPHEN 10 MG/ML
650 INJECTION, SOLUTION INTRAVENOUS EVERY 6 HOURS PRN
Status: DISCONTINUED | OUTPATIENT
Start: 2025-02-11 | End: 2025-02-15

## 2025-02-11 RX ORDER — CHOLECALCIFEROL (VITAMIN D3) 25 MCG
1000 TABLET ORAL DAILY
Status: DISCONTINUED | OUTPATIENT
Start: 2025-02-12 | End: 2025-02-26 | Stop reason: HOSPADM

## 2025-02-11 RX ORDER — INSULIN LISPRO 100 [IU]/ML
0-5 INJECTION, SOLUTION INTRAVENOUS; SUBCUTANEOUS EVERY 6 HOURS
Status: DISCONTINUED | OUTPATIENT
Start: 2025-02-11 | End: 2025-02-24

## 2025-02-11 RX ORDER — AMLODIPINE BESYLATE 10 MG/1
10 TABLET ORAL DAILY
Status: DISCONTINUED | OUTPATIENT
Start: 2025-02-12 | End: 2025-02-26 | Stop reason: HOSPADM

## 2025-02-11 RX ADMIN — ASPIRIN 81 MG: 81 TABLET, COATED ORAL at 09:00

## 2025-02-11 RX ADMIN — HYDROMORPHONE HYDROCHLORIDE 0.4 MG: 1 INJECTION, SOLUTION INTRAMUSCULAR; INTRAVENOUS; SUBCUTANEOUS at 10:30

## 2025-02-11 RX ADMIN — PIPERACILLIN SODIUM AND TAZOBACTAM SODIUM 3.38 G: 3; .375 INJECTION, SOLUTION INTRAVENOUS at 00:36

## 2025-02-11 RX ADMIN — HYDROMORPHONE HYDROCHLORIDE 0.2 MG: 1 INJECTION, SOLUTION INTRAMUSCULAR; INTRAVENOUS; SUBCUTANEOUS at 05:55

## 2025-02-11 RX ADMIN — Medication 10 MG: at 20:09

## 2025-02-11 RX ADMIN — LIDOCAINE 4% 3 PATCH: 40 PATCH TOPICAL at 10:31

## 2025-02-11 RX ADMIN — LEVOTHYROXINE SODIUM ANHYDROUS 12.5 MCG: 100 INJECTION, POWDER, LYOPHILIZED, FOR SOLUTION INTRAVENOUS at 10:31

## 2025-02-11 RX ADMIN — PIPERACILLIN SODIUM AND TAZOBACTAM SODIUM 3.38 G: 3; .375 INJECTION, SOLUTION INTRAVENOUS at 05:55

## 2025-02-11 RX ADMIN — SMOFLIPID 50 G: 6; 6; 5; 3 INJECTION, EMULSION INTRAVENOUS at 20:10

## 2025-02-11 RX ADMIN — Medication 1000 UNITS: at 10:30

## 2025-02-11 RX ADMIN — HEPARIN SODIUM 5000 UNITS: 5000 INJECTION INTRAVENOUS; SUBCUTANEOUS at 00:36

## 2025-02-11 RX ADMIN — HEPARIN SODIUM 5000 UNITS: 5000 INJECTION INTRAVENOUS; SUBCUTANEOUS at 17:18

## 2025-02-11 RX ADMIN — PANTOPRAZOLE SODIUM 40 MG: 40 INJECTION, POWDER, FOR SOLUTION INTRAVENOUS at 05:55

## 2025-02-11 RX ADMIN — AMLODIPINE BESYLATE 10 MG: 10 TABLET ORAL at 10:30

## 2025-02-11 RX ADMIN — TRACE ELEMENTS INJECTION 4: 7.4; .75; 98; 151 INJECTION, SOLUTION INTRAVENOUS at 20:09

## 2025-02-11 RX ADMIN — PIPERACILLIN SODIUM AND TAZOBACTAM SODIUM 3.38 G: 3; .375 INJECTION, SOLUTION INTRAVENOUS at 18:34

## 2025-02-11 RX ADMIN — HYDROMORPHONE HYDROCHLORIDE 0.2 MG: 1 INJECTION, SOLUTION INTRAMUSCULAR; INTRAVENOUS; SUBCUTANEOUS at 00:36

## 2025-02-11 RX ADMIN — HEPARIN SODIUM 5000 UNITS: 5000 INJECTION INTRAVENOUS; SUBCUTANEOUS at 10:30

## 2025-02-11 RX ADMIN — SODIUM CHLORIDE, POTASSIUM CHLORIDE, SODIUM LACTATE AND CALCIUM CHLORIDE 1000 ML: 600; 310; 30; 20 INJECTION, SOLUTION INTRAVENOUS at 10:31

## 2025-02-11 RX ADMIN — HYDROMORPHONE HYDROCHLORIDE 0.2 MG: 1 INJECTION, SOLUTION INTRAMUSCULAR; INTRAVENOUS; SUBCUTANEOUS at 15:29

## 2025-02-11 RX ADMIN — PIPERACILLIN SODIUM AND TAZOBACTAM SODIUM 3.38 G: 3; .375 INJECTION, SOLUTION INTRAVENOUS at 12:40

## 2025-02-11 RX ADMIN — Medication 1 L/MIN: at 10:32

## 2025-02-11 ASSESSMENT — COGNITIVE AND FUNCTIONAL STATUS - GENERAL
STANDING UP FROM CHAIR USING ARMS: A LOT
DAILY ACTIVITIY SCORE: 18
WALKING IN HOSPITAL ROOM: A LOT
TURNING FROM BACK TO SIDE WHILE IN FLAT BAD: A LITTLE
TOILETING: A LITTLE
PERSONAL GROOMING: A LITTLE
DRESSING REGULAR UPPER BODY CLOTHING: A LITTLE
HELP NEEDED FOR BATHING: A LITTLE
CLIMB 3 TO 5 STEPS WITH RAILING: A LOT
DRESSING REGULAR LOWER BODY CLOTHING: A LITTLE
EATING MEALS: A LITTLE
MOVING FROM LYING ON BACK TO SITTING ON SIDE OF FLAT BED WITH BEDRAILS: A LITTLE
MOVING TO AND FROM BED TO CHAIR: A LOT
MOBILITY SCORE: 14

## 2025-02-11 ASSESSMENT — PAIN SCALES - GENERAL
PAINLEVEL_OUTOF10: 7
PAINLEVEL_OUTOF10: 5 - MODERATE PAIN
PAINLEVEL_OUTOF10: 4
PAINLEVEL_OUTOF10: 3
PAINLEVEL_OUTOF10: 5 - MODERATE PAIN
PAINLEVEL_OUTOF10: 9

## 2025-02-11 ASSESSMENT — ACTIVITIES OF DAILY LIVING (ADL)
LACK_OF_TRANSPORTATION: NO
LACK_OF_TRANSPORTATION: NO

## 2025-02-11 NOTE — PROGRESS NOTES
"COLORECTAL SURGERY PROGRESS NOTE    Swapnil Allen  [id number]  1950    Reason for admission:  Swapnil Allen is a 75 y.o. male on day 14 of admission presenting with Renal mass. S/p open right radical nephrectomy and RP lymphadenectomy (1/28/25).    Subjective   NAEO  PEG tube placed with IR 2/10, patient tolerated procedure well  PEG tube has been to gravity since procedure. NG tube removed following tube placement  No Nausea.   No chest pain or SOB. Does have some soreness around PEG tube insertion site       Objective     Last Recorded Vitals  Blood pressure 111/67, pulse 81, temperature 36.1 °C (97 °F), temperature source Temporal, resp. rate 16, height 1.803 m (5' 11\"), weight 103 kg (226 lb 13.7 oz), SpO2 95%.    Physical Exam  /67 (BP Location: Right arm, Patient Position: Lying)   Pulse 81   Temp 36.1 °C (97 °F) (Temporal)   Resp 16   Ht 1.803 m (5' 11\")   Wt 103 kg (226 lb 13.7 oz)   SpO2 95%   BMI 31.64 kg/m²   NAD, sitting up in bed  PEG tube in place with dark bilious/brown fluid in bag, NG tube now removed  Nontachycardic  Nonlabored respirations  Abdomen soft, nontender, nondistended, well-healed midline incision covered with glue, no generalized peritonitis, PEG tube in place to gravity    I/O last 2 completed shifts:  In: 7852 (76.3 mL/kg) [I.V.:1360.8 (13.2 mL/kg); IV Piggyback:200]  Out: 900 (8.7 mL/kg) [Urine:600 (0.2 mL/kg/hr); Emesis/NG output:300]  Weight: 102.9 kg    Intake:  IVF 1510, TPN 6291  Output:  UOP - 600+1x,  , PEG 0, stool 0    Relevant Results  Results for orders placed or performed during the hospital encounter of 01/28/25 (from the past 24 hours)   POCT GLUCOSE   Result Value Ref Range    POCT Glucose 126 (H) 74 - 99 mg/dL   POCT GLUCOSE   Result Value Ref Range    POCT Glucose 111 (H) 74 - 99 mg/dL   POCT GLUCOSE   Result Value Ref Range    POCT Glucose 120 (H) 74 - 99 mg/dL   CBC   Result Value Ref Range    WBC 13.3 (H) 4.4 - 11.3 x10*3/uL    nRBC 0.0 0.0 - " 0.0 /100 WBCs    RBC 3.51 (L) 4.50 - 5.90 x10*6/uL    Hemoglobin 9.5 (L) 13.5 - 17.5 g/dL    Hematocrit 29.4 (L) 41.0 - 52.0 %    MCV 84 80 - 100 fL    MCH 27.1 26.0 - 34.0 pg    MCHC 32.3 32.0 - 36.0 g/dL    RDW 16.4 (H) 11.5 - 14.5 %    Platelets 261 150 - 450 x10*3/uL   Renal Function Panel   Result Value Ref Range    Glucose 119 (H) 74 - 99 mg/dL    Sodium 137 136 - 145 mmol/L    Potassium 4.4 3.5 - 5.3 mmol/L    Chloride 106 98 - 107 mmol/L    Bicarbonate 21 21 - 32 mmol/L    Anion Gap 14 10 - 20 mmol/L    Urea Nitrogen 34 (H) 6 - 23 mg/dL    Creatinine 1.95 (H) 0.50 - 1.30 mg/dL    eGFR 35 (L) >60 mL/min/1.73m*2    Calcium 8.8 8.6 - 10.6 mg/dL    Phosphorus 3.7 2.5 - 4.9 mg/dL    Albumin 2.8 (L) 3.4 - 5.0 g/dL   Protime-INR   Result Value Ref Range    Protime 14.1 (H) 9.8 - 12.8 seconds    INR 1.3 (H) 0.9 - 1.1   Magnesium   Result Value Ref Range    Magnesium 2.25 1.60 - 2.40 mg/dL   POCT GLUCOSE   Result Value Ref Range    POCT Glucose 117 (H) 74 - 99 mg/dL         Assessment/Plan   Assessment & Plan  Renal mass    Post-op pain    BPH (benign prostatic hyperplasia)    Small bowel obstruction (Multi)      75 year old male with notable medical history/co-morbidities presenting with partial bowel obstruction following open right radical nephrectomy (1/28/25). Reassuring abdominal exam, no signs of threatened bowel on CT scan, having some bowel obstruction. NGT was previously in place for decompression as patient continued to have high NG output. NGT removed 2/10 as patient underwent decompressive PEG placement 2/10 with IR.     Following PEG tube placement, patient states that he has not had any nausea and no emesis. PEG is draining appropriately when to gravity bag. No additional interventions planned from CRS perspective. Recommend continuing TPN per primary team discretion.      Recommendations:  -continue PEG to gravity  -No plans to return to the operating room  -Continue TPN  - remainder of care per  primary team    Seen and discussed with chief resident Dr. Iyer. To be d/w attending Dr. Tracey.     Ibeth Ortiz  Colorectal surgery   [id number]

## 2025-02-11 NOTE — CARE PLAN
The patient's goals for the shift include      The clinical goals for the shift include Pt will get up to chair for 2 hours before end of shift    Over the shift, the patient did not make progress toward the following goals. Barriers to progression include lack of mobility in lower extermities. Recommendations to address these barriers include Continue encouraging movement.

## 2025-02-11 NOTE — PROGRESS NOTES
[facility]  Progress Note      Patient: Swapnil Allen  Age/Sex: 75 y.o., male  MRN: [id number]  Date of surgery: 1/28/25  Admit Date: 1/28/2025   Code Status: Full Code  Length of Stay: 14      Interval History/Overnight Events:   NAONE, underwent decompressive PICC and PEG 2/10, bilious output this AM   Feeling better this AM  NGT out. No emesis episodes.   Denies any fevers, chills. Endorses abdominal soreness and discomfort that has been stable  UOP 600cc, 1x unmeasured (No rodriguez)  Having bowel function, Endorses feeling little bloating   Cr 1.95(1.78), up trending       Objective  02/09 1900 - 02/11 0659  In: 8752 [I.V.:1860.8]  Out: 2350 [Urine:1300]              10.3     15.2>-----<254              33.4   136  106  32                  ----------------<104     4.2  20  1.78          Ca 9.5 Phos 3.4 Mg 2.37       ALT 19 AST 24 AlkPhos 94 tBili 0.3          Vitals:    02/10/25 1545 02/10/25 2146 02/11/25 0029 02/11/25 0420   BP: (!) 146/93 105/71 118/70 111/67   BP Location: Right arm Right arm Right arm Right arm   Patient Position: Sitting Lying Lying Lying   Pulse: 86 86 84 81   Resp: 16 16 16 16   Temp:  36.4 °C (97.5 °F) 36.4 °C (97.5 °F) 36.1 °C (97 °F)   TempSrc:  Temporal Temporal Temporal   SpO2: 92% 95% 95% 95%   Weight:       Height:              Medications:  Current Facility-Administered Medications   Medication Dose Route Frequency Provider Last Rate Last Admin    Adult Clinimix Parenteral Nutrition Continuous  50 mL/hr intravenous Daily PN KYLIE Britt-CNP 50 mL/hr at 02/10/25 2029 New Bag at 02/10/25 2029    alteplase (Cathflo Activase) injection 2 mg  2 mg intra-catheter PRN Alexys Hood MD        amLODIPine (Norvasc) tablet 10 mg  10 mg nasogastric tube Daily Ludwin Phillips MD   10 mg at 02/10/25 0840    [Held by provider] aspirin EC tablet 81 mg  81 mg oral Daily Jannie Hamm MD   81 mg at 01/31/25 0820    aspirin suppository 150 mg  150 mg rectal Daily Deena  MD Adam   150 mg at 02/06/25 1652    benzocaine-menthol (Cepastat Sore Throat) lozenge 1 lozenge  1 lozenge Mouth/Throat q2h PRN Ludwin Phillips MD   1 lozenge at 02/09/25 2226    bisacodyl (Dulcolax) suppository 10 mg  10 mg rectal Daily Karen STEVE Sterling, APRN-CNP   10 mg at 02/08/25 0840    calcium carbonate (Tums) chewable tablet 500 mg  500 mg nasogastric tube 4x daily PRN Ludwin Phillips MD   500 mg at 02/04/25 2047    cholecalciferol (Vitamin D-3) tablet 1,000 Units  1,000 Units nasogastric tube Daily Ludwin Phillips MD   1,000 Units at 02/10/25 0840    dextrose 50 % injection 12.5 g  12.5 g intravenous q15 min PRN Ludwin Phillips MD   12.5 g at 02/02/25 1402    pantoprazole (ProtoNix) EC tablet 40 mg  40 mg oral Daily before breakfast Elmo Monteiro MD        Or    esomeprazole (NexIUM) suspension 40 mg  40 mg nasoduodenal tube Daily before breakfast Elmo Monteiro MD   40 mg at 02/09/25 0828    Or    pantoprazole (ProtoNix) injection 40 mg  40 mg intravenous Daily before breakfast Elmo Monteiro MD   40 mg at 02/11/25 0555    fat emulsion fish oil/plant based (SMOFlipid) 20 % IV infusion 50 g  250 mL intravenous Daily Lipids Deena Kilpatrick PA-C 20.8 mL/hr at 02/10/25 2029 50 g at 02/10/25 2029    heparin (porcine) injection 5,000 Units  5,000 Units subcutaneous q8h Jannie Hamm MD   5,000 Units at 02/11/25 0036    HYDROmorphone (Dilaudid) injection 0.2 mg  0.2 mg intravenous q3h PRN Alexys Hood MD   0.2 mg at 02/11/25 0555    HYDROmorphone (Dilaudid) injection 0.4 mg  0.4 mg intravenous q4h PRN Alexys Hood MD   0.4 mg at 02/10/25 1923    insulin lispro injection 0-5 Units  0-5 Units subcutaneous TID AC Karen Katz, APRN-CNP        ipratropium-albuteroL (Duo-Neb) 0.5-2.5 mg/3 mL nebulizer solution 3 mL  3 mL nebulization TID PRN Alexys Hood MD        labetaloL (Normodyne,Trandate) injection 10 mg  10 mg intravenous q8h PRN Gallo Martines MD        lactated Ringer's  infusion  75 mL/hr intravenous Continuous Deena Kilpatrick PA-C 75 mL/hr at 02/10/25 2202 75 mL/hr at 02/10/25 2202    levothyroxine (Synthroid) injection 12.5 mcg  12.5 mcg intravenous q24h [facility] Ludwin Phillips MD   12.5 mcg at 02/10/25 0840    [Held by provider] levothyroxine (Synthroid, Levoxyl) tablet 25 mcg  25 mcg oral Daily Jannie Hamm MD   25 mcg at 01/31/25 0531    lidocaine (Xylocaine) 10 mg/mL (1 %) injection 5 mL  5 mL infiltration Once Alexys Hood MD        lidocaine 2 % mucosal jelly (Uro-Jet) 1 Application  1 Application nasal Once Elmo Monteiro MD        lidocaine 4 % patch 3 patch  3 patch transdermal Daily Gallo Martines MD   3 patch at 02/04/25 0929    melatonin tablet 10 mg  10 mg nasogastric tube Nightly PRN Ludwin Phillips MD   10 mg at 02/10/25 2040    ondansetron (Zofran) injection 4 mg  4 mg intravenous q6h PRN Gallo Martines MD   4 mg at 02/10/25 1509    [Held by provider] oxyCODONE (Roxicodone) immediate release tablet 10 mg  10 mg oral q6h PRN Jannie Hamm MD   10 mg at 01/29/25 0235    [Held by provider] oxyCODONE (Roxicodone) immediate release tablet 5 mg  5 mg oral q6h PRN Jannie Hamm MD   5 mg at 01/29/25 0840    oxygen (O2) therapy   inhalation Continuous - Inhalation Ludwin Phillips MD 60,000 mL/hr at 02/10/25 0844 1 L/min at 02/10/25 0844    phenoL (Chloraseptic) 1.4 % mouth/throat spray 1 spray  1 spray Mouth/Throat 4x daily PRN Ludwin Phillips MD   1 spray at 02/09/25 2324    piperacillin-tazobactam (Zosyn) 3.375 g in dextrose (iso) IV 50 mL  3.375 g intravenous q6h Alexys Hood MD   Stopped at 02/11/25 0608    [Held by provider] tamsulosin (Flomax) 24 hr capsule 0.4 mg  0.4 mg oral Daily Jannie Hamm MD   0.4 mg at 01/31/25 0820       Physical Exam                                                                                                                         Gen -  No acute distress     Neuro - Alert, oriented,  conversant     CV -  regular rate , normotensive      Pulm - Symmetric chest rise, non-labored breathing on room air      Abd - Abdomen is soft, mildly-distended, and appropriately tender. Midline incision c/d/I closed with glue.  PEG in place with bilious output      Ext - Warm, well perfused     MSK- no pain or swelling in LE bilaterally     Skin - without jaunice       Psych - appropriate tone, affect      - voiding independently, no rodriguez     Imaging  Bedside PICC Imaging    Result Date: 2/10/2025  These images are not reportable by radiology and will not be interpreted by  Radiologists.    XR chest 1 view    Result Date: 2/10/2025  Interpreted By:  Tyree Tabares and Elsamaloty Mazzin STUDY: XR CHEST 1 VIEW; ;  2/10/2025 9:09 am   INDICATION: Signs/Symptoms:pneumonia.     COMPARISON: XR CHEST 1 VIEW 2/7/2025, XR CHEST 1 VIEW 1/31/2025, XR CHEST 1 VIEW 1/30/2025   ACCESSION NUMBER(S): [id number]   ORDERING CLINICIAN: FILIPE ROA   TECHNIQUE: A single upright AP view of the chest was obtained.   FINDINGS: Enteric tube seen projecting over the chest with the tip terminating within the gastric body.   Cardiomediastinal silhouette appears mildly enlarged, but similar to prior.   Bibasilar consolidative and reticular opacities. Normal lung volumes. No large pneumothorax identified.   Visualized portions of the upper abdomen are unremarkable.   No acute osseous abnormality seen.       Consolidative and reticular opacities are most compatible with bilateral atelectatic changes superimposed on a background of aspiration pneumonia/pneumonitis.   I personally reviewed the images/study and I agree with the findings as stated by Reid Traylor MD (resident).   MACRO: None   Signed by: Tyree Tabares 2/10/2025 11:03 AM Dictation workstation:   [id number]      Assessment & Plan  75 y.o. 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 Postoperative course complicated by prolonged ileus vs small bowel obstruction. NGT placed 01/31. Patient now with slowly resolving ANISHA and new mild leukocytosis. Decompressive PEG and PICC(TPN) placed 2/10.       Plan 2/11:  - Maintain decompressive PEG  - Continue TPN through PICC   - 1L bolus over 1-2 hours for up trending Cr   - NPO w/ mIVF LR 75cc/hr  -Follow up hepatitis labs per GI (Signed off)  -CRS following: appreciate recommendations, venting protocol for PEG  -Follow up labs, trend WBC  -Lidocaine patch at PEG tube site due to discomfort   -Ambulate/OOB/PT  -Continue Zosyn   -Discharge planning started       Neuro:   -Pain controlled with Scheduled acetaminophen,Oxycodone 5/10 HELD while NPO, Dilaudid 0.4 PRN for breakthrough pain, robaxin 500mg q8hrs   -PRN melatonin for sleep aid     Resp:   -ICS  -On room air   -Appreciate RT recs  -CXR 2/10 for PNA    Card:   -Monitor vitals  -Amlodpine  -HELD Aspirin   -Cholecalciferol     FEN: mIVF LR @ 75ml  -1L bolus for uptrending Cr     GI:   -NPO   -PEG 2/10  -BR: Miralax, senokat   -PRN Reglan for nausea   - Bisacodyl suppository   -Colorectal surgery consulted, appreciate recommendations  -KUB 2/7  -Nutrition consulted: following TPN recommendations   -Maintain PICC for TPN  -Follow up HEP labs per GI(signed off)    :   -HELD Flomax   -Daily BMP to monitor kidney function and replete electrolytes as indicated.   -Continue to monitor Cr     Endo:   -Levothyroxine IV while NPO  -SSI #1     Heme/ID:   -Daily CBC to monitor for acute blood loss anemia   -No indication for blood transfusion at this time   -Continue Zosyn for likely aspiration pneumonia   -Trend WBC    Ppy:   -ALEN  -SCD  -IS    Dispo:   RNF.  Discharge planning started, likely SNF.     Assessment and plan discussed with chief resident Dr. Ortez and attending Dr. Ana Xiao MD, [facility]  [facility] [place]  Adult Urology Pager: [phone]  Pediatric Urology Pager: [phone]

## 2025-02-11 NOTE — HH CARE COORDINATION
Home Care received a referral for [facility]fusion [facility]. Unfortunately, we are unable to accept and process the referral at this time.    Reason:  Unable to provide TPN due to supply shortages.    Patients, please reach out to the referring provider or your PCP to assist in obtaining an alternative home care agency and/or guidance to meet your needs.    Providers, please reach out to [facility] at [phone] with any questions regarding the declined referral.

## 2025-02-11 NOTE — PROGRESS NOTES
02/11/25 1100   Discharge Planning   Living Arrangements Alone   Support Systems Family members   Assistance Needed TPN, ADLs   Type of Residence Private residence   Number of Stairs to Enter Residence 3  (brother's home: 3 steps at side door to enter)   Number of Stairs Within Residence 3  (brother's home, down to basement)   Do you have animals or pets at home? No   Home or Post Acute Services In home services   Type of Home Care Services Home OT;Home PT;Home nursing visits   Expected Discharge Disposition Home  (brother Jhony's home)   Does the patient need discharge transport arranged? No   Financial Resource Strain   How hard is it for you to pay for the very basics like food, housing, medical care, and heating? Not very   Housing Stability   In the last 12 months, was there a time when you were not able to pay the mortgage or rent on time? N   In the past 12 months, how many times have you moved where you were living? 0   At any time in the past 12 months, were you homeless or living in a shelter (including now)? N   Transportation Needs   In the past 12 months, has lack of transportation kept you from medical appointments or from getting medications? no   In the past 12 months, has lack of transportation kept you from meetings, work, or from getting things needed for daily living? No     SW met with pt to discuss discharge to SNF.  Pt reported that he had been in conversation with his family and that the pam is for him [place] [place] [facility] [facility] [address] Peter's spouse Mayda is a retired RN.  STEPHANIE met with care team to follow up.  When STEPHANIE returned to pt room, he was on the phone with Peter. Peter confirmed that the plan was to discharge to his home.

## 2025-02-11 NOTE — PROGRESS NOTES
02/11/25 1300   Discharge Planning   Living Arrangements Alone   Support Systems Family members   Assistance Needed TPN ADLs   Type of Residence Private residence   Who is requesting discharge planning? Provider   Home or Post Acute Services In home services   Type of Home Care Services Home PT;Home nursing visits;DME or oxygen   Expected Discharge Disposition Home H   Does the patient need discharge transport arranged? No   Financial Resource Strain   How hard is it for you to pay for the very basics like food, housing, medical care, and heating? Not very   Housing Stability   In the last 12 months, was there a time when you were not able to pay the mortgage or rent on time? N   In the past 12 months, how many times have you moved where you were living? 0   At any time in the past 12 months, were you homeless or living in a shelter (including now)? N   Transportation Needs   In the past 12 months, has lack of transportation kept you from medical appointments or from getting medications? no   In the past 12 months, has lack of transportation kept you from meetings, work, or from getting things needed for daily living? No     [facility] sent referral to [facility]. Per Infusion unable to accept due to staffing and TPN needs. [facility] sent referral to [facility] care and a blanket referral 31 agencies for home care. Will continue to follow patient for discharge needs.  Aaliyah LICEA     Update @130pm- [facility] sent 92 referrals for home health care. Pending accepting agency. Will continue to follow patient for discharge needs.  Aaliyah LICEA

## 2025-02-12 LAB
ALBUMIN SERPL BCP-MCNC: 2.8 G/DL (ref 3.4–5)
ANION GAP SERPL CALC-SCNC: 14 MMOL/L (ref 10–20)
BUN SERPL-MCNC: 30 MG/DL (ref 6–23)
CALCIUM SERPL-MCNC: 8.9 MG/DL (ref 8.6–10.6)
CHLORIDE SERPL-SCNC: 106 MMOL/L (ref 98–107)
CO2 SERPL-SCNC: 21 MMOL/L (ref 21–32)
CREAT SERPL-MCNC: 1.82 MG/DL (ref 0.5–1.3)
EGFRCR SERPLBLD CKD-EPI 2021: 38 ML/MIN/1.73M*2
ERYTHROCYTE [DISTWIDTH] IN BLOOD BY AUTOMATED COUNT: 16.2 % (ref 11.5–14.5)
GLUCOSE BLD MANUAL STRIP-MCNC: 123 MG/DL (ref 74–99)
GLUCOSE BLD MANUAL STRIP-MCNC: 132 MG/DL (ref 74–99)
GLUCOSE BLD MANUAL STRIP-MCNC: 138 MG/DL (ref 74–99)
GLUCOSE BLD MANUAL STRIP-MCNC: 139 MG/DL (ref 74–99)
GLUCOSE BLD MANUAL STRIP-MCNC: 145 MG/DL (ref 74–99)
GLUCOSE SERPL-MCNC: 121 MG/DL (ref 74–99)
HCT VFR BLD AUTO: 28 % (ref 41–52)
HGB BLD-MCNC: 9.3 G/DL (ref 13.5–17.5)
MAGNESIUM SERPL-MCNC: 2.1 MG/DL (ref 1.6–2.4)
MCH RBC QN AUTO: 27.9 PG (ref 26–34)
MCHC RBC AUTO-ENTMCNC: 33.2 G/DL (ref 32–36)
MCV RBC AUTO: 84 FL (ref 80–100)
NRBC BLD-RTO: 0 /100 WBCS (ref 0–0)
PHOSPHATE SERPL-MCNC: 3.8 MG/DL (ref 2.5–4.9)
PLATELET # BLD AUTO: 238 X10*3/UL (ref 150–450)
POTASSIUM SERPL-SCNC: 4.3 MMOL/L (ref 3.5–5.3)
RBC # BLD AUTO: 3.33 X10*6/UL (ref 4.5–5.9)
SODIUM SERPL-SCNC: 137 MMOL/L (ref 136–145)
STAPHYLOCOCCUS SPEC CULT: NORMAL
WBC # BLD AUTO: 13.7 X10*3/UL (ref 4.4–11.3)

## 2025-02-12 PROCEDURE — 2500000001 HC RX 250 WO HCPCS SELF ADMINISTERED DRUGS (ALT 637 FOR MEDICARE OP)

## 2025-02-12 PROCEDURE — 85027 COMPLETE CBC AUTOMATED: CPT | Performed by: STUDENT IN AN ORGANIZED HEALTH CARE EDUCATION/TRAINING PROGRAM

## 2025-02-12 PROCEDURE — 2500000004 HC RX 250 GENERAL PHARMACY W/ HCPCS (ALT 636 FOR OP/ED)

## 2025-02-12 PROCEDURE — 2500000004 HC RX 250 GENERAL PHARMACY W/ HCPCS (ALT 636 FOR OP/ED): Performed by: STUDENT IN AN ORGANIZED HEALTH CARE EDUCATION/TRAINING PROGRAM

## 2025-02-12 PROCEDURE — 84100 ASSAY OF PHOSPHORUS: CPT | Performed by: STUDENT IN AN ORGANIZED HEALTH CARE EDUCATION/TRAINING PROGRAM

## 2025-02-12 PROCEDURE — 2500000005 HC RX 250 GENERAL PHARMACY W/O HCPCS

## 2025-02-12 PROCEDURE — 82947 ASSAY GLUCOSE BLOOD QUANT: CPT

## 2025-02-12 PROCEDURE — 83735 ASSAY OF MAGNESIUM: CPT

## 2025-02-12 PROCEDURE — 2500000005 HC RX 250 GENERAL PHARMACY W/O HCPCS: Performed by: NURSE PRACTITIONER

## 2025-02-12 PROCEDURE — 99231 SBSQ HOSP IP/OBS SF/LOW 25: CPT | Performed by: NURSE PRACTITIONER

## 2025-02-12 PROCEDURE — 1170000001 HC PRIVATE ONCOLOGY ROOM DAILY

## 2025-02-12 PROCEDURE — 97110 THERAPEUTIC EXERCISES: CPT | Mod: GP,CQ

## 2025-02-12 PROCEDURE — 2580000001 HC RX 258 IV SOLUTIONS

## 2025-02-12 PROCEDURE — 97116 GAIT TRAINING THERAPY: CPT | Mod: GP,CQ

## 2025-02-12 RX ORDER — SODIUM CHLORIDE, SODIUM LACTATE, POTASSIUM CHLORIDE, CALCIUM CHLORIDE 600; 310; 30; 20 MG/100ML; MG/100ML; MG/100ML; MG/100ML
83 INJECTION, SOLUTION INTRAVENOUS NIGHTLY
Qty: 30000 ML | Refills: 2 | Status: SHIPPED | OUTPATIENT
Start: 2025-02-12 | End: 2025-02-12 | Stop reason: HOSPADM

## 2025-02-12 RX ORDER — SODIUM CHLORIDE, CALCIUM CHLORIDE, AND POTASSIUM CHLORIDE .86; .033; .03 G/100ML; G/100ML; G/100ML
INJECTION, SOLUTION INTRAVENOUS
Qty: 30000 ML | Refills: 2 | Status: SHIPPED | OUTPATIENT
Start: 2025-02-12 | End: 2025-02-24 | Stop reason: HOSPADM

## 2025-02-12 RX ORDER — SODIUM CHLORIDE, SODIUM LACTATE, POTASSIUM CHLORIDE, CALCIUM CHLORIDE 600; 310; 30; 20 MG/100ML; MG/100ML; MG/100ML; MG/100ML
50 INJECTION, SOLUTION INTRAVENOUS CONTINUOUS
Status: DISCONTINUED | OUTPATIENT
Start: 2025-02-12 | End: 2025-02-13

## 2025-02-12 RX ORDER — SODIUM CHLORIDE, SODIUM LACTATE, POTASSIUM CHLORIDE, CALCIUM CHLORIDE 600; 310; 30; 20 MG/100ML; MG/100ML; MG/100ML; MG/100ML
83 INJECTION, SOLUTION INTRAVENOUS NIGHTLY
Qty: 30000 ML | Refills: 2 | Status: SHIPPED | OUTPATIENT
Start: 2025-02-12 | End: 2025-02-12

## 2025-02-12 RX ADMIN — ACETAMINOPHEN 650 MG: 1000 INJECTION, SOLUTION INTRAVENOUS at 03:26

## 2025-02-12 RX ADMIN — PIPERACILLIN SODIUM AND TAZOBACTAM SODIUM 3.38 G: 3; .375 INJECTION, SOLUTION INTRAVENOUS at 10:20

## 2025-02-12 RX ADMIN — PANTOPRAZOLE SODIUM 40 MG: 40 TABLET, DELAYED RELEASE ORAL at 10:23

## 2025-02-12 RX ADMIN — AMLODIPINE BESYLATE 10 MG: 10 TABLET ORAL at 10:23

## 2025-02-12 RX ADMIN — PIPERACILLIN SODIUM AND TAZOBACTAM SODIUM 3.38 G: 3; .375 INJECTION, SOLUTION INTRAVENOUS at 16:14

## 2025-02-12 RX ADMIN — LEVOTHYROXINE SODIUM ANHYDROUS 12.5 MCG: 100 INJECTION, POWDER, LYOPHILIZED, FOR SOLUTION INTRAVENOUS at 10:23

## 2025-02-12 RX ADMIN — PIPERACILLIN SODIUM AND TAZOBACTAM SODIUM 3.38 G: 3; .375 INJECTION, SOLUTION INTRAVENOUS at 03:26

## 2025-02-12 RX ADMIN — PIPERACILLIN SODIUM AND TAZOBACTAM SODIUM 3.38 G: 3; .375 INJECTION, SOLUTION INTRAVENOUS at 22:22

## 2025-02-12 RX ADMIN — HEPARIN SODIUM 5000 UNITS: 5000 INJECTION INTRAVENOUS; SUBCUTANEOUS at 20:38

## 2025-02-12 RX ADMIN — Medication 1000 UNITS: at 10:22

## 2025-02-12 RX ADMIN — HYDROMORPHONE HYDROCHLORIDE 0.2 MG: 1 INJECTION, SOLUTION INTRAMUSCULAR; INTRAVENOUS; SUBCUTANEOUS at 13:24

## 2025-02-12 RX ADMIN — HEPARIN SODIUM 5000 UNITS: 5000 INJECTION INTRAVENOUS; SUBCUTANEOUS at 03:26

## 2025-02-12 RX ADMIN — HEPARIN SODIUM 5000 UNITS: 5000 INJECTION INTRAVENOUS; SUBCUTANEOUS at 13:19

## 2025-02-12 RX ADMIN — ASPIRIN 81 MG: 81 TABLET, COATED ORAL at 10:23

## 2025-02-12 RX ADMIN — HYDROMORPHONE HYDROCHLORIDE 0.4 MG: 1 INJECTION, SOLUTION INTRAMUSCULAR; INTRAVENOUS; SUBCUTANEOUS at 20:38

## 2025-02-12 RX ADMIN — Medication 1 L/MIN: at 10:46

## 2025-02-12 RX ADMIN — SMOFLIPID 50 G: 6; 6; 5; 3 INJECTION, EMULSION INTRAVENOUS at 20:38

## 2025-02-12 RX ADMIN — ASCORBIC ACID, VITAMIN A PALMITATE, CHOLECALCIFEROL, THIAMINE HYDROCHLORIDE, RIBOFLAVIN-5 PHOSPHATE SODIUM, PYRIDOXINE HYDROCHLORIDE, NIACINAMIDE, DEXPANTHENOL, ALPHA-TOCOPHEROL ACETATE, VITAMIN K1, FOLIC ACID, BIOTIN, CYANOCOBALAMIN: 200; 3300; 200; 6; 3.6; 6; 40; 15; 10; 150; 600; 60; 5 INJECTION, SOLUTION INTRAVENOUS at 20:38

## 2025-02-12 ASSESSMENT — COGNITIVE AND FUNCTIONAL STATUS - GENERAL
CLIMB 3 TO 5 STEPS WITH RAILING: TOTAL
MOVING FROM LYING ON BACK TO SITTING ON SIDE OF FLAT BED WITH BEDRAILS: A LITTLE
DRESSING REGULAR UPPER BODY CLOTHING: A LITTLE
MOVING TO AND FROM BED TO CHAIR: A LITTLE
WALKING IN HOSPITAL ROOM: A LITTLE
STANDING UP FROM CHAIR USING ARMS: A LITTLE
MOVING FROM LYING ON BACK TO SITTING ON SIDE OF FLAT BED WITH BEDRAILS: A LITTLE
TURNING FROM BACK TO SIDE WHILE IN FLAT BAD: A LITTLE
MOBILITY SCORE: 18
TURNING FROM BACK TO SIDE WHILE IN FLAT BAD: A LOT
CLIMB 3 TO 5 STEPS WITH RAILING: A LOT
MOBILITY SCORE: 15
DRESSING REGULAR LOWER BODY CLOTHING: A LITTLE
DAILY ACTIVITIY SCORE: 21
MOVING TO AND FROM BED TO CHAIR: A LITTLE
TOILETING: A LITTLE
WALKING IN HOSPITAL ROOM: A LITTLE

## 2025-02-12 ASSESSMENT — PAIN - FUNCTIONAL ASSESSMENT: PAIN_FUNCTIONAL_ASSESSMENT: 0-10

## 2025-02-12 ASSESSMENT — PAIN SCALES - GENERAL
PAINLEVEL_OUTOF10: 4
PAINLEVEL_OUTOF10: 8
PAINLEVEL_OUTOF10: 5 - MODERATE PAIN
PAINLEVEL_OUTOF10: 0 - NO PAIN
PAINLEVEL_OUTOF10: 6

## 2025-02-12 NOTE — PROGRESS NOTES
[facility]  Progress Note      Patient: Swapnil Allen  Age/Sex: 75 y.o., male  MRN: [id number]  Date of surgery: 1/28/25  Admit Date: 1/28/2025   Code Status: Full Code  Length of Stay: 15      Interval History/Overnight Events:   S/p venting PEG 2/10, 900cc bilious output this AM   Feeling better overall  No nausea or emesis  Denies any fevers, chills. Endorses abdominal soreness and discomfort that has been stable  UOP 1.3L  BMx1, Endorses feeling little bloating, refusing enemas   Cr 1.82 (1.95), improving   Stable leukocytosis WBC 13  Hep C RNA negative       Objective  02/10 1900 - 02/12 0659  In: 9989.1 [I.V.:2124.2]  Out: 2900 [Urine:1750]              9.3     13.7>-----<238              28.0   137  106  30                  ----------------<121     4.3  21  1.82          Ca 8.9 Phos 3.8 Mg 2.10       ALT 19 AST 24 AlkPhos 94 tBili 0.3          Vitals:    02/11/25 2130 02/12/25 0248 02/12/25 0530 02/12/25 0730   BP: 126/66 137/89 113/73 137/82   BP Location: Right arm Right arm Right arm Right arm   Patient Position: Lying Lying Lying Lying   Pulse: 80 83 78 77   Resp: 18 18 18 20   Temp: 36.3 °C (97.3 °F) 36.4 °C (97.5 °F) 36 °C (96.8 °F) 36.1 °C (97 °F)   TempSrc: Temporal Temporal Temporal Temporal   SpO2: 97% 97% 95% 95%   Weight:       Height:              Medications:  Current Facility-Administered Medications   Medication Dose Route Frequency Provider Last Rate Last Admin    acetaminophen (Ofirmev) injection 650 mg  650 mg intravenous q6h PRN Deena Medina MD   Stopped at 02/12/25 0355    Adult Clinimix Parenteral Nutrition Continuous  75 mL/hr intravenous Daily PN Alexys Hood MD 75 mL/hr at 02/11/25 2009 New Bag at 02/11/25 2009    alteplase (Cathflo Activase) injection 2 mg  2 mg intra-catheter PRN Alexys Hood MD        amLODIPine (Norvasc) tablet 10 mg  10 mg oral Daily Alexys Hood MD   10 mg at 02/11/25 1030    aspirin EC tablet 81 mg  81 mg oral Daily Alexys Hood MD   81 mg  at 02/11/25 0900    benzocaine-menthol (Cepastat Sore Throat) lozenge 1 lozenge  1 lozenge Mouth/Throat q2h PRN Ludwin Phillips MD   1 lozenge at 02/09/25 2226    bisacodyl (Dulcolax) suppository 10 mg  10 mg rectal Daily Karen STEVE Sterling, APRN-CNP   10 mg at 02/08/25 0840    calcium carbonate (Tums) chewable tablet 500 mg  500 mg nasogastric tube 4x daily PRN Ludwin Phillips MD   500 mg at 02/04/25 2047    cholecalciferol (Vitamin D-3) tablet 1,000 Units  1,000 Units oral Daily Alexys Hood MD   1,000 Units at 02/11/25 1030    dextrose 50 % injection 12.5 g  12.5 g intravenous q15 min PRN Ludwin Phillips MD   12.5 g at 02/02/25 1402    pantoprazole (ProtoNix) EC tablet 40 mg  40 mg oral Daily before breakfast Elmo Monteiro MD        Or    esomeprazole (NexIUM) suspension 40 mg  40 mg nasoduodenal tube Daily before breakfast Elmo Monteiro MD   40 mg at 02/09/25 0828    Or    pantoprazole (ProtoNix) injection 40 mg  40 mg intravenous Daily before breakfast Elmo Monteiro MD   40 mg at 02/11/25 0555    fat emulsion fish oil/plant based (SMOFlipid) 20 % IV infusion 50 g  250 mL intravenous Daily Lipids Deena Kilpatrick PA-C 20.8 mL/hr at 02/11/25 2010 50 g at 02/11/25 2010    heparin (porcine) injection 5,000 Units  5,000 Units subcutaneous q8h Jannie Hamm MD   5,000 Units at 02/12/25 0326    HYDROmorphone (Dilaudid) injection 0.2 mg  0.2 mg intravenous q3h PRN Alexys Hood MD   0.2 mg at 02/11/25 1529    HYDROmorphone (Dilaudid) injection 0.4 mg  0.4 mg intravenous q4h PRN Alexys Hood MD   0.4 mg at 02/11/25 1030    insulin lispro injection 0-5 Units  0-5 Units subcutaneous q6h Alexys Hood MD        ipratropium-albuteroL (Duo-Neb) 0.5-2.5 mg/3 mL nebulizer solution 3 mL  3 mL nebulization TID PRN Alexys Hood MD        labetaloL (Normodyne,Trandate) injection 10 mg  10 mg intravenous q8h PRN Gallo Martines MD        lactated Ringer's infusion  50 mL/hr intravenous Continuous  Alexys Hood MD        levothyroxine (Synthroid) injection 12.5 mcg  12.5 mcg intravenous q24h ALEN Ludwin Phillips MD   12.5 mcg at 02/11/25 1031    [Held by provider] levothyroxine (Synthroid, Levoxyl) tablet 25 mcg  25 mcg oral Daily Jannie Hamm MD   25 mcg at 01/31/25 0531    lidocaine (Xylocaine) 10 mg/mL (1 %) injection 5 mL  5 mL infiltration Once Alexys Hood MD        lidocaine 2 % mucosal jelly (Uro-Jet) 1 Application  1 Application nasal Once Elmo Monteiro MD        lidocaine 4 % patch 3 patch  3 patch transdermal Daily Gallo Martines MD   3 patch at 02/11/25 1031    melatonin tablet 10 mg  10 mg nasogastric tube Nightly PRN Ludwin Phillips MD   10 mg at 02/11/25 2009    ondansetron (Zofran) injection 4 mg  4 mg intravenous q6h PRN Gallo Martines MD   4 mg at 02/10/25 1509    [Held by provider] oxyCODONE (Roxicodone) immediate release tablet 10 mg  10 mg oral q6h PRN Jannie Hamm MD   10 mg at 01/29/25 0235    [Held by provider] oxyCODONE (Roxicodone) immediate release tablet 5 mg  5 mg oral q6h PRN Jannie Hamm MD   5 mg at 01/29/25 0840    oxygen (O2) therapy   inhalation Continuous - Inhalation Ludwin Phillips MD 60,000 mL/hr at 02/11/25 1032 1 L/min at 02/11/25 1032    phenoL (Chloraseptic) 1.4 % mouth/throat spray 1 spray  1 spray Mouth/Throat 4x daily PRN Ludwin Phillips MD   1 spray at 02/09/25 2324    piperacillin-tazobactam (Zosyn) 3.375 g in dextrose (iso) IV 50 mL  3.375 g intravenous q6h Alexys Hood MD   Stopped at 02/12/25 0357    [Held by provider] tamsulosin (Flomax) 24 hr capsule 0.4 mg  0.4 mg oral Daily Jannie Hamm MD   0.4 mg at 01/31/25 0820       Physical Exam                                                                                                                         Gen -  No acute distress     Neuro - Alert, oriented, conversant     CV -  regular rate , normotensive      Pulm - Symmetric chest rise, non-labored breathing  on room air      Abd - Abdomen is soft, mildly-distended, and appropriately tender. Midline incision c/d/I closed with glue. PEG in place with bilious output      Ext - Warm, well perfused     MSK- no pain or swelling in LE bilaterally     Skin - without jaunice       Psych - appropriate tone, affect      - voiding independently, no rodriguez     Imaging  XR abdomen 1 view    Result Date: 2/11/2025  Interpreted By:  Heather Lopes, STUDY: XR ABDOMEN 1 VIEW; 2/11/2025 10:50 am   INDICATION: Signs/Symptoms:assess contrast progression.   COMPARISON: CT dated 02/06/2025 and radiograph dated 02/08/2025   ACCESSION NUMBER(S): [id number]   ORDERING CLINICIAN: FILIPE ROA   FINDINGS: Two views of the abdomen and pelvis. G tube is projecting over stomach. Scattered surgical clips projecting over the right hemiabdomen.   Multiple dilated loops of small bowel in the left side of the abdomen, not significantly different from prior study. Nonobstructive bowel gas pattern.  Limited evaluation of pneumoperitoneum on supine imaging, however no gross evidence of free air is noted.   Atelectasis in lung bases.   Osseous structures demonstrate no acute bony changes.       1.  Unchanged appearance of a few dilated loops of small in the left side of the abdomen. Findings may be due to ileus with component of partial obstruction not excluded. Recommend follow-up radiograph.   Signed by: Heather Zheng 2/11/2025 11:44 PM Dictation workstation:   [id number]    CT insertion of gastrostomy tube percutaneous under fluoroscopic    Result Date: 2/11/2025  Interpreted By:  Emely Quiroga,  Whitney Brady STUDY: CT AND FLUOROSCOPY GUIDED GASTROSTOMY TUBE PLACEMENT   INDICATION: Signs/Symptoms:venting PEG tube s/p open nephrectomy complicated by prolonged ileus.   COMPARISON: CT abdomen/pelvis 02/06/2025.   ACCESSION NUMBER(S): [id number]   ORDERING CLINICIAN: FILIPE ROA   TECHNIQUE: INTERVENTIONALIST(S): Dr. Emely Quiroga.   Jose Antonio Kramer.   CONSENT: The patient was informed of the nature of the proposed procedure. The purposes, alternatives, risks, and benefits were explained and discussed. All questions were answered and consent was obtained.   RADIATION EXPOSURE: Fluoroscopy time: 198 seconds. Dose: 150.7/197.9 mGy. DLP: 178.5 mGycm2.   SEDATION: Moderate conscious IV sedation services (supervision of administration, induction, and maintenance) were provided by the physician performing the procedure with intravenous fentanyl 100 mcg and versed 2 mg for 11 minutes. The physician was assisted by an independent trained observer, an interventional radiology nurse, in the continuous monitoring of patient level of consciousness and physiologic status.   MEDICATION/CONTRAST: None. Patient already on antibiotics per primary team.   TIME OUT: A time out was performed immediately prior to procedure start with the interventional team, correctly identifying the patient name, date of birth, MRN, procedure, anatomy (including marking of site and side), patient position, procedure consent form, relevant laboratory and imaging test results, antibiotic administration, safety precautions, and procedure-specific equipment needs.   COMPLICATIONS: No immediate adverse events identified.   FINDINGS: Patient was placed supine on the fluoroscopic table. Using fluoroscopy guidance, a 6 Romanian snare sheath was advanced via the oral cavity into the stomach. This was advanced over a Bentson wire.   Subsequently, the skin was prepped and draped in the regular sterile fashion, 1% lidocaine was utilized for local anesthesia. With intermittent CT guidance as well as fluoroscopy guidance, and after insufflation of the stomach with air, the stomach was access using 5 Romanian Yueh catheter via anterior approach. The is a 6 Romanian snare catheter, an EN snare wire was placed with subsequent snaring of the Yueh catheter. After removal of the Yueh stylet, a double wire was  placed via the Yueh sheath. After snaring the double wire, the pull wire was pulled in retrograde fashion from the anterior abdominal wall approach into the oral cavity.   Subsequently a 20 Ugandan G-tube was connected to the double wire. The gastrostomy tube was pulled in retrograde fashion from the oral cavity into the access site within the anterior abdominal wall. The tube was secured with the bumper with subsequent placement of the clamp. The tube was cut to appropriate length.   Patient tolerated the procedure without immediate complication.       Successful CT and fluoroscopy guided placement of gastrostomy tube. The tube is ready for immediate use. The patient tolerated the procedure well without immediate complications.   I personally reviewed the images/study and I agree with the findings as stated by Dr. Jose Antonio Kramer. This study was interpreted at [place], [place].   MACRO: None   Signed by: Emely Quiroga 2/11/2025 4:48 PM Dictation workstation:   [id number]    Bedside PICC Imaging    Result Date: 2/10/2025  These images are not reportable by radiology and will not be interpreted by  Radiologists.    XR chest 1 view    Result Date: 2/10/2025  Interpreted By:  Tyree Tabares and Elsamaloty Mazzin STUDY: XR CHEST 1 VIEW; ;  2/10/2025 9:09 am   INDICATION: Signs/Symptoms:pneumonia.     COMPARISON: XR CHEST 1 VIEW 2/7/2025, XR CHEST 1 VIEW 1/31/2025, XR CHEST 1 VIEW 1/30/2025   ACCESSION NUMBER(S): [id number]   ORDERING CLINICIAN: FILIPE ROA   TECHNIQUE: A single upright AP view of the chest was obtained.   FINDINGS: Enteric tube seen projecting over the chest with the tip terminating within the gastric body.   Cardiomediastinal silhouette appears mildly enlarged, but similar to prior.   Bibasilar consolidative and reticular opacities. Normal lung volumes. No large pneumothorax identified.   Visualized portions of the upper abdomen are unremarkable.   No acute  osseous abnormality seen.       Consolidative and reticular opacities are most compatible with bilateral atelectatic changes superimposed on a background of aspiration pneumonia/pneumonitis.   I personally reviewed the images/study and I agree with the findings as stated by Reid Traylor MD (resident).   MACRO: None   Signed by: Tyree Tabares 2/10/2025 11:03 AM Dictation workstation:   [id number]      Assessment & Plan  75 y.o. 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 Postoperative course complicated by prolonged ileus vs small bowel obstruction. NGT placed 01/31. Patient now with slowly resolving ANISHA and mild stable leukocytosis. Decompressive PEG and PICC(TPN) placed 2/10.       Plan 2/11:  - Maintain PEG to gravity  - Follow up [facility] recs for venting protocol  - Continue TPN 75cc/hr  - NPO w/ mIVF LR 50cc/hr  -Follow up hepatitis labs per GI (Signed off)  -Follow up labs, trend WBC  -Lidocaine patch at PEG tube site due to discomfort   -Ambulate/OOB/PT  -Continue Zosyn     Neuro:   -Pain controlled with Scheduled acetaminophen,Oxycodone 5/10 HELD while NPO, Dilaudid 0.4 PRN for breakthrough pain, robaxin 500mg q8hrs   -PRN melatonin for sleep aid     Resp:   -ICS  -On room air   -Appreciate RT recs    Card:   -Monitor vitals  -Amlodpine  -HELD Aspirin   -Cholecalciferol     FEN: mIVF LR @ 50ml      GI:   -NPO   -PEG 2/10  - Enemas BID  -BR: Miralax, senokat   -PRN Reglan for nausea   -Bisacodyl suppository   -Colorectal surgery consulted, appreciate recommendations  -Nutrition consulted: following TPN recommendations   -Maintain PICC for TPN  -Follow up HEP labs per GI(signed  off)    :   -HELD Flomax   -Daily BMP to monitor kidney function and replete electrolytes as indicated.   -Continue to monitor Cr     Endo:   -Levothyroxine IV while NPO  -SSI #1     Heme/ID:   -Daily CBC to monitor for acute blood loss anemia   -No indication for blood transfusion at this time   -Continue Zosyn for likely aspiration pneumonia (end date 2/14)  -Trend WBC    Ppy:   -ALEN  -SCD  -IS    Dispo:   RNF. Discharge planning started, likely SNF.     Assessment and plan discussed with chief resident Dr. Ortez and attending Dr. Ana Hood MD, [facility]  Urology [place]  Adult Urology Pager: [phone]  Pediatric Urology Pager: [phone]

## 2025-02-12 NOTE — PROGRESS NOTES
"COLORECTAL SURGERY PROGRESS NOTE    Swapnil Allen is a 75 y.o. male on day 15 of admission presenting with Renal mass. S/p open right radical nephrectomy and RP lymphadenectomy (1/28/25). Prolonged hospital course secondary to ileus vs PSBO and failure to progress.  PEG tube placed on 2/10 in IR.     Subjective   No acute events overnight, afebrile with stable vital signs. Awake and conversive this morning, denies N/V and endorses feeling better.  Pain controlled, abdomen soft, with minimal distention.  Endorsed BM x 1. PEG tube to gravity bag with with 900 ml thin bilious output recorded.     Objective     Last Recorded Vitals  Blood pressure 124/85, pulse 91, temperature 36 °C (96.8 °F), temperature source Temporal, resp. rate 20, height 1.803 m (5' 11\"), weight 103 kg (226 lb 13.7 oz), SpO2 97%.    Physical Exam  Constitutional:       Appearance: Normal appearance.   HENT:      Head: Normocephalic and atraumatic.      Nose: Nose normal.      Mouth/Throat:      Mouth: Mucous membranes are moist.   Eyes:      Extraocular Movements: Extraocular movements intact.   Cardiovascular:      Rate and Rhythm: Normal rate.   Pulmonary:      Effort: Pulmonary effort is normal. No respiratory distress.      Breath sounds: Normal breath sounds. No wheezing.   Abdominal:      Comments: Abdomen soft, minimal distention, PEG tube upper abdomen in upper abdomen connected to gravity bag draining large thin bilious output.   Musculoskeletal:         General: Normal range of motion.      Cervical back: Normal range of motion.   Skin:     General: Skin is warm and dry.   Neurological:      General: No focal deficit present.      Mental Status: He is alert.   Psychiatric:         Behavior: Behavior normal.         I/O last 2 completed shifts:  In: 3750 (36.4 mL/kg) [I.V.:2050 (19.9 mL/kg); IV Piggyback:1100]  Out: 2250 (21.9 mL/kg) [Urine:1350 (0.5 mL/kg/hr); Emesis/NG output:900]  Weight: 102.9 kg    Intake:  IVF 1510, TPN " 6291  Output:  UOP - 600+1x,  , PEG 0, stool 0    Relevant Results  Results for orders placed or performed during the hospital encounter of 01/28/25 (from the past 24 hours)   POCT GLUCOSE   Result Value Ref Range    POCT Glucose 112 (H) 74 - 99 mg/dL   POCT GLUCOSE   Result Value Ref Range    POCT Glucose 132 (H) 74 - 99 mg/dL   CBC   Result Value Ref Range    WBC 13.7 (H) 4.4 - 11.3 x10*3/uL    nRBC 0.0 0.0 - 0.0 /100 WBCs    RBC 3.33 (L) 4.50 - 5.90 x10*6/uL    Hemoglobin 9.3 (L) 13.5 - 17.5 g/dL    Hematocrit 28.0 (L) 41.0 - 52.0 %    MCV 84 80 - 100 fL    MCH 27.9 26.0 - 34.0 pg    MCHC 33.2 32.0 - 36.0 g/dL    RDW 16.2 (H) 11.5 - 14.5 %    Platelets 238 150 - 450 x10*3/uL   Renal Function Panel   Result Value Ref Range    Glucose 121 (H) 74 - 99 mg/dL    Sodium 137 136 - 145 mmol/L    Potassium 4.3 3.5 - 5.3 mmol/L    Chloride 106 98 - 107 mmol/L    Bicarbonate 21 21 - 32 mmol/L    Anion Gap 14 10 - 20 mmol/L    Urea Nitrogen 30 (H) 6 - 23 mg/dL    Creatinine 1.82 (H) 0.50 - 1.30 mg/dL    eGFR 38 (L) >60 mL/min/1.73m*2    Calcium 8.9 8.6 - 10.6 mg/dL    Phosphorus 3.8 2.5 - 4.9 mg/dL    Albumin 2.8 (L) 3.4 - 5.0 g/dL   Magnesium   Result Value Ref Range    Magnesium 2.10 1.60 - 2.40 mg/dL   POCT GLUCOSE   Result Value Ref Range    POCT Glucose 138 (H) 74 - 99 mg/dL   POCT GLUCOSE   Result Value Ref Range    POCT Glucose 145 (H) 74 - 99 mg/dL         Assessment/Plan   Assessment & Plan  Renal mass    Post-op pain    BPH (benign prostatic hyperplasia)    Small bowel obstruction (Multi)    Recommendation:  75 year old male with past medical history significant for HTN, CVA/TIA 2020, anemia, CKD, hx of substance abuse, Hep C who presented for Open right nephrectomy for renal mass.  His post operative course was complicated by PSB and failure to have return of bowel function.  He underwent PEG placement by IR on 2/10/25.  He continues with PEG tube to gravity and high output of bilious liquid from PEG.  He  has had intermittent BM, and remains NPO on TPN.  Appreciate involvement in patients care.    - Continue PEG to gravity and record output   - Cycle TPN, continue NPO or clears for comfort  - Do not recommend bowel regime at this time  - Recommend dispo planning home with HC for TPN, diet for comfort, no bowel regime  - Dr Tracey to see patient in outpatient clinic in 2 weeks, will make appointment.    Discussed with Dr Tracey and surgical teams    STEPHANIE Mata APRN-CNP  Colorectal Surgery NP #[id number]  [place] # [id number]

## 2025-02-12 NOTE — PROGRESS NOTES
02/11/25 1300   Discharge Planning   Living Arrangements Alone   Support Systems Family members   Assistance Needed TPN ADLs   Type of Residence Private residence   Who is requesting discharge planning? Provider   Home or Post Acute Services In home services   Type of Home Care Services Home PT;Home nursing visits;DME or oxygen   Expected Discharge Disposition Home H   Does the patient need discharge transport arranged? No   Financial Resource Strain   How hard is it for you to pay for the very basics like food, housing, medical care, and heating? Not very   Housing Stability   In the last 12 months, was there a time when you were not able to pay the mortgage or rent on time? N   In the past 12 months, how many times have you moved where you were living? 0   At any time in the past 12 months, were you homeless or living in a shelter (including now)? N   Transportation Needs   In the past 12 months, has lack of transportation kept you from medical appointments or from getting medications? no   In the past 12 months, has lack of transportation kept you from meetings, work, or from getting things needed for daily living? No     [facility] sent referral to [facility]. [facility] unable to accept due to staffing and TPN needs. [facility] sent [facility] and a blanket referral 31 agencies for home care. Will continue to follow patient for discharge needs.  Aaliyah Bee RN [facility]     Update @130pm- [facility] sent 92 referrals for home health care. Pending accepting agency. Will continue to follow patient for discharge needs.  Aaliyah LICEA     2/12/25- [facility] [place] [facility] accepted patient for home health care. [facility] has accepted patient for home going TPN. ADOD Friday. [facility] will continue to follow patient for discharge needs.  Aaliyah LICEA

## 2025-02-12 NOTE — PROGRESS NOTES
Physical Therapy    Physical Therapy Treatment    Patient Name: Swapnil Allen  MRN: [id number]  Department: [facility]  Room: [address] [place] [address]  Today's Date: 2/12/2025  Time Calculation  Start Time: 1152  Stop Time: 1217  Time Calculation (min): 25 min         Assessment/Plan   PT Assessment  Barriers to Discharge Home: Caregiver assistance, Cognition needs, Physical needs  Caregiver Assistance: Patient lives alone and/or does not have reliable caregiver assistance  Cognition Needs: 24hr supervision for safety awareness needed, Insight of patient limited regarding functional ability/needs  Physical Needs: 24hr mobility assistance needed, 24hr ADL assistance needed  End of Session Communication: Bedside nurse  Assessment Comment: Pt tolerated PT session well, able to tolerate increased ambulation within room. Pt continues to remain appropriate for Moderate intensity upon D/C from hospital.  End of Session Patient Position: Up in chair, Alarm off, not on at start of session  PT Plan  Inpatient/Swing Bed or Outpatient: Inpatient  PT Plan  Treatment/Interventions: Bed mobility, Gait training, Transfer training, Stair training, Balance training, Neuromuscular re-education, Strengthening, Endurance training, Range of motion, Therapeutic exercise, Therapeutic activity, Home exercise program, Positioning, Postural re-education  PT Plan: Ongoing PT  PT Frequency: 3 times per week  PT Discharge Recommendations: Moderate intensity level of continued care  Equipment Recommended upon Discharge: Wheeled walker  PT Recommended Transfer Status: Assist x1, Assistive device  PT - OK to Discharge: Yes      General Visit Information:   PT  Visit  PT Received On: 02/12/25  General  Missed Visit Reason:  (n/a)  Family/Caregiver Present: No  Prior to Session Communication: Bedside nurse  Patient Position Received: Up in chair, Alarm off, not on at start of session  General Comment: Pt seated up in chair, required increased encouragement to participate  "in PT this date.    Subjective   Precautions:  Precautions  Medical Precautions: Fall precautions  Precautions Comment: protective precautions,     Date/Time Vitals Session Patient Position Pulse Resp SpO2 BP MAP (mmHg)    02/12/25 1245 --  --  91  20  97 %  124/85  98           Vital Signs Comment: Pt declined SOB, however reports feeling \"woozy\" after, states moderate level of fatigue.     Objective   Pain:  Pain Assessment  Pain Assessment: 0-10  0-10 (Numeric) Pain Score: 5 - Moderate pain  Pain Type: Surgical pain  Pain Location: Abdomen  Cognition:  Cognition  Overall Cognitive Status: Within Functional Limits  Orientation Level: Oriented X4  Impulsive: Mildly    Activity Tolerance:  Activity Tolerance  Endurance: Tolerates 10 - 20 min exercise with multiple rests  Treatments:  Therapeutic Exercise  Therapeutic Exercise Performed: Yes  Therapeutic Exercise Activity 1: Seated: Heel/Toe Raises, LAQ, HIp Flexion, HIp ABD, Hip ADD x10 BLE    Bed Mobility  Bed Mobility: No    Ambulation/Gait Training  Ambulation/Gait Training Performed: Yes  Ambulation/Gait Training 1  Surface 1: Level tile  Device 1: Rolling walker  Assistance 1: Minimum assistance, Minimal verbal cues  Quality of Gait 1: Narrow base of support, Diminished heel strike, Decreased step length (Unsteady, slightly impulsive with pace,)  Comments/Distance (ft) 1: 2 trials x35ft, cues for safe activity pacing and safety with turns with awareness of IV line management. seated rest break between trials,    Transfers  Transfer: Yes  Transfer 1  Transfer From 1: Chair with arms to  Transfer to 1: Stand  Technique 1: Sit to stand, Stand to sit  Transfer Device 1: Walker  Transfer Level of Assistance 1: Minimum assistance, Minimal verbal cues  Trials/Comments 1: x2 trials, cues for sequencing and safe hand placement. Increased cues for safe and controlled descent to seated position.    Stairs  Stairs: No    Outcome Measures:  [facility] Basic Mobility  Turning " from your back to your side while in a flat bed without using bedrails: A little  Moving from lying on your back to sitting on the side of a flat bed without using bedrails: A lot  Moving to and from bed to chair (including a wheelchair): A little  Standing up from a chair using your arms (e.g. wheelchair or bedside chair): A little  To walk in hospital room: A little  Climbing 3-5 steps with railing: Total  Basic Mobility - Total Score: 15    Education Documentation  Precautions, taught by Sunitha Cortes PTA at 2/12/2025  2:09 PM.  Learner: Patient  Readiness: Acceptance  Method: Explanation  Response: Verbalizes Understanding, Needs Reinforcement  Comment: Importance of continued PT, and increasing OOB activity/ambulation.    Body Mechanics, taught by Sunitha Cortes PTA at 2/12/2025  2:09 PM.  Learner: Patient  Readiness: Acceptance  Method: Explanation  Response: Verbalizes Understanding, Needs Reinforcement  Comment: Importance of continued PT, and increasing OOB activity/ambulation.    Home Exercise Program, taught by Sunitha Cortes PTA at 2/12/2025  2:09 PM.  Learner: Patient  Readiness: Acceptance  Method: Explanation  Response: Verbalizes Understanding, Needs Reinforcement  Comment: Importance of continued PT, and increasing OOB activity/ambulation.    Mobility Training, taught by Sunitha Cortes PTA at 2/12/2025  2:09 PM.  Learner: Patient  Readiness: Acceptance  Method: Explanation  Response: Verbalizes Understanding, Needs Reinforcement  Comment: Importance of continued PT, and increasing OOB activity/ambulation.    Education Comments  No comments found.        OP EDUCATION:       Encounter Problems       Encounter Problems (Active)       Mobility       LTG - Patient will navigate 4-6 steps with rails/device and moderate assistance. (Not Progressing)       Start:  01/30/25    Expected End:  02/13/25            STG - Patient will ambulate ~100 feet with FWW and minimal assistance.  (Progressing)        Start:  01/30/25    Expected End:  02/13/25            Pt will ambulate >/= 100 ft with FWW and minimal assistance and no signs of fatigue or SOB.   (Progressing)       Start:  01/30/25    Expected End:  02/13/25               PT Transfers       LTG - Patient will transfer from one surface to another CGA with FWW. (Progressing)       Start:  01/30/25    Expected End:  02/13/25            STG - Patient will perform bed mobility with close supervision.  (Progressing)       Start:  01/30/25    Expected End:  02/13/25               Pain - Adult            02/12/25 at 2:12 PM   Sunitha Cortes \[facility] [place] [facility]\""   Rehab Office: [phone]

## 2025-02-13 LAB
ALBUMIN SERPL BCP-MCNC: 3.2 G/DL (ref 3.4–5)
ANION GAP SERPL CALC-SCNC: 14 MMOL/L (ref 10–20)
BUN SERPL-MCNC: 35 MG/DL (ref 6–23)
CALCIUM SERPL-MCNC: 9.5 MG/DL (ref 8.6–10.6)
CHLORIDE SERPL-SCNC: 105 MMOL/L (ref 98–107)
CO2 SERPL-SCNC: 21 MMOL/L (ref 21–32)
CREAT SERPL-MCNC: 1.83 MG/DL (ref 0.5–1.3)
EGFRCR SERPLBLD CKD-EPI 2021: 38 ML/MIN/1.73M*2
ERYTHROCYTE [DISTWIDTH] IN BLOOD BY AUTOMATED COUNT: 16.4 % (ref 11.5–14.5)
GLUCOSE BLD MANUAL STRIP-MCNC: 126 MG/DL (ref 74–99)
GLUCOSE BLD MANUAL STRIP-MCNC: 161 MG/DL (ref 74–99)
GLUCOSE BLD MANUAL STRIP-MCNC: 85 MG/DL (ref 74–99)
GLUCOSE BLD MANUAL STRIP-MCNC: 97 MG/DL (ref 74–99)
GLUCOSE SERPL-MCNC: 108 MG/DL (ref 74–99)
HBV DNA SERPL NAA+PROBE-ACNC: NOT DETECTED [IU]/ML
HBV DNA SERPL NAA+PROBE-LOG IU: NORMAL {LOG_IU}/ML
HCT VFR BLD AUTO: 30.4 % (ref 41–52)
HGB BLD-MCNC: 9.9 G/DL (ref 13.5–17.5)
MAGNESIUM SERPL-MCNC: 2.06 MG/DL (ref 1.6–2.4)
MCH RBC QN AUTO: 27.9 PG (ref 26–34)
MCHC RBC AUTO-ENTMCNC: 32.6 G/DL (ref 32–36)
MCV RBC AUTO: 86 FL (ref 80–100)
NRBC BLD-RTO: 0 /100 WBCS (ref 0–0)
PHOSPHATE SERPL-MCNC: 3.7 MG/DL (ref 2.5–4.9)
PLATELET # BLD AUTO: 188 X10*3/UL (ref 150–450)
POTASSIUM SERPL-SCNC: 4.9 MMOL/L (ref 3.5–5.3)
RBC # BLD AUTO: 3.55 X10*6/UL (ref 4.5–5.9)
SODIUM SERPL-SCNC: 135 MMOL/L (ref 136–145)
WBC # BLD AUTO: 13.8 X10*3/UL (ref 4.4–11.3)

## 2025-02-13 PROCEDURE — 2500000001 HC RX 250 WO HCPCS SELF ADMINISTERED DRUGS (ALT 637 FOR MEDICARE OP)

## 2025-02-13 PROCEDURE — 80069 RENAL FUNCTION PANEL: CPT | Performed by: STUDENT IN AN ORGANIZED HEALTH CARE EDUCATION/TRAINING PROGRAM

## 2025-02-13 PROCEDURE — 2500000004 HC RX 250 GENERAL PHARMACY W/ HCPCS (ALT 636 FOR OP/ED)

## 2025-02-13 PROCEDURE — 2500000005 HC RX 250 GENERAL PHARMACY W/O HCPCS

## 2025-02-13 PROCEDURE — 2580000001 HC RX 258 IV SOLUTIONS

## 2025-02-13 PROCEDURE — 85027 COMPLETE CBC AUTOMATED: CPT | Performed by: STUDENT IN AN ORGANIZED HEALTH CARE EDUCATION/TRAINING PROGRAM

## 2025-02-13 PROCEDURE — 82947 ASSAY GLUCOSE BLOOD QUANT: CPT

## 2025-02-13 PROCEDURE — 2500000005 HC RX 250 GENERAL PHARMACY W/O HCPCS: Performed by: NURSE PRACTITIONER

## 2025-02-13 PROCEDURE — 2500000004 HC RX 250 GENERAL PHARMACY W/ HCPCS (ALT 636 FOR OP/ED): Performed by: STUDENT IN AN ORGANIZED HEALTH CARE EDUCATION/TRAINING PROGRAM

## 2025-02-13 PROCEDURE — 1170000001 HC PRIVATE ONCOLOGY ROOM DAILY

## 2025-02-13 PROCEDURE — 2500000001 HC RX 250 WO HCPCS SELF ADMINISTERED DRUGS (ALT 637 FOR MEDICARE OP): Performed by: NURSE PRACTITIONER

## 2025-02-13 PROCEDURE — 83735 ASSAY OF MAGNESIUM: CPT

## 2025-02-13 RX ORDER — SODIUM CHLORIDE 9 MG/ML
50 INJECTION, SOLUTION INTRAVENOUS CONTINUOUS
Status: ACTIVE | OUTPATIENT
Start: 2025-02-13 | End: 2025-02-14

## 2025-02-13 RX ORDER — LEVOTHYROXINE SODIUM ANHYDROUS 100 UG/5ML
20 INJECTION, POWDER, LYOPHILIZED, FOR SOLUTION INTRAVENOUS
Status: DISCONTINUED | OUTPATIENT
Start: 2025-02-13 | End: 2025-02-15

## 2025-02-13 RX ADMIN — HYDROMORPHONE HYDROCHLORIDE 0.4 MG: 1 INJECTION, SOLUTION INTRAMUSCULAR; INTRAVENOUS; SUBCUTANEOUS at 04:32

## 2025-02-13 RX ADMIN — PIPERACILLIN SODIUM AND TAZOBACTAM SODIUM 3.38 G: 3; .375 INJECTION, SOLUTION INTRAVENOUS at 21:09

## 2025-02-13 RX ADMIN — Medication 1 L/MIN: at 08:59

## 2025-02-13 RX ADMIN — HEPARIN SODIUM 5000 UNITS: 5000 INJECTION INTRAVENOUS; SUBCUTANEOUS at 21:42

## 2025-02-13 RX ADMIN — HYDROMORPHONE HYDROCHLORIDE 0.4 MG: 1 INJECTION, SOLUTION INTRAMUSCULAR; INTRAVENOUS; SUBCUTANEOUS at 21:43

## 2025-02-13 RX ADMIN — AMLODIPINE BESYLATE 10 MG: 10 TABLET ORAL at 08:47

## 2025-02-13 RX ADMIN — PIPERACILLIN SODIUM AND TAZOBACTAM SODIUM 3.38 G: 3; .375 INJECTION, SOLUTION INTRAVENOUS at 15:13

## 2025-02-13 RX ADMIN — BISACODYL 10 MG: 10 SUPPOSITORY RECTAL at 08:47

## 2025-02-13 RX ADMIN — SMOFLIPID 50 G: 6; 6; 5; 3 INJECTION, EMULSION INTRAVENOUS at 21:13

## 2025-02-13 RX ADMIN — LEVOTHYROXINE SODIUM ANHYDROUS 20 MCG: 100 INJECTION, POWDER, LYOPHILIZED, FOR SOLUTION INTRAVENOUS at 08:47

## 2025-02-13 RX ADMIN — ASPIRIN 81 MG: 81 TABLET, COATED ORAL at 08:47

## 2025-02-13 RX ADMIN — HEPARIN SODIUM 5000 UNITS: 5000 INJECTION INTRAVENOUS; SUBCUTANEOUS at 04:51

## 2025-02-13 RX ADMIN — PIPERACILLIN SODIUM AND TAZOBACTAM SODIUM 3.38 G: 3; .375 INJECTION, SOLUTION INTRAVENOUS at 04:23

## 2025-02-13 RX ADMIN — HEPARIN SODIUM 5000 UNITS: 5000 INJECTION INTRAVENOUS; SUBCUTANEOUS at 12:07

## 2025-02-13 RX ADMIN — PANTOPRAZOLE SODIUM 40 MG: 40 INJECTION, POWDER, FOR SOLUTION INTRAVENOUS at 08:51

## 2025-02-13 RX ADMIN — PIPERACILLIN SODIUM AND TAZOBACTAM SODIUM 3.38 G: 3; .375 INJECTION, SOLUTION INTRAVENOUS at 08:52

## 2025-02-13 RX ADMIN — ASCORBIC ACID, VITAMIN A PALMITATE, CHOLECALCIFEROL, THIAMINE HYDROCHLORIDE, RIBOFLAVIN-5 PHOSPHATE SODIUM, PYRIDOXINE HYDROCHLORIDE, NIACINAMIDE, DEXPANTHENOL, ALPHA-TOCOPHEROL ACETATE, VITAMIN K1, FOLIC ACID, BIOTIN, CYANOCOBALAMIN: 200; 3300; 200; 6; 3.6; 6; 40; 15; 10; 150; 600; 60; 5 INJECTION, SOLUTION INTRAVENOUS at 21:13

## 2025-02-13 RX ADMIN — Medication 10 MG: at 21:07

## 2025-02-13 RX ADMIN — Medication 1000 UNITS: at 08:47

## 2025-02-13 RX ADMIN — SODIUM CHLORIDE 50 ML/HR: 9 INJECTION, SOLUTION INTRAVENOUS at 09:48

## 2025-02-13 RX ADMIN — LIDOCAINE 4% 3 PATCH: 40 PATCH TOPICAL at 08:48

## 2025-02-13 RX ADMIN — SODIUM CHLORIDE, POTASSIUM CHLORIDE, SODIUM LACTATE AND CALCIUM CHLORIDE 50 ML/HR: 600; 310; 30; 20 INJECTION, SOLUTION INTRAVENOUS at 02:08

## 2025-02-13 ASSESSMENT — PAIN SCALES - GENERAL
PAINLEVEL_OUTOF10: 3
PAINLEVEL_OUTOF10: 7
PAINLEVEL_OUTOF10: 0 - NO PAIN
PAINLEVEL_OUTOF10: 8

## 2025-02-13 ASSESSMENT — COGNITIVE AND FUNCTIONAL STATUS - GENERAL
DRESSING REGULAR UPPER BODY CLOTHING: A LOT
TOILETING: A LOT
CLIMB 3 TO 5 STEPS WITH RAILING: A LOT
MOBILITY SCORE: 17
MOVING TO AND FROM BED TO CHAIR: A LITTLE
PERSONAL GROOMING: A LOT
DAILY ACTIVITIY SCORE: 12
HELP NEEDED FOR BATHING: A LOT
PERSONAL GROOMING: A LOT
WALKING IN HOSPITAL ROOM: A LOT
DAILY ACTIVITIY SCORE: 12
STANDING UP FROM CHAIR USING ARMS: A LOT
EATING MEALS: A LOT
TOILETING: A LOT
DRESSING REGULAR UPPER BODY CLOTHING: A LOT
MOBILITY SCORE: 17
EATING MEALS: A LOT
CLIMB 3 TO 5 STEPS WITH RAILING: A LOT
MOVING TO AND FROM BED TO CHAIR: A LITTLE
WALKING IN HOSPITAL ROOM: A LOT
DRESSING REGULAR LOWER BODY CLOTHING: A LOT
DRESSING REGULAR LOWER BODY CLOTHING: A LOT
STANDING UP FROM CHAIR USING ARMS: A LOT
HELP NEEDED FOR BATHING: A LOT

## 2025-02-13 ASSESSMENT — PAIN - FUNCTIONAL ASSESSMENT
PAIN_FUNCTIONAL_ASSESSMENT: 0-10
PAIN_FUNCTIONAL_ASSESSMENT: 0-10

## 2025-02-13 NOTE — PROGRESS NOTES
"COLORECTAL SURGERY   PROGRESS NOTE    Swapnil Allen  [id number]  1950    Swapnil Allen is a 75 y.o. male on day 16 of admission presenting with Renal mass. S/p open right radical nephrectomy and RP lymphadenectomy (1/28/25). Prolonged hospital course secondary to ileus vs PSBO and failure to progress.  PEG tube placed on 2/10 in IR.     Subjective   No acute events overnight  Awake and conversive this morning  Had 2 liquid to mildy formed brown bowel movements yesterday  denies N/V and endorses feeling better.    Pain controlled,   No chest pain or SOB    Objective     Last Recorded Vitals  Blood pressure (!) 128/2, pulse 84, temperature 36.6 °C (97.9 °F), resp. rate 18, height 1.803 m (5' 11\"), weight 103 kg (226 lb 13.7 oz), SpO2 95%.    Physical Exam  Constitutional:       Appearance: Normal appearance.   HENT:      Head: Normocephalic and atraumatic.      Nose: Nose normal.      Mouth/Throat:      Mouth: Mucous membranes are moist.   Eyes:      Extraocular Movements: Extraocular movements intact.   Cardiovascular:      Rate and Rhythm: Normal rate.   Pulmonary:      Effort: Pulmonary effort is normal. No respiratory distress.      Breath sounds: Normal breath sounds. No wheezing.   Abdominal:      Comments: Abdomen soft, minimal distention, PEG tube upper abdomen in upper abdomen connected to gravity bag draining large thin bilious output. Bumper at 8 cm, but no laxity when tugging. No peritonitis. Only tender when working with PEG tube   Musculoskeletal:         General: Normal range of motion.      Cervical back: Normal range of motion.   Skin:     General: Skin is warm and dry.   Neurological:      General: No focal deficit present.      Mental Status: He is alert.   Psychiatric:         Behavior: Behavior normal.         I/O last 2 completed shifts:  In: 2573.5 (25 mL/kg) [I.V.:603.3 (5.9 mL/kg); IV Piggyback:200]  Out: 1675 (16.3 mL/kg) [Urine:1325 (0.5 mL/kg/hr); Emesis/NG output:350]  Weight: 102.9 kg  "   Intake:  IVF 1510, TPN 6291  Output:  UOP - 600+1x,  , PEG 0, stool 0    Relevant Results  Results for orders placed or performed during the hospital encounter of 01/28/25 (from the past 24 hours)   POCT GLUCOSE   Result Value Ref Range    POCT Glucose 145 (H) 74 - 99 mg/dL   POCT GLUCOSE   Result Value Ref Range    POCT Glucose 123 (H) 74 - 99 mg/dL   POCT GLUCOSE   Result Value Ref Range    POCT Glucose 139 (H) 74 - 99 mg/dL   POCT GLUCOSE   Result Value Ref Range    POCT Glucose 161 (H) 74 - 99 mg/dL         Assessment/Plan   Assessment & Plan  Renal mass    Post-op pain    BPH (benign prostatic hyperplasia)    Small bowel obstruction (Multi)    Recommendation:  75 year old male with past medical history significant for HTN, CVA/TIA 2020, anemia, CKD, hx of substance abuse, Hep C who presented for Open right nephrectomy for renal mass.  His post operative course was complicated by PSB and failure to have return of bowel function.  He underwent PEG placement by IR on 2/10/25.  He continues with PEG tube to gravity and high output of bilious liquid from PEG.  He has appropriate bowel function. Would recommend capping PEG tube and uncapping if patient become symptomatic ( nauseous, having emesis, distended).     - recommend capping PEG tube as tolerated. Can uncap and place PEG tube to gravity if patient develops nausea / vomiting / abd distention  - please record accurate I/Os   - Cycle TPN, continue NPO or clears for comfort  - Do not recommend bowel regime at this time  - Recommend dispo planning home with HC for TPN, diet for comfort, no bowel regime  - will follow-up with IR team regarding length of tube at bumper at end of their procedure  - Dr Tracey to see patient in outpatient clinic in 2 weeks, will make appointment.    Discussed with Dr Tracey , seen with chief resident Dr. Iyer.    Ibeth Ortiz MD  PGY1 Colorectal Surgery  [facility] [phone]  [facility] [phone]  Available via Secure  Chat

## 2025-02-13 NOTE — CARE PLAN
The patient's goals for the shift include      The clinical goals for the shift include maintain safety    Over the shift, the patient did not make progress toward the following goals. Barriers to progression include . Recommendations to address these barriers include .    Problem: Pain - Adult  Goal: Verbalizes/displays adequate comfort level or baseline comfort level  Outcome: Progressing     Problem: Safety - Adult  Goal: Free from fall injury  Outcome: Progressing     Problem: Discharge Planning  Goal: Discharge to home or other facility with appropriate resources  Outcome: Progressing     Problem: Nutrition  Goal: Nutrient intake appropriate for maintaining nutritional needs  Outcome: Progressing     Problem: Skin  Goal: Prevent/manage excess moisture  Outcome: Progressing  Goal: Prevent/minimize sheer/friction injuries  Outcome: Progressing  Goal: Promote/optimize nutrition  Outcome: Progressing     Problem: Pain  Goal: Takes deep breaths with improved pain control throughout the shift  Outcome: Progressing  Goal: Turns in bed with improved pain control throughout the shift  Outcome: Progressing  Goal: Walks with improved pain control throughout the shift  Outcome: Progressing  Goal: Performs ADL's with improved pain control throughout shift  Outcome: Progressing  Goal: Participates in PT with improved pain control throughout the shift  Outcome: Progressing  Goal: Free from opioid side effects throughout the shift  Outcome: Progressing  Goal: Free from acute confusion related to pain meds throughout the shift  Outcome: Progressing

## 2025-02-13 NOTE — CARE PLAN
The patient's goals for the shift include      The clinical goals for the shift include Pt will sit in chair for 4 hours before end of shift    Over the shift, the patient did not make progress toward the following goals. Barriers to progression include   Problem: Discharge Planning  Goal: Discharge to home or other facility with appropriate resources  Outcome: Progressing   . Recommendations to address these barriers include Continue resolving medical needs and planning dc.

## 2025-02-13 NOTE — PROGRESS NOTES
[facility] [place]  Progress Note      Patient: Swapnil Allen  Age/Sex: 75 y.o., male  MRN: [id number]  Date of surgery: 1/28/25  Admit Date: 1/28/2025   Code Status: Full Code  Length of Stay: 16      Interval History/Overnight Events:   S/p venting PEG 2/10, 1.1L bilious output this AM, to gravity   Feeling better overall  No nausea or emesis  Denies any fevers, chills. Endorses abdominal soreness and discomfort that has been stable  UOP 1.3L  BMx2, Endorses feeling little bloating  Cr stable 1.8  Stable leukocytosis WBC 13        Objective  02/11 1900 - 02/13 0659  In: 2623.5 [I.V.:603.3]  Out: 2425 [Urine:2075]              9.9     13.8>-----<188              30.4   135  105  35                  ----------------<108     4.9  21  1.83          Ca 9.5 Phos 3.7 Mg 2.06       ALT 19 AST 24 AlkPhos 94 tBili 0.3          Vitals:    02/12/25 2125 02/12/25 2326 02/13/25 0527 02/13/25 0858   BP: 129/81 120/81 (!) 128/2 123/83   BP Location:    Right arm   Patient Position:    Sitting   Pulse: 80 82 84 89   Resp: 18 18 18 18   Temp: 36.8 °C (98.2 °F) 36.7 °C (98.1 °F) 36.6 °C (97.9 °F) 36.2 °C (97.2 °F)   TempSrc: Temporal   Temporal   SpO2: 97% 99% 95% 97%   Weight:       Height:              Medications:  Current Facility-Administered Medications   Medication Dose Route Frequency Provider Last Rate Last Admin    acetaminophen (Ofirmev) injection 650 mg  650 mg intravenous q6h PRN Deena Medina MD   Stopped at 02/12/25 0355    Adult Clinimix Parenteral Nutrition Cyclic   intravenous Cyclic PN Karen Katz, APRN-CNP   Stopped at 02/13/25 0808    alteplase (Cathflo Activase) injection 2 mg  2 mg intra-catheter PRN Alexys Hood MD        amLODIPine (Norvasc) tablet 10 mg  10 mg oral Daily Alexys Hood MD   10 mg at 02/13/25 0847    aspirin EC tablet 81 mg  81 mg oral Daily Alexys Hood MD   81 mg at 02/13/25 0847    benzocaine-menthol (Cepastat Sore Throat) lozenge 1 lozenge  1 lozenge Mouth/Throat q2h PRN  Ludwin Phillips MD   1 lozenge at 02/09/25 2226    bisacodyl (Dulcolax) suppository 10 mg  10 mg rectal Daily Karen POWER Katz, APRN-CNP   10 mg at 02/13/25 0847    calcium carbonate (Tums) chewable tablet 500 mg  500 mg nasogastric tube 4x daily PRN Ludwin Phillips MD   500 mg at 02/04/25 2047    cholecalciferol (Vitamin D-3) tablet 1,000 Units  1,000 Units oral Daily Alexys Hood MD   1,000 Units at 02/13/25 0847    dextrose 50 % injection 12.5 g  12.5 g intravenous q15 min PRN Ludwin Phillips MD   12.5 g at 02/02/25 1402    pantoprazole (ProtoNix) EC tablet 40 mg  40 mg oral Daily before breakfast Elmo Monteiro MD   40 mg at 02/12/25 1023    Or    esomeprazole (NexIUM) suspension 40 mg  40 mg nasoduodenal tube Daily before breakfast Elmo Monteiro MD   40 mg at 02/09/25 0828    Or    pantoprazole (ProtoNix) injection 40 mg  40 mg intravenous Daily before breakfast Elmo Monteiro MD   40 mg at 02/13/25 0851    fat emulsion fish oil/plant based (SMOFlipid) 20 % IV infusion 50 g  250 mL intravenous Daily Lipids Deena Kilpatrick PA-C   Stopped at 02/13/25 0808    heparin (porcine) injection 5,000 Units  5,000 Units subcutaneous q8h Jannie Hamm MD   5,000 Units at 02/13/25 0451    HYDROmorphone (Dilaudid) injection 0.2 mg  0.2 mg intravenous q3h PRN Alexys Hood MD   0.2 mg at 02/12/25 1324    HYDROmorphone (Dilaudid) injection 0.4 mg  0.4 mg intravenous q4h PRN Alexys Hood MD   0.4 mg at 02/13/25 0432    insulin lispro injection 0-5 Units  0-5 Units subcutaneous q6h Alexys Hood MD        ipratropium-albuteroL (Duo-Neb) 0.5-2.5 mg/3 mL nebulizer solution 3 mL  3 mL nebulization TID PRN Alexys Hood MD        labetaloL (Normodyne,Trandate) injection 10 mg  10 mg intravenous q8h PRN Gallo Martines MD        lactated Ringer's infusion  50 mL/hr intravenous Continuous Alexys Hood MD 50 mL/hr at 02/13/25 0212 50 mL/hr at 02/13/25 0212    levothyroxine (Synthroid) injection 20 mcg   20 mcg intravenous q24h On license of [facility] Alexys Hood MD   20 mcg at 02/13/25 0847    lidocaine (Xylocaine) 10 mg/mL (1 %) injection 5 mL  5 mL infiltration Once Alexys Hood MD        lidocaine 2 % mucosal jelly (Uro-Jet) 1 Application  1 Application nasal Once Elmo Monteiro MD        lidocaine 4 % patch 3 patch  3 patch transdermal Daily Gallo Martines MD   3 patch at 02/13/25 0848    melatonin tablet 10 mg  10 mg nasogastric tube Nightly PRN Ludwin Phillips MD   10 mg at 02/11/25 2009    ondansetron (Zofran) injection 4 mg  4 mg intravenous q6h PRN Gallo Martines MD   4 mg at 02/10/25 1509    [Held by provider] oxyCODONE (Roxicodone) immediate release tablet 10 mg  10 mg oral q6h PRN Jannie Hamm MD   10 mg at 01/29/25 0235    [Held by provider] oxyCODONE (Roxicodone) immediate release tablet 5 mg  5 mg oral q6h PRN Jannie Hamm MD   5 mg at 01/29/25 0840    oxygen (O2) therapy   inhalation Continuous - Inhalation Ludwin Phillips MD 60,000 mL/hr at 02/13/25 0859 1 L/min at 02/13/25 0859    phenoL (Chloraseptic) 1.4 % mouth/throat spray 1 spray  1 spray Mouth/Throat 4x daily PRN Ludwin Phillips MD   1 spray at 02/09/25 2324    piperacillin-tazobactam (Zosyn) 3.375 g in dextrose (iso) IV 50 mL  3.375 g intravenous q6h Alexys Hood MD 0 mL/hr at 02/13/25 0453 3.375 g at 02/13/25 0852    [Held by provider] tamsulosin (Flomax) 24 hr capsule 0.4 mg  0.4 mg oral Daily Jannie Hamm MD   0.4 mg at 01/31/25 0820       Physical Exam                                                                                                                         Gen -  No acute distress     Neuro - Alert, oriented, conversant     CV -  regular rate , normotensive      Pulm - Symmetric chest rise, non-labored breathing on room air      Abd - Abdomen is soft, mildly-distended, and appropriately tender. Midline incision c/d/I closed with glue. PEG in place with bilious output      Ext - Warm, well perfused      MSK- no pain or swelling in LE bilaterally     Skin - without jaunice       Psych - appropriate tone, affect      - voiding independently, no rodriguez     Imaging  XR abdomen 1 view    Result Date: 2/11/2025  Interpreted By:  Heather Lopes, STUDY: XR ABDOMEN 1 VIEW; 2/11/2025 10:50 am   INDICATION: Signs/Symptoms:assess contrast progression.   COMPARISON: CT dated 02/06/2025 and radiograph dated 02/08/2025   ACCESSION NUMBER(S): [id number]   ORDERING CLINICIAN: FILIPE ROA   FINDINGS: Two views of the abdomen and pelvis. G tube is projecting over stomach. Scattered surgical clips projecting over the right hemiabdomen.   Multiple dilated loops of small bowel in the left side of the abdomen, not significantly different from prior study. Nonobstructive bowel gas pattern.  Limited evaluation of pneumoperitoneum on supine imaging, however no gross evidence of free air is noted.   Atelectasis in lung bases.   Osseous structures demonstrate no acute bony changes.       1.  Unchanged appearance of a few dilated loops of small in the left side of the abdomen. Findings may be due to ileus with component of partial obstruction not excluded. Recommend follow-up radiograph.   Signed by: Heather Zheng 2/11/2025 11:44 PM Dictation workstation:   [id number]      Assessment & Plan  75 y.o. 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 Postoperative course complicated by prolonged ileus vs small bowel obstruction. NGT placed 01/31. Patient now with slowly resolving ANISHA and mild stable leukocytosis. Decompressive PEG and PICC(TPN) placed 2/10.       Plan 2/13:  - Appreciate CORS recs, cap  PEG tube, vent PRN in case of increased bloating/nausea/emesis   - TPN cycled at night   - NPO w/ mIVF NS 50cc/hr (okay for sips/chips)  -Follow up hepatitis labs per GI (Signed off)  -Follow up labs, trend WBC  -Lidocaine patch at PEG tube site due to discomfort   -Ambulate/OOB/PT  -Continue Zosyn (end date 2/14)    Neuro:   -Pain controlled with Scheduled acetaminophen,Oxycodone 5/10 HELD while NPO, Dilaudid 0.4 PRN for breakthrough pain, robaxin 500mg q8hrs   -PRN melatonin for sleep aid     Resp:   -ICS  -On room air   -Appreciate RT recs    Card:   -Monitor vitals  -Amlodpine  -Aspirin   -Cholecalciferol     FEN: mIVF NS @ 50ml      GI:   -NPO, okay for sips/chips for comfort   -PEG 2/10  - Enemas once daily  -BR: Miralax, senokat   -PRN Reglan for nausea   -Bisacodyl suppository   -Colorectal surgery consulted, appreciate recommendations  -Nutrition consulted: following TPN recommendations   -Maintain PICC for TPN  -Follow up HEP labs per GI(signed off)    :   -HELD Flomax   -Daily BMP to monitor kidney function and replete electrolytes as indicated.   -Continue to monitor Cr     Endo:   -Levothyroxine IV while NPO  -SSI #1     Heme/ID:   -Daily CBC to monitor for acute blood loss anemia   -No indication for blood transfusion at this time   -Continue Zosyn for likely aspiration pneumonia (end date 2/14)  -Trend WBC    Ppy:   -ALEN  -SCD  -IS    Dispo:   RNF. Discharge planning started, likely SNF.     Assessment and plan discussed with chief resident Dr. Ortez and attending Dr. Ana Hood MD, [facility]  [facility] [place]  Adult Urology Pager: [phone]  Pediatric Urology Pager: [phone]

## 2025-02-13 NOTE — CARE PLAN
Problem: Pain - Adult  Goal: Verbalizes/displays adequate comfort level or baseline comfort level  Outcome: Progressing     Problem: Safety - Adult  Goal: Free from fall injury  Outcome: Progressing     Problem: Discharge Planning  Goal: Discharge to home or other facility with appropriate resources  Outcome: Progressing     Problem: Nutrition  Goal: Nutrient intake appropriate for maintaining nutritional needs  Outcome: Progressing     Problem: Skin  Goal: Prevent/manage excess moisture  Outcome: Progressing  Goal: Prevent/minimize sheer/friction injuries  Outcome: Progressing  Goal: Promote/optimize nutrition  Outcome: Progressing     Problem: Pain  Goal: Takes deep breaths with improved pain control throughout the shift  Outcome: Progressing  Goal: Turns in bed with improved pain control throughout the shift  Outcome: Progressing  Goal: Walks with improved pain control throughout the shift  Outcome: Progressing  Goal: Performs ADL's with improved pain control throughout shift  Outcome: Progressing  Goal: Participates in PT with improved pain control throughout the shift  Outcome: Progressing  Goal: Free from opioid side effects throughout the shift  Outcome: Progressing  Goal: Free from acute confusion related to pain meds throughout the shift  Outcome: Progressing   The patient's goals for the shift include  pain manage    The clinical goals for the shift include Pt will sit in chair for 4 hours before end of shift    Over the shift, the patient did not make progress toward the following goals. Barriers to progression include pain. Recommendations to address these barriers include prn meds.

## 2025-02-14 LAB
ALBUMIN SERPL BCP-MCNC: 2.9 G/DL (ref 3.4–5)
ANION GAP SERPL CALC-SCNC: 13 MMOL/L (ref 10–20)
BUN SERPL-MCNC: 33 MG/DL (ref 6–23)
CALCIUM SERPL-MCNC: 9.5 MG/DL (ref 8.6–10.6)
CHLORIDE SERPL-SCNC: 108 MMOL/L (ref 98–107)
CO2 SERPL-SCNC: 20 MMOL/L (ref 21–32)
CREAT SERPL-MCNC: 1.87 MG/DL (ref 0.5–1.3)
EGFRCR SERPLBLD CKD-EPI 2021: 37 ML/MIN/1.73M*2
ERYTHROCYTE [DISTWIDTH] IN BLOOD BY AUTOMATED COUNT: 16.4 % (ref 11.5–14.5)
GLUCOSE BLD MANUAL STRIP-MCNC: 128 MG/DL (ref 74–99)
GLUCOSE BLD MANUAL STRIP-MCNC: 176 MG/DL (ref 74–99)
GLUCOSE BLD MANUAL STRIP-MCNC: 178 MG/DL (ref 74–99)
GLUCOSE BLD MANUAL STRIP-MCNC: 96 MG/DL (ref 74–99)
GLUCOSE BLD MANUAL STRIP-MCNC: 97 MG/DL (ref 74–99)
GLUCOSE SERPL-MCNC: 144 MG/DL (ref 74–99)
HCT VFR BLD AUTO: 28.7 % (ref 41–52)
HGB BLD-MCNC: 9.1 G/DL (ref 13.5–17.5)
MAGNESIUM SERPL-MCNC: 1.96 MG/DL (ref 1.6–2.4)
MCH RBC QN AUTO: 27.6 PG (ref 26–34)
MCHC RBC AUTO-ENTMCNC: 31.7 G/DL (ref 32–36)
MCV RBC AUTO: 87 FL (ref 80–100)
NRBC BLD-RTO: 0 /100 WBCS (ref 0–0)
PHOSPHATE SERPL-MCNC: 3.3 MG/DL (ref 2.5–4.9)
PLATELET # BLD AUTO: 195 X10*3/UL (ref 150–450)
POTASSIUM SERPL-SCNC: 4.3 MMOL/L (ref 3.5–5.3)
RBC # BLD AUTO: 3.3 X10*6/UL (ref 4.5–5.9)
SODIUM SERPL-SCNC: 137 MMOL/L (ref 136–145)
WBC # BLD AUTO: 11.1 X10*3/UL (ref 4.4–11.3)

## 2025-02-14 PROCEDURE — 2500000004 HC RX 250 GENERAL PHARMACY W/ HCPCS (ALT 636 FOR OP/ED): Performed by: STUDENT IN AN ORGANIZED HEALTH CARE EDUCATION/TRAINING PROGRAM

## 2025-02-14 PROCEDURE — 2500000004 HC RX 250 GENERAL PHARMACY W/ HCPCS (ALT 636 FOR OP/ED)

## 2025-02-14 PROCEDURE — 2500000001 HC RX 250 WO HCPCS SELF ADMINISTERED DRUGS (ALT 637 FOR MEDICARE OP)

## 2025-02-14 PROCEDURE — 82947 ASSAY GLUCOSE BLOOD QUANT: CPT

## 2025-02-14 PROCEDURE — 2500000005 HC RX 250 GENERAL PHARMACY W/O HCPCS: Performed by: NURSE PRACTITIONER

## 2025-02-14 PROCEDURE — 2500000001 HC RX 250 WO HCPCS SELF ADMINISTERED DRUGS (ALT 637 FOR MEDICARE OP): Performed by: NURSE PRACTITIONER

## 2025-02-14 PROCEDURE — 2580000001 HC RX 258 IV SOLUTIONS

## 2025-02-14 PROCEDURE — 85027 COMPLETE CBC AUTOMATED: CPT | Performed by: STUDENT IN AN ORGANIZED HEALTH CARE EDUCATION/TRAINING PROGRAM

## 2025-02-14 PROCEDURE — 97530 THERAPEUTIC ACTIVITIES: CPT | Mod: GP,CQ

## 2025-02-14 PROCEDURE — 83735 ASSAY OF MAGNESIUM: CPT

## 2025-02-14 PROCEDURE — 97116 GAIT TRAINING THERAPY: CPT | Mod: GP,CQ

## 2025-02-14 PROCEDURE — 1170000001 HC PRIVATE ONCOLOGY ROOM DAILY

## 2025-02-14 PROCEDURE — 80069 RENAL FUNCTION PANEL: CPT | Performed by: STUDENT IN AN ORGANIZED HEALTH CARE EDUCATION/TRAINING PROGRAM

## 2025-02-14 RX ORDER — SODIUM CHLORIDE 9 MG/ML
50 INJECTION, SOLUTION INTRAVENOUS CONTINUOUS
Status: ACTIVE | OUTPATIENT
Start: 2025-02-14 | End: 2025-02-15

## 2025-02-14 RX ADMIN — PIPERACILLIN SODIUM AND TAZOBACTAM SODIUM 3.38 G: 3; .375 INJECTION, SOLUTION INTRAVENOUS at 20:10

## 2025-02-14 RX ADMIN — AMLODIPINE BESYLATE 10 MG: 10 TABLET ORAL at 08:51

## 2025-02-14 RX ADMIN — ASCORBIC ACID, VITAMIN A PALMITATE, CHOLECALCIFEROL, THIAMINE HYDROCHLORIDE, RIBOFLAVIN-5 PHOSPHATE SODIUM, PYRIDOXINE HYDROCHLORIDE, NIACINAMIDE, DEXPANTHENOL, ALPHA-TOCOPHEROL ACETATE, VITAMIN K1, FOLIC ACID, BIOTIN, CYANOCOBALAMIN: 200; 3300; 200; 6; 3.6; 6; 40; 15; 10; 150; 600; 60; 5 INJECTION, SOLUTION INTRAVENOUS at 20:10

## 2025-02-14 RX ADMIN — Medication 10 MG: at 20:10

## 2025-02-14 RX ADMIN — BISACODYL 10 MG: 10 SUPPOSITORY RECTAL at 08:47

## 2025-02-14 RX ADMIN — SMOFLIPID 50 G: 6; 6; 5; 3 INJECTION, EMULSION INTRAVENOUS at 20:10

## 2025-02-14 RX ADMIN — HYDROMORPHONE HYDROCHLORIDE 0.4 MG: 1 INJECTION, SOLUTION INTRAMUSCULAR; INTRAVENOUS; SUBCUTANEOUS at 21:07

## 2025-02-14 RX ADMIN — HYDROMORPHONE HYDROCHLORIDE 0.2 MG: 1 INJECTION, SOLUTION INTRAMUSCULAR; INTRAVENOUS; SUBCUTANEOUS at 14:07

## 2025-02-14 RX ADMIN — ASPIRIN 81 MG: 81 TABLET, COATED ORAL at 08:47

## 2025-02-14 RX ADMIN — PIPERACILLIN SODIUM AND TAZOBACTAM SODIUM 3.38 G: 3; .375 INJECTION, SOLUTION INTRAVENOUS at 02:51

## 2025-02-14 RX ADMIN — PIPERACILLIN SODIUM AND TAZOBACTAM SODIUM 3.38 G: 3; .375 INJECTION, SOLUTION INTRAVENOUS at 08:46

## 2025-02-14 RX ADMIN — HEPARIN SODIUM 5000 UNITS: 5000 INJECTION INTRAVENOUS; SUBCUTANEOUS at 11:53

## 2025-02-14 RX ADMIN — PANTOPRAZOLE SODIUM 40 MG: 40 INJECTION, POWDER, FOR SOLUTION INTRAVENOUS at 05:39

## 2025-02-14 RX ADMIN — LEVOTHYROXINE SODIUM ANHYDROUS 20 MCG: 100 INJECTION, POWDER, LYOPHILIZED, FOR SOLUTION INTRAVENOUS at 08:47

## 2025-02-14 RX ADMIN — PIPERACILLIN SODIUM AND TAZOBACTAM SODIUM 3.38 G: 3; .375 INJECTION, SOLUTION INTRAVENOUS at 15:12

## 2025-02-14 RX ADMIN — Medication 1000 UNITS: at 08:47

## 2025-02-14 RX ADMIN — HEPARIN SODIUM 5000 UNITS: 5000 INJECTION INTRAVENOUS; SUBCUTANEOUS at 02:51

## 2025-02-14 RX ADMIN — HEPARIN SODIUM 5000 UNITS: 5000 INJECTION INTRAVENOUS; SUBCUTANEOUS at 20:10

## 2025-02-14 RX ADMIN — SODIUM CHLORIDE 50 ML/HR: 9 INJECTION, SOLUTION INTRAVENOUS at 06:28

## 2025-02-14 ASSESSMENT — COGNITIVE AND FUNCTIONAL STATUS - GENERAL
TOILETING: A LOT
DRESSING REGULAR UPPER BODY CLOTHING: A LOT
WALKING IN HOSPITAL ROOM: A LOT
EATING MEALS: A LOT
DAILY ACTIVITIY SCORE: 12
WALKING IN HOSPITAL ROOM: A LOT
CLIMB 3 TO 5 STEPS WITH RAILING: A LOT
TOILETING: A LOT
MOBILITY SCORE: 17
DRESSING REGULAR LOWER BODY CLOTHING: A LOT
STANDING UP FROM CHAIR USING ARMS: A LITTLE
MOVING TO AND FROM BED TO CHAIR: A LITTLE
PERSONAL GROOMING: A LOT
STANDING UP FROM CHAIR USING ARMS: A LOT
DRESSING REGULAR UPPER BODY CLOTHING: A LOT
WALKING IN HOSPITAL ROOM: A LITTLE
CLIMB 3 TO 5 STEPS WITH RAILING: A LOT
CLIMB 3 TO 5 STEPS WITH RAILING: TOTAL
PERSONAL GROOMING: A LOT
HELP NEEDED FOR BATHING: A LOT
MOVING TO AND FROM BED TO CHAIR: A LITTLE
MOBILITY SCORE: 15
DAILY ACTIVITIY SCORE: 12
STANDING UP FROM CHAIR USING ARMS: A LOT
HELP NEEDED FOR BATHING: A LOT
MOBILITY SCORE: 17
TURNING FROM BACK TO SIDE WHILE IN FLAT BAD: A LOT
MOVING TO AND FROM BED TO CHAIR: A LITTLE
EATING MEALS: A LOT
MOVING FROM LYING ON BACK TO SITTING ON SIDE OF FLAT BED WITH BEDRAILS: A LITTLE
DRESSING REGULAR LOWER BODY CLOTHING: A LOT

## 2025-02-14 ASSESSMENT — PAIN - FUNCTIONAL ASSESSMENT
PAIN_FUNCTIONAL_ASSESSMENT: 0-10

## 2025-02-14 ASSESSMENT — PAIN SCALES - GENERAL
PAINLEVEL_OUTOF10: 2
PAINLEVEL_OUTOF10: 0 - NO PAIN
PAINLEVEL_OUTOF10: 9
PAINLEVEL_OUTOF10: 4
PAINLEVEL_OUTOF10: 8

## 2025-02-14 NOTE — PROGRESS NOTES
[facility] [place]  Progress Note      Patient: Swapnil Allen  Age/Sex: 75 y.o., male  MRN: [id number]  Date of surgery: 1/28/25  Admit Date: 1/28/2025   Code Status: Full Code  Length of Stay: 17      Interval History/Overnight Events:   S/p venting PEG 2/10, capped. No output in bag. 350 cc over 24 hours.   Feeling better overall  No nausea or emesis  Denies any fevers, chills.   UOP 2.57L  Passing gas, Bmx1  Ambulated   Stable leukocytosis down trending 11.1(13.8)      Objective  02/12 1900 - 02/14 0659  In: 3369.4 [I.V.:1584.2]  Out: 3520 [Urine:3170]              9.1     11.1>-----<195              28.7   135  105  35                  ----------------<108     4.9  21  1.83          Ca 9.5 Phos 3.7 Mg 2.06       ALT 19 AST 24 AlkPhos 94 tBili 0.3          Vitals:    02/13/25 2036 02/14/25 0029 02/14/25 0444 02/14/25 0540   BP: 117/85 114/67 127/76    BP Location: Right arm Right arm Right arm    Patient Position: Lying Lying Lying    Pulse: 83 83 80    Resp: 18 18 18    Temp: 36.4 °C (97.5 °F) 36.1 °C (97 °F) 36.5 °C (97.7 °F)    TempSrc: Temporal Temporal Temporal    SpO2: 96% 95% 98%    Weight:    103 kg (227 lb 1.2 oz)   Height:              Medications:  Current Facility-Administered Medications   Medication Dose Route Frequency Provider Last Rate Last Admin    acetaminophen (Ofirmev) injection 650 mg  650 mg intravenous q6h PRN Deena Medina MD   Stopped at 02/12/25 0355    Adult Clinimix Parenteral Nutrition Cyclic   intravenous Cyclic PN KYLIE Britt-CNP 80 mL/hr at 02/13/25 2113 New Bag at 02/13/25 2113    alteplase (Cathflo Activase) injection 2 mg  2 mg intra-catheter PRN Alexys Hood MD        amLODIPine (Norvasc) tablet 10 mg  10 mg oral Daily Alexys Hood MD   10 mg at 02/13/25 0847    aspirin EC tablet 81 mg  81 mg oral Daily Alexys Hood MD   81 mg at 02/13/25 0847    benzocaine-menthol (Cepastat Sore Throat) lozenge 1 lozenge  1 lozenge Mouth/Throat q2h PRN Ludwin Phillips,  MD   1 lozenge at 02/09/25 2226    bisacodyl (Dulcolax) suppository 10 mg  10 mg rectal Daily Karen POWER Katz APRN-CNP   10 mg at 02/13/25 0847    calcium carbonate (Tums) chewable tablet 500 mg  500 mg nasogastric tube 4x daily PRN Ludwin Phillips MD   500 mg at 02/04/25 2047    cholecalciferol (Vitamin D-3) tablet 1,000 Units  1,000 Units oral Daily Alexys Hood MD   1,000 Units at 02/13/25 0847    dextrose 50 % injection 12.5 g  12.5 g intravenous q15 min PRN Ludwin Phillips MD   12.5 g at 02/02/25 1402    pantoprazole (ProtoNix) EC tablet 40 mg  40 mg oral Daily before breakfast Elmo Monteiro MD   40 mg at 02/12/25 1023    Or    esomeprazole (NexIUM) suspension 40 mg  40 mg nasoduodenal tube Daily before breakfast Elmo Monteiro MD   40 mg at 02/09/25 0828    Or    pantoprazole (ProtoNix) injection 40 mg  40 mg intravenous Daily before breakfast Elmo Monteiro MD   40 mg at 02/14/25 0539    fat emulsion fish oil/plant based (SMOFlipid) 20 % IV infusion 50 g  250 mL intravenous Daily Lipids Deena Kilpatrick PA-C 20.8 mL/hr at 02/14/25 0653 Rate Verify at 02/14/25 0653    heparin (porcine) injection 5,000 Units  5,000 Units subcutaneous q8h Jannie Hamm MD   5,000 Units at 02/14/25 0251    HYDROmorphone (Dilaudid) injection 0.2 mg  0.2 mg intravenous q3h PRN Alexys Hood MD   0.2 mg at 02/12/25 1324    HYDROmorphone (Dilaudid) injection 0.4 mg  0.4 mg intravenous q4h PRN Alexys Hood MD   0.4 mg at 02/13/25 2143    insulin lispro injection 0-5 Units  0-5 Units subcutaneous q6h Alexys Hood MD        ipratropium-albuteroL (Duo-Neb) 0.5-2.5 mg/3 mL nebulizer solution 3 mL  3 mL nebulization TID PRN Alexys Hood MD        labetaloL (Normodyne,Trandate) injection 10 mg  10 mg intravenous q8h PRN Gallo Martines MD        levothyroxine (Synthroid) injection 20 mcg  20 mcg intravenous q24h [facility] Alexys Hood MD   20 mcg at 02/13/25 0847    lidocaine (Xylocaine) 10 mg/mL (1 %) injection 5  mL  5 mL infiltration Once Alexys Hood MD        lidocaine 2 % mucosal jelly (Uro-Jet) 1 Application  1 Application nasal Once Elmo Monteiro MD        lidocaine 4 % patch 3 patch  3 patch transdermal Daily Gallo Martines MD   3 patch at 02/13/25 0848    melatonin tablet 10 mg  10 mg nasogastric tube Nightly PRN Ludwin Phillips MD   10 mg at 02/13/25 2107    ondansetron (Zofran) injection 4 mg  4 mg intravenous q6h PRN Gallo Martines MD   4 mg at 02/10/25 1509    [Held by provider] oxyCODONE (Roxicodone) immediate release tablet 10 mg  10 mg oral q6h PRN Jannie Hamm MD   10 mg at 01/29/25 0235    [Held by provider] oxyCODONE (Roxicodone) immediate release tablet 5 mg  5 mg oral q6h PRN Jannie Hamm MD   5 mg at 01/29/25 0840    oxygen (O2) therapy   inhalation Continuous - Inhalation Ludwin Phillips MD   Stopped at 02/13/25 2156    phenoL (Chloraseptic) 1.4 % mouth/throat spray 1 spray  1 spray Mouth/Throat 4x daily PRN Ludwin Phillips MD   1 spray at 02/09/25 2324    piperacillin-tazobactam (Zosyn) 3.375 g in dextrose (iso) IV 50 mL  3.375 g intravenous q6h Alexys Hood MD   Stopped at 02/14/25 0342    sodium chloride 0.9% infusion  50 mL/hr intravenous Continuous Alexys Hood MD 50 mL/hr at 02/14/25 0653 50 mL/hr at 02/14/25 0653    sodium chloride 0.9% infusion  50 mL/hr intravenous Continuous Alexys Hood MD 50 mL/hr at 02/14/25 0653 50 mL/hr at 02/14/25 0653    [Held by provider] tamsulosin (Flomax) 24 hr capsule 0.4 mg  0.4 mg oral Daily Jannie Hamm MD   0.4 mg at 01/31/25 0820       Physical Exam                                                                                                                         Gen -  No acute distress     Neuro - Alert, oriented, conversant     CV -  regular rate , normotensive      Pulm - Symmetric chest rise, non-labored breathing on room air      Abd - Abdomen is soft, mildly-distended, and appropriately tender. Midline  incision c/d/I closed with glue. PEG in place with no output in bag.       Ext - Warm, well perfused     MSK- no pain or swelling in LE bilaterally     Skin - without jaunice       Psych - appropriate tone, affect      - voiding independently, no rodriguez     Imaging  No results found.     Assessment & Plan  75 y.o. 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 Postoperative course complicated by prolonged ileus vs small bowel obstruction. NGT placed 01/31. Patient now with slowly resolving ANISHA and mild stable leukocytosis. Decompressive PEG and PICC(TPN) placed 2/10.       Plan 2/14:  - Appreciate CRS recs, cap PEG tube, vent PRN in case of increased bloating/nausea/emesis   - TPN cycled at night   - mIVF NS 50cc/hr  - Advance to CLD  - Follow up labs, trend WBC  -Lidocaine patch at PEG tube site due to discomfort   -Ambulate/OOB/PT  -Continue Zosyn (end date 2/14)    Neuro:   -Pain controlled with Scheduled acetaminophen,Oxycodone 5/10 HELD while NPO, Dilaudid 0.4 PRN for breakthrough pain, robaxin 500mg q8hrs   -PRN melatonin for sleep aid     Resp:   -ICS  -On room air   -Appreciate RT recs    Card:   -Monitor vitals  -Amlodpine  -Aspirin   -Cholecalciferol     FEN: mIVF NS @ 50ml      GI:   -Okay to transition to CLD    - PEG 2/10  - Enemas once daily  -BR: Miralax, senokat   -PRN Reglan for nausea   -Bisacodyl suppository   -Colorectal surgery following, appreciate recommendations  -Nutrition consulted: following TPN recommendations   -Maintain PICC for TPN    :   -HELD Flomax   -Daily BMP to monitor kidney function and replete electrolytes as indicated.   -Continue to monitor Cr      Endo:   -Levothyroxine IV while NPO  -SSI #1     Heme/ID:   -Daily CBC to monitor for acute blood loss anemia   -No indication for blood transfusion at this time   -Continue Zosyn for likely aspiration pneumonia (end date 2/14)  -Trend WBC    Ppy:   -ALEN  -SCD  -IS    Dispo:   RNF. Discharge likely Next week.     Assessment and plan discussed with chief resident Dr. Ortez and attending Dr. Ana Xiao MD, [facility]  Urology [place]  Adult Urology Pager: [phone]  Pediatric Urology Pager: [phone]

## 2025-02-14 NOTE — PROGRESS NOTES
"Physical Therapy                 Therapy Communication Note    Patient Name: Swapnil Allen  MRN: [id number]  Department: [facility]  Room: [address] [place], [place] [address]  Today's Date: 2/14/2025     Discipline: Physical Therapy    PT Missed Visit: Yes     Missed Visit Reason: Missed Visit Reason: Patient refused (Pt declined PT services at this time, reports \"Feeling weak from not eating, and it's too much to move with these tubes\" Encouraged and educated pt on importance of ambulation and it's appropriate for mobilize with lines/tubes. Will re-attempt as schedule permits.)    Missed Time: Attempt    02/14/25 at 10:24 AM   Sunitha Cortes PTA   Rehab Office: [phone]     "

## 2025-02-14 NOTE — PROGRESS NOTES
"COLORECTAL SURGERY   PROGRESS NOTE    Swapnil Allen  [id number]  1950    Swapnil Allen is a 75 y.o. male on day 17 of admission presenting with Renal mass. S/p open right radical nephrectomy and RP lymphadenectomy (1/28/25). Prolonged hospital course secondary to ileus vs PSBO and failure to progress.  PEG tube placed on 2/10 in IR.  Started capping PEG tube 2/13.    Subjective   No acute events overnight  No nasuea with PEG tube capping  Awake and conversive this morning  Continuing to have Bms  Pain controlled,   No chest pain or SOB    Objective     Last Recorded Vitals  Blood pressure 127/76, pulse 80, temperature 36.5 °C (97.7 °F), temperature source Temporal, resp. rate 18, height 1.803 m (5' 11\"), weight 103 kg (227 lb 1.2 oz), SpO2 98%.    Physical Exam  Constitutional:       Appearance: Normal appearance.   HENT:      Head: Normocephalic and atraumatic.      Nose: Nose normal.      Mouth/Throat:      Mouth: Mucous membranes are moist.   Eyes:      Extraocular Movements: Extraocular movements intact.   Cardiovascular:      Rate and Rhythm: Normal rate.   Pulmonary:      Effort: Pulmonary effort is normal. No respiratory distress.      Breath sounds: Normal breath sounds. No wheezing.   Abdominal:      Comments: Abdomen soft, minimal distention, PEG tube upper abdomen in upper abdomen , clamped. Bumper at 8 cm, but no laxity when tugging. No peritonitis. Only tender when working with PEG tube   Musculoskeletal:         General: Normal range of motion.      Cervical back: Normal range of motion.   Skin:     General: Skin is warm and dry.   Neurological:      General: No focal deficit present.      Mental Status: He is alert.   Psychiatric:         Behavior: Behavior normal.         I/O last 2 completed shifts:  In: 2330 (22.6 mL/kg) [I.V.:1430.8 (13.9 mL/kg); IV Piggyback:100]  Out: 2920 (28.3 mL/kg) [Urine:2570 (1 mL/kg/hr); Emesis/NG output:350]  Weight: 103 kg        Relevant Results  Results for orders " placed or performed during the hospital encounter of 01/28/25 (from the past 24 hours)   CBC   Result Value Ref Range    WBC 13.8 (H) 4.4 - 11.3 x10*3/uL    nRBC 0.0 0.0 - 0.0 /100 WBCs    RBC 3.55 (L) 4.50 - 5.90 x10*6/uL    Hemoglobin 9.9 (L) 13.5 - 17.5 g/dL    Hematocrit 30.4 (L) 41.0 - 52.0 %    MCV 86 80 - 100 fL    MCH 27.9 26.0 - 34.0 pg    MCHC 32.6 32.0 - 36.0 g/dL    RDW 16.4 (H) 11.5 - 14.5 %    Platelets 188 150 - 450 x10*3/uL   Renal Function Panel   Result Value Ref Range    Glucose 108 (H) 74 - 99 mg/dL    Sodium 135 (L) 136 - 145 mmol/L    Potassium 4.9 3.5 - 5.3 mmol/L    Chloride 105 98 - 107 mmol/L    Bicarbonate 21 21 - 32 mmol/L    Anion Gap 14 10 - 20 mmol/L    Urea Nitrogen 35 (H) 6 - 23 mg/dL    Creatinine 1.83 (H) 0.50 - 1.30 mg/dL    eGFR 38 (L) >60 mL/min/1.73m*2    Calcium 9.5 8.6 - 10.6 mg/dL    Phosphorus 3.7 2.5 - 4.9 mg/dL    Albumin 3.2 (L) 3.4 - 5.0 g/dL   Magnesium   Result Value Ref Range    Magnesium 2.06 1.60 - 2.40 mg/dL   POCT GLUCOSE   Result Value Ref Range    POCT Glucose 126 (H) 74 - 99 mg/dL   POCT GLUCOSE   Result Value Ref Range    POCT Glucose 97 74 - 99 mg/dL   POCT GLUCOSE   Result Value Ref Range    POCT Glucose 85 74 - 99 mg/dL   POCT GLUCOSE   Result Value Ref Range    POCT Glucose 178 (H) 74 - 99 mg/dL   POCT GLUCOSE   Result Value Ref Range    POCT Glucose 128 (H) 74 - 99 mg/dL   CBC   Result Value Ref Range    WBC 11.1 4.4 - 11.3 x10*3/uL    nRBC 0.0 0.0 - 0.0 /100 WBCs    RBC 3.30 (L) 4.50 - 5.90 x10*6/uL    Hemoglobin 9.1 (L) 13.5 - 17.5 g/dL    Hematocrit 28.7 (L) 41.0 - 52.0 %    MCV 87 80 - 100 fL    MCH 27.6 26.0 - 34.0 pg    MCHC 31.7 (L) 32.0 - 36.0 g/dL    RDW 16.4 (H) 11.5 - 14.5 %    Platelets 195 150 - 450 x10*3/uL   POCT GLUCOSE   Result Value Ref Range    POCT Glucose 176 (H) 74 - 99 mg/dL         Assessment/Plan   Assessment & Plan  Renal mass    Post-op pain    BPH (benign prostatic hyperplasia)    Small bowel obstruction  (Multi)    Recommendation:  75 year old male with past medical history significant for HTN, CVA/TIA 2020, anemia, CKD, hx of substance abuse, Hep C who presented for Open right nephrectomy for renal mass.  His post operative course was complicated by PSB and failure to have return of bowel function.  He underwent PEG placement by IR on 2/10/25.  He continues with PEG tube to gravity and high output of bilious liquid from PEG.  He has appropriate bowel function. Would recommend capping PEG tube and uncapping if patient become symptomatic ( nauseous, having emesis, distended). Patient now consistently having bowel movements. PEG tube capped starting 2/13 and patient tolerating without any Nausea or vomiting.    - recommend continue capping PEG tube as tolerated. Can uncap and place PEG tube to gravity if patient develops nausea / vomiting / abd distention  - please record accurate I/Os   - Cycle TPN, continue NPO or clears for comfort  - Do not recommend bowel regime at this time  - Recommend dispo planning home with HC for TPN, diet for comfort, no bowel regimen  - will follow-up with IR team regarding length of tube at bumper at end of their procedure  - Dr Tracey to see patient in outpatient clinic in 2 weeks, will make appointment.    Discussed with Dr Tracey , seen with chief resident Dr. Iyer.    Ibeth Ortiz MD  PGY1 Colorectal Surgery  [facility] [phone]  [facility] [phone]  Available via Secure Chat

## 2025-02-14 NOTE — PROGRESS NOTES
Physical Therapy    Physical Therapy Treatment    Patient Name: Swapnil Allen  MRN: [id number]  Department: [facility]  Room: [facility]-  Today's Date: 2/14/2025  Time Calculation  Start Time: 1351  Stop Time: 1416  Time Calculation (min): 25 min         Assessment/Plan   PT Assessment  Barriers to Discharge Home: Caregiver assistance, Cognition needs, Physical needs  Caregiver Assistance: Patient lives alone and/or does not have reliable caregiver assistance  Cognition Needs: 24hr supervision for safety awareness needed, Insight of patient limited regarding functional ability/needs  Physical Needs: 24hr mobility assistance needed, 24hr ADL assistance needed  End of Session Communication: Bedside nurse  Assessment Comment: Pt continues to remain appropriate for Moderate intensity PT, however if pt refuses moderate intensity would recommend low intensity PT and use of FWW.  End of Session Patient Position: Up in chair, Alarm off, not on at start of session  PT Plan  Inpatient/Swing Bed or Outpatient: Inpatient  PT Plan  Treatment/Interventions: Bed mobility, Gait training, Transfer training, Stair training, Balance training, Neuromuscular re-education, Strengthening, Endurance training, Range of motion, Therapeutic exercise, Therapeutic activity, Home exercise program, Positioning, Postural re-education  PT Plan: Ongoing PT  PT Frequency: 3 times per week  PT Discharge Recommendations: Moderate intensity level of continued care (However if pt refuses Mod intensity, recommend low intensity with FWW)  Equipment Recommended upon Discharge: Wheeled walker  PT Recommended Transfer Status: Assist x1, Assistive device  PT - OK to Discharge: Yes      General Visit Information:   PT  Visit  PT Received On: 02/14/25  General  PT Missed Visit: Yes  Missed Visit Reason:  (n/a)  Family/Caregiver Present: No  Prior to Session Communication: Bedside nurse  Patient Position Received: Up in chair, Alarm off, not on at start of  session  General Comment: Pt seated up in chair, agreeable to work with PT. Reports having had some broth and apple juice, however still having abdominal pain.    Subjective   Precautions:  Precautions  Medical Precautions: Fall precautions  Precautions Comment: protective precautions,     Date/Time Vitals Session Patient Position Pulse Resp SpO2 BP MAP (mmHg)    02/14/25 1351 During PT  --  114  --  98 %  --  --                 Objective   Pain:  Pain Assessment  Pain Assessment: 0-10  0-10 (Numeric) Pain Score: 8  Pain Location: Abdomen  Cognition:  Cognition  Overall Cognitive Status: Within Functional Limits  Orientation Level: Oriented X4  Insight: Mild  Impulsive: Mildly    Activity Tolerance:  Activity Tolerance  Endurance: Tolerates 10 - 20 min exercise with multiple rests  Treatments:  Bed Mobility  Bed Mobility: No (Seated up in chair pre/post session.)    Ambulation/Gait Training  Ambulation/Gait Training Performed: Yes  Ambulation/Gait Training 1  Surface 1: Level tile  Device 1: Rolling walker  Assistance 1: Minimum assistance, Contact guard, Moderate verbal cues  Quality of Gait 1: Decreased step length, Forward flexed posture (slightly unsteady, varying gait speed, slighly impulsive)  Comments/Distance (ft) 1: x120ft total, standing rest breaks ~3-4 total throughout, increased fatigue noted upon return towards room with noted SOB however O2 >98%. Pt slightly impulsive at times very quick paced, increased time spent on education for energy conservation techniques to build up strength and endurance.  Ambulation/Gait Training 2  Surface 2: Level tile  Device 2: Single point cane (holding onto IV pole)  Assistance 2: Minimum assistance, Minimal verbal cues  Quality of Gait 2: Wide base of support, Diminished heel strike, Decreased step length  Comments/Distance (ft) 2: 10ft x2 increased unsteadiness noted compared to use of FWW,    Transfers  Transfer: Yes  Transfer 1  Transfer From 1: Chair with arms  to  Transfer to 1: Stand  Technique 1: Sit to stand  Transfer Device 1: Walker, Cane  Transfer Level of Assistance 1: Minimum assistance, Minimal verbal cues  Trials/Comments 1: x2 trials, cues for safe sequencing,  Transfers 2  Transfer From 2: Stand to  Transfer to 2: Chair with arms  Technique 2: Stand to sit  Transfer Device 2: Walker, Cane  Transfer Level of Assistance 2: Moderate assistance, Minimum assistance, Minimal verbal cues  Trials/Comments 2: x2 trials, Martell with FWW, ModA with cane, increased cues for safety with returning to chair, making sure properly aligned and use of UEs for safe and slow descent.    Stairs  Stairs: No    Outcome Measures:  [facility] Basic Mobility  Turning from your back to your side while in a flat bed without using bedrails: A little  Moving from lying on your back to sitting on the side of a flat bed without using bedrails: A lot  Moving to and from bed to chair (including a wheelchair): A little  Standing up from a chair using your arms (e.g. wheelchair or bedside chair): A little  To walk in hospital room: A little  Climbing 3-5 steps with railing: Total  Basic Mobility - Total Score: 15    Education Documentation  Precautions, taught by Sunitha Cortes PTA at 2/14/2025  3:16 PM.  Learner: Patient  Readiness: Acceptance  Method: Explanation  Response: Verbalizes Understanding, Needs Reinforcement  Comment: Safe activity pacing, energy conservation techniques    Body Mechanics, taught by Sunitha Cortes PTA at 2/14/2025  3:16 PM.  Learner: Patient  Readiness: Acceptance  Method: Explanation  Response: Verbalizes Understanding, Needs Reinforcement  Comment: Safe activity pacing, energy conservation techniques    Mobility Training, taught by Sunitha Cortes PTA at 2/14/2025  3:16 PM.  Learner: Patient  Readiness: Acceptance  Method: Explanation  Response: Verbalizes Understanding, Needs Reinforcement  Comment: Safe activity pacing, energy conservation techniques    Education  Comments  No comments found.        OP EDUCATION:       Encounter Problems       Encounter Problems (Active)       Mobility       LTG - Patient will navigate 4-6 steps with rails/device and moderate assistance. (Not Progressing)       Start:  01/30/25    Expected End:  02/13/25            STG - Patient will ambulate ~100 feet with FWW and minimal assistance.  (Progressing)       Start:  01/30/25    Expected End:  02/13/25            Pt will ambulate >/= 100 ft with FWW and minimal assistance and no signs of fatigue or SOB.   (Progressing)       Start:  01/30/25    Expected End:  02/13/25               PT Transfers       LTG - Patient will transfer from one surface to another CGA with FWW. (Progressing)       Start:  01/30/25    Expected End:  02/13/25            STG - Patient will perform bed mobility with close supervision.  (Progressing)       Start:  01/30/25    Expected End:  02/13/25               Pain - Adult            02/14/25 at 3:17 PM   Sunitha Cortes PTA   Rehab Office: [phone]

## 2025-02-14 NOTE — PROGRESS NOTES
02/11/25 1300   Discharge Planning   Living Arrangements Alone   Support Systems Family members   Assistance Needed TPN ADLs   Type of Residence Private residence   Who is requesting discharge planning? Provider   Home or Post Acute Services In home services   Type of Home Care Services Home PT;Home nursing visits;DME or oxygen   Expected Discharge Disposition Home H   Does the patient need discharge transport arranged? No   Financial Resource Strain   How hard is it for you to pay for the very basics like food, housing, medical care, and heating? Not very   Housing Stability   In the last 12 months, was there a time when you were not able to pay the mortgage or rent on time? N   In the past 12 months, how many times have you moved where you were living? 0   At any time in the past 12 months, were you homeless or living in a shelter (including now)? N   Transportation Needs   In the past 12 months, has lack of transportation kept you from medical appointments or from getting medications? no   In the past 12 months, has lack of transportation kept you from meetings, work, or from getting things needed for daily living? No     [facility] sent referral to [facility]. [facility] unable to accept due to staffing and TPN needs. [facility] sent [facility] and a blanket referral 31 agencies for home care. Will continue to follow patient for discharge needs.  Aaliyah Bee RN [facility]     Update @130pm- TCC sent 92 referrals for home health care. Pending accepting agency. Will continue to follow patient for discharge needs.  Aaliyah LICEA     2/12/25- [facility] has accepted patient for home health care. [facility] has accepted patient for home going TPN. ADOD Friday. T[facility] will continue to follow patient for discharge needs.  Aaliyah LICEA     2/14/25- Patient is scheduled for Start of Care with [facility] Monday 2/17/25 at 330pm. Care team is aware to aim  for a morning discharge. [facility] set to deliver home TPN at 3pm. Will continue to follow patient for discharge needs.  Aaliyah Bee RN [facility]

## 2025-02-14 NOTE — CARE PLAN
The patient's goals for the shift include      The clinical goals for the shift include adequate pain relief    Over the shift, the patient did not make progress toward the following goals. Barriers to progression include. Recommendations to address these barriers include.    Problem: Pain - Adult  Goal: Verbalizes/displays adequate comfort level or baseline comfort level  Outcome: Progressing     Problem: Safety - Adult  Goal: Free from fall injury  Outcome: Progressing     Problem: Discharge Planning  Goal: Discharge to home or other facility with appropriate resources  Outcome: Progressing     Problem: Nutrition  Goal: Nutrient intake appropriate for maintaining nutritional needs  Outcome: Progressing     Problem: Skin  Goal: Prevent/manage excess moisture  Outcome: Progressing  Flowsheets (Taken 2/7/2025 1711 by Dulce Maria Tariq RN)  Prevent/manage excess moisture: Monitor for/manage infection if present  Goal: Prevent/minimize sheer/friction injuries  Outcome: Progressing  Goal: Promote/optimize nutrition  Outcome: Progressing     Problem: Pain  Goal: Takes deep breaths with improved pain control throughout the shift  Outcome: Progressing  Goal: Turns in bed with improved pain control throughout the shift  Outcome: Progressing  Goal: Walks with improved pain control throughout the shift  Outcome: Progressing  Goal: Performs ADL's with improved pain control throughout shift  Outcome: Progressing  Goal: Participates in PT with improved pain control throughout the shift  Outcome: Progressing  Goal: Free from opioid side effects throughout the shift  Outcome: Progressing  Goal: Free from acute confusion related to pain meds throughout the shift  Outcome: Progressing

## 2025-02-14 NOTE — CARE PLAN
The patient's goals for the shift include    Problem: Safety - Adult  Goal: Free from fall injury  Outcome: Progressing       The clinical goals for the shift include ambulate,

## 2025-02-15 LAB
ALBUMIN SERPL BCP-MCNC: 2.9 G/DL (ref 3.4–5)
ANION GAP SERPL CALC-SCNC: 13 MMOL/L (ref 10–20)
BUN SERPL-MCNC: 34 MG/DL (ref 6–23)
CALCIUM SERPL-MCNC: 9.1 MG/DL (ref 8.6–10.6)
CHLORIDE SERPL-SCNC: 109 MMOL/L (ref 98–107)
CO2 SERPL-SCNC: 20 MMOL/L (ref 21–32)
CREAT SERPL-MCNC: 1.75 MG/DL (ref 0.5–1.3)
EGFRCR SERPLBLD CKD-EPI 2021: 40 ML/MIN/1.73M*2
ERYTHROCYTE [DISTWIDTH] IN BLOOD BY AUTOMATED COUNT: 16.5 % (ref 11.5–14.5)
GLUCOSE BLD MANUAL STRIP-MCNC: 119 MG/DL (ref 74–99)
GLUCOSE BLD MANUAL STRIP-MCNC: 145 MG/DL (ref 74–99)
GLUCOSE BLD MANUAL STRIP-MCNC: 148 MG/DL (ref 74–99)
GLUCOSE BLD MANUAL STRIP-MCNC: 203 MG/DL (ref 74–99)
GLUCOSE SERPL-MCNC: 177 MG/DL (ref 74–99)
HCT VFR BLD AUTO: 28.4 % (ref 41–52)
HGB BLD-MCNC: 9.1 G/DL (ref 13.5–17.5)
MAGNESIUM SERPL-MCNC: 1.99 MG/DL (ref 1.6–2.4)
MCH RBC QN AUTO: 27.4 PG (ref 26–34)
MCHC RBC AUTO-ENTMCNC: 32 G/DL (ref 32–36)
MCV RBC AUTO: 86 FL (ref 80–100)
NRBC BLD-RTO: 0 /100 WBCS (ref 0–0)
PHOSPHATE SERPL-MCNC: 2.8 MG/DL (ref 2.5–4.9)
PLATELET # BLD AUTO: 239 X10*3/UL (ref 150–450)
POTASSIUM SERPL-SCNC: 4.5 MMOL/L (ref 3.5–5.3)
RBC # BLD AUTO: 3.32 X10*6/UL (ref 4.5–5.9)
SODIUM SERPL-SCNC: 137 MMOL/L (ref 136–145)
WBC # BLD AUTO: 11.3 X10*3/UL (ref 4.4–11.3)

## 2025-02-15 PROCEDURE — 85027 COMPLETE CBC AUTOMATED: CPT | Performed by: STUDENT IN AN ORGANIZED HEALTH CARE EDUCATION/TRAINING PROGRAM

## 2025-02-15 PROCEDURE — 2500000001 HC RX 250 WO HCPCS SELF ADMINISTERED DRUGS (ALT 637 FOR MEDICARE OP)

## 2025-02-15 PROCEDURE — 83735 ASSAY OF MAGNESIUM: CPT

## 2025-02-15 PROCEDURE — 2500000004 HC RX 250 GENERAL PHARMACY W/ HCPCS (ALT 636 FOR OP/ED)

## 2025-02-15 PROCEDURE — 2500000004 HC RX 250 GENERAL PHARMACY W/ HCPCS (ALT 636 FOR OP/ED): Performed by: STUDENT IN AN ORGANIZED HEALTH CARE EDUCATION/TRAINING PROGRAM

## 2025-02-15 PROCEDURE — 2580000001 HC RX 258 IV SOLUTIONS

## 2025-02-15 PROCEDURE — 1170000001 HC PRIVATE ONCOLOGY ROOM DAILY

## 2025-02-15 PROCEDURE — 82947 ASSAY GLUCOSE BLOOD QUANT: CPT

## 2025-02-15 PROCEDURE — 80069 RENAL FUNCTION PANEL: CPT | Performed by: STUDENT IN AN ORGANIZED HEALTH CARE EDUCATION/TRAINING PROGRAM

## 2025-02-15 PROCEDURE — 2500000005 HC RX 250 GENERAL PHARMACY W/O HCPCS: Performed by: NURSE PRACTITIONER

## 2025-02-15 RX ORDER — OXYCODONE HCL 5 MG/5 ML
10 SOLUTION, ORAL ORAL EVERY 6 HOURS PRN
Status: DISCONTINUED | OUTPATIENT
Start: 2025-02-15 | End: 2025-02-15

## 2025-02-15 RX ORDER — LEVOTHYROXINE SODIUM 25 UG/1
25 TABLET ORAL DAILY
Status: DISCONTINUED | OUTPATIENT
Start: 2025-02-15 | End: 2025-02-26 | Stop reason: HOSPADM

## 2025-02-15 RX ORDER — ACETAMINOPHEN 325 MG/1
650 TABLET ORAL EVERY 6 HOURS PRN
Status: DISCONTINUED | OUTPATIENT
Start: 2025-02-15 | End: 2025-02-24

## 2025-02-15 RX ORDER — ACETAMINOPHEN 500 MG
10 TABLET ORAL NIGHTLY PRN
Status: DISCONTINUED | OUTPATIENT
Start: 2025-02-15 | End: 2025-02-26 | Stop reason: HOSPADM

## 2025-02-15 RX ORDER — CALCIUM CARBONATE 200(500)MG
500 TABLET,CHEWABLE ORAL 4 TIMES DAILY PRN
Status: DISCONTINUED | OUTPATIENT
Start: 2025-02-15 | End: 2025-02-26 | Stop reason: HOSPADM

## 2025-02-15 RX ORDER — OXYCODONE HCL 5 MG/5 ML
5 SOLUTION, ORAL ORAL EVERY 6 HOURS PRN
Status: DISCONTINUED | OUTPATIENT
Start: 2025-02-15 | End: 2025-02-15

## 2025-02-15 RX ORDER — OXYCODONE HCL 5 MG/5 ML
5 SOLUTION, ORAL ORAL EVERY 6 HOURS PRN
Status: DISCONTINUED | OUTPATIENT
Start: 2025-02-15 | End: 2025-02-18

## 2025-02-15 RX ADMIN — SMOFLIPID 50 G: 6; 6; 5; 3 INJECTION, EMULSION INTRAVENOUS at 18:38

## 2025-02-15 RX ADMIN — AMLODIPINE BESYLATE 10 MG: 10 TABLET ORAL at 08:28

## 2025-02-15 RX ADMIN — OXYCODONE HYDROCHLORIDE 5 MG: 5 SOLUTION ORAL at 15:00

## 2025-02-15 RX ADMIN — ASCORBIC ACID, VITAMIN A PALMITATE, CHOLECALCIFEROL, THIAMINE HYDROCHLORIDE, RIBOFLAVIN-5 PHOSPHATE SODIUM, PYRIDOXINE HYDROCHLORIDE, NIACINAMIDE, DEXPANTHENOL, ALPHA-TOCOPHEROL ACETATE, VITAMIN K1, FOLIC ACID, BIOTIN, CYANOCOBALAMIN: 200; 3300; 200; 6; 3.6; 6; 40; 15; 10; 150; 600; 60; 5 INJECTION, SOLUTION INTRAVENOUS at 18:39

## 2025-02-15 RX ADMIN — Medication 1000 UNITS: at 08:27

## 2025-02-15 RX ADMIN — HEPARIN SODIUM 5000 UNITS: 5000 INJECTION INTRAVENOUS; SUBCUTANEOUS at 11:26

## 2025-02-15 RX ADMIN — LEVOTHYROXINE SODIUM ANHYDROUS 20 MCG: 100 INJECTION, POWDER, LYOPHILIZED, FOR SOLUTION INTRAVENOUS at 08:28

## 2025-02-15 RX ADMIN — ASPIRIN 81 MG: 81 TABLET, COATED ORAL at 08:27

## 2025-02-15 RX ADMIN — HEPARIN SODIUM 5000 UNITS: 5000 INJECTION INTRAVENOUS; SUBCUTANEOUS at 19:14

## 2025-02-15 RX ADMIN — HEPARIN SODIUM 5000 UNITS: 5000 INJECTION INTRAVENOUS; SUBCUTANEOUS at 03:15

## 2025-02-15 RX ADMIN — OXYCODONE HYDROCHLORIDE 5 MG: 5 SOLUTION ORAL at 20:35

## 2025-02-15 RX ADMIN — PANTOPRAZOLE SODIUM 40 MG: 40 INJECTION, POWDER, FOR SOLUTION INTRAVENOUS at 06:36

## 2025-02-15 ASSESSMENT — PAIN SCALES - GENERAL
PAINLEVEL_OUTOF10: 1
PAINLEVEL_OUTOF10: 2
PAINLEVEL_OUTOF10: 5 - MODERATE PAIN
PAINLEVEL_OUTOF10: 6
PAINLEVEL_OUTOF10: 2

## 2025-02-15 ASSESSMENT — COGNITIVE AND FUNCTIONAL STATUS - GENERAL
DRESSING REGULAR LOWER BODY CLOTHING: A LOT
CLIMB 3 TO 5 STEPS WITH RAILING: A LOT
PERSONAL GROOMING: A LOT
DAILY ACTIVITIY SCORE: 12
DRESSING REGULAR UPPER BODY CLOTHING: A LOT
STANDING UP FROM CHAIR USING ARMS: A LOT
WALKING IN HOSPITAL ROOM: A LOT
MOBILITY SCORE: 17
MOVING TO AND FROM BED TO CHAIR: A LITTLE
TOILETING: A LOT
EATING MEALS: A LOT
HELP NEEDED FOR BATHING: A LOT

## 2025-02-15 ASSESSMENT — PAIN - FUNCTIONAL ASSESSMENT
PAIN_FUNCTIONAL_ASSESSMENT: 0-10
PAIN_FUNCTIONAL_ASSESSMENT: 0-10

## 2025-02-15 NOTE — PROGRESS NOTES
[facility]  Progress Note    Patient: Swapnil Allen  Age/Sex: 75 y.o., male  MRN: [id number]  Date of surgery: 1/28/25  Admit Date: 1/28/2025   Code Status: Full Code  Length of Stay: 18    Interval History/Overnight Events:   S/p venting PEG 2/10, capped. Minimal output.  Feeling better overall  No nausea or emesis  Denies any fevers, chills.     Objective  02/13 1900 - 02/15 0659  In: 3165.9 [P.O.:800; I.V.:1036.7]  Out: 6200 [Urine:6200]              9.1     11.3>-----<239              28.4   137  109  34                  ----------------<177     4.5  20  1.75          Ca 9.1 Phos 2.8 Mg 1.99       ALT 19 AST 24 AlkPhos 94 tBili 0.3          Vitals:    02/15/25 0030 02/15/25 0322 02/15/25 0600 02/15/25 0821   BP: 112/65 134/79  123/78   BP Location: Right arm Right arm  Right arm   Patient Position: Sitting Sitting  Lying   Pulse: 80 82  78   Resp: 18 18  18   Temp: 36.9 °C (98.4 °F) 36.9 °C (98.4 °F)  36.5 °C (97.7 °F)   TempSrc: Temporal Temporal  Temporal   SpO2: 100% 97%  98%   Weight:   103 kg (227 lb 1.2 oz)    Height:              Medications:  Current Facility-Administered Medications   Medication Dose Route Frequency Provider Last Rate Last Admin    acetaminophen (Ofirmev) injection 650 mg  650 mg intravenous q6h PRN Deena Medina MD   Stopped at 02/12/25 0355    Adult [employer] Parenteral Nutrition Cyclic   intravenous Cyclic PN Karen Katz, APRN-CNP   Stopped at 02/15/25 0825    alteplase (Cathflo Activase) injection 2 mg  2 mg intra-catheter PRN Alexys Hood MD        amLODIPine (Norvasc) tablet 10 mg  10 mg oral Daily Alexys Hood MD   10 mg at 02/15/25 0828    aspirin EC tablet 81 mg  81 mg oral Daily Alexys Hood MD   81 mg at 02/15/25 0827    benzocaine-menthol (Cepastat Sore Throat) lozenge 1 lozenge  1 lozenge Mouth/Throat q2h PRN Ludwin Phillips MD   1 lozenge at 02/09/25 2226    bisacodyl (Dulcolax) suppository 10 mg  10 mg rectal Daily Karen Katz APRN-CNP   10 mg  at 02/14/25 0847    calcium carbonate (Tums) chewable tablet 500 mg  500 mg nasogastric tube 4x daily PRN Ludwin Phillips MD   500 mg at 02/04/25 2047    cholecalciferol (Vitamin D-3) tablet 1,000 Units  1,000 Units oral Daily Alexys Hood MD   1,000 Units at 02/15/25 0827    dextrose 50 % injection 12.5 g  12.5 g intravenous q15 min PRN Ludwin Phillips MD   12.5 g at 02/02/25 1402    pantoprazole (ProtoNix) EC tablet 40 mg  40 mg oral Daily before breakfast Elmo Monteiro MD   40 mg at 02/12/25 1023    Or    esomeprazole (NexIUM) suspension 40 mg  40 mg nasoduodenal tube Daily before breakfast Elmo Monteiro MD   40 mg at 02/09/25 0828    Or    pantoprazole (ProtoNix) injection 40 mg  40 mg intravenous Daily before breakfast Elmo Monteiro MD   40 mg at 02/15/25 0636    fat emulsion fish oil/plant based (SMOFlipid) 20 % IV infusion 50 g  250 mL intravenous Daily Lipids Deena Kilpatrick PA-C   Stopped at 02/15/25 0826    heparin (porcine) injection 5,000 Units  5,000 Units subcutaneous q8h Jannie Hamm MD   5,000 Units at 02/15/25 0315    HYDROmorphone (Dilaudid) injection 0.2 mg  0.2 mg intravenous q3h PRN Alexys Hood MD   0.2 mg at 02/14/25 1407    HYDROmorphone (Dilaudid) injection 0.4 mg  0.4 mg intravenous q4h PRN Alexys Hood MD   0.4 mg at 02/14/25 2107    insulin lispro injection 0-5 Units  0-5 Units subcutaneous q6h Alexys Hodo MD        ipratropium-albuteroL (Duo-Neb) 0.5-2.5 mg/3 mL nebulizer solution 3 mL  3 mL nebulization TID PRN Alexys Hood MD        labetaloL (Normodyne,Trandate) injection 10 mg  10 mg intravenous q8h PRN Gallo Martines MD        levothyroxine (Synthroid) injection 20 mcg  20 mcg intravenous q24h [facility] Alexys Hood MD   20 mcg at 02/15/25 0828    lidocaine (Xylocaine) 10 mg/mL (1 %) injection 5 mL  5 mL infiltration Once Alexys Hood MD        lidocaine 2 % mucosal jelly (Uro-Jet) 1 Application  1 Application nasal Once Elmo Monteiro MD         lidocaine 4 % patch 3 patch  3 patch transdermal Daily Gallo Martines MD   3 patch at 02/13/25 0848    melatonin tablet 10 mg  10 mg nasogastric tube Nightly PRN Ludwin Phillips MD   10 mg at 02/14/25 2010    ondansetron (Zofran) injection 4 mg  4 mg intravenous q6h PRN Gallo Martines MD   4 mg at 02/10/25 1509    [Held by provider] oxyCODONE (Roxicodone) immediate release tablet 10 mg  10 mg oral q6h PRN Jannie Hamm MD   10 mg at 01/29/25 0235    [Held by provider] oxyCODONE (Roxicodone) immediate release tablet 5 mg  5 mg oral q6h PRN Jannie Hamm MD   5 mg at 01/29/25 0840    phenoL (Chloraseptic) 1.4 % mouth/throat spray 1 spray  1 spray Mouth/Throat 4x daily PRN Ludwin Phillips MD   1 spray at 02/09/25 2324    [Held by provider] tamsulosin (Flomax) 24 hr capsule 0.4 mg  0.4 mg oral Daily Jannie Hamm MD   0.4 mg at 01/31/25 0820     Physical Exam                                                                                                                         Gen - No acute distress     Neuro - Alert, oriented, conversant     CV - Regular rate, normotensive      Pulm - Symmetric chest rise, non-labored breathing on room air      Abd - Abdomen is soft, mildly-distended, and appropriately tender. Midline incision c/d/i closed with glue. PEG in place with no output in bag.       Ext - Warm, well perfused     MSK- no pain or swelling in LE bilaterally     Skin - without jaunice       Psych - appropriate tone, affect      - voiding independently, no rodriguez     Imaging  No results found.     Assessment & Plan  75 y.o. 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 Postoperative course complicated by prolonged ileus vs small bowel obstruction. NGT placed 01/31. Patient now with slowly resolving ANISHA and mild stable leukocytosis. Decompressive PEG and PICC(TPN) placed 2/10.     Plan 2/15:  - Appreciate CRS recs, cap PEG tube, vent PRN in case of increased bloating/nausea/emesis   - TPN cycled at night   - HLIV  - Advance to CLD  - Follow up labs, trend WBC  -Lidocaine patch at PEG tube site due to discomfort   -Ambulate/OOB/PT  - Minimize dilaudid    Neuro:   -Pain controlled with Scheduled acetaminophen, Oxycodone 5/10. Dc dilaudid, robaxin 500mg q8hrs   -PRN melatonin for sleep aid     Resp:   -ICS  -On room air   -Appreciate RT recs    Card:   -Monitor vitals  -Amlodpine  -Aspirin   -Cholecalciferol     FEN: HLIV    GI:   - CLD    - PEG 2/10  - Enemas once daily  -BR: Miralax, senokat   -PRN Reglan for nausea   -Bisacodyl suppository   -Colorectal surgery following, appreciate recommendations  -Nutrition consulted: following TPN recommendations   -Maintain PICC for TPN    :   -HELD Flomax   -Daily BMP to monitor kidney function and replete electrolytes as indicated.   -Continue to monitor Cr     Endo:   -Levothyroxine IV while NPO  -SSI #1     Heme/ID:   -Daily CBC to monitor for acute blood loss anemia   -No indication for blood transfusion at this time   -Zosyn completed 2/14 for aspiration PNA  -Trend WBC    Ppy:   -ALEN  -SCD  -IS    Dispo:   RNF. Discharge likely Next week.     Assessment and plan discussed with chief resident Dr. Ortez and attending Dr. Ana Sofia MD, [facility]  [facility]  Adult Urology Pager: [phone]  Pediatric Urology Pager: [phone]

## 2025-02-15 NOTE — CARE PLAN
The patient's goals for the shift include    Problem: Safety - Adult  Goal: Free from fall injury  Outcome: Progressing       The clinical goals for the shift include adequate pain, ambulate

## 2025-02-15 NOTE — CARE PLAN
The patient's goals for the shift include      The clinical goals for the shift include adequate pain relief

## 2025-02-15 NOTE — PROGRESS NOTES
"COLORECTAL SURGERY   PROGRESS NOTE    Swapnil Allen  [id number]  1950    Swapnil Allen is a 75 y.o. male on day 18 of admission presenting with Renal mass. S/p open right radical nephrectomy and RP lymphadenectomy (1/28/25). Prolonged hospital course secondary to ileus vs PSBO and failure to progress.  PEG tube placed on 2/10 in IR.  Started capping PEG tube 2/13.    Subjective   No acute events overnight  No nasuea with PEG tube capping  Continues to pass flatus and have bowel movements  Pain controlled  No chest pain or SOB    Objective     Last Recorded Vitals  Blood pressure 134/79, pulse 82, temperature 36.9 °C (98.4 °F), temperature source Temporal, resp. rate 18, height 1.803 m (5' 11\"), weight 103 kg (227 lb 1.2 oz), SpO2 97%.    Physical Exam  Constitutional:       Appearance: Normal appearance.   HENT:      Head: Normocephalic and atraumatic.      Nose: Nose normal.      Mouth/Throat:      Mouth: Mucous membranes are moist.   Eyes:      Extraocular Movements: Extraocular movements intact.   Cardiovascular:      Rate and Rhythm: Normal rate.   Pulmonary:      Effort: Pulmonary effort is normal. No respiratory distress.      Breath sounds: Normal breath sounds. No wheezing.   Abdominal:      Comments: Abdomen soft, minimal distention, PEG tube upper abdomen, clamped. Bumper at 8 cm, but no laxity when tugging. Nontender to palpation except mild tenderness around PEG tube.   Musculoskeletal:         General: Normal range of motion.      Cervical back: Normal range of motion.   Skin:     General: Skin is warm and dry.   Neurological:      General: No focal deficit present.      Mental Status: He is alert.   Psychiatric:         Behavior: Behavior normal.         I/O last 2 completed shifts:  In: 2199.8 (21.4 mL/kg) [P.O.:800; I.V.:321.7 (3.1 mL/kg); IV Piggyback:115]  Out: 4800 (46.6 mL/kg) [Urine:4800 (1.9 mL/kg/hr)]  Weight: 103 kg        Relevant Results  Results for orders placed or performed during the " hospital encounter of 01/28/25 (from the past 24 hours)   POCT GLUCOSE   Result Value Ref Range    POCT Glucose 96 74 - 99 mg/dL   POCT GLUCOSE   Result Value Ref Range    POCT Glucose 97 74 - 99 mg/dL   POCT GLUCOSE   Result Value Ref Range    POCT Glucose 148 (H) 74 - 99 mg/dL   POCT GLUCOSE   Result Value Ref Range    POCT Glucose 203 (H) 74 - 99 mg/dL   CBC   Result Value Ref Range    WBC 11.3 4.4 - 11.3 x10*3/uL    nRBC 0.0 0.0 - 0.0 /100 WBCs    RBC 3.32 (L) 4.50 - 5.90 x10*6/uL    Hemoglobin 9.1 (L) 13.5 - 17.5 g/dL    Hematocrit 28.4 (L) 41.0 - 52.0 %    MCV 86 80 - 100 fL    MCH 27.4 26.0 - 34.0 pg    MCHC 32.0 32.0 - 36.0 g/dL    RDW 16.5 (H) 11.5 - 14.5 %    Platelets 239 150 - 450 x10*3/uL   Renal Function Panel   Result Value Ref Range    Glucose 177 (H) 74 - 99 mg/dL    Sodium 137 136 - 145 mmol/L    Potassium 4.5 3.5 - 5.3 mmol/L    Chloride 109 (H) 98 - 107 mmol/L    Bicarbonate 20 (L) 21 - 32 mmol/L    Anion Gap 13 10 - 20 mmol/L    Urea Nitrogen 34 (H) 6 - 23 mg/dL    Creatinine 1.75 (H) 0.50 - 1.30 mg/dL    eGFR 40 (L) >60 mL/min/1.73m*2    Calcium 9.1 8.6 - 10.6 mg/dL    Phosphorus 2.8 2.5 - 4.9 mg/dL    Albumin 2.9 (L) 3.4 - 5.0 g/dL   Magnesium   Result Value Ref Range    Magnesium 1.99 1.60 - 2.40 mg/dL         Assessment/Plan   Assessment & Plan  Renal mass    Post-op pain    BPH (benign prostatic hyperplasia)    Small bowel obstruction (Multi)    Recommendation:  75 year old male with past medical history significant for HTN, CVA/TIA 2020, anemia, CKD, hx of substance abuse, Hep C who presented for Open right nephrectomy for renal mass.  His post operative course was complicated by PSB and failure to have return of bowel function.  He underwent PEG placement by IR on 2/10/25.  He continues with PEG tube to gravity and high output of bilious liquid from PEG.  He has appropriate bowel function. Would recommend capping PEG tube and uncapping if patient become symptomatic ( nauseous, having  emesis, distended). Patient now consistently having bowel movements. PEG tube capped starting 2/13 and patient tolerating without any nausea or vomiting.    - Recommend discontinuing IV pain meds and transition to PO  - Recommend minimal narcotic administration  - Recommend discontinuing suppositories and enemas  - Recommend continue capping PEG tube as tolerated. Can uncap and place PEG tube to gravity if patient develops nausea / vomiting / abd distention  - Cycle TPN, continue clears for comfort  - Recommend dispo planning home with HC for TPN, diet for comfort, no bowel regimen  - Dr Tracey to see patient in outpatient clinic in 2 weeks, will make appointment.    Discussed with Dr Alexy Hyatt MD  PGY4 General Surgery  Colorectal Surgery [phone]

## 2025-02-16 VITALS
WEIGHT: 227.07 LBS | DIASTOLIC BLOOD PRESSURE: 77 MMHG | HEART RATE: 94 BPM | OXYGEN SATURATION: 99 % | TEMPERATURE: 98.1 F | SYSTOLIC BLOOD PRESSURE: 109 MMHG | RESPIRATION RATE: 16 BRPM | BODY MASS INDEX: 31.79 KG/M2 | HEIGHT: 71 IN

## 2025-02-16 LAB
ALBUMIN SERPL BCP-MCNC: 2.9 G/DL (ref 3.4–5)
ANION GAP SERPL CALC-SCNC: 14 MMOL/L (ref 10–20)
BUN SERPL-MCNC: 31 MG/DL (ref 6–23)
CALCIUM SERPL-MCNC: 9 MG/DL (ref 8.6–10.6)
CHLORIDE SERPL-SCNC: 110 MMOL/L (ref 98–107)
CO2 SERPL-SCNC: 20 MMOL/L (ref 21–32)
CREAT SERPL-MCNC: 1.52 MG/DL (ref 0.5–1.3)
EGFRCR SERPLBLD CKD-EPI 2021: 47 ML/MIN/1.73M*2
ERYTHROCYTE [DISTWIDTH] IN BLOOD BY AUTOMATED COUNT: 16.2 % (ref 11.5–14.5)
GLUCOSE BLD MANUAL STRIP-MCNC: 100 MG/DL (ref 74–99)
GLUCOSE BLD MANUAL STRIP-MCNC: 157 MG/DL (ref 74–99)
GLUCOSE BLD MANUAL STRIP-MCNC: 185 MG/DL (ref 74–99)
GLUCOSE BLD MANUAL STRIP-MCNC: 98 MG/DL (ref 74–99)
GLUCOSE SERPL-MCNC: 92 MG/DL (ref 74–99)
HCT VFR BLD AUTO: 28.3 % (ref 41–52)
HGB BLD-MCNC: 9.3 G/DL (ref 13.5–17.5)
MAGNESIUM SERPL-MCNC: 1.98 MG/DL (ref 1.6–2.4)
MCH RBC QN AUTO: 27.8 PG (ref 26–34)
MCHC RBC AUTO-ENTMCNC: 32.9 G/DL (ref 32–36)
MCV RBC AUTO: 85 FL (ref 80–100)
NRBC BLD-RTO: 0 /100 WBCS (ref 0–0)
PHOSPHATE SERPL-MCNC: 2.6 MG/DL (ref 2.5–4.9)
PLATELET # BLD AUTO: 186 X10*3/UL (ref 150–450)
POTASSIUM SERPL-SCNC: 4.8 MMOL/L (ref 3.5–5.3)
RBC # BLD AUTO: 3.34 X10*6/UL (ref 4.5–5.9)
SODIUM SERPL-SCNC: 139 MMOL/L (ref 136–145)
WBC # BLD AUTO: 11.3 X10*3/UL (ref 4.4–11.3)

## 2025-02-16 PROCEDURE — 2500000001 HC RX 250 WO HCPCS SELF ADMINISTERED DRUGS (ALT 637 FOR MEDICARE OP)

## 2025-02-16 PROCEDURE — 85027 COMPLETE CBC AUTOMATED: CPT | Performed by: STUDENT IN AN ORGANIZED HEALTH CARE EDUCATION/TRAINING PROGRAM

## 2025-02-16 PROCEDURE — 2500000002 HC RX 250 W HCPCS SELF ADMINISTERED DRUGS (ALT 637 FOR MEDICARE OP, ALT 636 FOR OP/ED)

## 2025-02-16 PROCEDURE — 80069 RENAL FUNCTION PANEL: CPT | Performed by: STUDENT IN AN ORGANIZED HEALTH CARE EDUCATION/TRAINING PROGRAM

## 2025-02-16 PROCEDURE — 84100 ASSAY OF PHOSPHORUS: CPT | Performed by: STUDENT IN AN ORGANIZED HEALTH CARE EDUCATION/TRAINING PROGRAM

## 2025-02-16 PROCEDURE — 83735 ASSAY OF MAGNESIUM: CPT

## 2025-02-16 PROCEDURE — 1170000001 HC PRIVATE ONCOLOGY ROOM DAILY

## 2025-02-16 PROCEDURE — 2500000004 HC RX 250 GENERAL PHARMACY W/ HCPCS (ALT 636 FOR OP/ED)

## 2025-02-16 PROCEDURE — 2500000005 HC RX 250 GENERAL PHARMACY W/O HCPCS: Performed by: NURSE PRACTITIONER

## 2025-02-16 PROCEDURE — 82947 ASSAY GLUCOSE BLOOD QUANT: CPT

## 2025-02-16 PROCEDURE — 2580000001 HC RX 258 IV SOLUTIONS

## 2025-02-16 PROCEDURE — 2500000004 HC RX 250 GENERAL PHARMACY W/ HCPCS (ALT 636 FOR OP/ED): Performed by: STUDENT IN AN ORGANIZED HEALTH CARE EDUCATION/TRAINING PROGRAM

## 2025-02-16 RX ORDER — ZINC OXIDE 20 G/100G
1 OINTMENT TOPICAL
Status: DISCONTINUED | OUTPATIENT
Start: 2025-02-16 | End: 2025-02-26 | Stop reason: HOSPADM

## 2025-02-16 RX ADMIN — HEPARIN SODIUM 5000 UNITS: 5000 INJECTION INTRAVENOUS; SUBCUTANEOUS at 03:55

## 2025-02-16 RX ADMIN — INSULIN LISPRO 1 UNITS: 100 INJECTION, SOLUTION INTRAVENOUS; SUBCUTANEOUS at 00:58

## 2025-02-16 RX ADMIN — OXYCODONE HYDROCHLORIDE 5 MG: 5 SOLUTION ORAL at 09:40

## 2025-02-16 RX ADMIN — PANTOPRAZOLE SODIUM 40 MG: 40 INJECTION, POWDER, FOR SOLUTION INTRAVENOUS at 06:43

## 2025-02-16 RX ADMIN — AMLODIPINE BESYLATE 10 MG: 10 TABLET ORAL at 09:37

## 2025-02-16 RX ADMIN — HEPARIN SODIUM 5000 UNITS: 5000 INJECTION INTRAVENOUS; SUBCUTANEOUS at 12:28

## 2025-02-16 RX ADMIN — HEPARIN SODIUM 5000 UNITS: 5000 INJECTION INTRAVENOUS; SUBCUTANEOUS at 19:52

## 2025-02-16 RX ADMIN — LEVOTHYROXINE SODIUM 25 MCG: 25 TABLET ORAL at 06:43

## 2025-02-16 RX ADMIN — Medication 10 MG: at 19:52

## 2025-02-16 RX ADMIN — SMOFLIPID 50 G: 6; 6; 5; 3 INJECTION, EMULSION INTRAVENOUS at 19:52

## 2025-02-16 RX ADMIN — ASCORBIC ACID, VITAMIN A PALMITATE, CHOLECALCIFEROL, THIAMINE HYDROCHLORIDE, RIBOFLAVIN-5 PHOSPHATE SODIUM, PYRIDOXINE HYDROCHLORIDE, NIACINAMIDE, DEXPANTHENOL, ALPHA-TOCOPHEROL ACETATE, VITAMIN K1, FOLIC ACID, BIOTIN, CYANOCOBALAMIN: 200; 3300; 200; 6; 3.6; 6; 40; 15; 10; 150; 600; 60; 5 INJECTION, SOLUTION INTRAVENOUS at 19:52

## 2025-02-16 RX ADMIN — INSULIN LISPRO 1 UNITS: 100 INJECTION, SOLUTION INTRAVENOUS; SUBCUTANEOUS at 14:06

## 2025-02-16 RX ADMIN — OXYCODONE HYDROCHLORIDE 5 MG: 5 SOLUTION ORAL at 19:52

## 2025-02-16 RX ADMIN — Medication 1000 UNITS: at 09:37

## 2025-02-16 RX ADMIN — ASPIRIN 81 MG: 81 TABLET, COATED ORAL at 09:37

## 2025-02-16 ASSESSMENT — COGNITIVE AND FUNCTIONAL STATUS - GENERAL
HELP NEEDED FOR BATHING: A LOT
CLIMB 3 TO 5 STEPS WITH RAILING: A LOT
HELP NEEDED FOR BATHING: A LOT
STANDING UP FROM CHAIR USING ARMS: A LOT
WALKING IN HOSPITAL ROOM: A LOT
CLIMB 3 TO 5 STEPS WITH RAILING: A LOT
TOILETING: A LOT
DAILY ACTIVITIY SCORE: 12
EATING MEALS: A LOT
MOVING TO AND FROM BED TO CHAIR: A LITTLE
WALKING IN HOSPITAL ROOM: A LOT
DRESSING REGULAR UPPER BODY CLOTHING: A LOT
MOVING TO AND FROM BED TO CHAIR: A LITTLE
PERSONAL GROOMING: A LOT
MOBILITY SCORE: 17
EATING MEALS: A LOT
DRESSING REGULAR LOWER BODY CLOTHING: A LOT
DAILY ACTIVITIY SCORE: 12
MOBILITY SCORE: 17
DRESSING REGULAR UPPER BODY CLOTHING: A LOT
STANDING UP FROM CHAIR USING ARMS: A LOT
DRESSING REGULAR LOWER BODY CLOTHING: A LOT
PERSONAL GROOMING: A LOT
TOILETING: A LOT

## 2025-02-16 ASSESSMENT — PAIN SCALES - GENERAL
PAINLEVEL_OUTOF10: 10 - WORST POSSIBLE PAIN
PAINLEVEL_OUTOF10: 5 - MODERATE PAIN
PAINLEVEL_OUTOF10: 1
PAINLEVEL_OUTOF10: 0 - NO PAIN

## 2025-02-16 ASSESSMENT — PAIN - FUNCTIONAL ASSESSMENT
PAIN_FUNCTIONAL_ASSESSMENT: 0-10

## 2025-02-16 NOTE — PROGRESS NOTES
[facility]  Progress Note    Patient: Swapnil Allen  Age/Sex: 75 y.o., male  MRN: [id number]  Date of surgery: 1/28/25  Admit Date: 1/28/2025   Code Status: Full Code  Length of Stay: 19    Interval History/Overnight Events:   PEG capped. Minimal output.  No nausea or emesis  Denies any fevers, chills.     Objective  02/14 1900 - 02/16 0659  In: 200 [P.O.:200]  Out: 5025 [Urine:4775]              9.3     11.3>-----<186              28.3   137  109  34                  ----------------<177     4.5  20  1.75          Ca 9.1 Phos 2.8 Mg 1.98       ALT 19 AST 24 AlkPhos 94 tBili 0.3          Vitals:    02/15/25 1545 02/15/25 1945 02/16/25 0100 02/16/25 0442   BP: 103/63 135/87 125/75 136/74   BP Location: Right arm Right arm Right arm Right arm   Patient Position: Sitting Sitting Lying Lying   Pulse: 75 81 78 82   Resp: 18 18 16 18   Temp: 36.6 °C (97.9 °F) 36.7 °C (98.1 °F) 36.5 °C (97.7 °F) 36.7 °C (98.1 °F)   TempSrc: Temporal Temporal Temporal Temporal   SpO2: 97% 97% 96% 98%   Weight:       Height:              Physical Exam                                                                                                                         Gen - No acute distress     Neuro - Alert, oriented, conversant     CV - Regular rate, normotensive      Pulm - Symmetric chest rise, non-labored breathing on room air      Abd - Abdomen is soft, mildly-distended, and appropriately tender. Midline incision c/d/i closed with glue. PEG in place with no output in bag.       Ext - Warm, well perfused     MSK- no pain or swelling in LE bilaterally     Skin - without jaunice       Psych - appropriate tone, affect      - voiding independently, no rodriguez     Imaging  No results found.     Assessment & Plan  75 y.o. male with a history ofHTN, CVA/TIA 2020 (following cocaine use, on asa), anemia (hgb 12.2), CKD (baseline sCr 1.4-1.5), thyrotoxicosis s/p partial thyroidectomy, left parotid mass c/w warthins tumor, hep C  (treated with epclusa 9/2024), hx of substance use (quit 2020), and BPH w/LUTS , RCC now s/p Right Radical Nephrectomy, Retroperitoneal Lymph Node Dissection on 1/28/25. Postoperative course complicated by prolonged ileus vs small bowel obstruction. NGT placed 01/31. Patient now with slowly resolving ANISHA and mild stable leukocytosis. Decompressive PEG and PICC(TPN) placed 2/10.     Plan 2/16:  - Advance to FLD    Neuro:   -Pain controlled with Scheduled acetaminophen, Oxycodone 5/10. Dc dilaudid, robaxin 500mg q8hrs   -PRN melatonin for sleep aid     Resp:   -ICS  -On room air   -Appreciate RT recs    Card:   -Monitor vitals  -Amlodpine  -Aspirin   -Cholecalciferol     FEN: HLIV    GI:   - CLD    - PEG 2/10  - Enemas once daily  -BR: Miralax, senokat   -PRN Reglan for nausea   -Bisacodyl suppository   -Colorectal surgery following, appreciate recommendations  -Nutrition consulted: following TPN recommendations   -Maintain PICC for TPN    :   -HELD Flomax   -Daily BMP to monitor kidney function and replete electrolytes as indicated.   -Continue to monitor Cr     Endo:   -Levothyroxine IV while NPO  -SSI #1     Heme/ID:   -Daily CBC to monitor for acute blood loss anemia   -No indication for blood transfusion at this time   -Zosyn completed 2/14 for aspiration PNA  -Trend WBC    Ppy:   -ALEN  -SCD  -IS    Dispo:   RNF. Discharge likely Next week.     Seen and d/w with chief resident Dr. Ortez and attending Dr. Ana Martines MD  Urology PGY-3  Pager: [phone]

## 2025-02-16 NOTE — PROGRESS NOTES
"COLORECTAL SURGERY   PROGRESS NOTE    Swapnil Allen  [id number]  1950    Swapnil Allen is a 75 y.o. male on day 19 of admission presenting with Renal mass. S/p open right radical nephrectomy and RP lymphadenectomy (1/28/25). Prolonged hospital course secondary to ileus vs PSBO and failure to progress.  PEG tube placed on 2/10 in IR.  Started capping PEG tube 2/13.    Subjective   No acute events overnight  No nasuea. PEG uncapped at some point for unclear reasons.  Continues to pass flatus and have bowel movement daily  Pain controlled  No chest pain or SOB    Objective     Last Recorded Vitals  Blood pressure 136/74, pulse 82, temperature 36.7 °C (98.1 °F), temperature source Temporal, resp. rate 18, height 1.803 m (5' 11\"), weight 103 kg (227 lb 1.2 oz), SpO2 98%.    Physical Exam  Constitutional:       Appearance: Normal appearance.   HENT:      Head: Normocephalic and atraumatic.      Nose: Nose normal.      Mouth/Throat:      Mouth: Mucous membranes are moist.   Eyes:      Extraocular Movements: Extraocular movements intact.   Cardiovascular:      Rate and Rhythm: Normal rate.   Pulmonary:      Effort: Pulmonary effort is normal. No respiratory distress.      Breath sounds: Normal breath sounds. No wheezing.   Abdominal:      Comments: Abdomen soft, minimal distention, PEG tube upper abdomen, clamped. Bumper at 8 cm, but no laxity when tugging. Nontender to palpation except minimal tenderness around PEG tube.   Musculoskeletal:         General: Normal range of motion.      Cervical back: Normal range of motion.   Skin:     General: Skin is warm and dry.   Neurological:      General: No focal deficit present.      Mental Status: He is alert.   Psychiatric:         Behavior: Behavior normal.         I/O last 2 completed shifts:  In: 200 (1.9 mL/kg) [P.O.:200]  Out: 1525 (14.8 mL/kg) [Urine:1275 (0.5 mL/kg/hr); Emesis/NG output:250]  Weight: 103 kg        Relevant Results  Results for orders placed or performed " during the hospital encounter of 01/28/25 (from the past 24 hours)   POCT GLUCOSE   Result Value Ref Range    POCT Glucose 145 (H) 74 - 99 mg/dL   POCT GLUCOSE   Result Value Ref Range    POCT Glucose 119 (H) 74 - 99 mg/dL   POCT GLUCOSE   Result Value Ref Range    POCT Glucose 185 (H) 74 - 99 mg/dL   POCT GLUCOSE   Result Value Ref Range    POCT Glucose 98 74 - 99 mg/dL   CBC   Result Value Ref Range    WBC 11.3 4.4 - 11.3 x10*3/uL    nRBC 0.0 0.0 - 0.0 /100 WBCs    RBC 3.34 (L) 4.50 - 5.90 x10*6/uL    Hemoglobin 9.3 (L) 13.5 - 17.5 g/dL    Hematocrit 28.3 (L) 41.0 - 52.0 %    MCV 85 80 - 100 fL    MCH 27.8 26.0 - 34.0 pg    MCHC 32.9 32.0 - 36.0 g/dL    RDW 16.2 (H) 11.5 - 14.5 %    Platelets 186 150 - 450 x10*3/uL   Magnesium   Result Value Ref Range    Magnesium 1.98 1.60 - 2.40 mg/dL         Assessment/Plan   Assessment & Plan  Renal mass    Post-op pain    BPH (benign prostatic hyperplasia)    Small bowel obstruction (Multi)    Recommendation:  75 year old male with past medical history significant for HTN, CVA/TIA 2020, anemia, CKD, hx of substance abuse, Hep C who presented for Open right nephrectomy for renal mass.  His post operative course was complicated by PSB and failure to have return of bowel function.  He underwent PEG placement by IR on 2/10/25.  He continues with PEG tube to gravity and high output of bilious liquid from PEG.  He has appropriate bowel function. Would recommend capping PEG tube and uncapping if patient become symptomatic ( nauseous, having emesis, distended). Patient now consistently having bowel movements. PEG tube capped starting 2/13 and patient tolerating without any nausea or vomiting.    - Recommend minimal narcotic administration  - Recommend continue capping PEG tube as tolerated. Can uncap and place PEG tube to gravity if patient develops nausea / vomiting / abd distention  - Cycle TPN, continue clears for comfort, ok to advance per primary  - Recommend dispo planning home  with HC for TPN, diet for comfort, no bowel regimen  - Dr Tracey to see patient in outpatient clinic in 2 weeks, will make appointment.  - Will remain available. Remove PEG tube no sooner than 6 weeks from placement    Discussed with Dr. Alexy Hyatt MD  PGY4 General Surgery  Colorectal Surgery [phone]

## 2025-02-16 NOTE — CARE PLAN
The patient's goals for the shift include      The clinical goals for the shift include VS stability and pain control throughout shift      Problem: Pain - Adult  Goal: Verbalizes/displays adequate comfort level or baseline comfort level  Outcome: Progressing     Problem: Safety - Adult  Goal: Free from fall injury  Outcome: Progressing     Problem: Discharge Planning  Goal: Discharge to home or other facility with appropriate resources  Outcome: Progressing     Problem: Nutrition  Goal: Nutrient intake appropriate for maintaining nutritional needs  Outcome: Progressing     Problem: Skin  Goal: Prevent/manage excess moisture  Outcome: Progressing  Goal: Prevent/minimize sheer/friction injuries  Outcome: Progressing  Goal: Promote/optimize nutrition  Outcome: Progressing     Problem: Pain  Goal: Takes deep breaths with improved pain control throughout the shift  Outcome: Progressing  Goal: Turns in bed with improved pain control throughout the shift  Outcome: Progressing  Goal: Walks with improved pain control throughout the shift  Outcome: Progressing  Goal: Performs ADL's with improved pain control throughout shift  Outcome: Progressing  Goal: Participates in PT with improved pain control throughout the shift  Outcome: Progressing  Goal: Free from opioid side effects throughout the shift  Outcome: Progressing  Goal: Free from acute confusion related to pain meds throughout the shift  Outcome: Progressing

## 2025-02-17 LAB
ALBUMIN SERPL BCP-MCNC: 3.2 G/DL (ref 3.4–5)
ALBUMIN SERPL BCP-MCNC: 3.2 G/DL (ref 3.4–5)
ALP SERPL-CCNC: 106 U/L (ref 33–136)
ALT SERPL W P-5'-P-CCNC: 52 U/L (ref 10–52)
ANION GAP SERPL CALC-SCNC: 14 MMOL/L (ref 10–20)
AST SERPL W P-5'-P-CCNC: 30 U/L (ref 9–39)
BILIRUB DIRECT SERPL-MCNC: 0.1 MG/DL (ref 0–0.3)
BILIRUB SERPL-MCNC: 0.3 MG/DL (ref 0–1.2)
BUN SERPL-MCNC: 35 MG/DL (ref 6–23)
CALCIUM SERPL-MCNC: 9.1 MG/DL (ref 8.6–10.6)
CHLORIDE SERPL-SCNC: 107 MMOL/L (ref 98–107)
CO2 SERPL-SCNC: 18 MMOL/L (ref 21–32)
CREAT SERPL-MCNC: 1.76 MG/DL (ref 0.5–1.3)
EGFRCR SERPLBLD CKD-EPI 2021: 40 ML/MIN/1.73M*2
ERYTHROCYTE [DISTWIDTH] IN BLOOD BY AUTOMATED COUNT: 16.9 % (ref 11.5–14.5)
GLUCOSE BLD MANUAL STRIP-MCNC: 102 MG/DL (ref 74–99)
GLUCOSE BLD MANUAL STRIP-MCNC: 110 MG/DL (ref 74–99)
GLUCOSE BLD MANUAL STRIP-MCNC: 158 MG/DL (ref 74–99)
GLUCOSE BLD MANUAL STRIP-MCNC: 158 MG/DL (ref 74–99)
GLUCOSE BLD MANUAL STRIP-MCNC: 164 MG/DL (ref 74–99)
GLUCOSE SERPL-MCNC: 136 MG/DL (ref 74–99)
HCT VFR BLD AUTO: 29.1 % (ref 41–52)
HGB BLD-MCNC: 9.1 G/DL (ref 13.5–17.5)
MAGNESIUM SERPL-MCNC: 1.91 MG/DL (ref 1.6–2.4)
MCH RBC QN AUTO: 27.3 PG (ref 26–34)
MCHC RBC AUTO-ENTMCNC: 31.3 G/DL (ref 32–36)
MCV RBC AUTO: 87 FL (ref 80–100)
NRBC BLD-RTO: 0 /100 WBCS (ref 0–0)
PHOSPHATE SERPL-MCNC: 3.1 MG/DL (ref 2.5–4.9)
PLATELET # BLD AUTO: 118 X10*3/UL (ref 150–450)
POTASSIUM SERPL-SCNC: 4.6 MMOL/L (ref 3.5–5.3)
PROT SERPL-MCNC: 8 G/DL (ref 6.4–8.2)
RBC # BLD AUTO: 3.33 X10*6/UL (ref 4.5–5.9)
SODIUM SERPL-SCNC: 134 MMOL/L (ref 136–145)
TRIGL SERPL-MCNC: 220 MG/DL (ref 0–149)
WBC # BLD AUTO: 12.8 X10*3/UL (ref 4.4–11.3)

## 2025-02-17 PROCEDURE — 82947 ASSAY GLUCOSE BLOOD QUANT: CPT

## 2025-02-17 PROCEDURE — 97110 THERAPEUTIC EXERCISES: CPT | Mod: GP,CQ

## 2025-02-17 PROCEDURE — 84100 ASSAY OF PHOSPHORUS: CPT | Performed by: STUDENT IN AN ORGANIZED HEALTH CARE EDUCATION/TRAINING PROGRAM

## 2025-02-17 PROCEDURE — 2500000001 HC RX 250 WO HCPCS SELF ADMINISTERED DRUGS (ALT 637 FOR MEDICARE OP)

## 2025-02-17 PROCEDURE — 84478 ASSAY OF TRIGLYCERIDES: CPT

## 2025-02-17 PROCEDURE — 2500000004 HC RX 250 GENERAL PHARMACY W/ HCPCS (ALT 636 FOR OP/ED): Performed by: STUDENT IN AN ORGANIZED HEALTH CARE EDUCATION/TRAINING PROGRAM

## 2025-02-17 PROCEDURE — 83735 ASSAY OF MAGNESIUM: CPT

## 2025-02-17 PROCEDURE — 2500000004 HC RX 250 GENERAL PHARMACY W/ HCPCS (ALT 636 FOR OP/ED)

## 2025-02-17 PROCEDURE — 82040 ASSAY OF SERUM ALBUMIN: CPT

## 2025-02-17 PROCEDURE — 2500000005 HC RX 250 GENERAL PHARMACY W/O HCPCS: Performed by: NURSE PRACTITIONER

## 2025-02-17 PROCEDURE — 85027 COMPLETE CBC AUTOMATED: CPT | Performed by: STUDENT IN AN ORGANIZED HEALTH CARE EDUCATION/TRAINING PROGRAM

## 2025-02-17 PROCEDURE — 1170000001 HC PRIVATE ONCOLOGY ROOM DAILY

## 2025-02-17 PROCEDURE — 97116 GAIT TRAINING THERAPY: CPT | Mod: GP,CQ

## 2025-02-17 PROCEDURE — 99222 1ST HOSP IP/OBS MODERATE 55: CPT | Performed by: STUDENT IN AN ORGANIZED HEALTH CARE EDUCATION/TRAINING PROGRAM

## 2025-02-17 PROCEDURE — 2580000001 HC RX 258 IV SOLUTIONS

## 2025-02-17 PROCEDURE — 2500000001 HC RX 250 WO HCPCS SELF ADMINISTERED DRUGS (ALT 637 FOR MEDICARE OP): Performed by: NURSE PRACTITIONER

## 2025-02-17 RX ORDER — ZINC OXIDE 20 G/100G
1 OINTMENT TOPICAL
Start: 2025-02-17

## 2025-02-17 RX ORDER — ACETAMINOPHEN 325 MG/1
650 TABLET ORAL EVERY 6 HOURS PRN
Start: 2025-02-17 | End: 2025-02-24 | Stop reason: HOSPADM

## 2025-02-17 RX ORDER — CALCIUM CARBONATE 200(500)MG
500 TABLET,CHEWABLE ORAL 4 TIMES DAILY PRN
Start: 2025-02-17

## 2025-02-17 RX ADMIN — AMLODIPINE BESYLATE 10 MG: 10 TABLET ORAL at 09:34

## 2025-02-17 RX ADMIN — ASCORBIC ACID, VITAMIN A PALMITATE, CHOLECALCIFEROL, THIAMINE HYDROCHLORIDE, RIBOFLAVIN-5 PHOSPHATE SODIUM, PYRIDOXINE HYDROCHLORIDE, NIACINAMIDE, DEXPANTHENOL, ALPHA-TOCOPHEROL ACETATE, VITAMIN K1, FOLIC ACID, BIOTIN, CYANOCOBALAMIN: 200; 3300; 200; 6; 3.6; 6; 40; 15; 10; 150; 600; 60; 5 INJECTION, SOLUTION INTRAVENOUS at 20:33

## 2025-02-17 RX ADMIN — HEPARIN SODIUM 5000 UNITS: 5000 INJECTION INTRAVENOUS; SUBCUTANEOUS at 13:00

## 2025-02-17 RX ADMIN — HEPARIN SODIUM 5000 UNITS: 5000 INJECTION INTRAVENOUS; SUBCUTANEOUS at 03:21

## 2025-02-17 RX ADMIN — SMOFLIPID 50 G: 6; 6; 5; 3 INJECTION, EMULSION INTRAVENOUS at 20:33

## 2025-02-17 RX ADMIN — OXYCODONE HYDROCHLORIDE 5 MG: 5 SOLUTION ORAL at 03:28

## 2025-02-17 RX ADMIN — PIPERACILLIN SODIUM AND TAZOBACTAM SODIUM 3.38 G: 3; .375 INJECTION, SOLUTION INTRAVENOUS at 14:40

## 2025-02-17 RX ADMIN — ASPIRIN 81 MG: 81 TABLET, COATED ORAL at 09:35

## 2025-02-17 RX ADMIN — HEPARIN SODIUM 5000 UNITS: 5000 INJECTION INTRAVENOUS; SUBCUTANEOUS at 20:33

## 2025-02-17 RX ADMIN — LEVOTHYROXINE SODIUM 25 MCG: 25 TABLET ORAL at 05:30

## 2025-02-17 RX ADMIN — Medication 10 MG: at 21:41

## 2025-02-17 RX ADMIN — PANTOPRAZOLE SODIUM 40 MG: 40 INJECTION, POWDER, FOR SOLUTION INTRAVENOUS at 05:30

## 2025-02-17 RX ADMIN — OXYCODONE HYDROCHLORIDE 5 MG: 5 SOLUTION ORAL at 21:42

## 2025-02-17 RX ADMIN — Medication 1000 UNITS: at 09:34

## 2025-02-17 RX ADMIN — BISACODYL 10 MG: 10 SUPPOSITORY RECTAL at 09:44

## 2025-02-17 ASSESSMENT — COGNITIVE AND FUNCTIONAL STATUS - GENERAL
DRESSING REGULAR UPPER BODY CLOTHING: A LITTLE
STANDING UP FROM CHAIR USING ARMS: A LITTLE
TOILETING: A LITTLE
WALKING IN HOSPITAL ROOM: A LITTLE
HELP NEEDED FOR BATHING: A LITTLE
MOBILITY SCORE: 19
WALKING IN HOSPITAL ROOM: A LITTLE
TURNING FROM BACK TO SIDE WHILE IN FLAT BAD: A LITTLE
DRESSING REGULAR LOWER BODY CLOTHING: A LITTLE
MOBILITY SCORE: 19
CLIMB 3 TO 5 STEPS WITH RAILING: A LITTLE
PERSONAL GROOMING: A LITTLE
PERSONAL GROOMING: A LITTLE
EATING MEALS: A LITTLE
CLIMB 3 TO 5 STEPS WITH RAILING: A LITTLE
MOVING TO AND FROM BED TO CHAIR: A LITTLE
CLIMB 3 TO 5 STEPS WITH RAILING: A LOT
DRESSING REGULAR LOWER BODY CLOTHING: A LITTLE
TURNING FROM BACK TO SIDE WHILE IN FLAT BAD: A LOT
MOVING TO AND FROM BED TO CHAIR: A LITTLE
EATING MEALS: A LITTLE
STANDING UP FROM CHAIR USING ARMS: A LITTLE
DAILY ACTIVITIY SCORE: 18
DAILY ACTIVITIY SCORE: 18
MOBILITY SCORE: 16
MOVING TO AND FROM BED TO CHAIR: A LITTLE
MOVING FROM LYING ON BACK TO SITTING ON SIDE OF FLAT BED WITH BEDRAILS: A LITTLE
WALKING IN HOSPITAL ROOM: A LITTLE
TOILETING: A LITTLE
DRESSING REGULAR UPPER BODY CLOTHING: A LITTLE
TURNING FROM BACK TO SIDE WHILE IN FLAT BAD: A LITTLE
HELP NEEDED FOR BATHING: A LITTLE
STANDING UP FROM CHAIR USING ARMS: A LITTLE

## 2025-02-17 ASSESSMENT — PAIN - FUNCTIONAL ASSESSMENT
PAIN_FUNCTIONAL_ASSESSMENT: 0-10

## 2025-02-17 ASSESSMENT — PAIN SCALES - GENERAL
PAINLEVEL_OUTOF10: 0 - NO PAIN
PAINLEVEL_OUTOF10: 6
PAINLEVEL_OUTOF10: 6
PAINLEVEL_OUTOF10: 0 - NO PAIN
PAINLEVEL_OUTOF10: 0 - NO PAIN

## 2025-02-17 NOTE — PROGRESS NOTES
[facility]  Progress Note    Patient: Swapnil Allen  Age/Sex: 75 y.o., male  MRN: [id number]  Date of surgery: 1/28/25  Admit Date: 1/28/2025   Code Status: Full Code  Length of Stay: 20    Interval History/Overnight Events:   NAONE, sitting in chair on rounds. Feeling fair   PEG capped. Minimal output.  No nausea or emesis, tolerated diet   Denies any fevers, chills.   +BMs  WBC cecilia from 11.3 to 12.8    Objective  02/15 1900 - 02/17 0659  In: 820 [P.O.:820]  Out: 2350 [Urine:2100]              9.1     12.8>-----<118              29.1   139  110  31                  ----------------<92     4.8  20  1.52          Ca 9.0 Phos 2.6 Mg 1.91       ALT 19 AST 24 AlkPhos 94 tBili 0.3          Vitals:    02/17/25 0000 02/17/25 0332 02/17/25 0600 02/17/25 0751   BP: 110/60 113/72  120/72   BP Location: Right arm Right arm  Right arm   Patient Position: Sitting Sitting  Sitting   Pulse: 95 86  85   Resp: 18 18  18   Temp: 36.6 °C (97.9 °F) 36.7 °C (98.1 °F)  36.3 °C (97.3 °F)   TempSrc: Temporal Temporal  Temporal   SpO2: 100% 98%  96%   Weight:   103 kg (227 lb 1.2 oz)    Height:              Physical Exam                                                                                                                         Gen - No acute distress, sitting in chair      Neuro - Alert, oriented, conversant     CV - Regular rate, normotensive      Pulm - Symmetric chest rise, non-labored breathing on room air      Abd - Abdomen is soft, non-distended, and appropriately tender. Midline incision c/d/i closed with glue. PEG capped.      Ext - Warm, well perfused     MSK- no pain or swelling in LE bilaterally     Skin - without jaunice       Psych - appropriate tone, affect      - voiding independently, no rodriguez     Imaging  No results found.     Assessment & Plan  75 y.o. 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 Postoperative course complicated by prolonged ileus vs small bowel obstruction. NGT placed 01/31. Patient now with slowly resolving ANISHA and mild stable leukocytosis. Decompressive PEG and PICC(TPN) placed 2/10.     Plan 2/17:  - Continue to FLD  - Will  stay today due to increased WBC, will discuss with ID regarding antibiotic plan   - Likely discharge tomorrow     Neuro:   -Pain controlled with PRN acetaminophen, Oxycodone 5/10. Lidocaine patches  -PRN melatonin for sleep aid     Resp:   -ICS  -On room air   -Appreciate RT recs  -PRN duo neb for wheezing   -PRN phenol for discomfort     Card:   -Monitor vitals  -Amlodpine  -Aspirin   -Cholecalciferol   -PRN labetalol with hold parameters       GI:   - FLD    - PEG 2/10  - IV PPI  - Enemas once daily  -BR: Miralax, senokat   -Vitamin D3  -PRN Zofran for nausea   -Bisacodyl suppository   -Colorectal surgery following, appreciate recommendations  -Nutrition consulted: following TPN recommendations   -Maintain PICC for TPN  - PRN Tums   - PRN benzocaine-menthol (Cepastat Sore Throat) lozenge 1 lozenge      :   -HELD Flomax   -Daily BMP to monitor kidney function and replete electrolytes as indicated.   -Continue to monitor Cr     Endo:   -Levothyroxine PO  -SSI #1     Heme/ID:   -Daily CBC to monitor for acute blood loss anemia   -No indication for blood transfusion at this time   -Zosyn completed 2/14 for aspiration PNA  -Trend WBC    Ppy:   -ALEN  -SCD  -IS    Dispo:   RNF. Likely discharge tomorrow 2/18    Seen and d/w with chief resident Dr. Hamm and attending Dr. Ana Xiao MD, RTA   [facility]   Pager: [phone]

## 2025-02-17 NOTE — PROGRESS NOTES
"Nutrition Initial Assessment:   Nutrition Assessment    The patient is a 75 y.o. male who is hospital day #20.   s/p planned Right Radical Nephrectomy, Retroperitoneal Lymph Node Dissection on 1/28/25 d/t RCC. Course complicated by ileus.   Since last RD visit: pt had decompressive PEG placed. Clamped since 02/13. Having bowel movements. Working on dispo: initially home with HC but now pt may be discharging to SNF. Rising WBC - plan to adjust abx.     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  Pt was transitioned to cyclic TPN on 02/12. Continues to be on cyclic TPN. Pt is on a FLD since 02/16. Today pt reports PN is going well and denied having any questions. Confirmed he is only getting it at night. Reports his PEG tube has been clamped for 2-3 days now. He is tolerating PO diet well. Reports doing juice, yoghurt, and water this morning without issues. Also has tried milk. Pt reports last BM was on 02/16 and denied N/V. PTA he did not drink ONS - concern it did not sit well with him in the past but willing to try again.      Anthropometrics:  Start of admission anthropometrics:  Height: 180.3 cm (5' 11\")  Weight: 102 kg (224 lb 3.3 oz)  BMI (Calculated): 31.28  IBW/kg (Dietitian Calculated): 78.2 kg  Percent of IBW: 131 %    Date/Time                  Weights during admission   02/14/25 0540  103 kg (227 lb 1.2 oz)  02/01/25 0741             103 kg (226 lb 13.7 oz)  01/31/25 0932             102 kg (224 lb 6.9 oz)  01/30/25 0913             96.6 kg (212 lb 15.4 oz)  01/28/25 2151             97 kg (213 lb 13.5 oz)       Weight History / % Weight Change: stable wt x2 weeks. Continues " to be increased from start of admission.  Significant Weight Loss: No    Nutrition Focused Physical Exam Findings:  NFPE done 02/03/25    Edema  Edema: none  Physical Findings   Skin: None       Last BM Date: 02/14/25    Nutrition Significant Labs:    Results from last 7 days   Lab Units 02/17/25  0529 02/16/25  0634 02/15/25  0647 02/14/25  0538 02/13/25  0746 02/12/25  0528 02/11/25  0606   HEMOGLOBIN g/dL 9.1* 9.3* 9.1* 9.1* 9.9* 9.3* 9.5*   MCV fL 87 85 86 87 86 84 84   GLUCOSE mg/dL 136* 92 177* 144* 108* 121* 119*   POTASSIUM mmol/L 4.6 4.8 4.5 4.3 4.9 4.3 4.4   SODIUM mmol/L 134* 139 137 137 135* 137 137   PHOSPHORUS mg/dL 3.1 2.6 2.8 3.3 3.7 3.8 3.7   MAGNESIUM mg/dL 1.91 1.98 1.99 1.96 2.06 2.10 2.25   CREATININE mg/dL 1.76* 1.52* 1.75* 1.87* 1.83* 1.82* 1.95*   BUN mg/dL 35* 31* 34* 33* 35* 30* 34*       Nutrition Specific Medications:  bisacodyl, 10 mg, rectal, Daily  cholecalciferol, 1,000 Units, oral, Daily  fat emulsion fish oil/plant based, 250 mL, intravenous, Daily Lipids  insulin lispro, 0-5 Units, subcutaneous, q6h  levothyroxine, 25 mcg, oral, Daily  pantoprazole, 40 mg, intravenous, Daily before breakfast  piperacillin-tazobactam, 3.375 g, intravenous, q6h  Adult Clinimix Parenteral Nutrition Cyclic, , Last Rate: Stopped (02/17/25 0944)        I/O:   I/O last 2 completed shifts:  In: 820 (8 mL/kg) [P.O.:820]  Out: 1650 (16 mL/kg) [Urine:1650 (0.7 mL/kg/hr)]  Weight: 103 kg     Dietary Orders (From admission, onward)       Start     Ordered    02/16/25 0940  Adult diet Full Liquid  Diet effective now        Question:  Diet type  Answer:  Full Liquid    02/16/25 0939    01/29/25 0008  May Participate in Room Service  ( ROOM SERVICE MAY PARTICIPATE)  Once        Question:  .  Answer:  Yes    01/29/25 0007                     Estimated Needs:   Total Energy Estimated Needs in 24 hours (kCal):  (2150 kcal)  Method for Estimating Needs: MSJ 1792 x 1.2    Total Protein Estimated Needs in 24 Hours  (g):  (90 g)  Method for Estimating 24 Hour Protein Needs: 1.2 g/kg IBW    Total Fluid Estimated Needs in 24 Hours (mL):  (Per med team)          Nutrition Diagnosis   Malnutrition Diagnosis  Patient has Malnutrition Diagnosis: No (well nourished upon NFPE, no significant weight loss)    Nutrition Diagnosis  Patient has Nutrition Diagnosis: Yes  Diagnosis Status (1): Active  Nutrition Diagnosis 1: Altered GI function  Related to (1): altered GI structure  As Evidenced by (1): prolonged ileus, high NGT output, need for venting PEG and PN    Additional Nutrition Diagnosis: Diagnosis 2  Diagnosis Status (2): Resolved  Nutrition Diagnosis 2: Inadequate oral intake  Related to (2): Decreased appetite and NPO status  As Evidenced by (2): pt reports of not being able to eat anything during start of admission (x 6 days), PPN only meeting ~55% of kcal needs x7 days.  Additional Assessment Information (2): Now on TPN + PO diet meeting >75% of nutr needs.       Nutrition Interventions/Recommendations   Nutrition prescription for oral nutrition, Nutrition prescription for parenteral nutrition    Nutrition Recommendations:  Current cyclic TPN regimen remains appropriate.   Formula: TPN standard - AA 5%, dextrose 20%  Volume: 1760 ml  Additives: MVI + trace elements  Taper up/down for: 1 hour  Rate:  ml/hr  Admin duration: 12 hours  Fat Emulsion 20%: SMOF lipid, 250 mL/day (infused overnight, 21 ml/hr x12 hrs)     TPN monitoring:                           - daily weights                           - RFP + Mg daily                          - LFTs + TGs weekly    Given pt tolerating FLD will add order for Boost VHC (530 kcal, 22g pro) x1/d   If pt able to tolerate ONS and keep PEG clamped after consumption consider increasing order to TID.   If pt able to tolerate:   Boost VHC x1/day then PN regimen will need decreased.     Boost VHC >/= 2 /day then PN is no longer be necessary.    Nutrition Interventions/Goals:   Parenteral  Nutrition: Management of delivery rate of parenteral nutrition, Management of composition of parenteral nutrition  Goal: TPN meeting >75% of ntur needs  Medical Food Supplement: Commercial beverage medical food supplement therapy  Goal: Increase kcal/pro intake         Nutrition Monitoring and Evaluation   Monitoring and Evaluation Plan: Enteral and parenteral nutrition intake determination  Enteral and Parenteral Nutrition Intake Determination: Parenteral nutrition intake - Titrate to goal without metabolic complications, Parenteral nutrition intake - Tolerate TPN at goal rate    Monitoring and Evaluation Plan: Body weight  Body Weight: Body weight - Maintain stable weight    Monitoring and Evaluation Plan: Electrolyte/renal panel, GI profile, Glucose/endocrine profile  Criteria: WNL         Some progress toward goal(s)         Time Spent (min): 45 minutes

## 2025-02-17 NOTE — PROGRESS NOTES
Physical Therapy    Physical Therapy Treatment    Patient Name: Swapnil Allen  MRN: [id number]  Department: [facility]  Room: [id number]-A  Today's Date: 2/17/2025  Time Calculation  Start Time: 1420  Stop Time: 1444  Time Calculation (min): 24 min         Assessment/Plan   PT Assessment  Barriers to Discharge Home: Caregiver assistance, Cognition needs, Physical needs  Caregiver Assistance: Patient lives alone and/or does not have reliable caregiver assistance  Cognition Needs: 24hr supervision for safety awareness needed, Insight of patient limited regarding functional ability/needs  Physical Needs: Intermittent mobility assistance needed, 24hr ADL assistance needed  End of Session Communication: Bedside nurse  Assessment Comment: Pt tolerated PT session well, continues to make progress towards therapuetic goals however continues to remain appropriate for Moderate intensity PT upon D/C from hospital to improve overall mobility, strengthening, endurance and balance for safe D/C home.  End of Session Patient Position: Up in chair, Alarm off, not on at start of session  PT Plan  Inpatient/Swing Bed or Outpatient: Inpatient  PT Plan  Treatment/Interventions: Bed mobility, Gait training, Transfer training, Stair training, Balance training, Neuromuscular re-education, Strengthening, Endurance training, Range of motion, Therapeutic exercise, Therapeutic activity, Home exercise program, Positioning, Postural re-education  PT Plan: Ongoing PT  PT Frequency: 3 times per week  PT Discharge Recommendations: Moderate intensity level of continued care  Equipment Recommended upon Discharge: Wheeled walker  PT Recommended Transfer Status: Assist x1, Assistive device  PT - OK to Discharge: Yes      General Visit Information:   PT  Visit  PT Received On: 02/17/25  General  Missed Visit Reason:  (n/a)  Family/Caregiver Present: No  Prior to Session Communication: Bedside nurse  Patient Position Received: Up in chair, Alarm off, not on at  start of session  General Comment: Pt seated up in chair, agreeable to work with PT,    Subjective   Precautions:  Precautions  Medical Precautions: Fall precautions  Precautions Comment: protective precautions,     Date/Time Vitals Session Patient Position Pulse Resp SpO2 BP MAP (mmHg)    02/17/25 1420 During PT  --  104  --  98 %  --  --     02/17/25 1515 --  --  95  18  98 %  112/76  88                 Objective   Pain:  Pain Assessment  Pain Assessment: 0-10  0-10 (Numeric) Pain Score: 0 - No pain  Cognition:  Cognition  Overall Cognitive Status: Within Functional Limits  Orientation Level: Oriented X4  Impulsive: Mildly    Activity Tolerance:  Activity Tolerance  Endurance: Tolerates 10 - 20 min exercise with multiple rests  Treatments:  Therapeutic Exercise  Therapeutic Exercise Performed: Yes  Therapeutic Exercise Activity 1: Seated: Heel/Toe Raises, LAQ, Hip Flexion, Hip ABD, Hip ADD x10 BLE (Encouraged pt to trial seated exercises up to 2x/day, pt receptive)    Therapeutic Activity  Therapeutic Activity Performed: Yes  Therapeutic Activity 1: Pt performed standing static/dynamic balance activities this date, primarily use of FWW for support and occassional Martell for stability    Bed Mobility  Bed Mobility: No    Ambulation/Gait Training  Ambulation/Gait Training Performed: Yes  Ambulation/Gait Training 1  Surface 1: Level tile  Device 1: Rolling walker  Assistance 1: Contact guard, Minimal verbal cues  Quality of Gait 1: Decreased step length, Forward flexed posture (Decreased nena, decreased toe to floor clearance,)  Comments/Distance (ft) 1: 50ft x2 trials, x10ft, cues for safe activity pacing for increased safety awareness with use of FWW for support. Pt receptive and improved gait speed this date. Pt reports some SOB/fatigue once returned to room. Encouraged ambulation with PT and RN/Mobility Aide staff, pt receptive.    Transfers  Transfer: Yes  Transfer 1  Transfer From 1: Chair with arms  to  Transfer to 1: Stand  Technique 1: Sit to stand, Stand to sit  Transfer Device 1: Walker  Transfer Level of Assistance 1: Minimum assistance, Minimal verbal cues  Trials/Comments 1: x3 trials, cues for safe hand placement and sequencing, pt initally pulling up on FWW for STS transfers however improved with education provided.    Stairs  Stairs: No    Outcome Measures:  [facility] Basic Mobility  Turning from your back to your side while in a flat bed without using bedrails: A little  Moving from lying on your back to sitting on the side of a flat bed without using bedrails: A lot  Moving to and from bed to chair (including a wheelchair): A little  Standing up from a chair using your arms (e.g. wheelchair or bedside chair): A little  To walk in hospital room: A little  Climbing 3-5 steps with railing: A lot  Basic Mobility - Total Score: 16    [place]etti  Sitting Balance: Steady, safe  Arises: Unable without help  Attempts to Arise: Unable without help  Immediate Standing Balance (First 5 Seconds): Steady but uses walker or other support  Standing Balance: Steady but wide stance, uses cane or other support  Nudged: Staggers, grabs, catches self  Eyes Closed: Unsteady  Turned 360 Degrees: Steadiness: Unsteady (Grabs, staggers)  Turned 360 Degrees: Continuity of Steps: Discontinuous steps  Sitting Down: Uses arms or not a smooth motion  Balance Score: 5  Initiation of Gait: No hesitancy  Step Height: R Swing Foot: Right foot does not clear floor completely with step  Step Length: R Swing Foot: Passes left stance foot  Step Height: L Swing Foot: Left foot does not clear floor completely with step  Step Length: L Swing Foot: Passes right stance foot  Step Symmetry: Right and left step appear equal  Step Continuity: Steps appear continuous  Path: Mild/moderate deviation or uses walking aid  Trunk: Marked sway or uses walking aid  Walking Time: Heels almost touching while walking  Gait Score: 7  Total Score: 12    Education  Documentation  Precautions, taught by Sunitha Cortes PTA at 2/17/2025  3:19 PM.  Learner: Patient  Readiness: Acceptance  Method: Explanation  Response: Verbalizes Understanding, Needs Reinforcement  Comment: Safety with transfers, HEP and attempting 2x/daily if tolerated, increasing ambulation with RN/Mobility Aide Staff    Body Mechanics, taught by Sunitha Cortes PTA at 2/17/2025  3:19 PM.  Learner: Patient  Readiness: Acceptance  Method: Explanation  Response: Verbalizes Understanding, Needs Reinforcement  Comment: Safety with transfers, HEP and attempting 2x/daily if tolerated, increasing ambulation with RN/Mobility Aide Staff    Home Exercise Program, taught by Sunitha Cortes PTA at 2/17/2025  3:19 PM.  Learner: Patient  Readiness: Acceptance  Method: Explanation  Response: Verbalizes Understanding, Needs Reinforcement  Comment: Safety with transfers, HEP and attempting 2x/daily if tolerated, increasing ambulation with RN/Mobility Aide Staff    Mobility Training, taught by Sunitha Cortes PTA at 2/17/2025  3:19 PM.  Learner: Patient  Readiness: Acceptance  Method: Explanation  Response: Verbalizes Understanding, Needs Reinforcement  Comment: Safety with transfers, HEP and attempting 2x/daily if tolerated, increasing ambulation with RN/Mobility Aide Staff    Education Comments  No comments found.        OP EDUCATION:       Encounter Problems       Encounter Problems (Active)       Mobility       LTG - Patient will navigate 4-6 steps with rails/device and moderate assistance. (Not met)       Start:  01/30/25    Expected End:  02/13/25    Resolved:  02/17/25    Updated to: Pt will ascend/descend 4 steps with 1 rail and supervision, no LOB    Update reason: update         STG - Patient will ambulate ~100 feet with FWW and minimal assistance.  (Not met)       Start:  01/30/25    Expected End:  02/13/25    Resolved:  02/17/25    Updated to: Pt will ambulated 150' with WW and supervision safely, no LOB     Update reason: update         Pt will ambulate >/= 100 ft with FWW and minimal assistance and no signs of fatigue or SOB.   (Not met)       Start:  01/30/25    Expected End:  03/03/25    Resolved:  02/17/25    Updated to: Pt will score >45 on the Booth Balance scale indicating lower risk for falls    Update reason: update         Pt will ascend/descend 4 steps with 1 rail and supervision, no LOB (Not Progressing)       Start:  02/17/25    Expected End:  03/03/25                Pt will ambulated 150' with WW and supervision safely, no LOB (Progressing)       Start:  02/17/25    Expected End:  03/03/25                Pt will score >45 on the Booth Balance scale indicating lower risk for falls (Progressing)       Start:  02/17/25    Expected End:  03/03/25                   PT Transfers       LTG - Patient will transfer from one surface to another CGA with FWW. (Not met)       Start:  01/30/25    Expected End:  02/13/25    Resolved:  02/17/25    Updated to: Pt will come sit to/from stand, bed to/from chair with WW and supervision safely, no LOB    Update reason: update         STG - Patient will perform bed mobility with close supervision.  (Not met)       Start:  01/30/25    Expected End:  02/13/25    Resolved:  02/17/25    Updated to: Pt will come supine to/from sit (HOB flat, no rail) with supervision safely    Update reason: update         Pt will come sit to/from stand, bed to/from chair with WW and supervision safely, no LOB (Progressing)       Start:  02/17/25    Expected End:  03/03/25                Pt will come supine to/from sit (HOB flat, no rail) with supervision safely (Progressing)       Start:  02/17/25    Expected End:  03/03/25                   Pain - Adult            02/17/25 at 3:21 PM   [facility] [place]   Rehab Office: [phone]

## 2025-02-17 NOTE — CONSULTS
Inpatient consult to Infectious Diseases  Consult performed by: Bryan Bashir  Consult ordered by: Vaughn Perez MD      Referred by Dr. Perez    Primary MD: Swapnil Max MD    Reason For Consult  Persistent leukocytosis s/p CAP treated with zosyn     History Of Present Illness  Swapnil Allen is a 75 y.o. male presenting with PMH of HTN, CVA/TIA (following coaine use, on asa), anemia, CKD (baseline 1.4-1.5), thyrotoxicosis s/p partial thyroidecetomy, left partotid mass c/w warthins tumor, hep c (treated with epclusa 9/2024), hx of substance use (quit 2020) and BPH w/ LUTS who was admitted to the hospital for removal of right renal mass on 1/28. ID consulted for potenital antibiotic regiment given persistent leukocytosis.    Per chart review, Pt was admitted to the hospital on 1/28 with a right renal mass. Pt underwent a right radical nephrectomy and retroperitoneal lymph node dissection and mass was found to be RCC. Post op course has been complicated by prolonged ileus vs SBO. NGT placed 1/31. Decompressive PEG and PICC (TPN) placed on 2/10. Pt spiked fever of 38.8 on 2/7. CXR on 2/7 showed bibasilar atelectasis. Pt was treated with vanc and zosyn (narrow to zosyn) for suspected CAP.  Blood cultures and MRSA nares were negative. Pt completed 7 day course (2/7 to 2/14).  WBC count was mildly elevated to 15.2 and has down trended to 11.3 on 2/14. WBC was found to be elevated to 12.8 on 2/16.     Today, patient reports no new systemic symptoms. He denies any fever, chills, nausea, vomiting, abdominal pain, cough, congestion, CP, SOB, dysuria. He has a PICC line in place in his left arm. He has had no recent sick contacts. He denies any recent travel outside the country. He has a history of drug use, but has not used any IV or inhalant drugs In 15 years. He smokes tobacco pipes infrequently.     Past Medical History  He has a past medical history of BPH (benign prostatic hyperplasia), Cerebral vascular accident  (Multi), CKD (chronic kidney disease), Colon polyp, Hypertension, Hypothyroidism, Internal carotid artery stenosis, Kidney mass, Liver disease, Parotid mass, Renal mass, right, and Stroke (Multi).    Surgical History  He has a past surgical history that includes Thyroid surgery; Colonoscopy; and Renal biopsy.     Social History     Occupational History    Not on file   Tobacco Use    Smoking status: Some Days     Types: Cigars     Passive exposure: Never    Smokeless tobacco: Never    Tobacco comments:     Quit cigarette smoking over 20 years. Occassional cigar   Vaping Use    Vaping status: Never Used   Substance and Sexual Activity    Alcohol use: Not Currently     Comment: former drinker    Drug use: Not Currently     Types: Marijuana     Comment: formerly smoked marijuana    Sexual activity: Defer     Travel History   Travel since 01/17/25    No documented travel since 01/17/25            Pets: no  Hobbies: no    Family History  Family History   Problem Relation Name Age of Onset    Hypertension Brother       Allergies  Patient has no known allergies.     Immunization History   Administered Date(s) Administered    Moderna SARS-CoV-2 Vaccination 04/20/2021, 05/18/2021, 05/26/2022     Medications  Home medications:  Medications Prior to Admission   Medication Sig Dispense Refill Last Dose/Taking    amLODIPine (Norvasc) 10 mg tablet Take 1 tablet (10 mg) by mouth once daily.   1/27/2025    aspirin 81 mg EC tablet Take 1 tablet (81 mg) by mouth once daily.   1/27/2025    cholecalciferol (Vitamin D-3) 25 MCG (1000 UT) tablet Take 1 tablet (1,000 Units) by mouth once daily.   1/27/2025    levothyroxine (Synthroid, Levoxyl) 25 mcg tablet Take 1 tablet (25 mcg) by mouth once daily.   1/27/2025    melatonin 5 mg tablet Take 1 tablet (5 mg) by mouth once daily at bedtime.   1/27/2025    tamsulosin (Flomax) 0.4 mg 24 hr capsule Take 1 capsule (0.4 mg) by mouth once daily.   1/27/2025     Current medications:  Scheduled  medications  amLODIPine, 10 mg, oral, Daily  aspirin, 81 mg, oral, Daily  bisacodyl, 10 mg, rectal, Daily  cholecalciferol, 1,000 Units, oral, Daily  fat emulsion fish oil/plant based, 250 mL, intravenous, Daily Lipids  heparin (porcine), 5,000 Units, subcutaneous, q8h  insulin lispro, 0-5 Units, subcutaneous, q6h  lactated Ringer's, 500 mL, intravenous, Once  levothyroxine, 25 mcg, oral, Daily  lidocaine, 3 patch, transdermal, Daily  pantoprazole, 40 mg, intravenous, Daily before breakfast  [Held by provider] tamsulosin, 0.4 mg, oral, Daily      Continuous medications  Adult Clinimix Parenteral Nutrition Cyclic, , Last Rate: Stopped (02/17/25 0944)      PRN medications  PRN medications: acetaminophen, alteplase, benzocaine-menthol, calcium carbonate, dextrose, ipratropium-albuteroL, labetaloL, melatonin, ondansetron, oxyCODONE, phenoL, zinc oxide    Review of Systems  Negative other than stated in HPI    Objective  Range of Vitals (last 24 hours)  Heart Rate:  [84-97]   Temp:  [35.8 °C (96.4 °F)-36.7 °C (98.1 °F)]   Resp:  [16-18]   BP: ()/(60-82)   Weight:  [103 kg (227 lb 1.2 oz)]   SpO2:  [96 %-100 %]   Daily Weight  02/17/25 : 103 kg (227 lb 1.2 oz)    Body mass index is 31.67 kg/m².     Physical Exam  General: well-appearing, no acute distress, conversant  HEENT: sclera anicteric, NCAT  Cardio: RRR, no m/r/g  Respiratory: CTAB, no rhonchi, rales or wheezes  Abdomen: soft, nt, nd, large midline incision and drainage tube in place. Incision was non-erythematous without drainage  Extremities: no peripheral edema  Skin: warm well perfused, PICC line in place left arm, no rashes or lesions    Relevant Results  Outside Hospital Results  No  Labs  Results from last 72 hours   Lab Units 02/17/25  0529 02/16/25  0634 02/15/25  0647   WBC AUTO x10*3/uL 12.8* 11.3 11.3   HEMOGLOBIN g/dL 9.1* 9.3* 9.1*   HEMATOCRIT % 29.1* 28.3* 28.4*   PLATELETS AUTO x10*3/uL 118* 186 239     Results from last 72 hours   Lab Units  "02/17/25  0529 02/16/25  0634 02/15/25  0647   SODIUM mmol/L 134* 139 137   POTASSIUM mmol/L 4.6 4.8 4.5   CHLORIDE mmol/L 107 110* 109*   CO2 mmol/L 18* 20* 20*   BUN mg/dL 35* 31* 34*   CREATININE mg/dL 1.76* 1.52* 1.75*   GLUCOSE mg/dL 136* 92 177*   CALCIUM mg/dL 9.1 9.0 9.1   ANION GAP mmol/L 14 14 13   EGFR mL/min/1.73m*2 40* 47* 40*   PHOSPHORUS mg/dL 3.1 2.6 2.8     Results from last 72 hours   Lab Units 02/17/25 0529 02/16/25  0634 02/15/25  0647   ALBUMIN g/dL 3.2* 2.9* 2.9*     Estimated Creatinine Clearance: 44.3 mL/min (A) (by C-G formula based on SCr of 1.76 mg/dL (H)).  No results found for: \"CRP\", \"SEDRATE\"  HIV 1/2 Antigen/Antibody Screen with Reflex to Confirmation   Date Value Ref Range Status   07/24/2024 Nonreactive Nonreactive Final     Hepatitis C AB   Date Value Ref Range Status   07/24/2024 Reactive (A) Nonreactive Final     Comment:     HCV antibody detected. HCV RNA PCR has been ordered to evaluate the possibility of a current infection.     Results from patients taking biotin supplements or receiving high-dose biotin therapy should be interpreted with caution due to possible interference with this test. Providers may contact their local laboratory for further information.     HCV PCR Quant   Date Value Ref Range Status   07/24/2024 8,230,000 (H) Not detected IU/mL Final     Microbiology  Susceptibility data from last 90 days.  Collected Specimen Info Organism   02/03/25 Swab from Anterior Nares Methicillin Resistant Staphylococcus aureus (MRSA)   02/03- MRSA PICC +  02/06: MRSA PICC -  02/07: Bcx negaitve  02/07 Rcx: contaminated  02/10- MRSA: Negative      Imaging  XR abdomen 1 view  1.  Unchanged appearance of a few dilated loops of small in the left side of the abdomen. Findings may be due to ileus with component of partial obstruction not excluded. Recommend follow-up radiograph.   Signed by: Heather Zheng 2/11/2025 11:44 PM Dictation workstation:   [id number]      XR chest 1 " view  Consolidative and reticular opacities are most compatible with bilateral atelectatic changes superimposed on a background of aspiration pneumonia/pneumonitis.   I personally reviewed the images/study and I agree with the findings as stated by Reid Traylor MD (resident).   MACRO: None   Signed by: Tyree Tabares 2/10/2025 11:03 AM Dictation workstation:   [id number]      CT abdomen pelvis w IV contrast  1.  Findings compatible with partial small bowel obstruction in the region of mesenteric swirling. Normal enhancement of the bowel wall without evidence of ischemia. 2. Interval worsening of ground-glass to consolidative opacities in dependent portions of the lung, which may be related to aspiration and/or worsening infectious pneumonia. 3. Moderate-sized hiatal hernia with fluid visualized in the distal esophagus, which places the patient at risk for aspiration.   I personally reviewed the images/study and agree with the findings as stated by Kirill Buckley MD (Resident Physician). This study was interpreted at [facility], [place], [place].   MACRO: None   Signed by: Dannie Dean 2/8/2025 12:59 PM Dictation workstation:   [id number]    XR abdomen 1 view    Result Date: 2/7/2025  Interpreted By:  Tyree Tabares,  and Mariaa Fraser STUDY: XR ABDOMEN 1 VIEW;  2/7/2025 12:23 pm   INDICATION: Signs/Symptoms:s/p SBFT assessing contrast movement.     COMPARISON: Abdominal x-ray 02/06/2025   ACCESSION NUMBER(S): [id number]   ORDERING CLINICIAN: HENRY HENRIQUEZ   FINDINGS: AP view of the abdomen is obtained. 2 images total.   Enteric tube is seen with tip projecting over the gastric bubble. Multiple loops of dilated bowel are seen. Positive contrast is seen within the small bowel without evidence of contrast within colon. Limited evaluation of pneumoperitoneum on supine imaging, however no gross evidence of free air is noted.   Stable bibasilar hazy opacities..   Osseous  structures demonstrate no acute bony changes.       1.  Dilated loops of bowel without contrast in the colon, correlate for obstruction.   I personally reviewed the images/study and I agree with the findings as stated by Evelio Leroy MD, PGY-2 this study was interpreted at [place], [place].   MACRO: None   Signed by: Tyree Tabares 2/7/2025 4:39 PM Dictation workstation:   [id number]    XR chest 1 view    Result Date: 2/7/2025  Interpreted By:  Tyree Tabares and Dulla Kireeti STUDY: XR CHEST 1 VIEW;  2/7/2025 3:14 pm   INDICATION: Signs/Symptoms:Possible pneumonia.     COMPARISON: Chest x-ray from 01/31/2025   ACCESSION NUMBER(S): [id number]   ORDERING CLINICIAN: JOSEFA CALDWELL   FINDINGS: AP radiograph of the chest was provided.   Enteric tube is seen with tip terminating in the gastric body.   CARDIOMEDIASTINAL SILHOUETTE: Cardiomediastinal silhouette is stable in size and configuration.   LUNGS: Low lung volumes with bronchovascular crowding. There is no pulmonary edema, pneumothorax, or consolidation. Bibasilar opacities and retrocardiac opacities are present..   ABDOMEN: Moderate hiatal hernia.   BONES: No acute osseous changes.       1. Bibasilar and retrocardiac opacities likely represent atelectasis, however infection can not be excluded.   I personally reviewed the images/study and I agree with the findings as stated by Evelio Leroy MD, PGY-2 this study was interpreted at [facility], [place], [place].   MACRO: None   Signed by: Tyree Tabares 2/7/2025 4:30 PM Dictation workstation:   [id number]      Assessment/Plan   Swapnil Allen is a 75 y.o. male presenting with PMH of HTN, CVA/TIA (following coaine use, on asa), anemia, CKD (baseline 1.4-1.5), thyrotoxicosis s/p partial thyroidecetomy, left partotid mass c/w warthins tumor, hep c (treated with epclusa 9/2024), hx of substance use (quit 2020) and BPH w/ LUTS who was admitted  to the hospital for removal of right renal mass on 1/28. Post op course has been complicated by ileus vs SBO. Pt had a presumed pneumonia with CT on 2/7 that showed ground glass opacities in bilateral basal lungs concerning for pneumonia/aspiration. Pt was treated with 7d course of zosyn (2/7-2/14).     Pt history, exam, and labs are all unremarkable for any signs of new or worsening infection. Patient has remained afebrile with a mildly elevated leukocytosis (12.3) s/p zosyn for pneumonia (finished on 2/14). The patient's post op course has been complicated by SBO vs ileus therefore suggesting CT findings and persistent leukocytosis may be attributable to intermittent aspiration. All incisions and peripheral lines are clean, dry and nonerythematous lessening concern of skin/soft tissue infection. Therefore, at this time, there is a low concern for any infectious etiology.       Recommendations  We recommend discontinuing all antibiotics.  Recommend ongoing pulmonary hygiene to help reduce any future aspiration.   We suggest monitoring for development of no systemic symptoms or other signs of infections.  ID will sign off at this time.    Thank you for consulting ID! We will sign off at this time. Please re-consult if any new questions or concerns arise.      PATO GALINDO MS-3    Patient seen and discussed with Dr. Talbot

## 2025-02-17 NOTE — PROGRESS NOTES
02/11/25 1100   Discharge Planning   Living Arrangements Alone   Support Systems Family members   Assistance Needed TPN, ADLs   Type of Residence Private residence   Number of Stairs to Enter Residence 3  (brother's home: 3 steps at side door to enter)   Number of Stairs Within Residence 3  (brother's home, down to basement)   Do you have animals or pets at home? No   Home or Post Acute Services In home services   Type of Home Care Services Home OT;Home PT;Home nursing visits   Expected Discharge Disposition Home  (brother Jhony's home)   Does the patient need discharge transport arranged? No   Financial Resource Strain   How hard is it for you to pay for the very basics like food, housing, medical care, and heating? Not very   Housing Stability   In the last 12 months, was there a time when you were not able to pay the mortgage or rent on time? N   In the past 12 months, how many times have you moved where you were living? 0   At any time in the past 12 months, were you homeless or living in a shelter (including now)? N   Transportation Needs   In the past 12 months, has lack of transportation kept you from medical appointments or from getting medications? no   In the past 12 months, has lack of transportation kept you from meetings, work, or from getting things needed for daily living? No     SW met with pt to discuss discharge to SNF.  Pt reported that he had been in conversation with his family and that the plan is for him to discharge [place] his [place] [address] [id number] Peter's spouse Mayda is a retired RN.  SW met with care team to follow up.  When SW returned to pt room, he was on the phone with Jennifer Green confirmed that the plan was to discharge to his home.   Care team aware, pt has many teachable caregivers.  SW will follow. [facility] LSW    02/17/2025 1254  Plan was for pt to discharge to sibling Peter's home with [facility].  Pt is on  TPN via PICC.  Pt is not medically ready due to a rising WBC.  STEPHANIE notified this afternoon that sibling Peter and family feel that pt should discharge to a SNF.  Pt has been rec'd SNF for PT/OT throughout his admission but has not been amenable.  STEPHANIE printed out a SNF choice list and met with pt.  Pt called his brother Peter and Peter confirmed that they were concerned with his current care need.  SW and bedside nurse spoke to Peter and pt.  SNF choice list emailed to Peter at [email or web address].  Further discharge planning pending updates from the care team.  STEPHANIE will follow. [facility] LS

## 2025-02-17 NOTE — CARE PLAN
The patient's goals for the shift include    Problem: Skin  Goal: Prevent/manage excess moisture  Outcome: Progressing       The clinical goals for the shift include VS stability and pain control throughout shift

## 2025-02-17 NOTE — CARE PLAN
Problem: Skin  Goal: Prevent/manage excess moisture  2/17/2025 1855 by Guille Barraza RN  Outcome: Progressing  2/17/2025 1845 by Guille Barraza RN  Outcome: Progressing   The patient's goals for the shift include      The clinical goals for the shift include Pt will remain free from falls    Problem: Pain  Goal: Turns in bed with improved pain control throughout the shift  2/17/2025 1855 by Guille Barraza RN  Outcome: Progressing  2/17/2025 1845 by Guille Barraza RN  Outcome: Progressing     Problem: Pain  Goal: Walks with improved pain control throughout the shift  2/17/2025 1855 by Guille Barraza RN  Outcome: Progressing  2/17/2025 1845 by Guille Barraza RN  Outcome: Progressing     Problem: Skin  Goal: Prevent/manage excess moisture  2/17/2025 1855 by Guille Barraza RN  Outcome: Progressing

## 2025-02-17 NOTE — CARE PLAN
The patient's goals for the shift include      The clinical goals for the shift include Pt will remain free from falls    Problem: Discharge Planning  Goal: Discharge to home or other facility with appropriate resources  Outcome: Progressing     Problem: Skin  Goal: Prevent/manage excess moisture  Outcome: Progressing     Problem: Skin  Goal: Promote/optimize nutrition  Outcome: Progressing     Problem: Pain  Goal: Free from acute confusion related to pain meds throughout the shift  Outcome: Progressing     Problem: Pain  Goal: Participates in PT with improved pain control throughout the shift  Outcome: Progressing

## 2025-02-18 LAB
ALBUMIN SERPL BCP-MCNC: 3.2 G/DL (ref 3.4–5)
ANION GAP SERPL CALC-SCNC: 13 MMOL/L (ref 10–20)
BUN SERPL-MCNC: 36 MG/DL (ref 6–23)
CALCIUM SERPL-MCNC: 9.3 MG/DL (ref 8.6–10.6)
CHLORIDE SERPL-SCNC: 108 MMOL/L (ref 98–107)
CO2 SERPL-SCNC: 19 MMOL/L (ref 21–32)
CREAT SERPL-MCNC: 1.73 MG/DL (ref 0.5–1.3)
EGFRCR SERPLBLD CKD-EPI 2021: 41 ML/MIN/1.73M*2
ERYTHROCYTE [DISTWIDTH] IN BLOOD BY AUTOMATED COUNT: 16.9 % (ref 11.5–14.5)
GLUCOSE BLD MANUAL STRIP-MCNC: 116 MG/DL (ref 74–99)
GLUCOSE BLD MANUAL STRIP-MCNC: 123 MG/DL (ref 74–99)
GLUCOSE BLD MANUAL STRIP-MCNC: 135 MG/DL (ref 74–99)
GLUCOSE BLD MANUAL STRIP-MCNC: 201 MG/DL (ref 74–99)
GLUCOSE SERPL-MCNC: 154 MG/DL (ref 74–99)
HCT VFR BLD AUTO: 29.5 % (ref 41–52)
HGB BLD-MCNC: 9.1 G/DL (ref 13.5–17.5)
MAGNESIUM SERPL-MCNC: 2.09 MG/DL (ref 1.6–2.4)
MCH RBC QN AUTO: 27.2 PG (ref 26–34)
MCHC RBC AUTO-ENTMCNC: 30.8 G/DL (ref 32–36)
MCV RBC AUTO: 88 FL (ref 80–100)
NRBC BLD-RTO: 0 /100 WBCS (ref 0–0)
PHOSPHATE SERPL-MCNC: 2.8 MG/DL (ref 2.5–4.9)
PLATELET # BLD AUTO: 143 X10*3/UL (ref 150–450)
POTASSIUM SERPL-SCNC: 4.7 MMOL/L (ref 3.5–5.3)
RBC # BLD AUTO: 3.35 X10*6/UL (ref 4.5–5.9)
SODIUM SERPL-SCNC: 135 MMOL/L (ref 136–145)
WBC # BLD AUTO: 10.3 X10*3/UL (ref 4.4–11.3)

## 2025-02-18 PROCEDURE — 82947 ASSAY GLUCOSE BLOOD QUANT: CPT

## 2025-02-18 PROCEDURE — 2500000001 HC RX 250 WO HCPCS SELF ADMINISTERED DRUGS (ALT 637 FOR MEDICARE OP)

## 2025-02-18 PROCEDURE — 85027 COMPLETE CBC AUTOMATED: CPT | Performed by: STUDENT IN AN ORGANIZED HEALTH CARE EDUCATION/TRAINING PROGRAM

## 2025-02-18 PROCEDURE — 1170000001 HC PRIVATE ONCOLOGY ROOM DAILY

## 2025-02-18 PROCEDURE — 2500000002 HC RX 250 W HCPCS SELF ADMINISTERED DRUGS (ALT 637 FOR MEDICARE OP, ALT 636 FOR OP/ED)

## 2025-02-18 PROCEDURE — 80069 RENAL FUNCTION PANEL: CPT | Performed by: STUDENT IN AN ORGANIZED HEALTH CARE EDUCATION/TRAINING PROGRAM

## 2025-02-18 PROCEDURE — 2500000004 HC RX 250 GENERAL PHARMACY W/ HCPCS (ALT 636 FOR OP/ED): Performed by: STUDENT IN AN ORGANIZED HEALTH CARE EDUCATION/TRAINING PROGRAM

## 2025-02-18 PROCEDURE — 2580000001 HC RX 258 IV SOLUTIONS

## 2025-02-18 PROCEDURE — 2500000005 HC RX 250 GENERAL PHARMACY W/O HCPCS: Performed by: NURSE PRACTITIONER

## 2025-02-18 PROCEDURE — 2500000004 HC RX 250 GENERAL PHARMACY W/ HCPCS (ALT 636 FOR OP/ED)

## 2025-02-18 PROCEDURE — 83735 ASSAY OF MAGNESIUM: CPT

## 2025-02-18 RX ORDER — OXYCODONE HCL 5 MG/5 ML
5 SOLUTION, ORAL ORAL EVERY 6 HOURS PRN
Status: DISCONTINUED | OUTPATIENT
Start: 2025-02-18 | End: 2025-02-24

## 2025-02-18 RX ADMIN — OXYCODONE HYDROCHLORIDE 5 MG: 5 SOLUTION ORAL at 21:27

## 2025-02-18 RX ADMIN — HEPARIN SODIUM 5000 UNITS: 5000 INJECTION INTRAVENOUS; SUBCUTANEOUS at 03:08

## 2025-02-18 RX ADMIN — HEPARIN SODIUM 5000 UNITS: 5000 INJECTION INTRAVENOUS; SUBCUTANEOUS at 10:32

## 2025-02-18 RX ADMIN — Medication 1000 UNITS: at 10:19

## 2025-02-18 RX ADMIN — LEVOTHYROXINE SODIUM 25 MCG: 25 TABLET ORAL at 05:22

## 2025-02-18 RX ADMIN — INSULIN LISPRO 2 UNITS: 100 INJECTION, SOLUTION INTRAVENOUS; SUBCUTANEOUS at 05:23

## 2025-02-18 RX ADMIN — Medication 10 MG: at 21:27

## 2025-02-18 RX ADMIN — AMLODIPINE BESYLATE 10 MG: 10 TABLET ORAL at 10:19

## 2025-02-18 RX ADMIN — ASCORBIC ACID, VITAMIN A PALMITATE, CHOLECALCIFEROL, THIAMINE HYDROCHLORIDE, RIBOFLAVIN-5 PHOSPHATE SODIUM, PYRIDOXINE HYDROCHLORIDE, NIACINAMIDE, DEXPANTHENOL, ALPHA-TOCOPHEROL ACETATE, VITAMIN K1, FOLIC ACID, BIOTIN, CYANOCOBALAMIN: 200; 3300; 200; 6; 3.6; 6; 40; 15; 10; 150; 600; 60; 5 INJECTION, SOLUTION INTRAVENOUS at 21:34

## 2025-02-18 RX ADMIN — SMOFLIPID 50 G: 6; 6; 5; 3 INJECTION, EMULSION INTRAVENOUS at 21:30

## 2025-02-18 RX ADMIN — HEPARIN SODIUM 5000 UNITS: 5000 INJECTION INTRAVENOUS; SUBCUTANEOUS at 21:27

## 2025-02-18 RX ADMIN — OXYCODONE HYDROCHLORIDE 5 MG: 5 SOLUTION ORAL at 03:11

## 2025-02-18 RX ADMIN — PANTOPRAZOLE SODIUM 40 MG: 40 INJECTION, POWDER, FOR SOLUTION INTRAVENOUS at 05:23

## 2025-02-18 RX ADMIN — ASPIRIN 81 MG: 81 TABLET, COATED ORAL at 10:19

## 2025-02-18 ASSESSMENT — PAIN SCALES - GENERAL
PAINLEVEL_OUTOF10: 8
PAINLEVEL_OUTOF10: 1
PAINLEVEL_OUTOF10: 5 - MODERATE PAIN
PAINLEVEL_OUTOF10: 3
PAINLEVEL_OUTOF10: 4

## 2025-02-18 ASSESSMENT — COGNITIVE AND FUNCTIONAL STATUS - GENERAL
EATING MEALS: A LITTLE
CLIMB 3 TO 5 STEPS WITH RAILING: A LOT
DRESSING REGULAR UPPER BODY CLOTHING: A LITTLE
PERSONAL GROOMING: A LITTLE
MOBILITY SCORE: 19
TURNING FROM BACK TO SIDE WHILE IN FLAT BAD: A LITTLE
WALKING IN HOSPITAL ROOM: A LITTLE
DRESSING REGULAR LOWER BODY CLOTHING: A LITTLE
PERSONAL GROOMING: A LITTLE
DAILY ACTIVITIY SCORE: 18
TOILETING: A LITTLE
DRESSING REGULAR UPPER BODY CLOTHING: A LITTLE
HELP NEEDED FOR BATHING: A LITTLE
MOVING TO AND FROM BED TO CHAIR: A LITTLE
DRESSING REGULAR LOWER BODY CLOTHING: A LITTLE
CLIMB 3 TO 5 STEPS WITH RAILING: A LITTLE
STANDING UP FROM CHAIR USING ARMS: A LITTLE
WALKING IN HOSPITAL ROOM: A LITTLE
TURNING FROM BACK TO SIDE WHILE IN FLAT BAD: A LITTLE
DAILY ACTIVITIY SCORE: 18
TOILETING: A LITTLE
STANDING UP FROM CHAIR USING ARMS: A LITTLE
EATING MEALS: A LITTLE
MOBILITY SCORE: 18
MOVING TO AND FROM BED TO CHAIR: A LITTLE
HELP NEEDED FOR BATHING: A LITTLE

## 2025-02-18 ASSESSMENT — PAIN - FUNCTIONAL ASSESSMENT
PAIN_FUNCTIONAL_ASSESSMENT: 0-10

## 2025-02-18 NOTE — CARE PLAN
The patient's goals for the shift include      The clinical goals for the shift include pt will have gradually improve nutritional intake    Problem: Discharge Planning  Goal: Discharge to home or other facility with appropriate resources  Outcome: Progressing     Problem: Skin  Goal: Prevent/manage excess moisture  Outcome: Progressing     Problem: Skin  Goal: Promote/optimize nutrition  Outcome: Progressing     Problem: Pain - Adult  Goal: Verbalizes/displays adequate comfort level or baseline comfort level  Outcome: Progressing

## 2025-02-18 NOTE — PROGRESS NOTES
[facility] [place]  Progress Note    Patient: Swapnil Allen  Age/Sex: 75 y.o., male  MRN: [id number]  Date of surgery: 1/28/25  Admit Date: 1/28/2025   Code Status: Full Code  Length of Stay: 21    Interval History/Overnight Events:   NAONE, sitting in chair on rounds. Feeling fair/average   PEG capped.   No nausea or emesis, endorsing burping. Did not have dinner, tolerated 2x boosts   Denies any fevers, chills.   +Bms/+gas   No rodriguez, UOP 1.15L  Feeling mild soreness bilateral legs, new onset since admission starting yesterday. Per patient likely due to being confined to chair.       Objective  02/16 1900 - 02/18 0659  In: 2902.5   Out: 2975 [Urine:2975]              9.1     12.8>-----<118              29.1   134  107  35                  ----------------<136     4.6  18  1.76          Ca 9.1 Phos 3.1 Mg 1.91       ALT 52 AST 30 AlkPhos 106 tBili 0.3          Vitals:    02/17/25 2000 02/17/25 2345 02/18/25 0339 02/18/25 0600   BP: 114/70 119/72 115/67    BP Location: Right arm Right arm Right arm    Patient Position: Sitting Sitting Sitting    Pulse: 89 92 92    Resp: 18 18 18    Temp: 36.7 °C (98.1 °F) 36.8 °C (98.3 °F) 36.7 °C (98.1 °F)    TempSrc: Temporal Temporal Temporal    SpO2: 99% 98% 96%    Weight:    103 kg (227 lb 8.2 oz)   Height:              Physical Exam                                                                                                                         Gen - No acute distress, sitting in chair      Neuro - Alert, oriented, conversant     CV - Regular rate, normotensive      Pulm - Symmetric chest rise, non-labored breathing on room air      Abd - Abdomen is soft, mildly-distended, and appropriately tender. Midline incision c/d/i closed with glue. PEG capped.      Ext - Warm, well perfused     MSK- no pain or swelling in LE bilaterally, bilateral lower extremity soreness      Skin - without jaunice       Psych - appropriate tone, affect      - voiding independently, no rodriguez      Imaging  No results found.     Assessment & Plan  75 y.o. 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 Postoperative course complicated by prolonged ileus vs small bowel obstruction. NGT placed 01/31. Patient now with slowly resolving ANISHA and mild stable leukocytosis. Decompressive PEG and PICC(TPN) placed 2/10.     Plan 2/19:  - Continue to FLD with boosts   - Trend WBC  - Follow up labs   - Nutrition recommendations:  If pt able to tolerate ONS and keep PEG clamped after consumption consider increasing order to TID.   If pt able to tolerate:   Boost VHC x1/day then PN regimen will need decreased.     Boost VHC >/= 2 /day then PN is no longer be necessary.  - CRS following, appreciate recommendations   - Dulcolax Suppository   - Continue discharge process for rehab facility       Neuro:   -Pain controlled with PRN acetaminophen, Oxycodone 5/10. Lidocaine patches  -PRN melatonin for sleep aid     Resp:   -ICS  -On room air   -Appreciate RT recs  -PRN duo neb for wheezing   -PRN phenol for discomfort     Card:   -Monitor vitals  -Amlodpine  -Aspirin   -Cholecalciferol   -PRN labetalol with hold parameters       GI:   - FLD    - PEG 2/10  - IV PPI  - Enemas once daily  -BR: Miralax, senokat   -Vitamin D3  -PRN Zofran for nausea   -Bisacodyl suppository   -Colorectal surgery following, appreciate recommendations r  -Nutrition consulted: following TPN recommendations   - Maintain PICC for TPN, follow up nutrition recommendations regarding discharge diet plan   - PRN Tums   - PRN benzocaine-menthol (Cepastat Sore Throat) lozenge 1 lozenge      :   -HELD Flomax   -Daily BMP to monitor kidney  function and replete electrolytes as indicated.   -Continue to monitor Cr     Endo:   -Levothyroxine PO  -SSI #1     Heme/ID:   -Daily CBC to monitor for acute blood loss anemia   -No indication for blood transfusion at this time   -Zosyn completed 2/14 for aspiration PNA. Given 2/17 for WBC 12.8 now completed.  -Trend WBC  -ID consulted 2/17 for antibiotic plan regarding WBC 12.8, recommended no antibiotics. Signed off.     Ppy:   -ALEN  -SCD  -IS    Dispo:   RNF. Continue  discharge process to rehab facility     Seen and d/w with chief resident Dr. Hamm and attending Dr. Ana Xiao MD, RTA   Urology RTA   Pager: [phone]

## 2025-02-18 NOTE — CARE PLAN
The patient's goals for the shift include    Problem: Nutrition  Goal: Nutrient intake appropriate for maintaining nutritional needs  Outcome: Progressing       The clinical goals for the shift include Pt will remain free from falls

## 2025-02-19 LAB
ALBUMIN SERPL BCP-MCNC: 3.3 G/DL (ref 3.4–5)
ANION GAP SERPL CALC-SCNC: 12 MMOL/L (ref 10–20)
BUN SERPL-MCNC: 41 MG/DL (ref 6–23)
CALCIUM SERPL-MCNC: 9.3 MG/DL (ref 8.6–10.6)
CHLORIDE SERPL-SCNC: 107 MMOL/L (ref 98–107)
CO2 SERPL-SCNC: 19 MMOL/L (ref 21–32)
CREAT SERPL-MCNC: 1.84 MG/DL (ref 0.5–1.3)
EGFRCR SERPLBLD CKD-EPI 2021: 38 ML/MIN/1.73M*2
ERYTHROCYTE [DISTWIDTH] IN BLOOD BY AUTOMATED COUNT: 17 % (ref 11.5–14.5)
GLUCOSE BLD MANUAL STRIP-MCNC: 103 MG/DL (ref 74–99)
GLUCOSE BLD MANUAL STRIP-MCNC: 122 MG/DL (ref 74–99)
GLUCOSE BLD MANUAL STRIP-MCNC: 139 MG/DL (ref 74–99)
GLUCOSE BLD MANUAL STRIP-MCNC: 196 MG/DL (ref 74–99)
GLUCOSE SERPL-MCNC: 163 MG/DL (ref 74–99)
HCT VFR BLD AUTO: 28.4 % (ref 41–52)
HGB BLD-MCNC: 8.9 G/DL (ref 13.5–17.5)
MAGNESIUM SERPL-MCNC: 2.04 MG/DL (ref 1.6–2.4)
MCH RBC QN AUTO: 27.1 PG (ref 26–34)
MCHC RBC AUTO-ENTMCNC: 31.3 G/DL (ref 32–36)
MCV RBC AUTO: 86 FL (ref 80–100)
NRBC BLD-RTO: 0 /100 WBCS (ref 0–0)
PHOSPHATE SERPL-MCNC: 2.9 MG/DL (ref 2.5–4.9)
PLATELET # BLD AUTO: 164 X10*3/UL (ref 150–450)
POTASSIUM SERPL-SCNC: 4.7 MMOL/L (ref 3.5–5.3)
RBC # BLD AUTO: 3.29 X10*6/UL (ref 4.5–5.9)
SODIUM SERPL-SCNC: 133 MMOL/L (ref 136–145)
WBC # BLD AUTO: 10 X10*3/UL (ref 4.4–11.3)

## 2025-02-19 PROCEDURE — 85027 COMPLETE CBC AUTOMATED: CPT | Performed by: STUDENT IN AN ORGANIZED HEALTH CARE EDUCATION/TRAINING PROGRAM

## 2025-02-19 PROCEDURE — 2500000001 HC RX 250 WO HCPCS SELF ADMINISTERED DRUGS (ALT 637 FOR MEDICARE OP)

## 2025-02-19 PROCEDURE — 2500000004 HC RX 250 GENERAL PHARMACY W/ HCPCS (ALT 636 FOR OP/ED): Performed by: STUDENT IN AN ORGANIZED HEALTH CARE EDUCATION/TRAINING PROGRAM

## 2025-02-19 PROCEDURE — 2500000005 HC RX 250 GENERAL PHARMACY W/O HCPCS: Performed by: NURSE PRACTITIONER

## 2025-02-19 PROCEDURE — 83735 ASSAY OF MAGNESIUM: CPT

## 2025-02-19 PROCEDURE — 97530 THERAPEUTIC ACTIVITIES: CPT | Mod: GP,CQ

## 2025-02-19 PROCEDURE — 2500000002 HC RX 250 W HCPCS SELF ADMINISTERED DRUGS (ALT 637 FOR MEDICARE OP, ALT 636 FOR OP/ED)

## 2025-02-19 PROCEDURE — 2500000001 HC RX 250 WO HCPCS SELF ADMINISTERED DRUGS (ALT 637 FOR MEDICARE OP): Performed by: STUDENT IN AN ORGANIZED HEALTH CARE EDUCATION/TRAINING PROGRAM

## 2025-02-19 PROCEDURE — 80069 RENAL FUNCTION PANEL: CPT | Performed by: STUDENT IN AN ORGANIZED HEALTH CARE EDUCATION/TRAINING PROGRAM

## 2025-02-19 PROCEDURE — 82947 ASSAY GLUCOSE BLOOD QUANT: CPT

## 2025-02-19 PROCEDURE — 97116 GAIT TRAINING THERAPY: CPT | Mod: GP,CQ

## 2025-02-19 PROCEDURE — 1170000001 HC PRIVATE ONCOLOGY ROOM DAILY

## 2025-02-19 PROCEDURE — 2500000004 HC RX 250 GENERAL PHARMACY W/ HCPCS (ALT 636 FOR OP/ED)

## 2025-02-19 PROCEDURE — 2580000001 HC RX 258 IV SOLUTIONS

## 2025-02-19 RX ORDER — BISACODYL 10 MG/1
10 SUPPOSITORY RECTAL DAILY
Status: DISCONTINUED | OUTPATIENT
Start: 2025-02-19 | End: 2025-02-24

## 2025-02-19 RX ORDER — BISACODYL 5 MG
5 TABLET, DELAYED RELEASE (ENTERIC COATED) ORAL DAILY
Status: DISCONTINUED | OUTPATIENT
Start: 2025-02-19 | End: 2025-02-19

## 2025-02-19 RX ADMIN — PANTOPRAZOLE SODIUM 40 MG: 40 INJECTION, POWDER, FOR SOLUTION INTRAVENOUS at 06:07

## 2025-02-19 RX ADMIN — ASCORBIC ACID, VITAMIN A PALMITATE, CHOLECALCIFEROL, THIAMINE HYDROCHLORIDE, RIBOFLAVIN-5 PHOSPHATE SODIUM, PYRIDOXINE HYDROCHLORIDE, NIACINAMIDE, DEXPANTHENOL, ALPHA-TOCOPHEROL ACETATE, VITAMIN K1, FOLIC ACID, BIOTIN, CYANOCOBALAMIN: 200; 3300; 200; 6; 3.6; 6; 40; 15; 10; 150; 600; 60; 5 INJECTION, SOLUTION INTRAVENOUS at 21:56

## 2025-02-19 RX ADMIN — HEPARIN SODIUM 5000 UNITS: 5000 INJECTION INTRAVENOUS; SUBCUTANEOUS at 10:31

## 2025-02-19 RX ADMIN — HEPARIN SODIUM 5000 UNITS: 5000 INJECTION INTRAVENOUS; SUBCUTANEOUS at 03:56

## 2025-02-19 RX ADMIN — LEVOTHYROXINE SODIUM 25 MCG: 25 TABLET ORAL at 06:07

## 2025-02-19 RX ADMIN — ASPIRIN 81 MG: 81 TABLET, COATED ORAL at 10:28

## 2025-02-19 RX ADMIN — Medication 10 MG: at 21:41

## 2025-02-19 RX ADMIN — INSULIN LISPRO 1 UNITS: 100 INJECTION, SOLUTION INTRAVENOUS; SUBCUTANEOUS at 06:36

## 2025-02-19 RX ADMIN — ACETAMINOPHEN 650 MG: 325 TABLET ORAL at 21:41

## 2025-02-19 RX ADMIN — OXYCODONE HYDROCHLORIDE 5 MG: 5 SOLUTION ORAL at 03:58

## 2025-02-19 RX ADMIN — Medication 1000 UNITS: at 10:28

## 2025-02-19 RX ADMIN — ACETAMINOPHEN 650 MG: 325 TABLET ORAL at 01:52

## 2025-02-19 RX ADMIN — SMOFLIPID 50 G: 6; 6; 5; 3 INJECTION, EMULSION INTRAVENOUS at 21:56

## 2025-02-19 RX ADMIN — AMLODIPINE BESYLATE 10 MG: 10 TABLET ORAL at 10:28

## 2025-02-19 RX ADMIN — HEPARIN SODIUM 5000 UNITS: 5000 INJECTION INTRAVENOUS; SUBCUTANEOUS at 18:59

## 2025-02-19 ASSESSMENT — COGNITIVE AND FUNCTIONAL STATUS - GENERAL
WALKING IN HOSPITAL ROOM: A LITTLE
MOVING TO AND FROM BED TO CHAIR: A LITTLE
HELP NEEDED FOR BATHING: A LITTLE
EATING MEALS: A LITTLE
TOILETING: A LITTLE
WALKING IN HOSPITAL ROOM: A LITTLE
EATING MEALS: A LITTLE
PERSONAL GROOMING: A LITTLE
DAILY ACTIVITIY SCORE: 18
DRESSING REGULAR LOWER BODY CLOTHING: A LITTLE
MOVING TO AND FROM BED TO CHAIR: A LITTLE
DRESSING REGULAR LOWER BODY CLOTHING: A LITTLE
MOVING TO AND FROM BED TO CHAIR: A LITTLE
STANDING UP FROM CHAIR USING ARMS: A LITTLE
MOBILITY SCORE: 19
MOVING FROM LYING ON BACK TO SITTING ON SIDE OF FLAT BED WITH BEDRAILS: A LITTLE
TURNING FROM BACK TO SIDE WHILE IN FLAT BAD: A LITTLE
PERSONAL GROOMING: A LITTLE
MOBILITY SCORE: 19
DRESSING REGULAR UPPER BODY CLOTHING: A LITTLE
STANDING UP FROM CHAIR USING ARMS: A LITTLE
STANDING UP FROM CHAIR USING ARMS: A LITTLE
CLIMB 3 TO 5 STEPS WITH RAILING: A LOT
TURNING FROM BACK TO SIDE WHILE IN FLAT BAD: A LITTLE
WALKING IN HOSPITAL ROOM: A LITTLE
HELP NEEDED FOR BATHING: A LITTLE
TOILETING: A LITTLE
DAILY ACTIVITIY SCORE: 18
TURNING FROM BACK TO SIDE WHILE IN FLAT BAD: A LOT
CLIMB 3 TO 5 STEPS WITH RAILING: A LITTLE
DRESSING REGULAR UPPER BODY CLOTHING: A LITTLE
CLIMB 3 TO 5 STEPS WITH RAILING: A LITTLE
MOBILITY SCORE: 16

## 2025-02-19 ASSESSMENT — PAIN SCALES - PAIN ASSESSMENT IN ADVANCED DEMENTIA (PAINAD)
TOTALSCORE: MEDICATION (SEE MAR)
TOTALSCORE: MEDICATION (SEE MAR)

## 2025-02-19 ASSESSMENT — PAIN SCALES - GENERAL
PAINLEVEL_OUTOF10: 5 - MODERATE PAIN
PAINLEVEL_OUTOF10: 3
PAINLEVEL_OUTOF10: 8
PAINLEVEL_OUTOF10: 4
PAINLEVEL_OUTOF10: 0 - NO PAIN
PAINLEVEL_OUTOF10: 0 - NO PAIN

## 2025-02-19 ASSESSMENT — PAIN DESCRIPTION - LOCATION: LOCATION: KNEE

## 2025-02-19 ASSESSMENT — PAIN - FUNCTIONAL ASSESSMENT
PAIN_FUNCTIONAL_ASSESSMENT: 0-10

## 2025-02-19 ASSESSMENT — PAIN DESCRIPTION - ORIENTATION: ORIENTATION: RIGHT;LEFT

## 2025-02-19 NOTE — PROGRESS NOTES
Physical Therapy    Physical Therapy Treatment    Patient Name: Swapnil Allen  MRN: [id number]  Department: [facility]  Room: [facility]5028-  Today's Date: 2/19/2025  Time Calculation  Start Time: 1031  Stop Time: 1054  Time Calculation (min): 23 min         Assessment/Plan   PT Assessment  Barriers to Discharge Home: Caregiver assistance, Cognition needs, Physical needs  Caregiver Assistance: Patient lives alone and/or does not have reliable caregiver assistance  Cognition Needs: 24hr supervision for safety awareness needed, Insight of patient limited regarding functional ability/needs  Physical Needs: Intermittent mobility assistance needed, 24hr ADL assistance needed  End of Session Communication: Bedside nurse  Assessment Comment: Pt tolerated PT session well, continues to remian appropriate for Moderate intensity PT upon D/C from hosptial for increased activity tolerance, and overall mobility.  End of Session Patient Position: Up in chair, Alarm off, not on at start of session  PT Plan  Inpatient/Swing Bed or Outpatient: Inpatient  PT Plan  Treatment/Interventions: Bed mobility, Gait training, Transfer training, Stair training, Balance training, Neuromuscular re-education, Strengthening, Endurance training, Range of motion, Therapeutic exercise, Therapeutic activity, Home exercise program, Positioning, Postural re-education  PT Plan: Ongoing PT  PT Frequency: 3 times per week  PT Discharge Recommendations: Moderate intensity level of continued care  Equipment Recommended upon Discharge: Wheeled walker  PT Recommended Transfer Status: Contact guard, Assistive device  PT - OK to Discharge: Yes      General Visit Information:   PT  Visit  PT Received On: 02/19/25  General  Missed Visit Reason:  (n/a)  Family/Caregiver Present: No  Prior to Session Communication: Bedside nurse  Patient Position Received: Up in chair, Alarm off, not on at start of session  General Comment: Pt seated up in chair, agreeable to work with  PT,    Subjective   Precautions:  Precautions  Medical Precautions: Fall precautions  Precautions Comment: protective precautions,     02/19/25 1031   Vital Signs   Vitals Session During PT   Heart Rate (!) 111   SpO2 97 %     Objective   Pain:  Pain Assessment  Pain Assessment: 0-10  0-10 (Numeric) Pain Score: 0 - No pain  Cognition:  Cognition  Overall Cognitive Status: Within Functional Limits  Orientation Level: Oriented X4    Activity Tolerance:  Activity Tolerance  Endurance: Tolerates 10 - 20 min exercise with multiple rests  Treatments:  Therapeutic Exercise  Therapeutic Exercise Performed: No (Pt declined this date)    Therapeutic Activity  Therapeutic Activity Performed: Yes  Therapeutic Activity 1: Increased time spent educating and ecouraging pt on increased activity throughout the day, Encouraged pt with staff, mobility and PT/OT to trial ambualtion 3x/day to improve overall strength,endurance and mobility. Pt receptive.  Therapeutic Activity 2: Educated pt on energy conservation techniques as pt was quite fatigued and winded post ambulation in hallway. Educated pt on rest breaks, PLB, activity pacing.    Bed Mobility  Bed Mobility: No    Ambulation/Gait Training  Ambulation/Gait Training Performed: Yes  Ambulation/Gait Training 1  Surface 1: Level tile  Device 1: Rolling walker  Assistance 1: Contact guard, Minimal verbal cues  Quality of Gait 1: Diminished heel strike, Decreased step length (Slightly flexed posture,)  Comments/Distance (ft) 1: 60ft x2, 40ft, cues for activity pacing, x1 standing rest break and x1 seated rest break. Pt with occassional standing stops throughout ambulation back to room. Cues and education on pacing and energy conservation technqiues, pt receptive    Transfers  Transfer: Yes  Transfer 1  Transfer From 1: Chair with arms to  Transfer to 1: Stand  Technique 1: Sit to stand, Stand to sit  Transfer Device 1: Walker  Transfer Level of Assistance 1: Contact guard, Minimal  verbal cues  Trials/Comments 1: x2 trails, cues for safe hand placement and slow and controlled pacing.    Stairs  Stairs: No    Outcome Measures:  [facility] Basic Mobility  Turning from your back to your side while in a flat bed without using bedrails: A little  Moving from lying on your back to sitting on the side of a flat bed without using bedrails: A lot  Moving to and from bed to chair (including a wheelchair): A little  Standing up from a chair using your arms (e.g. wheelchair or bedside chair): A little  To walk in hospital room: A little  Climbing 3-5 steps with railing: A lot  Basic Mobility - Total Score: 16    Education Documentation  Precautions, taught by Sunitha Cortes PTA at 2/19/2025 12:45 PM.  Learner: Patient  Readiness: Acceptance  Method: Explanation  Response: Verbalizes Understanding, Needs Reinforcement  Comment: Energy conservation techniques, increased ambulation with RN/Mobility Aide staff    Body Mechanics, taught by Sunitha Cortes PTA at 2/19/2025 12:45 PM.  Learner: Patient  Readiness: Acceptance  Method: Explanation  Response: Verbalizes Understanding, Needs Reinforcement  Comment: Energy conservation techniques, increased ambulation with RN/Mobility Aide staff    Home Exercise Program, taught by Sunitha Cortes PTA at 2/19/2025 12:45 PM.  Learner: Patient  Readiness: Acceptance  Method: Explanation  Response: Verbalizes Understanding, Needs Reinforcement  Comment: Energy conservation techniques, increased ambulation with RN/Mobility Aide staff    Mobility Training, taught by Sunitha Cortes PTA at 2/19/2025 12:45 PM.  Learner: Patient  Readiness: Acceptance  Method: Explanation  Response: Verbalizes Understanding, Needs Reinforcement  Comment: Energy conservation techniques, increased ambulation with RN/Mobility Aide staff    Education Comments  No comments found.        OP EDUCATION:       Encounter Problems       Encounter Problems (Active)       Mobility       LTG - Patient  will navigate 4-6 steps with rails/device and moderate assistance. (Not met)       Start:  01/30/25    Expected End:  02/13/25    Resolved:  02/17/25    Updated to: Pt will ascend/descend 4 steps with 1 rail and supervision, no LOB    Update reason: update         STG - Patient will ambulate ~100 feet with FWW and minimal assistance.  (Not met)       Start:  01/30/25    Expected End:  02/13/25    Resolved:  02/17/25    Updated to: Pt will ambulated 150' with WW and supervision safely, no LOB    Update reason: update         Pt will ambulate >/= 100 ft with FWW and minimal assistance and no signs of fatigue or SOB.   (Not met)       Start:  01/30/25    Expected End:  03/03/25    Resolved:  02/17/25    Updated to: Pt will score >45 on the Booth Balance scale indicating lower risk for falls    Update reason: update         Pt will ascend/descend 4 steps with 1 rail and supervision, no LOB (Not Progressing)       Start:  02/17/25    Expected End:  03/03/25                Pt will ambulated 150' with WW and supervision safely, no LOB (Progressing)       Start:  02/17/25    Expected End:  03/03/25                Pt will score >45 on the Booth Balance scale indicating lower risk for falls (Progressing)       Start:  02/17/25    Expected End:  03/03/25                   PT Transfers       LTG - Patient will transfer from one surface to another CGA with FWW. (Not met)       Start:  01/30/25    Expected End:  02/13/25    Resolved:  02/17/25    Updated to: Pt will come sit to/from stand, bed to/from chair with WW and supervision safely, no LOB    Update reason: update         STG - Patient will perform bed mobility with close supervision.  (Not met)       Start:  01/30/25    Expected End:  02/13/25    Resolved:  02/17/25    Updated to: Pt will come supine to/from sit (HOB flat, no rail) with supervision safely    Update reason: update         Pt will come sit to/from stand, bed to/from chair with WW and supervision safely, no LOB  (Progressing)       Start:  02/17/25    Expected End:  03/03/25                Pt will come supine to/from sit (HOB flat, no rail) with supervision safely (Progressing)       Start:  02/17/25    Expected End:  03/03/25                   Pain - Adult            02/19/25 at 12:47 PM   Sunitha Cortes PTA   Rehab Office: [phone]

## 2025-02-19 NOTE — PROGRESS NOTES
02/11/25 1100   Discharge Planning   Living Arrangements Alone   Support Systems Family members   Assistance Needed TPN, ADLs   Type of Residence Private residence   Number of Stairs to Enter Residence 3  (brother's home: 3 steps at side door to enter)   Number of Stairs Within Residence 3  (brother's home, down to basement)   Do you have animals or pets at home? No   Home or Post Acute Services In home services   Type of Home Care Services Home OT;Home PT;Home nursing visits   Expected Discharge Disposition Home  (brother Jhony's home)   Does the patient need discharge transport arranged? No   Financial Resource Strain   How hard is it for you to pay for the very basics like food, housing, medical care, and heating? Not very   Housing Stability   In the last 12 months, was there a time when you were not able to pay the mortgage or rent on time? N   In the past 12 months, how many times have you moved where you were living? 0   At any time in the past 12 months, were you homeless or living in a shelter (including now)? N   Transportation Needs   In the past 12 months, has lack of transportation kept you from medical appointments or from getting medications? no   In the past 12 months, has lack of transportation kept you from meetings, work, or from getting things needed for daily living? No     SW met with pt to discuss discharge to SNF.  Pt reported that he had been in conversation with his family and that the plan is for him to discharge to his brother [place] [address] [id number] Peter's spouse Mayda is a retired RN.  SW met with care team to follow up.  When SW returned to pt room, he was on the phone with Jennifer Green confirmed that the plan was to discharge to his home.   Care team aware, pt has many teachable caregivers.  SW will follow. [facility] Juan [facility] LSW    02/17/2025 125  Plan was for pt to discharge to sibling [facility].  Pt is on  TPN via PICC.  Pt is not medically ready due to a rising WBC.  STEPHANIE notified this afternoon that sibling Peter and family feel that pt should discharge to a SNF.  Pt has been rec'd SNF for PT/OT throughout his admission but has not been amenable.  STEPHANIE printed out a SNF choice list and met with pt.  Pt called his brother Peter and Peter confirmed that they were concerned with his current care need.  SW and bedside nurse spoke to Peter and pt.  SNF choice list emailed to Peter at [email or web address].  Further discharge planning pending updates from the care team.  SW will follow. Rogers Juan [facility]     02/19/2025 0850  STEPHANIE received a phone call from pt sister-in-law Dunia asking for an update on SNF search. STEPHANIE had previously met with Dunia and several family members bedside and they were provided a SNF list.  STEPHANIE let her know that SW was waiting for their SNF preferences as [facility] and [facility] could not accept.  Dunia asked about [facility] and [facility] and was told that pt does not qualify for either.   Dunia added new preferences [facility], [facility], and [facility]  [facility].  SW will follow. Rogers Juan [facility]    02/19/2025 0930  [facility]. [facility] is reviewing.  STEPHANIE received a voicemail from Dunia asking about [facility].  STEPHANIE called Dunia back and let her know that pt is not skilled for AR.  STEPHANIE asked Dunia to choose additional preferences.  STEPHANIE will notify Dunia of [facility] SNFs response.  STEPHANIE will follow. Rogers Juan [facility]

## 2025-02-19 NOTE — PROGRESS NOTES
[facility] [place]  Progress Note    Patient: Swapnil Allen  Age/Sex: 75 y.o., male  MRN: [id number]  Date of surgery: 1/28/25  Admit Date: 1/28/2025   Code Status: Full Code  Length of Stay: 22    Interval History/Overnight Events:   NAONE, sitting in chair on rounds. Feeling fair/average   PEG capped.   No nausea or emesis, endorsing burping. Did not have dinner, tolerated 2x boosts   Denies any fevers, chills.   +Bms/+gas   No rodriguez, UOP 1.15L  Feeling mild soreness bilateral legs, new onset since admission starting yesterday. Per patient likely due to being confined to chair.       Objective  02/17 1900 - 02/19 0659  In: 2301.3 [P.O.:1150]  Out: 2800 [Urine:2800]              8.9     10.0>-----<164              28.4   133  107  41                  ----------------<163     4.7  19  1.84          Ca 9.3 Phos 2.9 Mg 2.04       ALT 52 AST 30 AlkPhos 106 tBili 0.3          Vitals:    02/18/25 2326 02/19/25 0338 02/19/25 0724 02/19/25 1153   BP: 120/81 109/69 106/74 103/68   BP Location: Right arm Right arm Right arm Right arm   Patient Position: Sitting Sitting Sitting Sitting   Pulse: 85 81 80 89   Resp: 16 16 17 18   Temp: 36.2 °C (97.2 °F) 36.4 °C (97.5 °F) 36 °C (96.8 °F) 36.5 °C (97.7 °F)   TempSrc: Temporal Temporal Temporal Temporal   SpO2:  98% 98% 97%   Weight:       Height:              Physical Exam                                                                                                                         Gen - No acute distress, sitting in chair      Neuro - Alert, oriented, conversant     CV - Regular rate, normotensive      Pulm - Symmetric chest rise, non-labored breathing on room air      Abd - Abdomen is soft, mildly-distended, and appropriately tender. Midline incision c/d/i closed with glue. PEG capped.      Ext - Warm, well perfused     MSK- no pain or swelling in LE bilaterally, bilateral lower extremity soreness      Skin - without jaunice       Psych - appropriate tone, affect       - voiding independently, no rodriguez     Imaging  No results found.     Assessment & Plan  75 y.o. 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 Postoperative course complicated by prolonged ileus vs small bowel obstruction. NGT placed 01/31. Patient now with slowly resolving ANISHA and mild stable leukocytosis. Decompressive PEG and PICC(TPN) placed 2/10.     Plan 2/19:  - Continue to FLD with boosts   - Trend WBC  - Follow up labs   - Nutrition recommendations:  If pt able to tolerate ONS and keep PEG clamped after consumption consider increasing order to TID.   If pt able to tolerate:   Boost VHC x1/day then PN regimen will need decreased.     Boost VHC >/= 2 /day then PN is no longer be necessary.  - CRS following, appreciate recommendations   - Dulcolax Suppository   - Continue discharge process for rehab facility       Neuro:   -Pain controlled with PRN acetaminophen, Oxycodone 5/10. Lidocaine patches  -PRN melatonin for sleep aid     Resp:   -ICS  -On room air   -Appreciate RT recs  -PRN duo neb for wheezing   -PRN phenol for discomfort     Card:   -Monitor vitals  -Amlodpine  -Aspirin   -Cholecalciferol   -PRN labetalol with hold parameters       GI:   - FLD    - PEG 2/10  - IV PPI  - Enemas once daily  -BR: Miralax, senokat   -Vitamin D3  -PRN Zofran for nausea   -Bisacodyl suppository   -Colorectal surgery following, appreciate recommendations r  -Nutrition consulted: following TPN recommendations   - Maintain PICC for TPN, follow up nutrition recommendations regarding discharge diet plan   - PRN Tums   - PRN benzocaine-menthol (Cepastat Sore Throat) lozenge 1 lozenge      :   -HELD  Flomax   -Daily BMP to monitor kidney function and replete electrolytes as indicated.   -Continue to monitor Cr     Endo:   -Levothyroxine PO  -SSI #1     Heme/ID:   -Daily CBC to monitor for acute blood loss anemia   -No indication for blood transfusion at this time   -Zosyn completed 2/14 for aspiration PNA. Given 2/17 for WBC 12.8 now completed.  -Trend WBC  -ID consulted 2/17 for antibiotic plan regarding WBC 12.8, recommended no antibiotics. Signed off.     Ppy:   -ALEN  -SCD  -IS    Dispo:   RNF. Continue  discharge process to rehab facility     Seen and d/w with chief resident Dr. Hamm and attending Dr. Ana Xiao MD, RTA   Urology RTA   Pager: [phone]

## 2025-02-19 NOTE — CARE PLAN
The patient's goals for the shift include      The clinical goals for the shift include pt will continue to improve with nutritional intake    Problem: Discharge Planning  Goal: Discharge to home or other facility with appropriate resources  Outcome: Progressing     Problem: Pain - Adult  Goal: Verbalizes/displays adequate comfort level or baseline comfort level  Outcome: Progressing     Problem: Skin  Goal: Prevent/minimize sheer/friction injuries  Outcome: Progressing     Problem: Skin  Goal: Promote/optimize nutrition  Outcome: Progressing

## 2025-02-20 ENCOUNTER — APPOINTMENT (OUTPATIENT)
Dept: UROLOGY | Facility: CLINIC | Age: 75
End: 2025-02-20
Payer: MEDICARE

## 2025-02-20 PROBLEM — C64.1 RENAL CELL CARCINOMA OF RIGHT KIDNEY (MULTI): Status: ACTIVE | Noted: 2025-02-20

## 2025-02-20 LAB
ALBUMIN SERPL BCP-MCNC: 3.2 G/DL (ref 3.4–5)
ANION GAP SERPL CALC-SCNC: 11 MMOL/L (ref 10–20)
BUN SERPL-MCNC: 42 MG/DL (ref 6–23)
CALCIUM SERPL-MCNC: 9.3 MG/DL (ref 8.6–10.6)
CHLORIDE SERPL-SCNC: 108 MMOL/L (ref 98–107)
CO2 SERPL-SCNC: 20 MMOL/L (ref 21–32)
CREAT SERPL-MCNC: 1.82 MG/DL (ref 0.5–1.3)
EGFRCR SERPLBLD CKD-EPI 2021: 38 ML/MIN/1.73M*2
ERYTHROCYTE [DISTWIDTH] IN BLOOD BY AUTOMATED COUNT: 17.1 % (ref 11.5–14.5)
GLUCOSE BLD MANUAL STRIP-MCNC: 101 MG/DL (ref 74–99)
GLUCOSE BLD MANUAL STRIP-MCNC: 126 MG/DL (ref 74–99)
GLUCOSE BLD MANUAL STRIP-MCNC: 147 MG/DL (ref 74–99)
GLUCOSE BLD MANUAL STRIP-MCNC: 175 MG/DL (ref 74–99)
GLUCOSE SERPL-MCNC: 132 MG/DL (ref 74–99)
HCT VFR BLD AUTO: 26.6 % (ref 41–52)
HGB BLD-MCNC: 8.5 G/DL (ref 13.5–17.5)
MAGNESIUM SERPL-MCNC: 2.12 MG/DL (ref 1.6–2.4)
MCH RBC QN AUTO: 27.7 PG (ref 26–34)
MCHC RBC AUTO-ENTMCNC: 32 G/DL (ref 32–36)
MCV RBC AUTO: 87 FL (ref 80–100)
NRBC BLD-RTO: 0 /100 WBCS (ref 0–0)
PHOSPHATE SERPL-MCNC: 2.8 MG/DL (ref 2.5–4.9)
PLATELET # BLD AUTO: 128 X10*3/UL (ref 150–450)
POTASSIUM SERPL-SCNC: 4.7 MMOL/L (ref 3.5–5.3)
RBC # BLD AUTO: 3.07 X10*6/UL (ref 4.5–5.9)
SODIUM SERPL-SCNC: 134 MMOL/L (ref 136–145)
WBC # BLD AUTO: 9 X10*3/UL (ref 4.4–11.3)

## 2025-02-20 PROCEDURE — 2500000001 HC RX 250 WO HCPCS SELF ADMINISTERED DRUGS (ALT 637 FOR MEDICARE OP)

## 2025-02-20 PROCEDURE — 2500000004 HC RX 250 GENERAL PHARMACY W/ HCPCS (ALT 636 FOR OP/ED): Performed by: STUDENT IN AN ORGANIZED HEALTH CARE EDUCATION/TRAINING PROGRAM

## 2025-02-20 PROCEDURE — 85027 COMPLETE CBC AUTOMATED: CPT | Performed by: STUDENT IN AN ORGANIZED HEALTH CARE EDUCATION/TRAINING PROGRAM

## 2025-02-20 PROCEDURE — 2500000002 HC RX 250 W HCPCS SELF ADMINISTERED DRUGS (ALT 637 FOR MEDICARE OP, ALT 636 FOR OP/ED)

## 2025-02-20 PROCEDURE — 1170000001 HC PRIVATE ONCOLOGY ROOM DAILY

## 2025-02-20 PROCEDURE — 80069 RENAL FUNCTION PANEL: CPT | Performed by: STUDENT IN AN ORGANIZED HEALTH CARE EDUCATION/TRAINING PROGRAM

## 2025-02-20 PROCEDURE — 2500000001 HC RX 250 WO HCPCS SELF ADMINISTERED DRUGS (ALT 637 FOR MEDICARE OP): Performed by: STUDENT IN AN ORGANIZED HEALTH CARE EDUCATION/TRAINING PROGRAM

## 2025-02-20 PROCEDURE — 97165 OT EVAL LOW COMPLEX 30 MIN: CPT | Mod: GO

## 2025-02-20 PROCEDURE — 82947 ASSAY GLUCOSE BLOOD QUANT: CPT

## 2025-02-20 PROCEDURE — 83735 ASSAY OF MAGNESIUM: CPT

## 2025-02-20 RX ADMIN — ACETAMINOPHEN 650 MG: 325 TABLET ORAL at 23:42

## 2025-02-20 RX ADMIN — HEPARIN SODIUM 5000 UNITS: 5000 INJECTION INTRAVENOUS; SUBCUTANEOUS at 18:22

## 2025-02-20 RX ADMIN — AMLODIPINE BESYLATE 10 MG: 10 TABLET ORAL at 08:30

## 2025-02-20 RX ADMIN — LEVOTHYROXINE SODIUM 25 MCG: 25 TABLET ORAL at 05:10

## 2025-02-20 RX ADMIN — HEPARIN SODIUM 5000 UNITS: 5000 INJECTION INTRAVENOUS; SUBCUTANEOUS at 11:21

## 2025-02-20 RX ADMIN — ACETAMINOPHEN 650 MG: 325 TABLET ORAL at 05:10

## 2025-02-20 RX ADMIN — INSULIN LISPRO 1 UNITS: 100 INJECTION, SOLUTION INTRAVENOUS; SUBCUTANEOUS at 05:07

## 2025-02-20 RX ADMIN — OXYCODONE HYDROCHLORIDE 5 MG: 5 SOLUTION ORAL at 21:14

## 2025-02-20 RX ADMIN — Medication 1000 UNITS: at 08:30

## 2025-02-20 RX ADMIN — HEPARIN SODIUM 5000 UNITS: 5000 INJECTION INTRAVENOUS; SUBCUTANEOUS at 02:39

## 2025-02-20 RX ADMIN — OXYCODONE HYDROCHLORIDE 5 MG: 5 SOLUTION ORAL at 01:41

## 2025-02-20 RX ADMIN — ASPIRIN 81 MG: 81 TABLET, COATED ORAL at 08:30

## 2025-02-20 RX ADMIN — Medication 10 MG: at 21:14

## 2025-02-20 ASSESSMENT — COGNITIVE AND FUNCTIONAL STATUS - GENERAL
DAILY ACTIVITIY SCORE: 19
PERSONAL GROOMING: A LITTLE
PERSONAL GROOMING: A LITTLE
CLIMB 3 TO 5 STEPS WITH RAILING: A LITTLE
STANDING UP FROM CHAIR USING ARMS: A LITTLE
HELP NEEDED FOR BATHING: A LOT
DRESSING REGULAR LOWER BODY CLOTHING: A LITTLE
DAILY ACTIVITIY SCORE: 18
MOVING TO AND FROM BED TO CHAIR: A LITTLE
MOBILITY SCORE: 19
TOILETING: A LITTLE
DRESSING REGULAR LOWER BODY CLOTHING: A LITTLE
EATING MEALS: A LITTLE
DRESSING REGULAR UPPER BODY CLOTHING: A LITTLE
TOILETING: A LITTLE
WALKING IN HOSPITAL ROOM: A LITTLE
HELP NEEDED FOR BATHING: A LITTLE
TURNING FROM BACK TO SIDE WHILE IN FLAT BAD: A LITTLE

## 2025-02-20 ASSESSMENT — PAIN - FUNCTIONAL ASSESSMENT
PAIN_FUNCTIONAL_ASSESSMENT: 0-10

## 2025-02-20 ASSESSMENT — PAIN SCALES - GENERAL
PAINLEVEL_OUTOF10: 3
PAINLEVEL_OUTOF10: 4
PAINLEVEL_OUTOF10: 5 - MODERATE PAIN
PAINLEVEL_OUTOF10: 3
PAINLEVEL_OUTOF10: 0 - NO PAIN
PAINLEVEL_OUTOF10: 5 - MODERATE PAIN

## 2025-02-20 ASSESSMENT — ACTIVITIES OF DAILY LIVING (ADL)
ADL_ASSISTANCE: INDEPENDENT
BATHING_ASSISTANCE: MODERATE

## 2025-02-20 NOTE — PROGRESS NOTES
"  [facility] [place]  Progress Note    Patient: Swapnil Allen  Age/Sex: 75 y.o., male  MRN: [id number]  Date of surgery: 1/28/25  Admit Date: 1/28/2025   Code Status: Full Code  Length of Stay: 23    Interval History/Overnight Events:   BM x 2 in last 24 hours  Had 3 boosts with 10% of cream of wheat. Reports burping this morning and no significant flatus. Denies nausea.   Did ambulate once yesterday.   Reports feeling \"woozy\" when he stands up, but also reports that this was ongoing prior to his surgery, feels it may be due to the insulin vs TPN      Objective  02/18 1900 - 02/20 0659  In: 3155 [P.O.:1431]  Out: 1050 [Urine:1050]              8.5     9.0>-----<128              26.6   134  108  42                  ----------------<132     4.7  20  1.82          Ca 9.3 Phos 2.8 Mg 2.12       ALT 52 AST 30 AlkPhos 106 tBili 0.3          Vitals:    02/19/25 1153 02/19/25 1635 02/19/25 2320 02/20/25 0417   BP: 103/68 112/72 114/72 109/69   BP Location: Right arm  Right arm Right arm   Patient Position: Sitting  Lying Lying   Pulse: 89 91 87 85   Resp: 18 18 18 18   Temp: 36.5 °C (97.7 °F) 36 °C (96.8 °F) 36.1 °C (97 °F) 36.2 °C (97.2 °F)   TempSrc: Temporal Temporal Temporal Temporal   SpO2: 97% 98% 97% 98%   Weight:       Height:              Physical Exam                                                                                                                         Gen - No acute distress, sitting in chair      Neuro - Alert, oriented, conversant     CV - Regular rate, normotensive      Pulm - Symmetric chest rise, non-labored breathing on room air      Abd - Abdomen is soft, mildly-distended, and appropriately tender. Midline incision c/d/i closed with glue. PEG capped.      Ext - Warm, well perfused, 1+ pitting edema bilaterally below the knees     MSK- no pain or swelling in LE bilaterally, bilateral lower extremity soreness      Skin - without jaunice       Psych - appropriate tone, affect      - voiding " independently, no rodriguez     Imaging  No results found.     Assessment & Plan  75 y.o. 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 Postoperative course complicated by prolonged ileus vs small bowel obstruction. NGT placed 01/31. Patient now with slowly resolving ANISHA and mild stable leukocytosis. Decompressive PEG and PICC(TPN) placed 2/10.     Plan 2/20:  - Continue to FLD with boosts   - Follow up labs   - Nutrition recommendations:  If pt able to tolerate ONS and keep PEG clamped after consumption consider increasing order to TID.   If pt able to tolerate:   Boost VHC x1/day then PN regimen will need decreased.     Boost VHC >/= 2 /day then PN is no longer be necessary.  - CRS following, appreciate recommendations   - Dulcolax Suppository   - Continue discharge process for rehab facility       Neuro:   -Pain controlled with PRN acetaminophen, Oxycodone 5/10. Lidocaine patches  -PRN melatonin for sleep aid     Resp:   -ICS  -On room air   -Appreciate RT recs  -PRN duo neb for wheezing   -PRN phenol for discomfort     Card:   -Monitor vitals  -Amlodpine  -Aspirin   -Cholecalciferol   -PRN labetalol with hold parameters   - orthostatics  - bennie hose      GI:   - FLD    - PEG 2/10  - IV PPI  - Enemas once daily  -BR: Miralax, senokat   -Vitamin D3  -PRN Zofran for nausea   -Bisacodyl suppository   -Colorectal surgery following, appreciate recommendations  -Nutrition consulted: following TPN recommendations   - Maintain PICC for TPN, follow up nutrition recommendations regarding discharge diet plan   - PRN Tums   - PRN benzocaine-menthol (Cepastat Sore Throat) lozenge 1 lozenge      :   -HELD  Flomax   -Daily BMP to monitor kidney function and replete electrolytes as indicated.   -Continue to monitor Cr     Endo:   -Levothyroxine PO  -SSI #1     Heme/ID:   -Daily CBC to monitor for acute blood loss anemia   -No indication for blood transfusion at this time   -Zosyn completed 2/14 for aspiration PNA. Given 2/17 for WBC 12.8 now completed.  -Trend WBC  -ID consulted 2/17 for antibiotic plan regarding WBC 12.8, recommended no antibiotics. Signed off.     Ppy:   -ALEN  -SCD  -IS    Dispo:   RNF. Continue  discharge process to rehab facility     D/w Dr. Perez.    --------------------------------------------  Jannie Hamm MD, PGY-6  Urology  Consult Uro: [id number]  Pediatrics Uro: [id number]   After 5pm and Weekends: [id number]

## 2025-02-20 NOTE — PROGRESS NOTES
Occupational Therapy    Evaluation    Patient Name: Swapnil Allen  MRN: [id number]  Today's Date: 2/20/2025  Room: [address] [place] [address]  Time Calculation  Start Time: 1450  Stop Time: 1506  Time Calculation (min): 16 min    Assessment  IP OT Assessment  OT Assessment: Pt presents with impaired functional mobility, transfers, balance, endurance, ADLs/IADLs, and cognition. Pt has safe home setup, limited access to DME/AE, good social support from family, fair insight, and is functioning below baseline. Pt tolerated session well but demonstrates decreased safety awareness and decreased insight into need for assistance. Pt is likely to benefit from skilled OT services at a MOD intensity as he lives alone, is functioning below baseline, and demo's decreased safety awareness.  Prognosis: Good  Barriers to Discharge Home: Caregiver assistance, Cognition needs, Physical needs  Caregiver Assistance: Patient lives alone and/or does not have reliable caregiver assistance  Cognition Needs: Cognition-related high falls risk  Physical Needs: Intermittent mobility assistance needed, Intermittent ADL assistance needed  Evaluation/Treatment Tolerance: Patient tolerated treatment well  Medical Staff Made Aware: Yes  End of Session Communication: Bedside nurse  End of Session Patient Position: Up in chair, Alarm off, not on at start of session  Plan:  Inpatient Plan  Treatment Interventions: ADL retraining, Functional transfer training, Endurance training, Cognitive reorientation, Equipment evaluation/education, Compensatory technique education  OT Frequency: 3 times per week  OT Discharge Recommendations: Moderate intensity level of continued care  Equipment Recommended upon Discharge:  (TBD at next level of care)  OT Recommended Transfer Status: Assist of 1  OT - OK to Discharge: Yes  OT Assessment  OT Assessment Results: Decreased ADL status, Decreased safe judgment during ADL, Decreased cognition, Decreased endurance, Decreased functional  mobility, Decreased IADLs  Prognosis: Good  Evaluation/Treatment Tolerance: Patient tolerated treatment well  Medical Staff Made Aware: Yes  Strengths: Attitude of self, Premorbid level of function, Support of Caregivers  Barriers to Participation: Comorbidities, Insight into problems    Subjective   Current Problem:  1. Renal mass  Full code    Insert and maintain peripheral IV    Saline lock IV    Full code    Insert and maintain peripheral IV    Saline lock IV    Surgical Pathology Exam    Surgical Pathology Exam    Referral to Home Health    acetaminophen (Tylenol) 325 mg tablet    DISCONTINUED: ceFAZolin (Ancef) 2 g in dextrose (iso)  mL    DISCONTINUED: acetaminophen (Tylenol) 325 mg tablet    DISCONTINUED: oxyCODONE (Roxicodone) 5 mg immediate release tablet    DISCONTINUED: polyethylene glycol (Glycolax, Miralax) 17 gram packet    CANCELED: Referral to Home Health    CANCELED: Referral to Home Health      2. Post-op pain  DISCONTINUED: oxyCODONE (Roxicodone) 5 mg immediate release tablet      3. Small bowel obstruction (Multi)  alteplase (Cathflo Activase) 2 mg injection    Adult Clinimix Parenteral Nutrition Cyclic    fat emulsion fish oil/plant based (SMOFlipid) 20 % emulsion    Ringer's solution    calcium carbonate (Tums) 200 mg calcium chewable tablet    Oral nutritional supplements    Monitor intake and output    Drain/Tube Care    DISCONTINUED: fat emulsion fish oil/plant based (SMOFlipid) 20 % emulsion    DISCONTINUED: Ringer's solution,lactated (lactated Ringer's) infusion    DISCONTINUED: Ringer's solution,lactated (lactated Ringer's) infusion    CANCELED: Case Request Operating Room: LAPAROTOMY, EXPLORATORY    CANCELED: Case Request Operating Room: LAPAROTOMY, EXPLORATORY    CANCELED: Drain/Tube Care    CANCELED: Drain/Tube Care      4. Ileus (Multi)  CANCELED: Esophagogastroduodenoscopy (EGD) w PEG Tube Placement      5. 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 Agreeable to OT eval. Pt reporting his brothers are assisting in discharge planning.   Precautions:  Hearing/Visual Limitations: WFL  Medical Precautions: Fall precautions    Pain:  Pain Assessment  Pain Assessment: 0-10  0-10 (Numeric) Pain Score: 4  Pain Type: Acute pain  Pain Location: Abdomen  Pain Interventions: Repositioned, Ambulation/increased activity, Relaxation technique, Rest  Response to Interventions: No change in pain  Lines/Tubes/Drains:  PICC - Adult 02/10/25 Double lumen Left Brachial vein (Active)   Number of days: 10       Gastrostomy/Enterostomy Gastrostomy 20 Fr. LUQ (Active)   Number of days: 10     Objective   Cognition:  Overall Cognitive Status: Within Functional Limits  Arousal/Alertness: Appropriate responses to stimuli  Orientation Level: Oriented X4  Following Commands: Follows all commands and directions without difficulty  Safety Judgment: Decreased awareness of need for assistance  Problem Solving: Assistance required to identify errors made  Cognition Comments: decreased safety awareness and insight into need for assistance  Insight: Mild  Impulsive: Mildly  Processing Speed: Within funtional limits    Home Living:  Type of Home: Apartment (15th floor; senior living)  Lives With: Alone  Home Adaptive Equipment: Cane  Home Layout: One level, Laundry main level  Home Access: Level entry,  Elevator  Bathroom Shower/Tub: Walk-in shower (with small threshold)  Bathroom Toilet: Standard  Bathroom Equipment: Shower chair with back, Grab bars in shower   Prior Function:  Level of [place]: Independent with ADLs and functional transfers, Independent with homemaking with ambulation  Receives Help From: Family (5 brothers and 2 sisters local and able to assist PRN)  ADL Assistance: Independent  Homemaking Assistance: Independent (meals on wheels for meals per pt report, laundry services available)  Ambulatory Assistance: Independent (PRN use of cane)  Hand Dominance: Left  Prior Function Comments: Pt reports baseline independence  IADL History:  Homemaking Responsibilities: Yes  Meal Prep Responsibility: Secondary  Laundry Responsibility: Secondary  Cleaning Responsibility: Primary  Shopping Responsibility: No  Current License: Yes  Mode of Transportation: Car  Occupation: Retired  Type of Occupation: [employer]  Leisure and Hobbies: community room or TV  ADL:  Eating Assistance: Independent (Anticipated)  Grooming Assistance: Stand by (Anticipated)  Bathing Assistance: Moderate (Anticipated)  UE Dressing Assistance: Independent (Anticipated)  LE Dressing Assistance: Minimal (Anticipated)  Toileting Assistance with Device: Minimal (Anticipated)  Toileting Deficit: Clothing management up  Activity Tolerance:  Endurance: Tolerates 10 - 20 min exercise with multiple rests  Balance:  Dynamic Sitting Balance  Dynamic Sitting-Balance Support: Feet supported  Dynamic Sitting-Level of Assistance: Contact guard, Close supervision  Dynamic Sitting-Balance: Forward lean, Reaching for objects (figure four technique, posterior lean)  Dynamic Sitting-Comments: Good-fair balance  Dynamic Standing Balance  Dynamic Standing-Balance Support: No upper extremity supported  Dynamic Standing-Level of Assistance: Contact guard, Minimum assistance  Dynamic Standing-Balance: Turning, Reaching for objects  Dynamic Standing-Comments:  Mostly CGA with Martell while turning for safety and to maintain balance  Static Sitting Balance  Static Sitting-Balance Support: Feet supported  Static Sitting-Level of Assistance: Distant supervision  Static Standing Balance  Static Standing-Balance Support: No upper extremity supported  Static Standing-Level of Assistance: Contact guard  Bed Mobility/Transfers:   Bed Mobility  Bed Mobility: No (seated in chair pre and post OT)  Functional Mobility  Functional Mobility Performed: Yes  Functional Mobility 1  Comments 1: Pt ambulated MIN household distance in room using no AD and with CGA-SBA for safety   and Transfers  Transfer: Yes  Transfer 1  Transfer From 1: Chair with arms to, Stand to  Transfer to 1: Stand, Chair with arms  Technique 1: Sit to stand, Stand to sit  Transfer Device 1:  (no AD)  Transfer Level of Assistance 1: Contact guard  Trials/Comments 1: x3 trials, CGA for safety  Transfers 2  Transfer From 2: Stand to, Toilet to  Transfer to 2: Toilet, Stand  Technique 2: Stand to sit, Sit to stand  Transfer Device 2:  (no AD)  Transfer Level of Assistance 2: Minimum assistance, Minimal verbal cues  Trials/Comments 2: VCs for hand placement  IADL's:   Homemaking Responsibilities: Yes  Meal Prep Responsibility: Secondary  Laundry Responsibility: Secondary  Cleaning Responsibility: Primary  Shopping Responsibility: No  Current License: Yes  Mode of Transportation: Car  Occupation: Retired  Type of Occupation: [employer]  Leisure and Hobbies: community room or TV  Vision: Vision - Basic Assessment  Current Vision: Wears glasses only for reading   and Vision - Complex Assessment  Ocular Range of Motion: Within Functional Limits  Tracking: WFL  Sensation:  Light Touch: Not tested  Strength:  Strength Comments: BUE WFL  Perception:  Inattention/Neglect: Appears intact  Initiation: Appears intact  Motor Planning: Appears intact  Perseveration: Not present  Coordination:  Movements are Fluid and Coordinated:  Yes  Coordination Comment: opposition intact; gross BUE coordination intact   Hand Function:  Hand Function  Gross Grasp: Functional  Coordination: Functional  Extremities: RUE   RUE : Within Functional Limits, LUE   LUE: Within Functional Limits    Outcome Measures: [facility] Daily Activity  Putting on and taking off regular lower body clothing: A little  Bathing (including washing, rinsing, drying): A lot  Putting on and taking off regular upper body clothing: None  Toileting, which includes using toilet, bedpan or urinal: A little  Taking care of personal grooming such as brushing teeth: A little  Eating Meals: None  Daily Activity - Total Score: 19    Education Documentation  Body Mechanics, taught by Víctor Viramontes OT at 2/20/2025  3:59 PM.  Learner: Patient  Readiness: Acceptance  Method: Explanation, Demonstration  Response: Verbalizes Understanding, Demonstrated Understanding, Needs Reinforcement  Comment: safety precautions, use of DME/AD for safety, compensatory strategies, EC    Precautions, taught by Víctor Viramontes OT at 2/20/2025  3:59 PM.  Learner: Patient  Readiness: Acceptance  Method: Explanation, Demonstration  Response: Verbalizes Understanding, Demonstrated Understanding, Needs Reinforcement  Comment: safety precautions, use of DME/AD for safety, compensatory strategies, EC    ADL Training, taught by Víctor Viramontes OT at 2/20/2025  3:59 PM.  Learner: Patient  Readiness: Acceptance  Method: Explanation, Demonstration  Response: Verbalizes Understanding, Demonstrated Understanding, Needs Reinforcement  Comment: safety precautions, use of DME/AD for safety, compensatory strategies, EC    Education Comments  No comments found.      Goals:     Encounter Problems       Encounter Problems (Active)       ADLs       Pt will complete LB dressing with modified independence while seated and/or standing and AE as needed.        Start:  02/20/25    Expected End:  03/06/25            Pt will  complete UB /LB bathing tasks with modified independence while seated and AE as needed.        Start:  02/20/25    Expected End:  03/06/25            Pt will complete toilet hygiene while seated /standing with IND level of assistance.         Start:  02/20/25    Expected End:  03/06/25               BALANCE       Pt will maintain dynamic standing balance during ADL task with modified independent level of assistance in order to demonstrate decreased risk of falling and improved postural control.       Start:  02/20/25    Expected End:  03/06/25               COGNITION/SAFETY       Patient will score WFL on standardized cognitive assessment with verbal cues and within reasonable time frame       Start:  02/20/25    Expected End:  03/06/25               MOBILITY       Patient will perform Functional mobility max Household distances/Community Distances with modified independent level of assistance and least restrictive device in order to improve safety and functional mobility.       Start:  02/20/25    Expected End:  03/06/25               TRANSFERS       Patient will complete sit to stand transfer with modified independent level of assistance and least restrictive device in order to improve safety and prepare for out of bed mobility.       Start:  02/20/25    Expected End:  03/06/25 02/20/25 at 4:00 PM   Víctor Viramontes OT   Rehab Office: [phone]

## 2025-02-20 NOTE — PROGRESS NOTES
02/11/25 1100   Discharge Planning   Living Arrangements Alone   Support Systems Family members   Assistance Needed TPN, ADLs   Type of Residence Private residence   Number of Stairs to Enter Residence 3  (brother's home: 3 steps at side door to enter)   Number of Stairs Within Residence 3  (brother's home, down to basement)   Do you have animals or pets at home? No   Home or Post Acute Services In home services   Type of Home Care Services Home OT;Home PT;Home nursing visits   Expected Discharge Disposition Home  (brother Jhony's home)   Does the patient need discharge transport arranged? No   Financial Resource Strain   How hard is it for you to pay for the very basics like food, housing, medical care, and heating? Not very   Housing Stability   In the last 12 months, was there a time when you were not able to pay the mortgage or rent on time? N   In the past 12 months, how many times have you moved where you were living? 0   At any time in the past 12 months, were you homeless or living in a shelter (including now)? N   Transportation Needs   In the past 12 months, has lack of transportation kept you from medical appointments or from getting medications? no   In the past 12 months, has lack of transportation kept you from meetings, work, or from getting things needed for daily living? No     SW met with pt to discuss discharge to SNF.  Pt reported that he had been in conversation with his family and that the plan is for him to discharge to his brother [place] [address] [id number] Peter's spouse Mayda is a retired RN.  SW met with care team to follow up.  When SW returned to pt room, he was on the phone with Jennifer Green confirmed that the plan was to discharge to his home.   Care team aware, pt has many teachable caregivers.  SW will follow. Rogers Juan [facility] LSW    02/17/2025 1256  Plan was for pt to discharge to sibling Peter's home with [facility].  Pt is on  TPN via PICC.  Pt is not medically ready due to a rising WBC.  STEPHANIE notified this afternoon that sibling Peter and family feel that pt should discharge to a SNF.  Pt has been rec'd SNF for PT/OT throughout his admission but has not been amenable.  STEPHANIE printed out a SNF choice list and met with pt.  Pt called his brother Peter and Peter confirmed that they were concerned with his current care need.  SW and bedside nurse spoke to Peter and pt.  SNF choice list emailed to Peter at [email or web address].  Further discharge planning pending updates from the care team.  SW will follow. Rogers Juan [facility]     02/19/2025 0850  STEPHANIE received a phone call from pt sister-in-law Dunia asking for an update on SNF search. STEPHANIE had previously met with Dunia and several family members bedside and they were provided a SNF list.  STEPHANIE let her know that SW was waiting for their SNF preferences as [facility] and [facility] could not accept.  Dunia asked about [facility] and [facility] AR and was told that pt does not qualify for either.   Dunia added new preferences [facility], [facility], and [facility].  [facility]  SW will follow. Rogers Juan [facility]    02/19/2025 0930  SNFs [facility] and [facility] cannot accept pt. [facility] SN[facility] is reviewing.  STEPHANIE received a voicemail from Dunia asking about [facility].  STEPHANIE called Dunia back and let her know that pt is not skilled for AR.  STEPHANIE asked Dunia to choose additional preferences.  STEPHANIE will notify Dunia of [facility] SNFs response.  SW will follow. Rogers Juan [facility]    02/20/2025 0930  STEPHANIE reached out to following SNF [facility] re acceptance.  Pt is medically ready for discharge.  STEPHANIE will follow. Rogers Juan [facility]    02/20/2025 1445  SNF [facility].  Pt consented to a blanket SNF referral and he and his family can choose from the acceptances.  SNF choice list  filtered by TPN; referral sent to 25 facilities.  Care team updated.  SW will follow. Rogers Juan [facility] LSW

## 2025-02-20 NOTE — CARE PLAN
Problem: Pain - Adult  Goal: Verbalizes/displays adequate comfort level or baseline comfort level  Outcome: Progressing     Problem: Safety - Adult  Goal: Free from fall injury  Outcome: Progressing     Problem: Discharge Planning  Goal: Discharge to home or other facility with appropriate resources  Outcome: Progressing     Problem: Nutrition  Goal: Nutrient intake appropriate for maintaining nutritional needs  Outcome: Progressing     Problem: Skin  Goal: Prevent/manage excess moisture  Outcome: Progressing  Flowsheets (Taken 2/20/2025 0749)  Prevent/manage excess moisture: Cleanse incontinence/protect with barrier cream  Goal: Prevent/minimize sheer/friction injuries  Outcome: Progressing  Flowsheets (Taken 2/20/2025 0749)  Prevent/minimize sheer/friction injuries:   HOB 30 degrees or less   Use pull sheet   Turn/reposition every 2 hours/use positioning/transfer devices  Goal: Promote/optimize nutrition  Outcome: Progressing  Flowsheets (Taken 2/20/2025 0749)  Promote/optimize nutrition: Monitor/record intake including meals     Problem: Pain  Goal: Takes deep breaths with improved pain control throughout the shift  Outcome: Progressing  Goal: Turns in bed with improved pain control throughout the shift  Outcome: Progressing  Goal: Walks with improved pain control throughout the shift  Outcome: Progressing  Goal: Performs ADL's with improved pain control throughout shift  Outcome: Progressing  Goal: Participates in PT with improved pain control throughout the shift  Outcome: Progressing  Goal: Free from opioid side effects throughout the shift  Outcome: Progressing  Goal: Free from acute confusion related to pain meds throughout the shift  Outcome: Progressing   The patient's goals for the shift include  pain control    The clinical goals for the shift include pt will continue to improve with nutritional intake

## 2025-02-21 LAB
GLUCOSE BLD MANUAL STRIP-MCNC: 119 MG/DL (ref 74–99)
GLUCOSE BLD MANUAL STRIP-MCNC: 131 MG/DL (ref 74–99)
GLUCOSE BLD MANUAL STRIP-MCNC: 96 MG/DL (ref 74–99)

## 2025-02-21 PROCEDURE — 82947 ASSAY GLUCOSE BLOOD QUANT: CPT

## 2025-02-21 PROCEDURE — 2500000004 HC RX 250 GENERAL PHARMACY W/ HCPCS (ALT 636 FOR OP/ED): Performed by: STUDENT IN AN ORGANIZED HEALTH CARE EDUCATION/TRAINING PROGRAM

## 2025-02-21 PROCEDURE — 2500000004 HC RX 250 GENERAL PHARMACY W/ HCPCS (ALT 636 FOR OP/ED)

## 2025-02-21 PROCEDURE — 2500000001 HC RX 250 WO HCPCS SELF ADMINISTERED DRUGS (ALT 637 FOR MEDICARE OP)

## 2025-02-21 PROCEDURE — 1170000001 HC PRIVATE ONCOLOGY ROOM DAILY

## 2025-02-21 PROCEDURE — 2500000001 HC RX 250 WO HCPCS SELF ADMINISTERED DRUGS (ALT 637 FOR MEDICARE OP): Performed by: STUDENT IN AN ORGANIZED HEALTH CARE EDUCATION/TRAINING PROGRAM

## 2025-02-21 RX ADMIN — HEPARIN SODIUM 5000 UNITS: 5000 INJECTION INTRAVENOUS; SUBCUTANEOUS at 10:41

## 2025-02-21 RX ADMIN — HEPARIN SODIUM 5000 UNITS: 5000 INJECTION INTRAVENOUS; SUBCUTANEOUS at 04:34

## 2025-02-21 RX ADMIN — ASPIRIN 81 MG: 81 TABLET, COATED ORAL at 09:04

## 2025-02-21 RX ADMIN — Medication 10 MG: at 20:31

## 2025-02-21 RX ADMIN — HEPARIN SODIUM 5000 UNITS: 5000 INJECTION INTRAVENOUS; SUBCUTANEOUS at 18:44

## 2025-02-21 RX ADMIN — OXYCODONE HYDROCHLORIDE 5 MG: 5 SOLUTION ORAL at 06:04

## 2025-02-21 RX ADMIN — Medication 1000 UNITS: at 09:04

## 2025-02-21 RX ADMIN — ACETAMINOPHEN 650 MG: 325 TABLET ORAL at 20:31

## 2025-02-21 RX ADMIN — OXYCODONE HYDROCHLORIDE 5 MG: 5 SOLUTION ORAL at 20:31

## 2025-02-21 RX ADMIN — PANTOPRAZOLE SODIUM 40 MG: 40 INJECTION, POWDER, FOR SOLUTION INTRAVENOUS at 06:04

## 2025-02-21 RX ADMIN — AMLODIPINE BESYLATE 10 MG: 10 TABLET ORAL at 09:04

## 2025-02-21 RX ADMIN — LEVOTHYROXINE SODIUM 25 MCG: 25 TABLET ORAL at 06:04

## 2025-02-21 ASSESSMENT — COGNITIVE AND FUNCTIONAL STATUS - GENERAL
DAILY ACTIVITIY SCORE: 18
MOBILITY SCORE: 19
TOILETING: A LITTLE
HELP NEEDED FOR BATHING: A LITTLE
PERSONAL GROOMING: A LITTLE
MOVING TO AND FROM BED TO CHAIR: A LITTLE
DRESSING REGULAR LOWER BODY CLOTHING: A LITTLE
CLIMB 3 TO 5 STEPS WITH RAILING: A LITTLE
MOBILITY SCORE: 19
STANDING UP FROM CHAIR USING ARMS: A LITTLE
EATING MEALS: A LITTLE
WALKING IN HOSPITAL ROOM: A LITTLE
EATING MEALS: A LITTLE
TURNING FROM BACK TO SIDE WHILE IN FLAT BAD: A LITTLE
DAILY ACTIVITIY SCORE: 18
CLIMB 3 TO 5 STEPS WITH RAILING: A LITTLE
DRESSING REGULAR UPPER BODY CLOTHING: A LITTLE
PERSONAL GROOMING: A LITTLE
MOVING TO AND FROM BED TO CHAIR: A LITTLE
DRESSING REGULAR UPPER BODY CLOTHING: A LITTLE
STANDING UP FROM CHAIR USING ARMS: A LITTLE
TURNING FROM BACK TO SIDE WHILE IN FLAT BAD: A LITTLE
TOILETING: A LITTLE
DRESSING REGULAR LOWER BODY CLOTHING: A LITTLE
WALKING IN HOSPITAL ROOM: A LITTLE
HELP NEEDED FOR BATHING: A LITTLE

## 2025-02-21 ASSESSMENT — PAIN SCALES - GENERAL
PAINLEVEL_OUTOF10: 6
PAINLEVEL_OUTOF10: 7
PAINLEVEL_OUTOF10: 4

## 2025-02-21 NOTE — SIGNIFICANT EVENT
Okay to transition to soft regular diet + TID protein shakes as tolerated and DC TPN. Patient has been tolerating FULL liquid diet with TID protein shakes with PEG tube capped for almost 1 week.  If patient becomes symptomatic , please uncap and vent PEG tube and downgrade to FULL liquid diet again.     D/w fellow Dr. Carr.    Ibeth Ortiz MD  PGY1 Colorectal Surgery  [employer] [facility] [phone]  [facility] [phone]  Available via Secure Chat

## 2025-02-21 NOTE — CARE PLAN
Problem: Pain - Adult  Goal: Verbalizes/displays adequate comfort level or baseline comfort level  Outcome: Progressing     Problem: Safety - Adult  Goal: Free from fall injury  Outcome: Progressing     Problem: Discharge Planning  Goal: Discharge to home or other facility with appropriate resources  Outcome: Progressing     Problem: Nutrition  Goal: Nutrient intake appropriate for maintaining nutritional needs  Outcome: Progressing     Problem: Skin  Goal: Prevent/manage excess moisture  Outcome: Progressing  Flowsheets (Taken 2/21/2025 0710)  Prevent/manage excess moisture:   Moisturize dry skin   Use wicking fabric (obtain order)   Cleanse incontinence/protect with barrier cream  Goal: Prevent/minimize sheer/friction injuries  Outcome: Progressing  Flowsheets (Taken 2/21/2025 0710)  Prevent/minimize sheer/friction injuries: Use pull sheet  Goal: Promote/optimize nutrition  Outcome: Progressing  Flowsheets (Taken 2/21/2025 0710)  Promote/optimize nutrition:   Assist with feeding   Consume > 50% meals/supplements     Problem: Pain  Goal: Takes deep breaths with improved pain control throughout the shift  Outcome: Progressing  Goal: Turns in bed with improved pain control throughout the shift  Outcome: Progressing  Goal: Walks with improved pain control throughout the shift  Outcome: Progressing  Goal: Performs ADL's with improved pain control throughout shift  Outcome: Progressing  Goal: Participates in PT with improved pain control throughout the shift  Outcome: Progressing  Goal: Free from opioid side effects throughout the shift  Outcome: Progressing  Goal: Free from acute confusion related to pain meds throughout the shift  Outcome: Progressing   The patient's goals for the shift include  pain control    The clinical goals for the shift include pain management

## 2025-02-21 NOTE — PROGRESS NOTES
02/11/25 1100   Discharge Planning   Living Arrangements Alone   Support Systems Family members   Assistance Needed TPN, ADLs   Type of Residence Private residence   Number of Stairs to Enter Residence 3  (brother's home: 3 steps at side door to enter)   Number of Stairs Within Residence 3  (brother's home, down to basement)   Do you have animals or pets at home? No   Home or Post Acute Services In home services   Type of Home Care Services Home OT;Home PT;Home nursing visits   Expected Discharge Disposition Home  (brother Jhony's home)   Does the patient need discharge transport arranged? No   Financial Resource Strain   How hard is it for you to pay for the very basics like food, housing, medical care, and heating? Not very   Housing Stability   In the last 12 months, was there a time when you were not able to pay the mortgage or rent on time? N   In the past 12 months, how many times have you moved where you were living? 0   At any time in the past 12 months, were you homeless or living in a shelter (including now)? N   Transportation Needs   In the past 12 months, has lack of transportation kept you from medical appointments or from getting medications? no   In the past 12 months, has lack of transportation kept you from meetings, work, or from getting things needed for daily living? No     SW met with pt to discuss discharge to SNF.  Pt reported that he had been in conversation with his family and that the plan is for him to discharge to his brother Peter[place] [address] [id number] Peter's spouse Mayda is a retired RN.  SW met with care team to follow up.  When SW returned to pt room, he was on the phone with Jennifer Green confirmed that the plan was to discharge to his home.   Care team aware, pt has many teachable caregivers.  SW will follow. [facility] LSW    02/17/2025 1254  Plan was for pt to discharge to sibling [facility]'s home [facility].  Pt is on  TPN via PICC.  Pt is not medically ready due to a rising WBC.  STEPHANIE notified this afternoon that sibling Peter and family feel that pt should discharge to a SNF.  Pt has been rec'd SNF for PT/OT throughout his admission but has not been amenable.  STEPHANIE printed out a SNF choice list and met with pt.  Pt called his brother Peter and Peter confirmed that they were concerned with his current care need.  SW and bedside nurse spoke to Peter and pt.  SNF choice list emailed to Peter at [email or web address].  Further discharge planning pending updates from the care team.  SW will follow. Rogers Juan [facility]     02/19/2025 0850  STEPHANIE received a phone call from pt sister-in-law Dunia asking for an update on SNF search. STEPHANIE had previously met with Dunia and several family members bedside and they were provided a SNF list.  STEPHANIE let her know that SW was waiting for their SNF preferences as [facility] and [facility] could not accept.  Dunia asked about [facility] and [facility] and was told that pt does not qualify for either.   Dunia added new preferences [facility], [facility], and [facility].  [facility]  SW will follow. Rogers Juan [facility]    02/19/2025 0930  SNFs [facility] and [facility] cannot accept pt. [facility] SNF is reviewing.  STEPHANIE received a voicemail from Dunia asking about [facility].  STEPHANIE called Dunia back and let her know that pt is not skilled for AR.  STEPHANIE asked Dunia to choose additional preferences.  STEPHANIE will notify Dunia of [facility] SNFs response.  SW will follow. Rogers Juan [facility]    02/20/2025 0930  STEPHANIE reached out to following SNF [facility] re acceptance.  Pt is medically ready for discharge.  STEPHANIE will follow. Rogers Juan [facility]    02/20/2025 1445  SNF [facility].  Pt consented to a blanket SNF referral and he and his family can choose from the acceptances.  SNF choice list  filtered by TPN; referral sent to 25 facilities.  Care team updated.  SW will follow. Rogers Juan [facility]    02/21/2025 0900  Pt discussed during morning rounds.  Pt is no longer on TPN.  SNF stay is for PT/OT.  SW met with pt and he is amenable to SNF.  Original SNF preferences sent the update that pt is no longer on TPN.  SW will follow. Rogers Juan [facility]    02/21/2025 1230  Previously chosen preferences cannot accept due to no beds.  Updates sent to SNFs in the blanket referral.  SW had a voicemail from pt sibling Yeison asking for clarification on the blanket referral. SW called Yeison and left a voicemail explaining that the blanket referral is for the SNFs from the choice list given to Yeison's spouse Dunia. STEPHANIE let Yeison know that if the family has additional preferences, they will be added to the referral.  SW will follow. Rogers Juan [facility]

## 2025-02-21 NOTE — CARE PLAN
Problem: Pain - Adult  Goal: Verbalizes/displays adequate comfort level or baseline comfort level  Outcome: Progressing     Problem: Safety - Adult  Goal: Free from fall injury  Outcome: Progressing     Problem: Discharge Planning  Goal: Discharge to home or other facility with appropriate resources  Outcome: Progressing     Problem: Nutrition  Goal: Nutrient intake appropriate for maintaining nutritional needs  Outcome: Progressing

## 2025-02-21 NOTE — PROGRESS NOTES
[facility] [place]  Progress Note    Patient: Swapnil Allen  Age/Sex: 75 y.o., male  MRN: [id number]  Date of surgery: 1/28/25  Admit Date: 1/28/2025   Code Status: Full Code  Length of Stay: 24    Interval History/Overnight Events:   NAOE, seen this morning sitting in chair, feeling so/so and uncomfortable.   Had 3 boosts with mash potatoes and gravy. Did not eat much.  Reports burping this morning. Denies nausea/emesis.   1x BM, UOP 1.7L  Off TPN   Legs swollen this morning, said compression stocking were uncomfortable yesterday and took them off       Objective  02/19 1900 - 02/21 0659  In: 2784 [P.O.:711]  Out: 1700 [Urine:1700]              8.5     9.0>-----<128              26.6   134  108  42                  ----------------<132     4.7  20  1.82          Ca 9.3 Phos 2.8 Mg 2.12       ALT 52 AST 30 AlkPhos 106 tBili 0.3          Vitals:    02/20/25 1531 02/20/25 2036 02/20/25 2336 02/21/25 0440   BP: 102/72 110/69 103/67 108/72   BP Location: Right arm Right arm Right arm Right arm   Patient Position: Sitting Sitting Sitting Sitting   Pulse: 96 82 79 80   Resp: 18 18 18 18   Temp: 36 °C (96.8 °F) 36.1 °C (97 °F) 36.5 °C (97.7 °F) 36.2 °C (97.2 °F)   TempSrc: Temporal Temporal Temporal Temporal   SpO2: 97% 96% 97% 97%   Weight:       Height:              Physical Exam                                                                                                                         Gen - No acute distress, sitting in chair      Neuro - Alert, oriented, conversant     CV - Regular rate, normotensive      Pulm - Symmetric chest rise, non-labored breathing on room air      Abd - Abdomen is soft, mildly-distended, and appropriately tender. Midline incision c/d/i closed with glue. PEG capped.      Ext - Warm, well perfused, 1+ pitting edema bilaterally below the knees     MSK- no pain or swelling in LE bilaterally, bilateral lower extremity soreness      Skin - without jaunice       Psych - appropriate tone,  affect      - voiding independently, no rodriguez     Imaging  No results found.     Assessment & Plan  75 y.o. 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 Postoperative course complicated by prolonged ileus vs small bowel obstruction. NGT placed 01/31. Patient now with slowly resolving ANISHA and mild stable leukocytosis. Decompressive PEG and PICC(TPN) placed 2/10. Off TPN 2/20.     Plan 2/21:  - Continue to regular diet easy to chew with boosts   - Compression stockings more comfortable for patient   - Follow up labs   - Follow up nutrition recommendations   - CRS following, appreciate recommendations   - Dulcolax Suppository   - Pending placement to SNF      Neuro:   -Pain controlled with PRN acetaminophen, Oxycodone 5/10. Lidocaine patches  -PRN melatonin for sleep aid     Resp:   -ICS  -On room air   -Appreciate RT recs  -PRN duo neb for wheezing   -PRN phenol for discomfort     Card:   -Monitor vitals  -Amlodpine  -Aspirin   -Cholecalciferol   -PRN labetalol with hold parameters   - bennie hose      GI:   - Regular diet easy to chew with boosts   - PEG 2/10  - IV PPI  - Enemas once daily  -BR: Miralax, senokat   -Vitamin D3  -PRN Zofran for nausea   -Bisacodyl suppository   -Colorectal surgery following, appreciate recommendations  -Nutrition consulted: following TPN recommendations   - Maintain PICC for TPN, follow up nutrition recommendations regarding discharge diet plan   - PRN Tums   - PRN benzocaine-menthol (Cepastat Sore Throat) lozenge 1 lozenge      :   -HELD Flomax   -Daily BMP to monitor kidney function and replete electrolytes as indicated.   -Continue to monitor Cr     Endo:    -Levothyroxine PO  -SSI #1     Heme/ID:   -Daily CBC to monitor for acute blood loss anemia   -No indication for blood transfusion at this time   -Zosyn completed 2/14 for aspiration PNA. Given 2/17 for WBC 12.8 now completed.  -Trend WBC  -ID consulted 2/17 for antibiotic plan regarding WBC 12.8, recommended no antibiotics. Signed off.     Ppy:   -ALEN  -SCD  -IS    Dispo:   RNF. Continue  discharge process SNF    Patient seen with chief resident Dr. Hamm and D/w Dr. Perez.    --------------------------------------------  Tiffanie Xiao MD, RTA   Urology  Consult Uro: [phone]  Pediatrics Uro: [phone]   After 5pm and Weekends: [phone]

## 2025-02-22 LAB
GLUCOSE BLD MANUAL STRIP-MCNC: 108 MG/DL (ref 74–99)
GLUCOSE BLD MANUAL STRIP-MCNC: 112 MG/DL (ref 74–99)
GLUCOSE BLD MANUAL STRIP-MCNC: 125 MG/DL (ref 74–99)

## 2025-02-22 PROCEDURE — 82947 ASSAY GLUCOSE BLOOD QUANT: CPT

## 2025-02-22 PROCEDURE — 2500000001 HC RX 250 WO HCPCS SELF ADMINISTERED DRUGS (ALT 637 FOR MEDICARE OP)

## 2025-02-22 PROCEDURE — 2500000004 HC RX 250 GENERAL PHARMACY W/ HCPCS (ALT 636 FOR OP/ED): Performed by: STUDENT IN AN ORGANIZED HEALTH CARE EDUCATION/TRAINING PROGRAM

## 2025-02-22 PROCEDURE — 1170000001 HC PRIVATE ONCOLOGY ROOM DAILY

## 2025-02-22 PROCEDURE — 2500000004 HC RX 250 GENERAL PHARMACY W/ HCPCS (ALT 636 FOR OP/ED)

## 2025-02-22 RX ORDER — ENOXAPARIN SODIUM 100 MG/ML
40 INJECTION SUBCUTANEOUS DAILY
Status: DISCONTINUED | OUTPATIENT
Start: 2025-02-22 | End: 2025-02-26 | Stop reason: HOSPADM

## 2025-02-22 RX ADMIN — ASPIRIN 81 MG: 81 TABLET, COATED ORAL at 09:00

## 2025-02-22 RX ADMIN — OXYCODONE HYDROCHLORIDE 5 MG: 5 SOLUTION ORAL at 09:02

## 2025-02-22 RX ADMIN — LEVOTHYROXINE SODIUM 25 MCG: 25 TABLET ORAL at 06:55

## 2025-02-22 RX ADMIN — HEPARIN SODIUM 5000 UNITS: 5000 INJECTION INTRAVENOUS; SUBCUTANEOUS at 06:55

## 2025-02-22 RX ADMIN — AMLODIPINE BESYLATE 10 MG: 10 TABLET ORAL at 09:00

## 2025-02-22 RX ADMIN — PANTOPRAZOLE SODIUM 40 MG: 40 INJECTION, POWDER, FOR SOLUTION INTRAVENOUS at 09:02

## 2025-02-22 RX ADMIN — Medication 1000 UNITS: at 09:00

## 2025-02-22 ASSESSMENT — PAIN SCALES - GENERAL: PAINLEVEL_OUTOF10: 7

## 2025-02-22 NOTE — PROGRESS NOTES
[facility] [place]  Progress Note    Patient: Swapnil Allen  Age/Sex: 75 y.o., male  MRN: [id number]  Date of surgery: 1/28/25  Admit Date: 1/28/2025   Code Status: Full Code  Length of Stay: 25    Interval History/Overnight Events:   NAOE, seen this morning sitting in chair, feeling ok  Had 3 boosts   Mild nausea  Off TPN   Legs swollen this morning, SCDs applied at bedside    Objective  02/20 1900 - 02/22 0659  In: 627 [P.O.:627]  Out: 1510 [Urine:1510]              8.5     9.0>-----<128              26.6   134  108  42                  ----------------<132     4.7  20  1.82          Ca 9.3 Phos 2.8 Mg 2.12       ALT 52 AST 30 AlkPhos 106 tBili 0.3          Vitals:    02/21/25 1614 02/21/25 2015 02/22/25 0451 02/22/25 0853   BP: 103/69 109/69 127/79 103/67   BP Location: Right arm Right arm Right arm Right arm   Patient Position: Lying Sitting Sitting Sitting   Pulse: 93 85 84 94   Resp: 18 18 18 18   Temp: 37.2 °C (99 °F) 36.4 °C (97.5 °F) 36.4 °C (97.5 °F) 36.4 °C (97.5 °F)   TempSrc: Temporal Temporal Temporal Temporal   SpO2: 93% 98% 98% 96%   Weight:       Height:          Physical Exam                                                                                                                         Gen - No acute distress, sitting in chair      Neuro - Alert, oriented, conversant     CV - Regular rate, normotensive      Pulm - Symmetric chest rise, non-labored breathing on room air      Abd - Abdomen is soft, mildly-distended, and appropriately tender. Midline incision c/d/i closed with glue. PEG capped.      Ext - Warm, well perfused, 1+ pitting edema bilaterally below the knees     MSK- no pain or swelling in LE bilaterally, bilateral lower extremity soreness      Skin - without jaunice       Psych - appropriate tone, affect      - voiding independently, no rodriguez     Imaging  No results found.     Assessment & Plan  75 y.o. 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 Postoperative course complicated by prolonged ileus vs small bowel obstruction. NGT placed 01/31. Patient now with slowly resolving ANISHA and mild stable leukocytosis. Decompressive PEG and PICC(TPN) placed 2/10. Off TPN 2/20.     Neuro:   -Pain controlled with PRN acetaminophen, Oxycodone 5/10. Lidocaine patches  -PRN melatonin for sleep aid     Resp:   -ICS  -On room air   -Appreciate RT recs  -PRN duo neb for wheezing   -PRN phenol for discomfort     Card:   -Monitor vitals  -Amlodpine  -Aspirin   -Cholecalciferol   -PRN labetalol with hold parameters   - bennie hose    GI:   - Regular diet easy to chew with boosts   - PEG 2/10  - IV PPI  - Enemas once daily  -BR: Miralax, senokat   -Vitamin D3  -PRN Zofran for nausea   -Bisacodyl suppository   -Colorectal surgery following, appreciate recommendations  -Nutrition consulted: following TPN recommendations   - Maintain PICC for TPN, follow up nutrition recommendations regarding discharge diet plan   - PRN Tums   - PRN benzocaine-menthol (Cepastat Sore Throat) lozenge 1 lozenge      :   -HELD Flomax   -Daily BMP to monitor kidney function and replete electrolytes as indicated.   -Continue to monitor Cr     Endo:   -Levothyroxine PO  -SSI #1     Heme/ID:   -Daily CBC to monitor for acute blood loss anemia   -No indication for blood transfusion at this time   -Zosyn completed 2/14 for aspiration PNA. Given 2/17 for WBC 12.8 now completed.  -Trend WBC  -ID consulted 2/17 for antibiotic plan regarding WBC 12.8, recommended no antibiotics. Signed off.     Ppy:   -Lovenox  -SCD  -IS    Dispo:   RNF. Continue  discharge process SNF    Patient seen with chief resident Dr. Hamm and  D/w Dr. Perez.    --------------------------------------------  Bryan Sofia MD   Urology  Consult Uro: [phone]  Pediatrics Uro: [phone]   After 5pm and Weekends: [phone]

## 2025-02-22 NOTE — CARE PLAN
The patient's goals for the shift include      The clinical goals for the shift include Patient will remain free from falls and injury throughout shift      Problem: Pain - Adult  Goal: Verbalizes/displays adequate comfort level or baseline comfort level  Outcome: Progressing     Problem: Skin  Goal: Prevent/manage excess moisture  Outcome: Progressing  Flowsheets (Taken 2/22/2025 1600)  Prevent/manage excess moisture: Monitor for/manage infection if present  Goal: Prevent/minimize sheer/friction injuries  Outcome: Progressing  Flowsheets (Taken 2/22/2025 1600)  Prevent/minimize sheer/friction injuries: Increase activity/out of bed for meals  Goal: Promote/optimize nutrition  Outcome: Progressing  Flowsheets (Taken 2/22/2025 1600)  Promote/optimize nutrition:   Monitor/record intake including meals   Consume > 50% meals/supplements     Problem: Discharge Planning  Goal: Discharge to home or other facility with appropriate resources  Outcome: Progressing     Problem: Safety - Adult  Goal: Free from fall injury  Outcome: Progressing

## 2025-02-22 NOTE — CARE PLAN
Problem: Pain - Adult  Goal: Verbalizes/displays adequate comfort level or baseline comfort level  Outcome: Progressing     Problem: Safety - Adult  Goal: Free from fall injury  Outcome: Progressing     Problem: Discharge Planning  Goal: Discharge to home or other facility with appropriate resources  Outcome: Progressing     Problem: Nutrition  Goal: Nutrient intake appropriate for maintaining nutritional needs  Outcome: Progressing     Problem: Skin  Goal: Prevent/manage excess moisture  Outcome: Progressing  Goal: Prevent/minimize sheer/friction injuries  Outcome: Progressing  Goal: Promote/optimize nutrition  Outcome: Progressing     Problem: Pain  Goal: Takes deep breaths with improved pain control throughout the shift  Outcome: Progressing  Goal: Turns in bed with improved pain control throughout the shift  Outcome: Progressing  Goal: Walks with improved pain control throughout the shift  Outcome: Progressing  Goal: Performs ADL's with improved pain control throughout shift  Outcome: Progressing  Goal: Participates in PT with improved pain control throughout the shift  Outcome: Progressing  Goal: Free from opioid side effects throughout the shift  Outcome: Progressing  Goal: Free from acute confusion related to pain meds throughout the shift  Outcome: Progressing   The patient's goals for the shift include  rest and comfort    The clinical goals for the shift include pain control    Over the shift, the patient did not make progress toward the following goals. Barriers to progression include pain. Recommendations to address these barriers include prn meds.

## 2025-02-23 LAB
GLUCOSE BLD MANUAL STRIP-MCNC: 114 MG/DL (ref 74–99)
GLUCOSE BLD MANUAL STRIP-MCNC: 118 MG/DL (ref 74–99)

## 2025-02-23 PROCEDURE — 82947 ASSAY GLUCOSE BLOOD QUANT: CPT

## 2025-02-23 PROCEDURE — 2500000001 HC RX 250 WO HCPCS SELF ADMINISTERED DRUGS (ALT 637 FOR MEDICARE OP): Performed by: STUDENT IN AN ORGANIZED HEALTH CARE EDUCATION/TRAINING PROGRAM

## 2025-02-23 PROCEDURE — 2500000001 HC RX 250 WO HCPCS SELF ADMINISTERED DRUGS (ALT 637 FOR MEDICARE OP)

## 2025-02-23 PROCEDURE — 2500000004 HC RX 250 GENERAL PHARMACY W/ HCPCS (ALT 636 FOR OP/ED)

## 2025-02-23 PROCEDURE — 2500000004 HC RX 250 GENERAL PHARMACY W/ HCPCS (ALT 636 FOR OP/ED): Performed by: STUDENT IN AN ORGANIZED HEALTH CARE EDUCATION/TRAINING PROGRAM

## 2025-02-23 PROCEDURE — 1170000001 HC PRIVATE ONCOLOGY ROOM DAILY

## 2025-02-23 RX ADMIN — PANTOPRAZOLE SODIUM 40 MG: 40 INJECTION, POWDER, FOR SOLUTION INTRAVENOUS at 06:33

## 2025-02-23 RX ADMIN — BISACODYL 10 MG: 10 SUPPOSITORY RECTAL at 08:28

## 2025-02-23 RX ADMIN — Medication 1000 UNITS: at 08:27

## 2025-02-23 RX ADMIN — ASPIRIN 81 MG: 81 TABLET, COATED ORAL at 08:27

## 2025-02-23 RX ADMIN — ENOXAPARIN SODIUM 40 MG: 100 INJECTION SUBCUTANEOUS at 06:33

## 2025-02-23 RX ADMIN — ACETAMINOPHEN 650 MG: 325 TABLET ORAL at 21:10

## 2025-02-23 RX ADMIN — LEVOTHYROXINE SODIUM 25 MCG: 25 TABLET ORAL at 06:33

## 2025-02-23 RX ADMIN — Medication 10 MG: at 00:44

## 2025-02-23 RX ADMIN — OXYCODONE HYDROCHLORIDE 5 MG: 5 SOLUTION ORAL at 00:44

## 2025-02-23 RX ADMIN — AMLODIPINE BESYLATE 10 MG: 10 TABLET ORAL at 08:27

## 2025-02-23 RX ADMIN — OXYCODONE HYDROCHLORIDE 5 MG: 5 SOLUTION ORAL at 21:11

## 2025-02-23 RX ADMIN — ACETAMINOPHEN 650 MG: 325 TABLET ORAL at 00:44

## 2025-02-23 RX ADMIN — Medication 10 MG: at 21:10

## 2025-02-23 ASSESSMENT — PAIN SCALES - GENERAL
PAINLEVEL_OUTOF10: 0 - NO PAIN
PAINLEVEL_OUTOF10: 5 - MODERATE PAIN
PAINLEVEL_OUTOF10: 6

## 2025-02-23 ASSESSMENT — ACTIVITIES OF DAILY LIVING (ADL): LACK_OF_TRANSPORTATION: NO

## 2025-02-23 NOTE — PROGRESS NOTES
[facility] [place]  Progress Note    Patient: Swapnil Allen  Age/Sex: 75 y.o., male  MRN: [id number]  Date of surgery: 1/28/25  Admit Date: 1/28/2025   Code Status: Full Code  Length of Stay: 26    Interval History/Overnight Events:   NAOE, seen this morning sitting in chair, feeling ok  Off TPN   Legs swollen this morning, encouraging SCD's   No BM's the last 2 days, refused suppository yesterday     Objective  02/21 1900 - 02/23 0659  In: 480 [P.O.:480]  Out: 1640 [Urine:1640]              8.5     9.0>-----<128              26.6   134  108  42                  ----------------<132     4.7  20  1.82          Ca 9.3 Phos 2.8 Mg 2.12       ALT 52 AST 30 AlkPhos 106 tBili 0.3          Vitals:    02/22/25 1529 02/22/25 2040 02/23/25 0226 02/23/25 0826   BP: 122/74 119/74 110/65 125/87   BP Location:  Left arm Right arm    Patient Position:  Sitting Lying    Pulse: 86 87 85 88   Resp: 18 18 17    Temp: 37.4 °C (99.3 °F) 37.3 °C (99.1 °F) 35.7 °C (96.3 °F) 36.5 °C (97.7 °F)   TempSrc: Temporal Temporal Temporal Temporal   SpO2: 97% 96% 97% 99%   Weight:       Height:          Physical Exam                                                                                                                         Gen - No acute distress, sitting in chair      Neuro - Alert, oriented, conversant     CV - Regular rate, normotensive      Pulm - Symmetric chest rise, non-labored breathing on room air      Abd - Abdomen is soft, mildly-distended, and appropriately tender. Midline incision c/d/i closed with glue. PEG capped.      Ext - Warm, well perfused, 1+ pitting edema bilaterally below the knees     MSK- no pain or swelling in LE bilaterally, bilateral lower extremity soreness      Skin - without jaunice       Psych - appropriate tone, affect      - voiding independently, no rodriguez     Imaging  No results found.     Assessment & Plan  75 y.o. 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 Postoperative course complicated by prolonged ileus vs small bowel obstruction. NGT placed 01/31. Patient now with slowly resolving ANISHA and mild stable leukocytosis. Decompressive PEG and PICC(TPN) placed 2/10. Off TPN 2/20.     Neuro:   -Pain controlled with PRN acetaminophen, Oxycodone 5/10. Lidocaine patches  -PRN melatonin for sleep aid     Resp:   -ICS  -On room air   -Appreciate RT recs  -PRN duo neb for wheezing   -PRN phenol for discomfort     Card:   -Monitor vitals  -Amlodpine  -Aspirin   -Cholecalciferol   -PRN labetalol with hold parameters   - bennie hose    GI:   - Regular diet easy to chew with boosts   - PEG 2/10  - IV PPI  - Enemas once daily  -BR: Miralax, senokat   -Vitamin D3  -PRN Zofran for nausea   -Bisacodyl suppository   -Colorectal surgery following, appreciate recommendations  -Nutrition consulted: following TPN recommendations   - Maintain PICC for TPN, follow up nutrition recommendations regarding discharge diet plan   - PRN Tums   - PRN benzocaine-menthol (Cepastat Sore Throat) lozenge 1 lozenge      :   -HELD Flomax   -Daily BMP to monitor kidney function and replete electrolytes as indicated.   -Continue to monitor Cr     Endo:   -Levothyroxine PO  -SSI #1     Heme/ID:   -Daily CBC to monitor for acute blood loss anemia   -No indication for blood transfusion at this time   -Zosyn completed 2/14 for aspiration PNA. Given 2/17 for WBC 12.8 now completed.  -Trend WBC  -ID consulted 2/17 for antibiotic plan regarding WBC 12.8, recommended no antibiotics. Signed off.     Ppy:   -Lovenox  -SCD  -IS    Dispo:   RNF. Continue  discharge process SNF    Patient seen with chief resident Dr. Hamm and D/w   Ana.    --------------------------------------------  Ludwin Phillips MD   Urology  Consult Uro: [phone]  Pediatrics Uro: [phone]   After 5pm and Weekends: [phone]

## 2025-02-23 NOTE — CARE PLAN
The clinical goals for the shift include patient will have a bowel movement during shift      Problem: Pain - Adult  Goal: Verbalizes/displays adequate comfort level or baseline comfort level  Outcome: Progressing     Problem: Safety - Adult  Goal: Free from fall injury  Outcome: Progressing     Problem: Discharge Planning  Goal: Discharge to home or other facility with appropriate resources  Outcome: Progressing     Problem: Nutrition  Goal: Nutrient intake appropriate for maintaining nutritional needs  Outcome: Progressing     Problem: Skin  Goal: Prevent/manage excess moisture  Outcome: Progressing  Goal: Prevent/minimize sheer/friction injuries  Outcome: Progressing  Goal: Promote/optimize nutrition  Outcome: Progressing     Problem: Pain  Goal: Takes deep breaths with improved pain control throughout the shift  Outcome: Progressing  Goal: Turns in bed with improved pain control throughout the shift  Outcome: Progressing  Goal: Walks with improved pain control throughout the shift  Outcome: Progressing  Goal: Performs ADL's with improved pain control throughout shift  Outcome: Progressing  Goal: Participates in PT with improved pain control throughout the shift  Outcome: Progressing  Goal: Free from opioid side effects throughout the shift  Outcome: Progressing  Goal: Free from acute confusion related to pain meds throughout the shift  Outcome: Progressing

## 2025-02-23 NOTE — CARE PLAN
Problem: Pain - Adult  Goal: Verbalizes/displays adequate comfort level or baseline comfort level  Outcome: Progressing     Problem: Safety - Adult  Goal: Free from fall injury  Outcome: Progressing     Problem: Discharge Planning  Goal: Discharge to home or other facility with appropriate resources  Outcome: Progressing     Problem: Skin  Goal: Prevent/manage excess moisture  Outcome: Progressing  Goal: Prevent/minimize sheer/friction injuries  Outcome: Progressing  Goal: Promote/optimize nutrition  Outcome: Progressing     Problem: Pain  Goal: Takes deep breaths with improved pain control throughout the shift  Outcome: Progressing  Goal: Turns in bed with improved pain control throughout the shift  Outcome: Progressing  Goal: Walks with improved pain control throughout the shift  Outcome: Progressing  Goal: Performs ADL's with improved pain control throughout shift  Outcome: Progressing  Goal: Participates in PT with improved pain control throughout the shift  Outcome: Progressing  Goal: Free from opioid side effects throughout the shift  Outcome: Progressing  Goal: Free from acute confusion related to pain meds throughout the shift  Outcome: Progressing   The patient's goals for the shift include  rest and comfort    The clinical goals for the shift include Patient will remain free from falls and injury throughout shift    Over the shift, the patient did not make progress toward the following goals. Barriers to progression include pain. Recommendations to address these barriers include prn meds.

## 2025-02-23 NOTE — PROGRESS NOTES
02/11/25 1100   Discharge Planning   Living Arrangements Alone   Support Systems Family members   Assistance Needed TPN, ADLs   Type of Residence Private residence   Number of Stairs to Enter Residence 3  (brother's home: 3 steps at side door to enter)   Number of Stairs Within Residence 3  (brother's home, down to basement)   Do you have animals or pets at home? No   Home or Post Acute Services In home services   Type of Home Care Services Home OT;Home PT;Home nursing visits   Expected Discharge Disposition Home  (brother Jhony's home)   Does the patient need discharge transport arranged? No   Financial Resource Strain   How hard is it for you to pay for the very basics like food, housing, medical care, and heating? Not very   Housing Stability   In the last 12 months, was there a time when you were not able to pay the mortgage or rent on time? N   In the past 12 months, how many times have you moved where you were living? 0   At any time in the past 12 months, were you homeless or living in a shelter (including now)? N   Transportation Needs   In the past 12 months, has lack of transportation kept you from medical appointments or from getting medications? no   In the past 12 months, has lack of transportation kept you from meetings, work, or from getting things needed for daily living? No     SW met with pt to discuss discharge to SNF.  Pt reported that he had been in conversation with his family and that the plan is for him to discharge to his brother Peter's [address] [id number] Peter's spouse Mayda is a retired RN.  SW met with care team to follow up.  When SW returned to pt room, he was on the phone with Jennifer Green confirmed that the plan was to discharge to his home.   Care team aware, pt has many teachable caregivers.  SW will follow. Rogers Juan [facility] LSW    02/17/2025 1259  Plan was for pt to discharge to sibling [facility].  Pt is on  TPN via PICC.  Pt is not medically ready due to a rising WBC.  STEPHANIE notified this afternoon that sibling Peter and family feel that pt should discharge to a SNF.  Pt has been rec'd SNF for PT/OT throughout his admission but has not been amenable.  STEPHANIE printed out a SNF choice list and met with pt.  Pt called his brother Peter and Peter confirmed that they were concerned with his current care need.  SW and bedside nurse spoke to Peter and pt.  SNF choice list emailed to Peter at [email or web address].  Further discharge planning pending updates from the care team.  SW will follow. Rogers Juan [facility]     02/19/2025 0850  STEPHANIE received a phone call from pt sister-in-law Dunia asking for an update on SNF search. STEPHANIE had previously met with Dunia and several family members bedside and they were provided a SNF list.  STEPHANIE let her know that SW was waiting for their SNF preferences as [facility] and [facility] could not accept.  Dunia asked about [facility] and [facility] and was told that pt does not qualify for either.   Dunia added new preferences [facility], [facility] SN[facility], and [facility].  [facility]  SW will follow. Rogers Juan [facility]    02/19/2025 0930  SNFs [facility] and [facility] cannot accept pt. [facility] SNF is reviewing.  STEPHANIE received a voicemail from Dunia asking about [facility].  STEPHANIE called Dunia back and let her know that pt is not skilled for AR.  STEPHANIE asked Dunia to choose additional preferences.  STEPHANIE will notify Dunia of [facility] SNFs response.  SW will follow. Rogers Juan [facility]    02/20/2025 0930  STEPHANIE reached out to following SNF [facility] re acceptance.  Pt is medically ready for discharge.  STEPHANIE will follow. Rogers Juan [facility]    02/20/2025 1445  SNF [facility]  Pt consented to a blanket SNF referral and he and his family can choose from the acceptances.  SNF choice list  filtered by TPN; referral sent to 25 facilities.  Care team updated.  SW will follow. Rogers Juan [facility]    02/21/2025 0900  Pt discussed during morning rounds.  Pt is no longer on TPN.  SNF stay is for PT/OT.  SW met with pt and he is amenable to SNF.  Original SNF preferences sent the update that pt is no longer on TPN.  SW will follow. Rogers Juan [facility]    02/21/2025 1230  Previously chosen preferences cannot accept due to no beds.  Updates sent to SNFs in the blanket referral.  SW had a voicemail from pt sibling Yeison asking for clarification on the blanket referral. SW called Yeison and left a voicemail explaining that the blanket referral is for the SNFs from the choice list given to Yeison's spouse Dunia. SW let Yeison know that if the family has additional preferences, they will be added to the referral.  SW will follow. Rogers Amy Drake [facility]    02/23/2025 0815  SW met with pt to discuss FOC.  Pt has been accepted to [facility], [facility], [facility], [facility], and [facility].   Pt indicated he wanted his family to make the decision; SW let him know that calls have been placed to family over several days with no response.  Pt called gloria Latham with Dunia also on the line.  SW explained that there are 5 accepting facilities and a decision is needed. Dunia disagrees that pt is medically ready and they will not be rushed with a SNF decision. Dunia reported that pt is not getting rehab while inpatient; STEPHANIE explained that PT/OT while inpatient is not daily and that is why discharge to SNF is so important.   SW took a photo of the list of accepting facilities and texted it to pt sibling Yeison from pt phone.  Pt will discharge with a decompressive PEG and will need PT/OT at SNF.  Care team updated.  SW will follow. Rogers Reyes Juan [facility]    02/23/2025 1335  Pt gloria Latham spoke with a member of the care team and got his questions  answered.  FOC is [facility]; facility notified.   Pt will need updated PT and OT notes for precert; request made on whiteboard.  Care team updated.  SW will follow. Rogers Juan [facility]

## 2025-02-24 PROCEDURE — 2500000004 HC RX 250 GENERAL PHARMACY W/ HCPCS (ALT 636 FOR OP/ED)

## 2025-02-24 PROCEDURE — 2500000004 HC RX 250 GENERAL PHARMACY W/ HCPCS (ALT 636 FOR OP/ED): Performed by: STUDENT IN AN ORGANIZED HEALTH CARE EDUCATION/TRAINING PROGRAM

## 2025-02-24 PROCEDURE — 97530 THERAPEUTIC ACTIVITIES: CPT | Mod: GO

## 2025-02-24 PROCEDURE — 2500000001 HC RX 250 WO HCPCS SELF ADMINISTERED DRUGS (ALT 637 FOR MEDICARE OP)

## 2025-02-24 PROCEDURE — 2500000002 HC RX 250 W HCPCS SELF ADMINISTERED DRUGS (ALT 637 FOR MEDICARE OP, ALT 636 FOR OP/ED)

## 2025-02-24 PROCEDURE — 1170000001 HC PRIVATE ONCOLOGY ROOM DAILY

## 2025-02-24 PROCEDURE — 97530 THERAPEUTIC ACTIVITIES: CPT | Mod: GP,CQ

## 2025-02-24 PROCEDURE — 97116 GAIT TRAINING THERAPY: CPT | Mod: GP,CQ

## 2025-02-24 RX ORDER — ENOXAPARIN SODIUM 100 MG/ML
40 INJECTION SUBCUTANEOUS DAILY
Qty: 12 ML | Refills: 0 | Status: SHIPPED | OUTPATIENT
Start: 2025-02-24 | End: 2025-02-25 | Stop reason: HOSPADM

## 2025-02-24 RX ORDER — TAMSULOSIN HYDROCHLORIDE 0.4 MG/1
0.4 CAPSULE ORAL NIGHTLY
Status: DISCONTINUED | OUTPATIENT
Start: 2025-02-24 | End: 2025-02-26 | Stop reason: HOSPADM

## 2025-02-24 RX ORDER — ACETAMINOPHEN 325 MG/1
650 TABLET ORAL EVERY 6 HOURS PRN
Start: 2025-02-24

## 2025-02-24 RX ORDER — ACETAMINOPHEN 325 MG/1
650 TABLET ORAL EVERY 6 HOURS PRN
Status: DISCONTINUED | OUTPATIENT
Start: 2025-02-24 | End: 2025-02-26 | Stop reason: HOSPADM

## 2025-02-24 RX ADMIN — LEVOTHYROXINE SODIUM 25 MCG: 25 TABLET ORAL at 06:25

## 2025-02-24 RX ADMIN — ACETAMINOPHEN 650 MG: 325 TABLET ORAL at 08:26

## 2025-02-24 RX ADMIN — TAMSULOSIN HYDROCHLORIDE 0.4 MG: 0.4 CAPSULE ORAL at 20:00

## 2025-02-24 RX ADMIN — PANTOPRAZOLE SODIUM 40 MG: 40 INJECTION, POWDER, FOR SOLUTION INTRAVENOUS at 06:26

## 2025-02-24 RX ADMIN — ASPIRIN 81 MG: 81 TABLET, COATED ORAL at 08:26

## 2025-02-24 RX ADMIN — ENOXAPARIN SODIUM 40 MG: 100 INJECTION SUBCUTANEOUS at 06:25

## 2025-02-24 RX ADMIN — ACETAMINOPHEN 650 MG: 325 TABLET ORAL at 19:58

## 2025-02-24 RX ADMIN — Medication 10 MG: at 19:58

## 2025-02-24 RX ADMIN — Medication 1000 UNITS: at 08:27

## 2025-02-24 RX ADMIN — AMLODIPINE BESYLATE 10 MG: 10 TABLET ORAL at 08:26

## 2025-02-24 ASSESSMENT — COGNITIVE AND FUNCTIONAL STATUS - GENERAL
HELP NEEDED FOR BATHING: A LOT
TURNING FROM BACK TO SIDE WHILE IN FLAT BAD: A LOT
MOBILITY SCORE: 16
DRESSING REGULAR LOWER BODY CLOTHING: A LITTLE
CLIMB 3 TO 5 STEPS WITH RAILING: A LOT
DAILY ACTIVITIY SCORE: 19
WALKING IN HOSPITAL ROOM: A LITTLE
STANDING UP FROM CHAIR USING ARMS: A LITTLE
PERSONAL GROOMING: A LITTLE
TOILETING: A LITTLE
MOVING TO AND FROM BED TO CHAIR: A LITTLE
MOVING FROM LYING ON BACK TO SITTING ON SIDE OF FLAT BED WITH BEDRAILS: A LITTLE

## 2025-02-24 ASSESSMENT — BALANCE ASSESSMENTS
STANDING UNSUPPORTED ONE FOOT IN FRONT: LOSES BALANCE WHILE STEPPING OR STANDING
STANDING UNSUPPORTED WITH FEET TOGETHER: NEEDS HELP TO ATTAIN POSITION AND UNABLE TO HOLD FOR 15 SECONDS
STANDING UNSUPPORTED: ABLE TO STAND SAFELY FOR 2 MINUTES
REACHING FORWARD WITH OUTSTRETCHED ARM WHILE STANDING: CAN REACH FORWARD 5 CM (2 INCHES)
TRANSFERS: ABLE TO TRANSFER SAFELY DEFINITE NEED OF HANDS
STANDING TO SITTING: ABLE TO STAND INDEPENDENTLY USING HANDS
PLACE ALTERNATE FOOT ON STEP OR STOOL WHILE STANDING UNSUPPORTED: LOOKS BEHIND FROM BOTH SIDES AND WEIGHT SHIFTS WELL
STANDING ON ONE LEG: UNABLE TO TRY NEEDS ASSIST TO PREVENT FALL
PICK UP OBJECT FROM THE FLOOR FROM A STANDING POSITION: ABLE TO PICK UP SLIPPER BUT NEEDS SUPERVISION
PLACE ALTERNATE FOOT ON STEP OR STOOL WHILE STANDING UNSUPPORTED: ABLE TO COMPLETE 4 STEPS WITHOUT AID WITH SUPERVISION
TURN 360 DEGREES: ABLE TO TURN 360 DEGREES SAFELY BUT SLOWLY
STANDING UNSUPPORTED WITH EYES CLOSED: ABLE TO STAND 10 SECONDS SAFELY
LONG VERSION TOTAL SCORE (MAX 56): 34
STANDING TO SITTING: CONTROLS DESCENT BY USING HANDS
SITTING WITH BACK UNSUPPORTED BUT FEET SUPPORTED ON FLOOR OR ON A STOOL: ABLE TO SIT SAFELY AND SECURELY FOR 2 MINUTES

## 2025-02-24 ASSESSMENT — PAIN SCALES - GENERAL
PAINLEVEL_OUTOF10: 4
PAINLEVEL_OUTOF10: 5 - MODERATE PAIN

## 2025-02-24 ASSESSMENT — PAIN - FUNCTIONAL ASSESSMENT: PAIN_FUNCTIONAL_ASSESSMENT: 0-10

## 2025-02-24 NOTE — PROGRESS NOTES
Physical Therapy    Physical Therapy Treatment    Patient Name: Swapnil Allen  MRN: [id number]  Department: [facility]  Room: [address] [place], [place] [address]-  Today's Date: 2/24/2025  Time Calculation  Start Time: 1013  Stop Time: 1045  Time Calculation (min): 32 min         Assessment/Plan   PT Assessment  PT Assessment Results: Decreased strength, Decreased endurance, Impaired balance, Decreased mobility  End of Session Communication: Bedside nurse  Assessment Comment: Pt ambulated 100' x2 using FWW with CGA appearing slightly unsteady but with no LOB. Pt scored 34 on the Booth balance test indicating pt is at moderate fall risk. Pt remains appropriate for Mod intensity therapy upon discharge.  End of Session Patient Position: Up in chair, Alarm off, not on at start of session  PT Plan  Inpatient/Swing Bed or Outpatient: Inpatient  PT Plan  Treatment/Interventions: Bed mobility, Gait training, Transfer training, Stair training, Balance training, Neuromuscular re-education, Strengthening, Endurance training, Range of motion, Therapeutic exercise, Therapeutic activity, Home exercise program, Positioning, Postural re-education  PT Plan: Ongoing PT  PT Frequency: 3 times per week  PT Discharge Recommendations: Moderate intensity level of continued care  Equipment Recommended upon Discharge: Wheeled walker  PT Recommended Transfer Status: Contact guard, Assistive device  PT - OK to Discharge: Yes      General Visit Information:   PT  Visit  PT Received On: 02/24/25  General  Family/Caregiver Present: No  Prior to Session Communication: Bedside nurse  Patient Position Received: Up in chair, Alarm off, not on at start of session  General Comment: Pt seated in chair upon arrival. Pt pleasant and agreeable to therapy.    Subjective   Precautions:  Precautions  Hearing/Visual Limitations: WFL  Medical Precautions: Fall precautions            Objective   Pain:     Cognition:  Cognition  Overall Cognitive Status: Within Functional Limits  Coordination:        Activity Tolerance:  Activity Tolerance  Endurance: Tolerates 10 - 20 min exercise with multiple rests  Treatments:  Therapeutic Activity  Therapeutic Activity Performed: Yes  Therapeutic Activity 1: Pt completed the Booth balance test scoring 34    Ambulation/Gait Training  Ambulation/Gait Training Performed: Yes  Ambulation/Gait Training 1  Surface 1: Level tile  Device 1: Rolling walker  Gait Support Devices: Gait belt  Assistance 1: Contact guard  Quality of Gait 1: Diminished heel strike, Narrow base of support (slightly unsteady)  Comments/Distance (ft) 1: 100' x2 with prolonged seated rest betwen trials.  Transfers  Transfer: Yes  Transfer 1  Transfer From 1: Sit to, Stand to  Transfer to 1: Stand, Sit  Technique 1: Sit to stand, Stand to sit  Transfer Device 1: Walker  Transfer Level of Assistance 1: Contact guard  Trials/Comments 1: x4  Transfers 2  Transfer From 2: Chair with arms to, Bed to  Transfer to 2: Bed, Chair with arms  Technique 2: Stand pivot  Transfer Device 2:  (no AD)  Transfer Level of Assistance 2: Contact guard  Trials/Comments 2: pt with LOB but able to self correct    Outcome Measures:  [facility] Basic Mobility  Turning from your back to your side while in a flat bed without using bedrails: A little  Moving from lying on your back to sitting on the side of a flat bed without using bedrails: A lot  Moving to and from bed to chair (including a wheelchair): A little  Standing up from a chair using your arms (e.g. wheelchair or bedside chair): A little  To walk in hospital room: A little  Climbing 3-5 steps with railing: A lot  Basic Mobility - Total Score: 16    Education Documentation  Mobility Training, taught by Maliha Del Castillo PTA at 2/24/2025 11:00 AM.  Learner: Patient  Readiness: Acceptance  Method: Explanation  Response: Verbalizes Understanding  Comment: Pt educated to control pace during ambulation using FWW for increased safety.    Education Comments  No comments found.        OP  EDUCATION:       Encounter Problems       Encounter Problems (Active)       Mobility       LTG - Patient will navigate 4-6 steps with rails/device and moderate assistance. (Not met)       Start:  01/30/25    Expected End:  02/13/25    Resolved:  02/17/25    Updated to: Pt will ascend/descend 4 steps with 1 rail and supervision, no LOB    Update reason: update         STG - Patient will ambulate ~100 feet with FWW and minimal assistance.  (Not met)       Start:  01/30/25    Expected End:  02/13/25    Resolved:  02/17/25    Updated to: Pt will ambulated 150' with WW and supervision safely, no LOB    Update reason: update         Pt will ambulate >/= 100 ft with FWW and minimal assistance and no signs of fatigue or SOB.   (Not met)       Start:  01/30/25    Expected End:  03/03/25    Resolved:  02/17/25    Updated to: Pt will score >45 on the Booth Balance scale indicating lower risk for falls    Update reason: update         Pt will ascend/descend 4 steps with 1 rail and supervision, no LOB (Progressing)       Start:  02/17/25    Expected End:  03/03/25                Pt will ambulated 150' with WW and supervision safely, no LOB (Progressing)       Start:  02/17/25    Expected End:  03/03/25                Pt will score >45 on the Booth Balance scale indicating lower risk for falls (Progressing)       Start:  02/17/25    Expected End:  03/03/25                   PT Transfers       LTG - Patient will transfer from one surface to another CGA with FWW. (Not met)       Start:  01/30/25    Expected End:  02/13/25    Resolved:  02/17/25    Updated to: Pt will come sit to/from stand, bed to/from chair with WW and supervision safely, no LOB    Update reason: update         STG - Patient will perform bed mobility with close supervision.  (Not met)       Start:  01/30/25    Expected End:  02/13/25    Resolved:  02/17/25    Updated to: Pt will come supine to/from sit (HOB flat, no rail) with supervision safely    Update reason:  update         Pt will come sit to/from stand, bed to/from chair with WW and supervision safely, no LOB (Progressing)       Start:  02/17/25    Expected End:  03/03/25                Pt will come supine to/from sit (HOB flat, no rail) with supervision safely (Progressing)       Start:  02/17/25    Expected End:  03/03/25                   Pain - Adult

## 2025-02-24 NOTE — SIGNIFICANT EVENT
Paged by nursing staff to see the patient as he complain of bilateral ankle swelling. On exam, he has bl LE edema below the knew, petting edema +1. Denied any leg pain, chest pain or sob. HR 85, BP normal and O2 sat normal in room air. Looking at previous charting with documented known BL lower extremity edema. No new orders for now.    Gallo Martines MD  Urology PGY-3  Pager: [phone]

## 2025-02-24 NOTE — PROGRESS NOTES
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 Surgery was originally scheduled for 10/14, but was delayed to obtain additional imaging as the mass was non-enhancing on CT. 11/2 MRI showed the mass to be heterogeneously enhancing with features c/f neoplasm. There was an additional finding of mildly enlarged precaval/pericaval lymph nodes and a small enhancing focus along the lateral right pararenal fascia which was said to be indeterminate, but neoplastic deposit could not be ruled out. RMB 12/9 revealed path c/w renal cell carcinoma, favoring papillary type. He underwent open right radical nephrectomy with RPLND with Dr. Perez on 1/28. 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 Post-op course was c/b mild leukocytosis and opacities on 2/7, 2/10 cxrays possibly c/w aspiration pneumonia s/p 7 day course of zosyn without need for further treatment per ID as well as nausea/vomiting requiring NG placement 1/31. NGT output remained high with CT a/p 2/3 showing mesenteric swirling and findings compatible with partial SBO. CRS was consulted and recommended conservative management with NG/PPN. Patient had ROBF with improvement of small bowel distension on 2/8 xray, though failed NGT clamp trial thus venting PEG was placed by IR 2/10. NG was removed and he was started on TPN the same day. PEG was capped 2/13 without further episodes of nausea/vomiting, thus diet was slowly advanced. He was able to tolerate TID VHC boosts for one week, meeting calorie requirements per nutrition. TPN was discontinued 2/20 and he was started on a soft diet per CRS. PT  "evaluated patient and recommended SNF placement due to deconditioning from his extended hospital stay, which he is now awaiting.     Subjective Patient up to chair. States he is tolerating diet w/o n/v, and bowel function has been \"ok\". Last BM 2/23. Voiding at baseline. Hasn't been walking much or out of chair per nursing. Agrees to SNF choice. No other complaint.      Objective    Vital Signs:  Heart Rate:  [74-89]   Temp:  [36.2 °C (97.2 °F)-36.5 °C (97.7 °F)]   Resp:  [18]   BP: (109-127)/(76-87)   SpO2:  [96 %-99 %]     I&Os    Intake/Output Summary (Last 24 hours) at 2/24/2025 0657  Last data filed at 2/23/2025 1800  Gross per 24 hour   Intake 240 ml   Output 625 ml   Net -385 ml     Labs  Lab Results   Component Value Date    WBC 9.0 02/20/2025    HGB 8.5 (L) 02/20/2025    HCT 26.6 (L) 02/20/2025    MCV 87 02/20/2025     (L) 02/20/2025     Lab Results   Component Value Date    GLUCOSE 132 (H) 02/20/2025    CALCIUM 9.3 02/20/2025     (L) 02/20/2025    K 4.7 02/20/2025    CO2 20 (L) 02/20/2025     (H) 02/20/2025    BUN 42 (H) 02/20/2025    CREATININE 1.82 (H) 02/20/2025     Physical Exam:   Constitutional: Alert, in NAD  HEENT: Normocephalic, atraumatic  Neuro: A&O x3  CV: Regular rate  Pulm: Non-laboured breathing   GI: Abdomen soft, non-distended, non-tender  : Voiding spontaneously  Skin: Midline incision well healed. No lesions or discoloration noted.  Musculoskeletal: RODOLFO  Extremities: b/l LE edema    Current Medications:  amLODIPine, 10 mg, oral, Daily  aspirin, 81 mg, oral, Daily  cholecalciferol, 1,000 Units, oral, Daily  enoxaparin, 40 mg, subcutaneous, Daily  lactated Ringer's, 500 mL, intravenous, Once  levothyroxine, 25 mcg, oral, Daily  pantoprazole, 40 mg, intravenous, Daily before breakfast  tamsulosin, 0.4 mg, oral, Nightly      PRN medications: acetaminophen, alteplase, calcium carbonate, dextrose, ipratropium-albuteroL, melatonin, ondansetron, zinc oxide   Dietary Orders " (From admission, onward)       Start     Ordered    02/20/25 1540  Adult diet Regular; Easy to chew  Diet effective now        Question Answer Comment   Diet type Regular    Texture Easy to chew        02/20/25 1540    02/19/25 1350  Oral nutritional supplements  Until discontinued        Comments: Boost VHC TID, please deliver 3 vanilla boosts   Question Answer Comment   Deliver with All meals send when pt requests   Deliver with Breakfast    Deliver with Lunch    Deliver with Dinner    Select supplement: Boost [facility]        02/19/25 1349    01/29/25 0008  May Participate in Room Service  ( ROOM SERVICE MAY PARTICIPATE)  Once        Question:  .  Answer:  Yes    01/29/25 0007                    Current Outpatient Medications   Medication Instructions    Adult Clinimix Parenteral Nutrition Cyclic Cycle 1760mL over 12 hours starting at 8pm, daily.  Run at 80mL/hr for first 1 hour  Run at 160mL/hr for 10 hours  Run at 80mL/hr for last 1 hour.  Use a 0.22 micron filter.    amLODIPine (Norvasc) 10 mg tablet 1 tablet, Daily    aspirin 81 mg, Daily    calcium carbonate (TUMS) 500 mg, oral, 4 times daily PRN    cholecalciferol (VITAMIN D-3) 1,000 Units, Daily    fat emulsion fish oil/plant based (SMOFlipid) 20 % emulsion 50 g, intravenous, Daily Lipids    levothyroxine (SYNTHROID, LEVOXYL) 25 mcg, Daily    melatonin 5 mg, Nightly    Ringer's solution Infuse 1000mL at 83mL/hr over 12 hours each night. To be run with nightly TPN    tamsulosin (FLOMAX) 0.4 mg, Daily    zinc oxide 20 % ointment 1 Application, Topical, Every 1 hour PRN        Updates:  2/24: VSS. Lab holiday. Continues to tolerate diet/boosts w/o n/v, last BM yesterday. 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, on asa), anemia  #home meds: asa, amlodipine  - VS q8hr  -  Continue home asa, amlodipine    Heme:   #hx of chronic anemia, stable [hgb 8.5<8.9 last checked 2/20; baseline ~10]  - No indication for transfusion at this time  - CBC only as clinically indicated    Resp:  - Aggressive pulmonary toilet and incentive spirometry 10x/hour  - Aspiration precautions    GI/Diet:   #Post-op SBO, 2/6 CT a/p - c/w partial SBO in the region of mesenteric swirling  - Appreciate CRS recs:   - No bowel regimen aside from enemas   - Continue soft regular diet + TID VHC boost, d/c TPN  - If patient becomes symptomatic, please uncap and vent PEG tube and downgrade to FULL liquids   - Outpatient follow-up with Dr. Tracey scheduled for 3/4  - Appreciate nutrition recs:   - Boost VHC TID, if continues to tolerate no need for PN  - Daily soap suds enema   - PRN TUMS    /Renal:   #hx of CKD, stable (sCr 1.82<1.84 last checked 2/20; baseline sCr ~1.4-1.5), BPH w/LUTS  - Continue home tamsulosin  - Strict I/Os  - BMP only as clinically indicated    Endocrine:   #hx of thyrotoxicosis s/p partial thyroidectomy  #home meds: levothyroxine  - Continue home levothyroxine  - D/c SSI/POC accuchecks as patient no longer on TPN    MSK:  - OOB most of the day  - Ambulate at least 3x per day, more if tolerated  - PT on board, rec SNF     ID:   #Possible aspiration pneumonia - leukocytosis to 15.2 2/10 with cxray - consolidative and reticular opacities c/w atelectasis on a background of aspiration pneumonia/pneumonitis  #Leukocytosis resolved 2/14  - Completed course of zosyn 2/7 - 2/14, no need for further abx per ID  - Monitor for signs/sx of infection    PPx: SCDs and lovenox    Dispo: RNF, anticipate discharge after pre-cert approved for SNF    Seen and discussed with chief resident Dr. La. To be discussed with attending physician Dr. Perez.    ---  Deena Kilpatrick PALouC  Service Pager [phone]

## 2025-02-24 NOTE — CARE PLAN
The clinical goals for the shift include patient will be safe and free from falls throughout shift      Problem: Pain - Adult  Goal: Verbalizes/displays adequate comfort level or baseline comfort level  Outcome: Progressing     Problem: Safety - Adult  Goal: Free from fall injury  Outcome: Progressing     Problem: Discharge Planning  Goal: Discharge to home or other facility with appropriate resources  Outcome: Progressing     Problem: Nutrition  Goal: Nutrient intake appropriate for maintaining nutritional needs  Outcome: Progressing     Problem: Skin  Goal: Prevent/manage excess moisture  Outcome: Progressing  Goal: Prevent/minimize sheer/friction injuries  Outcome: Progressing  Goal: Promote/optimize nutrition  Outcome: Progressing     Problem: Pain  Goal: Takes deep breaths with improved pain control throughout the shift  Outcome: Progressing  Goal: Turns in bed with improved pain control throughout the shift  Outcome: Progressing  Goal: Walks with improved pain control throughout the shift  Outcome: Progressing  Goal: Performs ADL's with improved pain control throughout shift  Outcome: Progressing  Goal: Participates in PT with improved pain control throughout the shift  Outcome: Progressing  Goal: Free from opioid side effects throughout the shift  Outcome: Progressing  Goal: Free from acute confusion related to pain meds throughout the shift  Outcome: Progressing

## 2025-02-24 NOTE — CARE PLAN
Problem: Pain - Adult  Goal: Verbalizes/displays adequate comfort level or baseline comfort level  Outcome: Progressing     Problem: Safety - Adult  Goal: Free from fall injury  Outcome: Progressing     Problem: Discharge Planning  Goal: Discharge to home or other facility with appropriate resources  Outcome: Progressing     Problem: Nutrition  Goal: Nutrient intake appropriate for maintaining nutritional needs  Outcome: Progressing     Problem: Skin  Goal: Prevent/manage excess moisture  Outcome: Progressing  Goal: Prevent/minimize sheer/friction injuries  Outcome: Progressing  Goal: Promote/optimize nutrition  Outcome: Progressing     Problem: Pain  Goal: Takes deep breaths with improved pain control throughout the shift  Outcome: Progressing  Goal: Turns in bed with improved pain control throughout the shift  Outcome: Progressing  Goal: Walks with improved pain control throughout the shift  Outcome: Progressing  Goal: Performs ADL's with improved pain control throughout shift  Outcome: Progressing  Goal: Participates in PT with improved pain control throughout the shift  Outcome: Progressing  Goal: Free from opioid side effects throughout the shift  Outcome: Progressing  Goal: Free from acute confusion related to pain meds throughout the shift  Outcome: Progressing   The patient's goals for the shift include  rest and comfort    The clinical goals for the shift include patient will have a bowel movement during shift    Over the shift, the patient did not make progress toward the following goals. Barriers to progression include safety. Recommendations to address these barriers include reorient to room.

## 2025-02-24 NOTE — PROGRESS NOTES
Occupational Therapy    Occupational Therapy Treatment    Name: Swapnil Allen  MRN: [id number]  : 1950  Date: 25  Room: [address] [place] [address]-A    Time Calculation  Start Time: 1429  Stop Time: 1455  Time Calculation (min): 26 min    Assessment:  OT Assessment: Pt tolerated session well this day. Pt with good effort once agreeable to session. Pt scored 11/30 for SLUMS examination indicating dementia- noted to have most difficulty with problem solving. Pt also demonstrates decreased safety awareness during mobility, balance training, and evnironment navigation requiring increased cueing for safety. Pt educated on energy conservation strateiges- verbalized understanding but may require reinforcement. Pt remains appropriate for MOD intensity OT services.  Prognosis: Good  Barriers to Discharge Home: Caregiver assistance, Cognition needs, Physical needs  Caregiver Assistance: Patient lives alone and/or does not have reliable caregiver assistance  Cognition Needs: Cognition-related high falls risk  Physical Needs: Intermittent mobility assistance needed, Intermittent ADL assistance needed  Evaluation/Treatment Tolerance: Patient tolerated treatment well  Medical Staff Made Aware: Yes  End of Session Communication: Bedside nurse  End of Session Patient Position: Up in chair, Alarm off, not on at start of session  Plan:  Treatment Interventions: ADL retraining, Functional transfer training, Endurance training, Cognitive reorientation, Equipment evaluation/education, Compensatory technique education  OT Frequency: 3 times per week  OT Discharge Recommendations: Moderate intensity level of continued care  Equipment Recommended upon Discharge:  (TBD at next level of care)  OT Recommended Transfer Status: Assist of 1  OT - OK to Discharge: Yes    Subjective   General:  OT Last Visit  OT Received On: 25  Reason for Referral: s/p nephrectomy  Past Medical History Relevant to Rehab: HTN, 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 Hesitant to participate but ultimately agreeable with MAX encouragement from this OT, RN, and Physician.     Precautions:  Hearing/Visual Limitations: WFL  Medical Precautions: Fall precautions    Lines/Tubes/Drains:  PICC - Adult 02/10/25 Double lumen Left Brachial vein (Active)   Number of days: 14       Gastrostomy/Enterostomy Gastrostomy 20 Fr. LUQ (Active)   Number of days: 14     Cognition:  Overall Cognitive Status: Impaired  Arousal/Alertness: Appropriate responses to stimuli  Orientation Level: Oriented X4  Following Commands: Follows all commands and directions without difficulty  Safety Judgment: Decreased awareness of need for assistance  Problem Solving: Assistance required to identify errors made  Cognition Comments: decreased safety awareness and insight into need for assistance  Insight: Mild  Impulsive: Mildly  Processing Speed: Within funtional limits    Pain Assessment:  Pain Assessment  Pain Assessment: 0-10  0-10 (Numeric) Pain Score: 4  Pain Type: Acute pain  Pain Location: Abdomen  Pain Interventions: Repositioned, Ambulation/increased activity, Relaxation technique, Rest  Response to Interventions: No change in pain     Objective     Functional Standing Tolerance:  Functional Mobility  Functional Mobility Performed: Yes  Functional Mobility 1  Comments 1: Pt ambulated total of MOD household distance using cane and CGA-SBA for safety  Bed Mobility/Transfers:   Bed Mobility  Bed Mobility: No  Transfers  Transfer: Yes  Transfer 1  Transfer From 1: Chair with arms to, Stand  to  Transfer to 1: Stand, Chair with arms  Technique 1: Sit to stand, Stand to sit  Transfer Device 1: Cane  Transfer Level of Assistance 1: Contact guard, Close supervision  Trials/Comments 1: x3 trials; CGA for first trial and SBA for trials 2-3    Balance:  Dynamic Sitting Balance  Dynamic Sitting-Balance Support: Feet supported  Dynamic Sitting-Level of Assistance: Close supervision  Dynamic Sitting-Balance: Forward lean, Reaching for objects  Dynamic Sitting-Comments: Good  Dynamic Standing Balance  Dynamic Standing-Balance Support: Left upper extremity supported  Dynamic Standing-Level of Assistance: Contact guard  Dynamic Standing-Balance: Turning, Reaching for objects  Dynamic Standing-Comments: CGA throughout dynamic reaching during ther act (linen bed change)  Static Sitting Balance  Static Sitting-Balance Support: Feet supported  Static Sitting-Level of Assistance: Distant supervision  Static Standing Balance  Static Standing-Balance Support: Left upper extremity supported  Static Standing-Level of Assistance: Close supervision  Static Standing-Comment/Number of Minutes: Cane    Therapy/Activity:      Therapeutic Activity  Therapeutic Activity Performed: Yes  Therapeutic Activity 1: Functional mobility and transfer training as noted requiring skilled assistance and cueing for safety and training  Therapeutic Activity 2: Pt completed bed linen change while standing at bedside with intermittent UE support and SBA-CGA for safety. Pt requiring CGA during some forward lean and reach while changing sheets. Overall good balance with UE support and overall fair balance without UE support  Therapeutic Activity 3: SLUMS evaluation and education on OT role in cognition  Therapeutic Activity 4: Educated on benefits of mobility and balance training in regards to functional independence  Therapeutic Activity 5: Seated and standing balance training during bed linen change and mobility       Outcome Measures:  [facility]  Daily Activity  Putting on and taking off regular lower body clothing: A little  Bathing (including washing, rinsing, drying): A lot  Putting on and taking off regular upper body clothing: None  Toileting, which includes using toilet, bedpan or urinal: A little  Taking care of personal grooming such as brushing teeth: A little  Eating Meals: None  Daily Activity - Total Score: 19    OT Adult Other Outcome Measures  SLUMS Total Score: 11/30- Dementia    Education Documentation  Body Mechanics, taught by Víctor Viramontes OT at 2/24/2025  4:18 PM.  Learner: Patient  Readiness: Acceptance  Method: Explanation, Demonstration  Response: Verbalizes Understanding, Demonstrated Understanding  Comment: safety concerns, environment management/navigation safety, EC, OT POC    Precautions, taught by Víctor Viramontes OT at 2/24/2025  4:18 PM.  Learner: Patient  Readiness: Acceptance  Method: Explanation, Demonstration  Response: Verbalizes Understanding, Demonstrated Understanding  Comment: safety concerns, environment management/navigation safety, EC, OT POC    ADL Training, taught by Víctor Viramontes OT at 2/24/2025  4:18 PM.  Learner: Patient  Readiness: Acceptance  Method: Explanation, Demonstration  Response: Verbalizes Understanding, Demonstrated Understanding  Comment: safety concerns, environment management/navigation safety, EC, OT POC    Education Comments  No comments found.      Goals:  Encounter Problems       Encounter Problems (Active)       ADLs       Pt will complete LB dressing with modified independence while seated and/or standing and AE as needed.  (Progressing)       Start:  02/20/25    Expected End:  03/06/25            Pt will complete UB /LB bathing tasks with modified independence while seated and AE as needed.  (Progressing)       Start:  02/20/25    Expected End:  03/06/25            Pt will complete toilet hygiene while seated /standing with IND level of assistance.   (Progressing)        Start:  02/20/25    Expected End:  03/06/25               BALANCE       Pt will maintain dynamic standing balance during ADL task with modified independent level of assistance in order to demonstrate decreased risk of falling and improved postural control. (Progressing)       Start:  02/20/25    Expected End:  03/06/25               COGNITION/SAFETY       Patient will score WFL on standardized cognitive assessment with verbal cues and within reasonable time frame (Progressing)       Start:  02/20/25    Expected End:  03/06/25               MOBILITY       Patient will perform Functional mobility max Household distances/Community Distances with modified independent level of assistance and least restrictive device in order to improve safety and functional mobility. (Progressing)       Start:  02/20/25    Expected End:  03/06/25               TRANSFERS       Patient will complete sit to stand transfer with modified independent level of assistance and least restrictive device in order to improve safety and prepare for out of bed mobility. (Progressing)       Start:  02/20/25    Expected End:  03/06/25 02/24/25 at 4:21 PM   Víctor Viramontes, OT   [phone]

## 2025-02-24 NOTE — PROGRESS NOTES
02/11/25 1100   Discharge Planning   Living Arrangements Alone   Support Systems Family members   Assistance Needed TPN, ADLs   Type of Residence Private residence   Number of Stairs to Enter Residence 3  (brother's home: 3 steps at side door to enter)   Number of Stairs Within Residence 3  (brother's home, down to basement)   Do you have animals or pets at home? No   Home or Post Acute Services In home services   Type of Home Care Services Home OT;Home PT;Home nursing visits   Expected Discharge Disposition Home  (brother Jhony's home)   Does the patient need discharge transport arranged? No   Financial Resource Strain   How hard is it for you to pay for the very basics like food, housing, medical care, and heating? Not very   Housing Stability   In the last 12 months, was there a time when you were not able to pay the mortgage or rent on time? N   In the past 12 months, how many times have you moved where you were living? 0   At any time in the past 12 months, were you homeless or living in a shelter (including now)? N   Transportation Needs   In the past 12 months, has lack of transportation kept you from medical appointments or from getting medications? no   In the past 12 months, has lack of transportation kept you from meetings, work, or from getting things needed for daily living? No     SW met with pt to discuss discharge to SNF.  Pt reported that he had been in conversation with his family and that the plan is for him to discharge to his brother [address] [id number] Peter's spouse Mayda is a retired RN.  SW met with care team to follow up.  When SW returned to pt room, he was on the phone with Jennifer Green confirmed that the plan was to discharge to his home.   Care team aware, pt has many teachable caregivers.  SW will follow. Rogers Juan Cox [place] LSW    02/17/2025 1251  Plan was for pt to discharge to sibling [facility].  Pt is on  TPN via PICC.  Pt is not medically ready due to a rising WBC.  STEPHANIE notified this afternoon that sibling Peter and family feel that pt should discharge to a SNF.  Pt has been rec'd SNF for PT/OT throughout his admission but has not been amenable.  STEPHANIE printed out a SNF choice list and met with pt.  Pt called his brother Peter and Peter confirmed that they were concerned with his current care need.  SW and bedside nurse spoke to Peter and pt.  SNF choice list emailed to Peter at [email or web address].  Further discharge planning pending updates from the care team.  SW will follow. Rogers Juan [facility]     02/19/2025 0850  STEPHANIE received a phone call from pt sister-in-law Dunia asking for an update on SNF search. STEPHANIE had previously met with Dunia and several family members bedside and they were provided a SNF list.  STEPHANIE let her know that SW was waiting for their SNF preferences as [facility] and [facility] could not accept.  Dunia asked about [facility] and [facility] AR and was told that pt does not qualify for either.   Dunia added new preferences [facility], [facility], and [facility].  [facility]  SW will follow. Rogers Juan [facility]    02/19/2025 0930  SNFs [facility] and [facility] cannot accept pt. [facility] SNF is reviewing.  STEPHANIE received a voicemail from Dunia asking about [facility] AR [facility].  STEPHANIE called Dunia back and let her know that pt is not skilled for AR.  STEPHANIE asked Dunia to choose additional preferences.  STEPHANIE will notify Dunia of [facility] SNFs response.  SW will follow. Rogers Juan [facility]    02/20/2025 0930  STEPHANIE reached out to following SNF [facility] re acceptance.  Pt is medically ready for discharge.  STEPHANIE will follow. Rogers Juan [facility]    02/20/2025 1445  SNF [facility] not [facility]  Pt consented to a blanket SNF referral and he and his family can choose from the acceptances.  SNF choice list  filtered by TPN; referral sent to 25 facilities.  Care team updated.  SW will follow. Rogers Juan [facility]    02/21/2025 0900  Pt discussed during morning rounds.  Pt is no longer on TPN.  SNF stay is for PT/OT.  SW met with pt and he is amenable to SNF.  Original SNF preferences sent the update that pt is no longer on TPN.  SW will follow. Rogers Juan [facility]    02/21/2025 1230  Previously chosen preferences cannot accept due to no beds.  Updates sent to SNFs in the blanket referral.  SW had a voicemail from pt sibling Yeison asking for clarification on the blanket referral. SW called Yeison and left a voicemail explaining that the blanket referral is for the SNFs from the choice list given to Yeison's spouse Dunia. SW let Yeison know that if the family has additional preferences, they will be added to the referral.  SW will follow. Rogers Amy Drake [facility]    02/23/2025 0815  SW met with pt to discuss FOC.  Pt has been accepted to [facility], [facility], [facility], [facility], and [facility].   Pt indicated he wanted his family to make the decision; SW let him know that calls have been placed to family over several days with no response.  Pt called gloria Latham with Dunia also on the line.  SW explained that there are 5 accepting facilities and a decision is needed. Dunia disagrees that pt is medically ready and they will not be rushed with a SNF decision. Dunia reported that pt is not getting rehab while inpatient; STEPHANIE explained that PT/OT while inpatient is not daily and that is why discharge to SNF is so important.   SW took a photo of the list of accepting facilities and texted it to pt sibling Yeison from pt phone.  Pt will discharge with a decompressive PEG and will need PT/OT at SNF.  Care team updated.  SW will follow. Rogers Reyes Juan [facility]    02/23/2025 1335  Pt gloria Latham spoke with a member of the care team and got his questions  answered.  FOC is [facility]; facility notified.   Pt will need updated PT and OT notes for precert; request made on whiteboard.  Care team updated.  SW will follow. Rogers LY    02/24/2025 0800  Precert will be initiated by DSC once PT and OT notes are in.  SW will follow. Rogers TERAN ALIYAH

## 2025-02-25 PROCEDURE — 2500000001 HC RX 250 WO HCPCS SELF ADMINISTERED DRUGS (ALT 637 FOR MEDICARE OP)

## 2025-02-25 PROCEDURE — 1170000001 HC PRIVATE ONCOLOGY ROOM DAILY

## 2025-02-25 PROCEDURE — 2500000002 HC RX 250 W HCPCS SELF ADMINISTERED DRUGS (ALT 637 FOR MEDICARE OP, ALT 636 FOR OP/ED)

## 2025-02-25 PROCEDURE — 2500000004 HC RX 250 GENERAL PHARMACY W/ HCPCS (ALT 636 FOR OP/ED): Performed by: STUDENT IN AN ORGANIZED HEALTH CARE EDUCATION/TRAINING PROGRAM

## 2025-02-25 RX ADMIN — ENOXAPARIN SODIUM 40 MG: 100 INJECTION SUBCUTANEOUS at 21:32

## 2025-02-25 RX ADMIN — AMLODIPINE BESYLATE 10 MG: 10 TABLET ORAL at 08:44

## 2025-02-25 RX ADMIN — LEVOTHYROXINE SODIUM 25 MCG: 25 TABLET ORAL at 06:39

## 2025-02-25 RX ADMIN — ENOXAPARIN SODIUM 40 MG: 100 INJECTION SUBCUTANEOUS at 06:39

## 2025-02-25 RX ADMIN — TAMSULOSIN HYDROCHLORIDE 0.4 MG: 0.4 CAPSULE ORAL at 21:32

## 2025-02-25 RX ADMIN — Medication 10 MG: at 23:14

## 2025-02-25 RX ADMIN — MINERAL OIL, PETROLATUM, PHENYLEPHRINE HCL 1 APPLICATION: 2.5; 140; 749 OINTMENT TOPICAL at 11:40

## 2025-02-25 RX ADMIN — ACETAMINOPHEN 650 MG: 325 TABLET ORAL at 21:48

## 2025-02-25 RX ADMIN — Medication 1000 UNITS: at 08:41

## 2025-02-25 RX ADMIN — ACETAMINOPHEN 650 MG: 325 TABLET ORAL at 15:01

## 2025-02-25 RX ADMIN — ASPIRIN 81 MG: 81 TABLET, COATED ORAL at 08:41

## 2025-02-25 ASSESSMENT — PAIN SCALES - GENERAL
PAINLEVEL_OUTOF10: 0 - NO PAIN
PAINLEVEL_OUTOF10: 0 - NO PAIN
PAINLEVEL_OUTOF10: 8
PAINLEVEL_OUTOF10: 4

## 2025-02-25 NOTE — PROGRESS NOTES
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 Surgery was originally scheduled for 10/14, but was delayed to obtain additional imaging as the mass was non-enhancing on CT. 11/2 MRI showed the mass to be heterogeneously enhancing with features c/f neoplasm. There was an additional finding of mildly enlarged precaval/pericaval lymph nodes and a small enhancing focus along the lateral right pararenal fascia which was said to be indeterminate, but neoplastic deposit could not be ruled out. RMB 12/9 revealed path c/w renal cell carcinoma, favoring papillary type. He underwent open right radical nephrectomy with RPLND with Dr. Perez on 1/28. 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 Post-op course was c/b mild leukocytosis and opacities on 2/7, 2/10 cxrays possibly c/w aspiration pneumonia s/p 7 day course of zosyn without need for further treatment per ID as well as nausea/vomiting requiring NG placement 1/31. NGT output remained high with CT a/p 2/3 showing mesenteric swirling and findings compatible with partial SBO. CRS was consulted and recommended conservative management with NG/PPN. Patient had ROBF with improvement of small bowel distension on 2/8 xray, though failed NGT clamp trial thus venting PEG was placed by IR 2/10. NG was removed and he was started on TPN the same day. PEG was capped 2/13 without further episodes of nausea/vomiting, thus diet was slowly advanced. He was able to tolerate TID VHC boosts for one week, meeting calorie requirements per nutrition. TPN was discontinued 2/20 and he was started on a soft diet per CRS. PT  evaluated patient and recommended SNF placement due to deconditioning from his extended hospital stay, which he is now awaiting.     Subjective   Patient up to chair.   Leg swelling improved, swelling to mid shin, no pain   States he is tolerating diet w/o n/v.   Passing gas, having Bms   Voiding at baseline.   Worked with PT/OT yesterday   Pending SNF     Objective    Vital Signs:  Heart Rate:  [76-88]   Temp:  [36.2 °C (97.2 °F)-36.9 °C (98.4 °F)]   Resp:  [18]   BP: (103-120)/(70-81)   SpO2:  [96 %-99 %]     I&Os    Intake/Output Summary (Last 24 hours) at 2/25/2025 0744  Last data filed at 2/25/2025 0510  Gross per 24 hour   Intake 0 ml   Output 1350 ml   Net -1350 ml     Labs  Lab Results   Component Value Date    WBC 9.0 02/20/2025    HGB 8.5 (L) 02/20/2025    HCT 26.6 (L) 02/20/2025    MCV 87 02/20/2025     (L) 02/20/2025     Lab Results   Component Value Date    GLUCOSE 132 (H) 02/20/2025    CALCIUM 9.3 02/20/2025     (L) 02/20/2025    K 4.7 02/20/2025    CO2 20 (L) 02/20/2025     (H) 02/20/2025    BUN 42 (H) 02/20/2025    CREATININE 1.82 (H) 02/20/2025     Physical Exam:   Constitutional: Alert, in NAD  HEENT: Normocephalic, atraumatic  Neuro: A&O x3  CV: Regular rate, normotensive   Pulm: Non-laboured breathing, on room air   GI: Abdomen soft, non-distended, non-tender, midline incision s/d/I, PEG capped.   : Voiding spontaneously  Skin: Midline incision well healed. No lesions or discoloration noted.  Musculoskeletal: RODOLFO  Extremities: b/l LE edema to mid-shin     Current Medications:  amLODIPine, 10 mg, oral, Daily  aspirin, 81 mg, oral, Daily  cholecalciferol, 1,000 Units, oral, Daily  enoxaparin, 40 mg, subcutaneous, Daily  levothyroxine, 25 mcg, oral, Daily  tamsulosin, 0.4 mg, oral, Nightly      PRN medications: acetaminophen, alteplase, calcium carbonate, dextrose, melatonin, ondansetron, zinc oxide   Dietary Orders (From admission, onward)       Start     Ordered    02/20/25  1540  Adult diet Regular; Easy to chew  Diet effective now        Question Answer Comment   Diet type Regular    Texture Easy to chew        02/20/25 1540    02/19/25 1350  Oral nutritional supplements  Until discontinued        Comments: Boost VHC TID, please deliver 3 vanilla boosts   Question Answer Comment   Deliver with All meals send when pt requests   Deliver with Breakfast    Deliver with Lunch    Deliver with Dinner    Select supplement: Boost [facility]        02/19/25 1349    01/29/25 0008  May Participate in Room Service  ( ROOM SERVICE MAY PARTICIPATE)  Once        Question:  .  Answer:  Yes    01/29/25 0007                    Current Outpatient Medications   Medication Instructions    acetaminophen (TYLENOL) 650 mg, oral, Every 6 hours PRN    amLODIPine (Norvasc) 10 mg tablet 1 tablet, Daily    aspirin 81 mg, Daily    calcium carbonate (TUMS) 500 mg, oral, 4 times daily PRN    cholecalciferol (VITAMIN D-3) 1,000 Units, Daily    enoxaparin (LOVENOX) 40 mg, subcutaneous, Daily    levothyroxine (SYNTHROID, LEVOXYL) 25 mcg, Daily    melatonin 5 mg, Nightly    tamsulosin (FLOMAX) 0.4 mg, Daily    zinc oxide 20 % ointment 1 Application, Topical, Every 1 hour PRN        Updates:  2/24: VSS. Lab holiday. Continues to tolerate diet/boosts w/o n/v, last BM yesterday. Has not walked much, to work with OT/PT today, pending SNF    2/25: pending SNF placement, worked with PT/OT yesterday     Plan:     Neuro:  - Tylenol 650 mg q6h PRN pain  - d/c opioids for bowel function  - Zofran 4 mg q8h PRN for nausea    CV:   #hx of HTN, CVA/TIA 2020 (following cocaine use, on asa), anemia  #home meds: asa, amlodipine  - VS q8hr  - Continue home asa, amlodipine    Heme:   #hx of chronic anemia, stable [hgb 8.5<8.9 last checked 2/20; baseline ~10]  - No indication for transfusion at this time  - CBC only as clinically indicated    Resp:  - Aggressive pulmonary toilet and incentive spirometry 10x/hour  - Aspiration  precautions    GI/Diet:   #Post-op SBO, 2/6 CT a/p - c/w partial SBO in the region of mesenteric swirling  - Appreciate CRS recs:   - No bowel regimen aside from enemas   - Continue soft regular diet + TID VHC boost, d/c TPN  - If patient becomes symptomatic, please uncap and vent PEG tube and downgrade to FULL liquids   - Outpatient follow-up with Dr. Tracey scheduled for 3/4  - Appreciate nutrition recs:   - Boost VHC TID, if continues to tolerate no need for PN  - Daily soap suds enema   - PRN TUMS    /Renal:   #hx of CKD, stable (sCr 1.82<1.84 last checked 2/20; baseline sCr ~1.4-1.5), BPH w/LUTS  - Continue home tamsulosin  - Strict I/Os  - BMP only as clinically indicated    Endocrine:   #hx of thyrotoxicosis s/p partial thyroidectomy  #home meds: levothyroxine  - Continue home levothyroxine  - D/c SSI/POC accuchecks as patient no longer on TPN    MSK:  - OOB most of the day  - Ambulate at least 3x per day, more if tolerated  - PT on board, rec SNF     ID:   #Possible aspiration pneumonia - leukocytosis to 15.2 2/10 with cxray - consolidative and reticular opacities c/w atelectasis on a background of aspiration pneumonia/pneumonitis  #Leukocytosis resolved 2/14  - Completed course of zosyn 2/7 - 2/14, no need for further abx per ID  - Monitor for signs/sx of infection    PPx: SCDs and lovenox    Dispo: RNF, anticipate discharge after pre-cert approved for SNF    Seen and discussed with chief resident Dr. La. Discussed with attending physician Dr. Perez.    ---  Tiffanie Xiao MD, RTA   Service Pager [phone]

## 2025-02-25 NOTE — CARE PLAN
Problem: Pain - Adult  Goal: Verbalizes/displays adequate comfort level or baseline comfort level  Outcome: Progressing     Problem: Safety - Adult  Goal: Free from fall injury  Outcome: Progressing     Problem: Discharge Planning  Goal: Discharge to home or other facility with appropriate resources  Outcome: Progressing     Problem: Nutrition  Goal: Nutrient intake appropriate for maintaining nutritional needs  Outcome: Progressing     Problem: Skin  Goal: Prevent/manage excess moisture  Outcome: Progressing  Goal: Prevent/minimize sheer/friction injuries  Outcome: Progressing  Goal: Promote/optimize nutrition  Outcome: Progressing     Problem: Pain  Goal: Takes deep breaths with improved pain control throughout the shift  Outcome: Progressing  Goal: Turns in bed with improved pain control throughout the shift  Outcome: Progressing  Goal: Walks with improved pain control throughout the shift  Outcome: Progressing  Goal: Performs ADL's with improved pain control throughout shift  Outcome: Progressing  Goal: Participates in PT with improved pain control throughout the shift  Outcome: Progressing  Goal: Free from opioid side effects throughout the shift  Outcome: Progressing  Goal: Free from acute confusion related to pain meds throughout the shift  Outcome: Progressing   The patient's goals for the shift include  rest and comfort    The clinical goals for the shift include patient will be safe and free from falls throughout shift    Over the shift, the patient did not make progress toward the following goals. Barriers to progression include pain. Recommendations to address these barriers include prn meds.

## 2025-02-25 NOTE — PROGRESS NOTES
02/11/25 1100   Discharge Planning   Living Arrangements Alone   Support Systems Family members   Assistance Needed TPN, ADLs   Type of Residence Private residence   Number of Stairs to Enter Residence 3  (brother's home: 3 steps at side door to enter)   Number of Stairs Within Residence 3  (brother's home, down to basement)   Do you have animals or pets at home? No   Home or Post Acute Services In home services   Type of Home Care Services Home OT;Home PT;Home nursing visits   Expected Discharge Disposition Home  (brother Jhony's home)   Does the patient need discharge transport arranged? No   Financial Resource Strain   How hard is it for you to pay for the very basics like food, housing, medical care, and heating? Not very   Housing Stability   In the last 12 months, was there a time when you were not able to pay the mortgage or rent on time? N   In the past 12 months, how many times have you moved where you were living? 0   At any time in the past 12 months, were you homeless or living in a shelter (including now)? N   Transportation Needs   In the past 12 months, has lack of transportation kept you from medical appointments or from getting medications? no   In the past 12 months, has lack of transportation kept you from meetings, work, or from getting things needed for daily living? No     SW met with pt to discuss discharge to SNF.  Pt reported that he had been in conversation with his family and that the plan is for him to discharge to his brother [place] [address] [id number] Peter's spouse Mayda is a retired RN.  SW met with care team to follow up.  When SW returned to pt room, he was on the phone with Jennifer Green confirmed that the plan was to discharge to his home.   Care team aware, pt has many teachable caregivers.  SW will follow. Rogers Juan [facility] LSW    02/17/2025 1257  Plan was for pt to discharge to sibling Peter's home with [facility].  Pt is on  TPN via PICC.  Pt is not medically ready due to a rising WBC.  STEPHANIE notified this afternoon that sibling Peter and family feel that pt should discharge to a SNF.  Pt has been rec'd SNF for PT/OT throughout his admission but has not been amenable.  STEPHANIE printed out a SNF choice list and met with pt.  Pt called his brother Peter and Peter confirmed that they were concerned with his current care need.  SW and bedside nurse spoke to Peter and pt.  SNF choice list emailed to Peter at [email or web address].  Further discharge planning pending updates from the care team.  SW will follow. Rogers Juan [facility]     02/19/2025 0850  STEPHANIE received a phone call from pt sister-in-law Dunia asking for an update on SNF search. STEPHANIE had previously met with Dunia and several family members bedside and they were provided a SNF list.  STEPHANIE let her know that SW was waiting for their SNF preferences as [facility] and [facility] could not accept.  Dunia asked about [facility] and [facility] and was told that pt does not qualify for either.   Dunia added new preferences [facility], [facility], and [facility].  [facility]  SW will follow. Rogers Juan [facility]    02/19/2025 0930  SNFs [facility] and [facility] cannot accept pt. [facility] SN[facility] is reviewing.  STEPHANIE received a voicemail from Dunia asking about [facility].  STEPHANIE called Dunia back and let her know that pt is not skilled for AR.  STEPHANIE asked Dunia to choose additional preferences.  STEPHANIE will notify Dunia of [facility] SNFs response.  SW will follow. Rogers Juan [facility]    02/20/2025 0930  STEPHANIE reached out to following SNF [facility] re acceptance.  Pt is medically ready for discharge.  STEPHANIE will follow. Rogers Juan [facility]    02/20/2025 1445  [facility]  Pt consented to a blanket SNF referral and he and his family can choose from the acceptances.  SNF choice list  filtered by TPN; referral sent to 25 facilities.  Care team updated.  SW will follow. Rogers [facility]    02/21/2025 0900  Pt discussed during morning rounds.  Pt is no longer on TPN.  SNF stay is for PT/OT.  SW met with pt and he is amenable to SNF.  Original SNF preferences sent the update that pt is no longer on TPN.  SW will follow. Rogers [facility]    02/21/2025 1230  Previously chosen preferences cannot accept due to no beds.  Updates sent to SNFs in the blanket referral.  SW had a voicemail from pt sibling Yeison asking for clarification on the blanket referral. SW called Yeison and left a voicemail explaining that the blanket referral is for the SNFs from the choice list given to Yeison's spouse Dunia. SW let Yeison know that if the family has additional preferences, they will be added to the referral.  SW will follow. [facility]    02/23/2025 0815  SW met with pt to discuss FOC.  Pt has been accepted to [facility], [facility], [facility], [facility], and [facility].   Pt indicated he wanted his family to make the decision; SW let him know that calls have been placed to family over several days with no response.  Pt called gloria Latham with Dunia also on the line.  SW explained that there are 5 accepting facilities and a decision is needed. Dunia disagrees that pt is medically ready and they will not be rushed with a SNF decision. Dunia reported that pt is not getting rehab while inpatient; STEPHANIE explained that PT/OT while inpatient is not daily and that is why discharge to SNF is so important.   SW took a photo of the list of accepting facilities and texted it to pt sibling Yeison from pt phone.  Pt will discharge with a decompressive PEG and will need PT/OT at SNF.  Care team updated.  SW will follow. Rogers Reyes [facility]    02/23/2025 1335  Pt gloria Latham spoke with a member of the care team and got his questions  "answered.  FOC is [facility]; facility notified.   Pt will need updated PT and OT notes for precert; request made on whiteboard.  Care team updated.  SW will follow. Rogers Juan [facility]    02/24/2025 0800  Precert will be initiated by DSC once PT and OT notes are in.  SW will follow. Rogers Juan [facility]    02/25/2025 0800  SW requested that DSC initiate precert for pt to discharge to [facility].  Pt is medically ready for discharge.  SW will follow. Rogers Juan [facility]    02/25/2025 1305  7000 in HENS pending completed [place], care team aware.  SW will follow. Rogers Juan [facility]    02/25/2025 1700  Per DSC: \"Peer to peer is being offered Please call [phone] Call by 1 p.m. TOMORROW 2/26 REF #[id number] [place] ID: [id number]\"  Secure chat sent to members of the care team.  SW will follow. Rogers Reyes [facility]  "

## 2025-02-25 NOTE — CARE PLAN
The clinical goals for the shift include patient will remain safe and free from falls throughout shift      Problem: Pain - Adult  Goal: Verbalizes/displays adequate comfort level or baseline comfort level  Outcome: Progressing     Problem: Safety - Adult  Goal: Free from fall injury  Outcome: Progressing     Problem: Discharge Planning  Goal: Discharge to home or other facility with appropriate resources  Outcome: Progressing     Problem: Nutrition  Goal: Nutrient intake appropriate for maintaining nutritional needs  Outcome: Progressing     Problem: Skin  Goal: Prevent/manage excess moisture  Outcome: Progressing  Goal: Prevent/minimize sheer/friction injuries  Outcome: Progressing  Goal: Promote/optimize nutrition  Outcome: Progressing     Problem: Pain  Goal: Takes deep breaths with improved pain control throughout the shift  Outcome: Progressing  Goal: Turns in bed with improved pain control throughout the shift  Outcome: Progressing  Goal: Walks with improved pain control throughout the shift  Outcome: Progressing  Goal: Performs ADL's with improved pain control throughout shift  Outcome: Progressing  Goal: Participates in PT with improved pain control throughout the shift  Outcome: Progressing  Goal: Free from opioid side effects throughout the shift  Outcome: Progressing  Goal: Free from acute confusion related to pain meds throughout the shift  Outcome: Progressing

## 2025-02-25 NOTE — PROGRESS NOTES
"Nutrition Follow Up Assessment:   Nutrition Assessment    The patient is a 75 y.o. male who is hospital day #28.  s/p planned Right Radical Nephrectomy, Retroperitoneal Lymph Node Dissection on 1/28/25 d/t RCC. Course complicated by ileus.   During admission: pt had decompressive PEG placed. Clamped since 02/13. Having bowel movements. Working on dispo: discharging to SNF. On abx. TPN has been discontinued as pt tolerating PO intake.     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 TPN was discontinued on 02/20. Pt's diet now advanced to include solids. At visit, pt reports he is tolerating diet. Had a tuna sandwich and 2 ice creams for lunch - he ate 100%. For breakfast he usually eats an omelette. Reports this is similar to what he would eat at baseline. Boost VHC intake decreased to 1-2 per day. Pt reports PEG continues to be clamped - would like to get it out. Pt reports having good BM though question if pt constipated?  Digestive System Findings: Abdominal distension, Nausea, Vomiting    Anthropometrics:  Start of admission anthropometrics:  Height: 180.3 cm (5' 11\")  Weight: 102 kg (224 lb 3.3 oz)  BMI (Calculated): 31.28  IBW/kg (Dietitian Calculated): 78.2 kg  Percent of IBW: 131 %    Date/Time                  Weights during admission   02/18/25 0600  103 kg (227 lb 8.2 oz)  02/14/25 0540             103 kg (227 lb 1.2 oz)  02/01/25 0741             103 kg (226 lb 13.7 oz)  01/31/25 0932             102 kg (224 lb 6.9 oz)  01/30/25 0913             96.6 kg (212 lb 15.4 oz)  01/28/25 2151             97 kg (213 lb 13.5 oz)    Weight History / % Weight Change: stable wt x3 weeks. " Continues to be increased from start of admission.  Significant Weight Loss: No    Nutrition Focused Physical Exam Findings:  NFPE 02/03/25    Edema  Edema: none  Physical Findings   Skin: Positive (abdominal incision)      Last BM Date: 02/19/25    Nutrition Significant Labs:    Results from last 7 days   Lab Units 02/20/25  0238 02/19/25  0400   HEMOGLOBIN g/dL 8.5* 8.9*   MCV fL 87 86   GLUCOSE mg/dL 132* 163*   POTASSIUM mmol/L 4.7 4.7   SODIUM mmol/L 134* 133*   PHOSPHORUS mg/dL 2.8 2.9   MAGNESIUM mg/dL 2.12 2.04   CREATININE mg/dL 1.82* 1.84*   BUN mg/dL 42* 41*       Nutrition Specific Medications:  cholecalciferol, 1,000 Units, oral, Daily  levothyroxine, 25 mcg, oral, Daily    I/O:   I/O last 2 completed shifts:  In: 0 (0 mL/kg)   Out: 1850 (17.9 mL/kg) [Urine:1850 (0.7 mL/kg/hr)]  Weight: 103.2 kg     Dietary Orders (From admission, onward)       Start     Ordered    02/20/25 1540  Adult diet Regular; Easy to chew  Diet effective now        Question Answer Comment   Diet type Regular    Texture Easy to chew        02/20/25 1540    02/19/25 1350  Oral nutritional supplements  Until discontinued        Comments: Boost VHC TID, please deliver 3 vanilla boosts   Question Answer Comment   Deliver with All meals send when pt requests   Deliver with Breakfast    Deliver with Lunch    Deliver with Dinner    Select supplement: Boost [facility]        02/19/25 1349    01/29/25 0008  May Participate in Room Service  ( ROOM SERVICE MAY PARTICIPATE)  Once        Question:  .  Answer:  Yes    01/29/25 0007                     Estimated Needs:   Total Energy Estimated Needs in 24 hours (kCal):  (2150 kcal)  Method for Estimating Needs: MSJ 1792 x 1.2    Total Protein Estimated Needs in 24 Hours (g):  (90 g)  Method for Estimating 24 Hour Protein Needs: 1.2 g/kg IBW    Total Fluid Estimated Needs in 24 Hours (mL):  (Per med team)          Nutrition Diagnosis   Malnutrition Diagnosis  Patient has Malnutrition Diagnosis: No  (well nourished upon NFPE, no significant weight loss)    Nutrition Diagnosis  Patient has Nutrition Diagnosis: Yes  Diagnosis Status (1): Active  Nutrition Diagnosis 1: Altered GI function  Related to (1): altered GI structure  As Evidenced by (1): prolonged ileus, high NGT output, need for venting PEG and PN  Additional Nutrition Diagnosis: Diagnosis 2  Diagnosis Status (2): Resolved  Nutrition Diagnosis 2: Inadequate oral intake  Related to (2): Decreased appetite and NPO status  As Evidenced by (2): pt reports of not being able to eat anything during start of admission (x 6 days), PPN only meeting ~55% of kcal needs x7 days.  Additional Assessment Information (2): Now on TPN + PO diet meeting >75% of nutr needs.       Nutrition Interventions/Recommendations   Nutrition prescription for oral nutrition    Nutrition Recommendations:  Continue with current regimen of Boost VHC (530 kcal, 22g pro) TID.   Monitor BM and start/adjust bowel regimen as needed.         Nutrition Monitoring and Evaluation   Monitoring and Evaluation Plan: Estimated Energy Intake  Estimated Energy Intake: Energy intake greater or equal to 75% of estimated energy needs    Monitoring and Evaluation Plan: Body weight  Body Weight: Body weight - Maintain stable weight    Monitoring and Evaluation Plan: Electrolyte/renal panel, GI profile, Glucose/endocrine profile  Criteria: WNL         Some progress toward goal(s)         Time Spent (min): 30 minutes

## 2025-02-26 VITALS
TEMPERATURE: 97.7 F | WEIGHT: 227.51 LBS | SYSTOLIC BLOOD PRESSURE: 124 MMHG | DIASTOLIC BLOOD PRESSURE: 85 MMHG | HEART RATE: 95 BPM | BODY MASS INDEX: 31.85 KG/M2 | HEIGHT: 71 IN | RESPIRATION RATE: 18 BRPM | OXYGEN SATURATION: 97 %

## 2025-02-26 LAB
LAB AP BLOCK FOR ADDITIONAL STUDIES: NORMAL
LABORATORY COMMENT REPORT: NORMAL
PATH REPORT.ADDENDUM SPEC: NORMAL
PATH REPORT.FINAL DX SPEC: NORMAL
PATH REPORT.GROSS SPEC: NORMAL
PATH REPORT.RELEVANT HX SPEC: NORMAL
PATH REPORT.TOTAL CANCER: NORMAL
PATHOLOGY SYNOPTIC REPORT: NORMAL

## 2025-02-26 PROCEDURE — 2500000001 HC RX 250 WO HCPCS SELF ADMINISTERED DRUGS (ALT 637 FOR MEDICARE OP)

## 2025-02-26 RX ADMIN — LEVOTHYROXINE SODIUM 25 MCG: 25 TABLET ORAL at 06:31

## 2025-02-26 RX ADMIN — AMLODIPINE BESYLATE 10 MG: 10 TABLET ORAL at 08:46

## 2025-02-26 RX ADMIN — ASPIRIN 81 MG: 81 TABLET, COATED ORAL at 08:46

## 2025-02-26 RX ADMIN — Medication 1000 UNITS: at 08:46

## 2025-02-26 ASSESSMENT — COGNITIVE AND FUNCTIONAL STATUS - GENERAL
STANDING UP FROM CHAIR USING ARMS: A LITTLE
CLIMB 3 TO 5 STEPS WITH RAILING: A LITTLE
DRESSING REGULAR LOWER BODY CLOTHING: A LITTLE
MOBILITY SCORE: 21
HELP NEEDED FOR BATHING: A LITTLE
DRESSING REGULAR UPPER BODY CLOTHING: A LITTLE
WALKING IN HOSPITAL ROOM: A LITTLE
PERSONAL GROOMING: A LITTLE
TOILETING: A LITTLE
EATING MEALS: A LITTLE
DAILY ACTIVITIY SCORE: 18

## 2025-02-26 ASSESSMENT — PAIN - FUNCTIONAL ASSESSMENT: PAIN_FUNCTIONAL_ASSESSMENT: 0-10

## 2025-02-26 ASSESSMENT — PAIN SCALES - GENERAL: PAINLEVEL_OUTOF10: 0 - NO PAIN

## 2025-02-26 NOTE — CARE PLAN
The patient's goals for the shift include      The clinical goals for the shift include patient safety    Over the shift, the patient did not make progress toward the following goals. Barriers to progression include limited mobility. Recommendations to address these barriers include hourly rounding.

## 2025-02-26 NOTE — DISCHARGE SUMMARY
Discharge Diagnosis  Renal mass    Issues Requiring Follow-Up  Colorectal surgery follow-up for PEG tube   Urology follow up for postoperative recovery     Discharge Meds     Medication List      START taking these medications     acetaminophen 325 mg tablet; Commonly known as: Tylenol; Take 2 tablets   (650 mg) by mouth every 6 hours if needed for mild pain (1 - 3) or   moderate pain (4 - 6).   calcium carbonate 200 mg calcium chewable tablet; Commonly known as:   Tums; Chew 1 tablet (500 mg) 4 times a day as needed for indigestion or   heartburn.   zinc oxide 20 % ointment; Apply 1 Application topically every 1 hour if   needed for irritation.     CONTINUE taking these medications     amLODIPine 10 mg tablet; Commonly known as: Norvasc   aspirin 81 mg EC tablet   cholecalciferol 25 mcg (1000 units) tablet; Commonly known as: Vitamin   D-3   levothyroxine 25 mcg tablet; Commonly known as: Synthroid, Levoxyl   melatonin 5 mg tablet   tamsulosin 0.4 mg 24 hr capsule; Commonly known as: Flomax       Test Results Pending At Discharge  Pending Labs       Order Current Status    Extra Urine Gray Tube Collected (02/17/25 1916)    Urinalysis with Reflex Culture and Microscopic Collected (02/17/25 1916)    Urinalysis with Reflex Culture and Microscopic Collected (02/17/25 1916)            Hospital Course  Swapnil Allen is a 75 year old male with a significant pmh of HTN, 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 He underwent open right radical nephrectomy with RPLND with Dr. Perez on 1/28. 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 Post-op course was c/b mild leukocytosis and opacities on 2/7, 2/10 cxrays  possibly c/w aspiration pneumonia s/p 7 day course of zosyn without need for further treatment per ID as well as nausea/vomiting requiring NG placement 1/31. NGT output remained high with CT a/p 2/3 showing mesenteric swirling and findings compatible with partial SBO. CRS was consulted and recommended conservative management with NG/PPN. Patient had ROBF with improvement of small bowel distension on 2/8 xray, though failed NGT clamp trial thus venting PEG was placed by IR 2/10. NG was removed and he was started on TPN the same day. PEG was capped 2/13 without further episodes of nausea/vomiting, thus diet was slowly advanced. He was able to tolerate TID VHC boosts for one week, meeting calorie requirements per nutrition. TPN was discontinued 2/20 and he was started on a soft diet per CRS.  Patient was discharged home on 02/26/25. His vitals were stable, labs were normal, tolerating GI soft diet, and pain under control. PEG tube capped. Having bowel function. Patient was discharged with instructions to follow up in clinic with urology and colorectal surgery.      Pertinent Physical Exam At Time of Discharge  Physical Exam  General: resting comfortably in bed  Neuro: alert and oriented, no obvious focal deficit   Head/Neck: normocephalic, atraumatic  ENT: moist mucous membranes  CV: regular rate and rhythm  Pulm: nonlabored breathing on room air, no conversational dyspnea  Abd: soft, nondistended, nontender, surgical incision clean, dry, intact, PEG tube capped with intact skin site   MSK: SAMPSON, no gross deformities  Psych: mood and behavior normal    Outpatient Follow-Up  Future Appointments   Date Time Provider Department Center   3/4/2025  3:40 PM Chalino Tracey MD [id number] [place]   3/20/2025  9:40 AM Vaughn Perez MD [id number] [facility]         Alexys Hood MD

## 2025-02-26 NOTE — NURSING NOTE
Discharge Note:    Pt discharged to home successfully. Instructions provided and reviewed with patient. PICC line removed with catheter tip intact. All belongings returned to patient and questions answered. Awaiting transport to [facility].    Alessandra Bruner RN

## 2025-02-26 NOTE — PROGRESS NOTES
02/11/25 1300   Discharge Planning   Living Arrangements Alone   Support Systems Family members   Assistance Needed TPN ADLs   Type of Residence Private residence   Who is requesting discharge planning? Provider   Home or Post Acute Services In home services   Type of Home Care Services Home PT;Home nursing visits;DME or oxygen   Expected Discharge Disposition Home H   Does the patient need discharge transport arranged? No   Financial Resource Strain   How hard is it for you to pay for the very basics like food, housing, medical care, and heating? Not very   Housing Stability   In the last 12 months, was there a time when you were not able to pay the mortgage or rent on time? N   In the past 12 months, how many times have you moved where you were living? 0   At any time in the past 12 months, were you homeless or living in a shelter (including now)? N   Transportation Needs   In the past 12 months, has lack of transportation kept you from medical appointments or from getting medications? no   In the past 12 months, has lack of transportation kept you from meetings, work, or from getting things needed for daily living? No     TCC sent referral to [facility]. Per Infusion unable to accept due to staffing and TPN needs. TCC sent [facility] and a blanket referral 31 agencies for home care. Will continue to follow patient for discharge needs.  Aaliyah Bee RN TCC     Update @130pm- TCC sent 92 referrals for home health care. Pending accepting agency. Will continue to follow patient for discharge needs.  Aaliyah LICEA     2/12/25- [facility] has accepted patient for home health care. [facility] has accepted patient for home going TPN. ADOD Friday. TCC will continue to follow patient for discharge needs.  Aaliyah LICEA     2/14/25- Patient is scheduled for Start of Care with [facility] Monday 2/17/25 at 330pm. Care team is aware to aim  for a morning discharge. [facility] set to deliver home TPN at 3pm. Will continue to follow patient for discharge needs.  Aaliyah Bee RN TCC     2/26/25- Patient's insurance denied auth to SNF. Patient has chosen to go home with [facility] for PT. TCC will continue to follow patient for discharge needs.  Aaliyah Bee RN TCC

## 2025-02-26 NOTE — PROGRESS NOTES
02/11/25 1100   Discharge Planning   Living Arrangements Alone   Support Systems Family members   Assistance Needed TPN, ADLs   Type of Residence Private residence   Number of Stairs to Enter Residence 3  (brother's home: 3 steps at side door to enter)   Number of Stairs Within Residence 3  (brother's home, down to basement)   Do you have animals or pets at home? No   Home or Post Acute Services In home services   Type of Home Care Services Home OT;Home PT;Home nursing visits   Expected Discharge Disposition Home  (brother Jhony's home)   Does the patient need discharge transport arranged? No   Financial Resource Strain   How hard is it for you to pay for the very basics like food, housing, medical care, and heating? Not very   Housing Stability   In the last 12 months, was there a time when you were not able to pay the mortgage or rent on time? N   In the past 12 months, how many times have you moved where you were living? 0   At any time in the past 12 months, were you homeless or living in a shelter (including now)? N   Transportation Needs   In the past 12 months, has lack of transportation kept you from medical appointments or from getting medications? no   In the past 12 months, has lack of transportation kept you from meetings, work, or from getting things needed for daily living? No     SW met with pt to discuss discharge to SNF.  Pt reported that he had been in conversation with his family and that the plan is for him to discharge to his brother [place] [address] [id number] Peter's spouse Mayda is a retired RN.  SW met with care team to follow up.  When SW returned to pt room, he was on the phone with Jennifer Green confirmed that the plan was to discharge to his home.   Care team aware, pt has many teachable caregivers.  SW will follow. Rogers Juan [facility] LSW    02/17/2025 1256  Plan was for pt to discharge to sibling Peter's home with [facility].  Pt is on  TPN via PICC.  Pt is not medically ready due to a rising WBC.  STEPHANIE notified this afternoon that sibling Peter and family feel that pt should discharge to a SNF.  Pt has been rec'd SNF for PT/OT throughout his admission but has not been amenable.  STEPHANIE printed out a SNF choice list and met with pt.  Pt called his brother Peter and Peter confirmed that they were concerned with his current care need.  SW and bedside nurse spoke to Peter and pt.  SNF choice list emailed to Peter at [email or web address].  Further discharge planning pending updates from the care team.  SW will follow. Rogers Juan [facility]     02/19/2025 0850  STEPHANIE received a phone call from pt sister-in-law Dunia asking for an update on SNF search. STEPHANIE had previously met with Dunia and several family members bedside and they were provided a SNF list.  STEPHANIE let her know that SW was waiting for their SNF preferences as [facility] and [facility] could not accept.  Dunia asked about [facility] and [facility] AR and was told that pt does not qualify for either.   Dunia added new preferences [facility], [facility], and [facility].  [facility]  SW will follow. Rogers Juan [facility]    02/19/2025 0930  SNFs [facility] and [facility] cannot accept pt. [facility] SNF is reviewing.  STEPHANIE received a voicemail from Dunia asking about [facility].  STEPHANIE called Dunia back and let her know that pt is not skilled for AR.  STEPHANIE asked Dunia to choose additional preferences.  STEPHANIE will notify Dunia of [facility] SNFs response.  SW will follow. Rogers Juan [facility]    02/20/2025 0930  STEPHANIE reached out to following SNF [facility] re acceptance.  Pt is medically ready for discharge.  STEPHANIE will follow. Rogers Juan [facility]    02/20/2025 1445  SNF [facility].  Pt consented to a blanket SNF referral and he and his family can choose from the acceptances.  SNF choice list  filtered by TPN; referral sent to 25 facilities.  Care team updated.  SW will follow. Rogers Juan [facility]    02/21/2025 0900  Pt discussed during morning rounds.  Pt is no longer on TPN.  SNF stay is for PT/OT.  SW met with pt and he is amenable to SNF.  Original SNF preferences sent the update that pt is no longer on TPN.  SW will follow. Rogers Juan [facility]    02/21/2025 1230  Previously chosen preferences cannot accept due to no beds.  Updates sent to SNFs in the blanket referral.  SW had a voicemail from pt sibling Yeison asking for clarification on the blanket referral. SW called Yeison and left a voicemail explaining that the blanket referral is for the SNFs from the choice list given to Yeison's spouse Dunia. SW let Yeison know that if the family has additional preferences, they will be added to the referral.  SW will follow. Rogers Amy Drake [facility]    02/23/2025 0815  SW met with pt to discuss FOC.  Pt has been accepted to [facility], [facility], [facility], [facility], and [facility].   Pt indicated he wanted his family to make the decision; SW let him know that calls have been placed to family over several days with no response.  Pt called gloria Latham with Dunia also on the line.  SW explained that there are 5 accepting facilities and a decision is needed. Dunia disagrees that pt is medically ready and they will not be rushed with a SNF decision. Dunia reported that pt is not getting rehab while inpatient; STEPHANIE explained that PT/OT while inpatient is not daily and that is why discharge to SNF is so important.   SW took a photo of the list of accepting facilities and texted it to pt sibling Yeison from pt phone.  Pt will discharge with a decompressive PEG and will need PT/OT at SNF.  Care team updated.  SW will follow. Rogers Reyes Juan [facility]    02/23/2025 1335  Pt gloria Latham spoke with a member of the care team and got his questions  "answered.  FOC is [facility]; facility notified.   Pt will need updated PT and OT notes for precert; request made on whiteboard.  Care team updated.  SW will follow. Rogers Juan [facility]    02/24/2025 0800  Precert will be initiated by DSC once PT and OT notes are in.  SW will follow. Rogers Reyes Juan [facility]    02/25/2025 0800  SW requested that DSC initiate precert for pt to discharge to [facility].  Pt is medically ready for discharge.  SW will follow. Rogers Reyes [facility]    02/25/2025 1305  7000 in HENS pending completed goldenrod, care team aware.  SW will follow. Rogers Judde [facility]    02/25/2025 1700  Per DSC: \"Peer to peer is being offered Please call [phone] Call by 1 p.m. TOMORROW 2/26 REF #[id number] [place] ID: [id number]\"  Secure chat sent to members of the care team.  SW will follow. Rogers Branann [facility]    02/26/2025 0930  [facility] is awaiting details on the P2P.  They are tight on beds and cannot hold a bed beyond today  SW will follow. Rogers Reyes [facility]    02/26/2025 1000  Insurance auth denied for SNF; facility notified.  Care team putting in orders for HC.  SW will follow. Rogers Reyes [facility]  "

## 2025-02-26 NOTE — PROGRESS NOTES
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 Surgery was originally scheduled for 10/14, but was delayed to obtain additional imaging as the mass was non-enhancing on CT. 11/2 MRI showed the mass to be heterogeneously enhancing with features c/f neoplasm. There was an additional finding of mildly enlarged precaval/pericaval lymph nodes and a small enhancing focus along the lateral right pararenal fascia which was said to be indeterminate, but neoplastic deposit could not be ruled out. RMB 12/9 revealed path c/w renal cell carcinoma, favoring papillary type. He underwent open right radical nephrectomy with RPLND with Dr. Perez on 1/28. 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 Post-op course was c/b mild leukocytosis and opacities on 2/7, 2/10 cxrays possibly c/w aspiration pneumonia s/p 7 day course of zosyn without need for further treatment per ID as well as nausea/vomiting requiring NG placement 1/31. NGT output remained high with CT a/p 2/3 showing mesenteric swirling and findings compatible with partial SBO. CRS was consulted and recommended conservative management with NG/PPN. Patient had ROBF with improvement of small bowel distension on 2/8 xray, though failed NGT clamp trial thus venting PEG was placed by IR 2/10. NG was removed and he was started on TPN the same day. PEG was capped 2/13 without further episodes of nausea/vomiting, thus diet was slowly advanced. He was able to tolerate TID VHC boosts for one week, meeting calorie requirements per nutrition. TPN was discontinued 2/20 and he was started on a soft diet per CRS. PT  evaluated patient and recommended SNF placement due to deconditioning from his extended hospital stay, which he is now awaiting. Peer to Peer 2/26 denied SNF placement.     Subjective   Patient up to chair.   Leg swelling improved   States he is tolerating diet w/o n/v.   Passing gas, having Bms   Voiding at baseline.     Objective    Vital Signs:  Heart Rate:  []   Temp:  [36.4 °C (97.5 °F)-36.9 °C (98.4 °F)]   Resp:  [18]   BP: (108-164)/(68-78)   SpO2:  [96 %-99 %]     I&Os    Intake/Output Summary (Last 24 hours) at 2/26/2025 0951  Last data filed at 2/26/2025 0804  Gross per 24 hour   Intake 240 ml   Output 1575 ml   Net -1335 ml     Labs  Lab Results   Component Value Date    WBC 9.0 02/20/2025    HGB 8.5 (L) 02/20/2025    HCT 26.6 (L) 02/20/2025    MCV 87 02/20/2025     (L) 02/20/2025     Lab Results   Component Value Date    GLUCOSE 132 (H) 02/20/2025    CALCIUM 9.3 02/20/2025     (L) 02/20/2025    K 4.7 02/20/2025    CO2 20 (L) 02/20/2025     (H) 02/20/2025    BUN 42 (H) 02/20/2025    CREATININE 1.82 (H) 02/20/2025     Physical Exam:   Constitutional: Alert, in NAD  HEENT: Normocephalic, atraumatic  Neuro: A&O x3  CV: Regular rate, normotensive   Pulm: Non-laboured breathing, on room air   GI: Abdomen soft, non-distended, non-tender, midline incision s/d/I, PEG capped.   : Voiding spontaneously  Skin: Midline incision well healed. No lesions or discoloration noted.  Musculoskeletal: RODOLFO  Extremities: b/l LE edema to mid-shin     Current Medications:  amLODIPine, 10 mg, oral, Daily  aspirin, 81 mg, oral, Daily  cholecalciferol, 1,000 Units, oral, Daily  enoxaparin, 40 mg, subcutaneous, Daily  levothyroxine, 25 mcg, oral, Daily  tamsulosin, 0.4 mg, oral, Nightly      PRN medications: acetaminophen, alteplase, calcium carbonate, dextrose, melatonin, ondansetron, phenyleph-min oil-petrolatum, zinc oxide   Dietary Orders (From admission, onward)       Start     Ordered    02/20/25  1540  Adult diet Regular; Easy to chew  Diet effective now        Question Answer Comment   Diet type Regular    Texture Easy to chew        02/20/25 1540    02/19/25 1350  Oral nutritional supplements  Until discontinued        Comments: Boost VHC TID, please deliver 3 vanilla boosts   Question Answer Comment   Deliver with All meals send when pt requests   Deliver with Breakfast    Deliver with Lunch    Deliver with Dinner    Select supplement: Boost [facility]        02/19/25 1349    01/29/25 0008  May Participate in Room Service  ( ROOM SERVICE MAY PARTICIPATE)  Once        Question:  .  Answer:  Yes    01/29/25 0007                    Current Outpatient Medications   Medication Instructions    acetaminophen (TYLENOL) 650 mg, oral, Every 6 hours PRN    amLODIPine (Norvasc) 10 mg tablet 1 tablet, Daily    aspirin 81 mg, Daily    calcium carbonate (TUMS) 500 mg, oral, 4 times daily PRN    cholecalciferol (VITAMIN D-3) 1,000 Units, Daily    levothyroxine (SYNTHROID, LEVOXYL) 25 mcg, Daily    melatonin 5 mg, Nightly    tamsulosin (FLOMAX) 0.4 mg, Daily    zinc oxide 20 % ointment 1 Application, Topical, Every 1 hour PRN        Updates:  2/24: VSS. Lab holiday. Continues to tolerate diet/boosts w/o n/v, last BM yesterday. 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, on asa), anemia  #home meds: asa, amlodipine  - VS q8hr  - Continue home asa, amlodipine    Heme:   #hx of chronic anemia, stable [hgb 8.5<8.9 last checked 2/20; baseline ~10]  - No indication for transfusion at this time  - CBC only as clinically indicated    Resp:  - Aggressive pulmonary toilet and incentive spirometry 10x/hour  -  Aspiration precautions    GI/Diet:   #Post-op SBO, 2/6 CT a/p - c/w partial SBO in the region of mesenteric swirling  - Appreciate CRS recs:   - No bowel regimen aside from enemas   - Continue soft regular diet + TID VHC boost, d/c TPN  - If patient becomes symptomatic, please uncap and vent PEG tube and downgrade to FULL liquids   - Outpatient follow-up with Dr. Tracey scheduled for 3/4  - Appreciate nutrition recs:   - Boost VHC TID, if continues to tolerate no need for PN  - Daily soap suds enema   - PRN TUMS  -PRN preparation H     /Renal:   #hx of CKD, stable (sCr 1.82<1.84 last checked 2/20; baseline sCr ~1.4-1.5), BPH w/LUTS  - Continue home tamsulosin  - Strict I/Os  - BMP only as clinically indicated    Endocrine:   #hx of thyrotoxicosis s/p partial thyroidectomy  #home meds: levothyroxine  - Continue home levothyroxine  - D/c SSI/POC accuchecks as patient no longer on TPN    MSK:  - OOB most of the day  - Ambulate at least 3x per day, more if tolerated  - PT on board, rec SNF     ID:   #Possible aspiration pneumonia - leukocytosis to 15.2 2/10 with cxray - consolidative and reticular opacities c/w atelectasis on a background of aspiration pneumonia/pneumonitis  #Leukocytosis resolved 2/14  - Completed course of zosyn 2/7 - 2/14, no need for further abx per ID  - Monitor for signs/sx of infection    PPx: SCDs and lovenox    Dispo: RNF, P2P unsuccessful for SNF. Will re access discharge plan     Seen and discussed with chief resident Dr. La. Discussed with attending physician Dr. Perez.    ---  Tiffanie Xiao MD, RTA   Service Pager [phone]

## 2025-03-03 ENCOUNTER — SPECIALTY PHARMACY (OUTPATIENT)
Dept: PHARMACY | Facility: CLINIC | Age: 75
End: 2025-03-03

## 2025-03-03 NOTE — PROGRESS NOTES
I called this patient on  3/3/2025, to let him know that his SVR12 labwork showed that his HCV viral load was negative. He was reminded to follow up with Dr. Morris's office. He verbalized understanding. He didn't have any questions for the pharmacist.

## 2025-03-03 NOTE — PROGRESS NOTES
HISTORY OF PRESENTING ILLNESS: Swapnil Allen is a 75yoM s/p PEG placement for SBO.  He presents today for hospital discharge follow-up.    He was admitted to [facility] 1/28/25 and underwent open right radical nephrectomy with Dr. Perez.  Path revealed papillary renal cell carcinoma.  Post-op course was complicated by ileus vs PSBO and failure to progress.  He underwent PEG tube placement 2/10/25 in IR.  PEG was capped 2/13 and he was discharged home on soft diet 2/26/25.      Since discharge, he says he has been doing well. He denies nausea/vomiting/diarrhea/constipation. He denies fever/chills/shortness of breath/chest pain. He is having regular formed bowel movements. He says his appetite and diet are good. He has not needed to decompress his PEG tube.    Past Medical History:   Diagnosis Date    Anemia     BPH (benign prostatic hyperplasia)     CKD (chronic kidney disease)     CKD (chronic kidney disease)     Cocaine abuse in remission (Multi)     Colon polyp     Hepatitis C     Hypertension     managed with Amlodipine    Hypothyroidism     Internal carotid artery stenosis     8/2020--Mild to moderate stenosis of right supraclinoid ICA    Liver disease     Hep C treated with Epclusa 9/2024    Parotid mass     Renal cell carcinoma (Multi)     Stroke (Multi)     CVA -2020         Past Surgical History:   Procedure Laterality Date    COLONOSCOPY      NEPHRECTOMY Right 01/28/2025    Open right radical nephrectomy, retroperitoneal lymph node dissection    RENAL BIOPSY      THYROID SURGERY      partial thyroidectomy         Social History     Tobacco Use    Smoking status: Some Days     Types: Cigars     Passive exposure: Never    Smokeless tobacco: Never    Tobacco comments:     Quit cigarette smoking over 20 years. Occassional cigar   Vaping Use    Vaping status: Never Used   Substance Use Topics    Alcohol use: Not Currently     Comment: former drinker    Drug use: Not Currently     Types: Marijuana     Comment: formerly  smoked marijuana         Family History   Problem Relation Name Age of Onset    Hypertension Brother             Current Outpatient Medications:     acetaminophen (Tylenol) 325 mg tablet, Take 2 tablets (650 mg) by mouth every 6 hours if needed for mild pain (1 - 3) or moderate pain (4 - 6)., Disp: , Rfl:     amLODIPine (Norvasc) 10 mg tablet, Take 1 tablet (10 mg) by mouth once daily., Disp: , Rfl:     aspirin 81 mg EC tablet, Take 1 tablet (81 mg) by mouth once daily., Disp: , Rfl:     calcium carbonate (Tums) 200 mg calcium chewable tablet, Chew 1 tablet (500 mg) 4 times a day as needed for indigestion or heartburn., Disp: , Rfl:     cholecalciferol (Vitamin D-3) 25 MCG (1000 UT) tablet, Take 1 tablet (1,000 Units) by mouth once daily., Disp: , Rfl:     levothyroxine (Synthroid, Levoxyl) 25 mcg tablet, Take 1 tablet (25 mcg) by mouth once daily., Disp: , Rfl:     melatonin 5 mg tablet, Take 1 tablet (5 mg) by mouth once daily at bedtime., Disp: , Rfl:     tamsulosin (Flomax) 0.4 mg 24 hr capsule, Take 1 capsule (0.4 mg) by mouth once daily., Disp: , Rfl:     zinc oxide 20 % ointment, Apply 1 Application topically every 1 hour if needed for irritation., Disp: , Rfl:        No Known Allergies      REVIEW OF SYSTEMS:  Review of Systems   Constitutional:  Negative for chills and fever.   Respiratory:  Negative for shortness of breath.    Cardiovascular:  Negative for chest pain.   Gastrointestinal:  Negative for abdominal distention, abdominal pain, constipation, diarrhea, nausea and vomiting.   Genitourinary:  Negative for difficulty urinating.       Labs:       Imaging:  XR abdomen 1 view    Result Date: 2/11/2025  Interpreted By:  Heather Lopes, STUDY: XR ABDOMEN 1 VIEW; 2/11/2025 10:50 am   INDICATION: Signs/Symptoms:assess contrast progression.   COMPARISON: CT dated 02/06/2025 and radiograph dated 02/08/2025   ACCESSION NUMBER(S): [id number]   ORDERING CLINICIAN: FILIPE ROA   FINDINGS: Two views  of the abdomen and pelvis. G tube is projecting over stomach. Scattered surgical clips projecting over the right hemiabdomen.   Multiple dilated loops of small bowel in the left side of the abdomen, not significantly different from prior study. Nonobstructive bowel gas pattern.  Limited evaluation of pneumoperitoneum on supine imaging, however no gross evidence of free air is noted.   Atelectasis in lung bases.   Osseous structures demonstrate no acute bony changes.       1.  Unchanged appearance of a few dilated loops of small in the left side of the abdomen. Findings may be due to ileus with component of partial obstruction not excluded. Recommend follow-up radiograph.   Signed by: Heather Zheng 2/11/2025 11:44 PM Dictation workstation:   [id number]    CT insertion of gastrostomy tube percutaneous under fluoroscopic    Result Date: 2/11/2025  Interpreted By:  Emely Quiroga and Ohs Zachary STUDY: CT AND FLUOROSCOPY GUIDED GASTROSTOMY TUBE PLACEMENT   INDICATION: Signs/Symptoms:venting PEG tube s/p open nephrectomy complicated by prolonged ileus.   COMPARISON: CT abdomen/pelvis 02/06/2025.   ACCESSION NUMBER(S): [id number]   ORDERING CLINICIAN: FILIPE ROA   TECHNIQUE: INTERVENTIONALIST(S): Dr. Emely Quiroga. Dr. Jose Antonio Kramer.   CONSENT: The patient was informed of the nature of the proposed procedure. The purposes, alternatives, risks, and benefits were explained and discussed. All questions were answered and consent was obtained.   RADIATION EXPOSURE: Fluoroscopy time: 198 seconds. Dose: 150.7/197.9 mGy. DLP: 178.5 mGycm2.   SEDATION: Moderate conscious IV sedation services (supervision of administration, induction, and maintenance) were provided by the physician performing the procedure with intravenous fentanyl 100 mcg and versed 2 mg for 11 minutes. The physician was assisted by an independent trained observer, an interventional radiology nurse, in the continuous monitoring of patient level of  consciousness and physiologic status.   MEDICATION/CONTRAST: None. Patient already on antibiotics per primary team.   TIME OUT: A time out was performed immediately prior to procedure start with the interventional team, correctly identifying the patient name, date of birth, MRN, procedure, anatomy (including marking of site and side), patient position, procedure consent form, relevant laboratory and imaging test results, antibiotic administration, safety precautions, and procedure-specific equipment needs.   COMPLICATIONS: No immediate adverse events identified.   FINDINGS: Patient was placed supine on the fluoroscopic table. Using fluoroscopy guidance, a 6 Bolivian snare sheath was advanced via the oral cavity into the stomach. This was advanced over a Bentson wire.   Subsequently, the skin was prepped and draped in the regular sterile fashion, 1% lidocaine was utilized for local anesthesia. With intermittent CT guidance as well as fluoroscopy guidance, and after insufflation of the stomach with air, the stomach was access using 5 Bolivian Yueh catheter via anterior approach. The is a 6 Bolivian snare catheter, an EN snare wire was placed with subsequent snaring of the Yueh catheter. After removal of the Yueh stylet, a double wire was placed via the Yueh sheath. After snaring the double wire, the pull wire was pulled in retrograde fashion from the anterior abdominal wall approach into the oral cavity.   Subsequently a 20 Bolivian G-tube was connected to the double wire. The gastrostomy tube was pulled in retrograde fashion from the oral cavity into the access site within the anterior abdominal wall. The tube was secured with the bumper with subsequent placement of the clamp. The tube was cut to appropriate length.   Patient tolerated the procedure without immediate complication.       Successful CT and fluoroscopy guided placement of gastrostomy tube. The tube is ready for immediate use. The patient tolerated the procedure  well without immediate complications.   I personally reviewed the images/study and I agree with the findings as stated by Dr. Jose Antonio Kramer. This study was interpreted at [facility], [place], [place].   MACRO: None   Signed by: Emely Quiroga 2/11/2025 4:48 PM Dictation workstation:   [id number]    Bedside PICC Imaging    Result Date: 2/10/2025  These images are not reportable by radiology and will not be interpreted by  Radiologists.    XR chest 1 view    Result Date: 2/10/2025  Interpreted By:  Tyree Tabares and Elsamaloty Mazzin STUDY: XR CHEST 1 VIEW; ;  2/10/2025 9:09 am   INDICATION: Signs/Symptoms:pneumonia.     COMPARISON: XR CHEST 1 VIEW 2/7/2025, XR CHEST 1 VIEW 1/31/2025, XR CHEST 1 VIEW 1/30/2025   ACCESSION NUMBER(S): [id number]   ORDERING CLINICIAN: FILIPE ROA   TECHNIQUE: A single upright AP view of the chest was obtained.   FINDINGS: Enteric tube seen projecting over the chest with the tip terminating within the gastric body.   Cardiomediastinal silhouette appears mildly enlarged, but similar to prior.   Bibasilar consolidative and reticular opacities. Normal lung volumes. No large pneumothorax identified.   Visualized portions of the upper abdomen are unremarkable.   No acute osseous abnormality seen.       Consolidative and reticular opacities are most compatible with bilateral atelectatic changes superimposed on a background of aspiration pneumonia/pneumonitis.   I personally reviewed the images/study and I agree with the findings as stated by Reid Traylor MD (resident).   MACRO: None   Signed by: Tyree Tabares 2/10/2025 11:03 AM Dictation workstation:   [id number]    XR abdomen 1 view    Result Date: 2/9/2025  Interpreted By:  Tyree Tabares and Sheng Max STUDY: XR ABDOMEN 1 VIEW;  2/8/2025 11:31 pm   INDICATION: Signs/Symptoms:Confirm NG tube placement.     COMPARISON: Abdominal radiograph 02/07/2025   ACCESSION NUMBER(S): [id number]   ORDERING  CLINICIAN: FILIPE ROA   FINDINGS: Enteric tube courses below the left hemidiaphragm with its tip projecting over the gastric body/pylorus.   Numerous distended small bowel loops measuring up to 4.8 cm, slightly improved when compared to prior.   Stable bibasilar airspace opacities. No pleural effusion.   Osseous structures demonstrate no acute bony changes.       1. Enteric tube courses below the left hemidiaphragm with its tip projecting over the gastric body/pylorus, in appropriate positioning. 2. Numerous distended small bowel loops measuring up to 4.8 cm, slightly improved when compared to prior.     I personally reviewed the images/study and I agree with the findings as stated by Dr. Brett Phillips. This study was interpreted at [place], [place].   MACRO: None   Signed by: Tyree Tabares 2/9/2025 9:32 AM Dictation workstation:   [id number]    CT abdomen pelvis w IV contrast    Result Date: 2/8/2025  Interpreted By:  Dannie Dean,  and Marcio Roy STUDY: CT ABDOMEN PELVIS W IV CONTRAST;  2/6/2025 10:03 am   INDICATION: Signs/Symptoms:postop s/p nephrectomy on 01/28/2025 ileus.     COMPARISON: CT, 02/03/2025   ACCESSION NUMBER(S): [id number]   ORDERING CLINICIAN: FILIPE ROA   TECHNIQUE: CT of the abdomen and pelvis was performed.  Standard contiguous axial images were obtained at 3 mm slice thickness through the abdomen and pelvis. Coronal and sagittal reconstructions at 3 mm slice thickness were performed.  75 ML of Omnipaque 350 was administered intravenously without immediate complication.   FINDINGS: LOWER CHEST: The visualized lung base demonstrates slightly increased bilateral pleural effusions with associated atelectasis as well as ground-glass to consolidative opacities along the dependent portions of the lower lobes.   The heart is enlarged in size with trace pericardial effusion. Visualized distal esophagus demonstrates moderate-size hiatal  hernia with some fluid visualized in the distal esophagus/hiatal hernia.   ABDOMEN:   LIVER: The liver demonstrates homogeneous attenuation without evidence of focal liver lesions. The previously described perihepatic free intraperitoneal air is no longer visualized.   BILE DUCTS: The intrahepatic and extrahepatic ducts are not dilated.   GALLBLADDER: No calcified stones. No wall thickening.   PANCREAS: The pancreas appears unremarkable without evidence of ductal dilatation or masses.   SPLEEN: The spleen is normal in size without focal lesions. Splenule is again visualized (series 2, image 21).   ADRENAL GLANDS: Bilateral adrenal glands appear normal.   KIDNEYS AND URETERS: Postsurgical changes from prior right nephrectomy.   The left kidney again demonstrates multiple cysts measuring up to 4.7 x 4.5 cm, similar in size compared to multiple prior CTs dating back 09/12/2024 which previously measured 4.8 x 4.3 cm. No left calculi or hydronephrosis evident.   PELVIS:   BLADDER: The urinary bladder appears normal without abnormal wall thickening.   REPRODUCTIVE ORGANS: The prostate is enlarged measuring 4.3 cm, to be correlate with PSA nonemergent/outpatient basis.   BOWEL: The distal tip of the enteric tube terminates in the gastric antrum. Moderate-size hiatal hernia as described above. The stomach is slightly decompressed limiting evaluation.   There is again a focal area of narrowing along proximal to mid jejunal bowel loop (series 2, image 88) with slightly dilated and fluid-filled small bowel proximal and distal to this point. There is also again associated mesenteric vessel whirling without discrete focal obstruction evident. The bowel wall enhances normally without evidence of ischemia.   Contrast from prior small-bowel follow-through is seen throughout the entire small bowel. No contrast is seen along the large bowel.   The appendix appears normal.   VESSELS: The aorta and IVC appear normal.    PERITONEUM/RETROPERITONEUM/LYMPH NODES: There is again trace pelvic free fluid. There is also layering fluid along the right nephrectomy surgical bed (series 2, image 78). Slightly prominent perigastric lymph nodes, likely postsurgical in nature.   No significantly enlarged mesenteric lymph nodes.   BONES AND ABDOMINAL WALL: No suspicious osseous lesions are identified.  Slight midline abdominal fat thickening/stranding, likely postsurgical in nature.       1.  Findings compatible with partial small bowel obstruction in the region of mesenteric swirling. Normal enhancement of the bowel wall without evidence of ischemia. 2. Interval worsening of ground-glass to consolidative opacities in dependent portions of the lung, which may be related to aspiration and/or worsening infectious pneumonia. 3. Moderate-sized hiatal hernia with fluid visualized in the distal esophagus, which places the patient at risk for aspiration.   I personally reviewed the images/study and agree with the findings as stated by Kirill Buckley MD (Resident Physician). This study was interpreted at [facility], [place], [place].   MACRO: None   Signed by: Dannie Dean 2/8/2025 12:59 PM Dictation workstation:   [id number]    XR abdomen 1 view    Result Date: 2/7/2025  Interpreted By:  Tyree Tabares and Dulla Kireeti STUDY: XR ABDOMEN 1 VIEW;  2/7/2025 12:23 pm   INDICATION: Signs/Symptoms:s/p SBFT assessing contrast movement.     COMPARISON: Abdominal x-ray 02/06/2025   ACCESSION NUMBER(S): [id number]   ORDERING CLINICIAN: HENRY HENRIQUEZ   FINDINGS: AP view of the abdomen is obtained. 2 images total.   Enteric tube is seen with tip projecting over the gastric bubble. Multiple loops of dilated bowel are seen. Positive contrast is seen within the small bowel without evidence of contrast within colon. Limited evaluation of pneumoperitoneum on supine imaging, however no gross evidence of free air is noted.   Stable  bibasilar hazy opacities..   Osseous structures demonstrate no acute bony changes.       1.  Dilated loops of bowel without contrast in the colon, correlate for obstruction.   I personally reviewed the images/study and I agree with the findings as stated by Evelio Leroy MD, PGY-2 this study was interpreted at [place], [place].   MACRO: None   Signed by: Tyree Tabares 2/7/2025 4:39 PM Dictation workstation:   [id number]    XR chest 1 view    Result Date: 2/7/2025  Interpreted By:  Tyree Tabares and Dulla Kireeti STUDY: XR CHEST 1 VIEW;  2/7/2025 3:14 pm   INDICATION: Signs/Symptoms:Possible pneumonia.     COMPARISON: Chest x-ray from 01/31/2025   ACCESSION NUMBER(S): [id number]   ORDERING CLINICIAN: JOSEFA CALDWELL   FINDINGS: AP radiograph of the chest was provided.   Enteric tube is seen with tip terminating in the gastric body.   CARDIOMEDIASTINAL SILHOUETTE: Cardiomediastinal silhouette is stable in size and configuration.   LUNGS: Low lung volumes with bronchovascular crowding. There is no pulmonary edema, pneumothorax, or consolidation. Bibasilar opacities and retrocardiac opacities are present..   ABDOMEN: Moderate hiatal hernia.   BONES: No acute osseous changes.       1. Bibasilar and retrocardiac opacities likely represent atelectasis, however infection can not be excluded.   I personally reviewed the images/study and I agree with the findings as stated by Evelio Leroy MD, PGY-2 this study was interpreted at [place], [place].   MACRO: None   Signed by: Tyree Tabares 2/7/2025 4:30 PM Dictation workstation:   [id number]    FL small bowel series    Result Date: 2/6/2025  Interpreted By:  Dannie Dean and Liu Scott STUDY: FL SMALL BOWEL SERIES;  2/5/2025 3:33 pm   INDICATION: Signs/Symptoms:concern for small bowel obstruction.   COMPARISON: None.   ACCESSION NUMBER(S): [id number]   ORDERING CLINICIAN:  FILIPE ROA   TECHNIQUE: An initial radiograph of the abdomen and pelvis was obtained.  This was followed by  injection through the NG tube approximately   180 mL mL of contrast  Gastrografin. Multiple dynamic and static fluoroscopic images of the esophagus, stomach and duodenum were obtained. Delayed static images of the abdomen in the posteroanterior projection were obtained at 5, 10, 15, 30, 45, and 60 minute intervals. Total fluoroscopy time: 0.4 minutes.   FINDINGS: Initial  image demonstrates postsurgical changes of right nephrectomy in the sidewall of the NG tube projecting over the gastroesophageal junction. Multiple dilated small bowel loops are seen throughout the abdomen measuring up to 6 cm in diameter. Large amount of colonic stool burden is also noted.   Initial fluoro images demonstrate reflux of contrast into the esophagus. Subsequently the NG tube was advanced approximately 1 inch after discussion with the clinical team. The contrast subsequently is seen within the stomach and reaches the duodenum by 60 minutes. The patient was returned to the floor to be followed with serial KUBs.       1.  Multiple gas-filled dilated small bowel loops throughout the abdomen. Contrast is seen within the duodenum at 60 minutes with no opacification of the jejunal loops. Findings likely represent small-bowel obstruction. The patient was returned to the floor to be followed with serial KUBs.   I personally reviewed the images/study and I agree with the findings as stated by resident physician Gaston Kendall MD. This study was interpreted at [facility], [place], [place].   MACRO: None   Signed by: Dannie Dean 2/6/2025 7:00 PM Dictation workstation:   [id number]    XR abdomen 1 view    Result Date: 2/6/2025  Interpreted By:  Heather Lopes, STUDY: XR ABDOMEN 1 VIEW; 2/6/2025 8:20 am   INDICATION: Signs/Symptoms:Extend SBFT.   COMPARISON: 02/06/2025, 2:28 a.m.    ACCESSION NUMBER(S): [id number]   ORDERING CLINICIAN: FILIPE ROA   FINDINGS: Two views of the abdomen and pelvis.   Enteric tube is in place with the tip projecting over the stomach. Multiple dilated loops of small bowel in the abdomen. Positive contrast is identified in the proximal small bowel. No definite evidence of contrast in the colon. Limited evaluation of pneumoperitoneum on supine imaging, however no gross evidence of free air is noted.   Atelectasis/scarring/consolidation in lung bases.   Osseous structures demonstrate no acute bony changes.       1.  As described above.   Signed by: Heather Zheng 2/6/2025 9:05 AM Dictation workstation:   [id number]    XR abdomen 1 view    Result Date: 2/6/2025  Interpreted By:  Heather Lopes, STUDY: XR ABDOMEN 1 VIEW; 2/6/2025 2:41 am   INDICATION: Signs/Symptoms:Extend SBFT.   COMPARISON: 02/05/2025   ACCESSION NUMBER(S): [id number]   ORDERING CLINICIAN: FILIPE ROA   FINDINGS: Two views of the abdomen and pelvis.   Enteric tube is in place with the tip projecting over the stomach. Multiple dilated loops of small bowel in the abdomen. Positive contrast is identified in the proximal small bowel. No definite evidence of contrast in the colon. Limited evaluation of pneumoperitoneum on supine imaging, however no gross evidence of free air is noted.   Atelectasis/infiltrate in lung bases.   Osseous structures demonstrate no acute bony changes.       1.  As described above.   Signed by: Heather Zheng 2/6/2025 9:05 AM Dictation workstation:   [id number]    XR abdomen 1 view    Result Date: 2/6/2025  Interpreted By:  Heather Lopes, STUDY: XR ABDOMEN 1 VIEW; 2/5/2025 10:10 pm   INDICATION: Signs/Symptoms:extend SBFT.   COMPARISON: 02/05/2025   ACCESSION NUMBER(S): [id number]   ORDERING CLINICIAN: FILIPE ROA   FINDINGS: Two views of the abdomen and pelvis.   Enteric tube is in place with the tip projecting over the stomach.  Multiple dilated loops of small bowel in the abdomen. Positive contrast is identified in the proximal small bowel. No definite evidence of contrast in the colon. Limited evaluation of pneumoperitoneum on supine imaging, however no gross evidence of free air is noted.   Visualized lungs are clear.   Osseous structures demonstrate no acute bony changes.       1.  As described above   Signed by: Heather Zheng 2/6/2025 9:04 AM Dictation workstation:   [id number]    XR abdomen 1 view    Result Date: 2/6/2025  Interpreted By:  Heather Lopes,  and Stephania Blanco STUDY: XR ABDOMEN 1 VIEW; ;  2/5/2025 7:31 pm   INDICATION: Signs/Symptoms:NGT displaced, confirm replacement.   COMPARISON: Abdominal radiograph 02/05/2025 (6:16 p.m.)   ACCESSION NUMBER(S): [id number]   ORDERING CLINICIAN: FILIPE ROA   FINDINGS: Upright AP radiograph of the abdomen was provided.   Enteric tube courses below the level of the diaphragm with distal tip overlying expected location of the gastric fundus. Surgical clips overlie the right hemiabdomen.   A few dilated loops of bowel in the visualized portion of the upper abdomen. No large pneumoperitoneum.   Faint opacity in lung bases which is atelectatic.   No acute osseous abnormality is evident.       1. Enteric tube with distal tip overlying the expected location of the gastric fundus. 2. Dilated loops of bowel in the visualized portion of the upper abdomen with positive contrast identified in the proximal small bowel. No definite evidence of contrast in the colon.       I personally reviewed the images/study and I agree with the findings as stated by Francis Reilly MD (Radiology Resident). This study was interpreted at [place], [place].   MACRO: None   Signed by: Heather Zheng 2/6/2025 9:02 AM Dictation workstation:   [id number]    XR abdomen 1 view    Result Date: 2/6/2025  Interpreted By:   Heather Lopes, STUDY: XR ABDOMEN 1 VIEW; 2/5/2025 6:15 pm   INDICATION: Signs/Symptoms:Extend SBFT.   COMPARISON: Radiograph dated 02/04/2025   ACCESSION NUMBER(S): [id number]   ORDERING CLINICIAN: FILIPE ROA   FINDINGS: Two views of the abdomen and pelvis. Positive contrast is identified in the stomach, duodenum and proximal jejunum. Multiple dilated loops of small bowel measuring up to 5.4 cm, unchanged. No definite evidence of contrast in the colon. Limited evaluation of pneumoperitoneum on supine imaging, however no gross evidence of free air is noted.   Visualized lungs are clear.   Osseous structures demonstrate no acute bony changes.       1.  As described above.   Signed by: Heather Zheng 2/6/2025 9:00 AM Dictation workstation:   [id number]    XR abdomen 1 view    Result Date: 2/4/2025  Interpreted By:  Heather Lopes,  and Mariaa Fraser STUDY: XR ABDOMEN 1 VIEW;  2/4/2025 8:42 am   INDICATION: Signs/Symptoms:evaluate contrast transition.     COMPARISON: CT abdomen and pelvis from 02/03/2025   ACCESSION NUMBER(S): [id number]   ORDERING CLINICIAN: FILIPE ROA   FINDINGS: 2 AP images of the abdomen were obtained. Dilated loops of bowel are seen similar to prior CT abdomen. No contrast is seen within the bowel loops. Multiple surgical clips overlie the abdomen. Enteric tube with tip in the gastric body. Limited evaluation of pneumoperitoneum on supine imaging, however no gross evidence of free air is noted.   Partially visualized hazy opacities of the bilateral lung bases.   Osseous structures demonstrate no acute bony changes.       1.  Dilated loops of bowel are seen and correlate with concern for obstruction. No oral contrast is seen within the visualized bowel loops. 2. Partially visualized hazy opacity of the bilateral lung base, better seen on prior chest x-ray, correlate for infection or aspiration   I personally reviewed the images/study and I agree with the findings  as stated by Evelio Leroy MD, PGY-2 this study was interpreted at [facility], [place], [place].   MACRO: None   Signed by: Coronasandie Swaintarik Zheng 2/4/2025 10:19 AM Dictation workstation:   [id number]    CT abdomen and pelvis w oral contrast only    Result Date: 2/4/2025  Interpreted By:  Amando Snyder  and Marcio Roy STUDY: CT ABDOMEN AND PELVIS W ORAL CONTRAST ONLY;  2/3/2025 9:31 am   INDICATION: Signs/Symptoms:prolonged ileus s/p nephrectomy 1/28.     COMPARISON: CT, 09/12/2024   ACCESSION NUMBER(S): [id number]   ORDERING CLINICIAN: HENRY HENRIQUEZ   TECHNIQUE: CT of the abdomen and pelvis was performed. Contiguous axial images were obtained at 3 mm slice thickness through the abdomen and pelvis. Coronal and sagittal reconstructions at 3 mm slice thickness were performed.  No intravenous contrast was administered; positive oral contrast was given.   FINDINGS: Please note that the evaluation of vessels, lymph nodes and organs is limited without intravenous contrast.   LOWER CHEST: The visualized lower lung demonstrates bibasilar dependent airspace opacities. Right middle lobe 0.6 cm pleural-based nodule (series 4, image 10), similar in size compared to prior CT.   The heart is enlarged in size with trace pericardial effusion. Visualized distal esophagus demonstrates a small to moderate-sized hiatal hernia.   ABDOMEN:   LIVER: The liver is normal in size without evidence of focal liver lesions. Hepatic dome calcified granuloma. There is also free intraperitoneal air in the gastrohepatic space and north hepatis space, likely related to recent surgical procedure (series 2, image 17).   BILE DUCTS: The intrahepatic and extrahepatic ducts are not dilated.   GALLBLADDER: No calcified stones. No wall thickening.   PANCREAS: The pancreas appears unremarkable with similar prominence of pancreatic ducts.   SPLEEN: The spleen is normal in size without focal lesions. There is  re-demonstration of a 1.1 cm soft tissue nodular focus in the gastro omental region (series 2, image 26) in the left upper quadrant, too small to completely characterize, but likely to represent splenule.   ADRENAL GLANDS: Bilateral adrenal glands appear normal.   KIDNEYS AND URETERS: Interval right nephrectomy with minimal fat stranding and fascial prominence along the surgical bed. There are also multiple sutures/staples visualized along the surgical bed.   Multiple hypodense left renal lesions, measuring up to 4.5 x 4.0 cm along the superior pole previously characterized as cysts and better characterized on dedicated multiphase CT kidney on 09/12/2024. No hydronephrosis or calculi visualized.   PELVIS:   BLADDER: The urinary bladder is decompressed with a Awad catheter in place.   REPRODUCTIVE ORGANS: The prostate is not enlarged. 1.0 cm calcific focus (series 2, image 129) anterior to the seminal vesicles, likely representing calcified granuloma or calcified vas deferens and is without significant change compared to prior CT.   BOWEL:   Small hiatal hernia is noted. The contrast opacified stomach is otherwise unremarkable with the distal tip of enteric tube seen terminating in the gastric body.   Oral contrast opacification is visualized up to the level of the mid jejunum.   There are multiple air-fluid distended proximal small bowel loops, with a suggestion of a transition point on series 2, image 86, where a small bowel loop and a portion of the sigmoid colon are seen crossing posterior to the mesenteric axis, with associated mesenteric vessel whirl just distal to this point (series 2, image 95).     Distal to this small bowel loop narrowing, there are distended fluid-filled bowel loops visualized without and nodular definite transition point, however the more distal small bowel loops appear collapsed.  The appendix appears normal.   VESSELS: The aorta and IVC appear normal. There is also mild-to-moderate  calcified atherosclerosis of the abdominal aorta as mentioned.   PERITONEUM/RETROPERITONEUM/LYMPH NODES: There is trace free air visualized along the surgical bed. Otherwise, no ascites or sizable fluid collection.  There are multiple prominent gastrohepatic lymph nodes.   ABDOMINAL WALL: Postsurgical changes from prior nephrectomy with abdominal soft tissue fat stranding with subcutaneous foci of air.   BONES: Mild degenerative changes of the spine again visualized.       1.  There is moderate fluid distention of the proximal small bowel without progression of oral contrast to the distal small bowel loops with a likely transition point associated with swirling of the mesenteric vessels. There are dilated small bowel loops located distal to this possible transition point, and more distal small bowel loops collapsed/decompressed. Findings are concerning at least for partial small bowel obstruction, with suspicion of internal hernia given the appearance of the mesentery. Evaluation for potential ischemic changes is limited due to noncontrast enhanced CT. 2. Bilateral patchy consolidative to ground-glass opacities in the dependent portion of the long, concerning for infectious etiology with aspiration not excluded. 3. Remaining findings as described above.   I personally reviewed the images/study and agree with the findings as stated by Kirill Buckley MD (Resident Physician). This study was interpreted at [facility], [place], [place].   MACRO: Critical Finding:  See findings. Notification was initiated on 2/3/2025 at 10:51 am to Karen Katz by  Kirill Buckley.  (**-OCF-**) Instructions:   Signed by: Amando Allen 2/4/2025 12:11 AM Dictation workstation:   [id number]       Physical Exam:  Physical Exam  Constitutional:       General: He is not in acute distress.     Appearance: He is not ill-appearing or toxic-appearing.   Abdominal:      General: Bowel sounds are normal.  There is no distension.      Palpations: Abdomen is soft.      Tenderness: There is no abdominal tenderness.   Neurological:      Mental Status: He is alert.      Comments: Grossly neurologically oriented.   Psychiatric:         Mood and Affect: Mood normal.         Behavior: Behavior normal.         Thought Content: Thought content normal.         ASSESSMENT AND PLAN: Swapnil Allen is a 75 y.o. male s/p PEG placement 2/10/25 for SBO presenting today for hospital discharge follow-up. Since discharge he has been doing well on a full diet without needing to decompress his PEG tube. He can continue with a full diet as tolerated and will follow up in 2 weeks to have his PEG pulled.    75-year-old male status post nephrectomy complicated by postop partial obstruction requiring PEG tube placement and parenteral nutrition temporarily.  Currently the patient is tolerating a p.o. diet and is doing very well.  He does not need his PEG tube and I do not anticipate him needing it in the future.  I will plan to pull the PEG tube once he has had it in place for 6 weeks.  I have made an appointment for the patient to follow-up with us in another 2 weeks at which time we will have the PEG tube pulled.    MD KAYA Mantilla, MS-3  Claudia Le, RN   3/3/2025

## 2025-03-04 ENCOUNTER — OFFICE VISIT (OUTPATIENT)
Dept: SURGERY | Facility: CLINIC | Age: 75
End: 2025-03-04
Payer: MEDICARE

## 2025-03-04 VITALS
DIASTOLIC BLOOD PRESSURE: 83 MMHG | HEART RATE: 91 BPM | WEIGHT: 213 LBS | HEIGHT: 71 IN | SYSTOLIC BLOOD PRESSURE: 112 MMHG | TEMPERATURE: 98.1 F | BODY MASS INDEX: 29.82 KG/M2

## 2025-03-04 DIAGNOSIS — K56.609 SMALL BOWEL OBSTRUCTION (MULTI): Primary | ICD-10-CM

## 2025-03-04 PROCEDURE — 99211 OFF/OP EST MAY X REQ PHY/QHP: CPT | Performed by: COLON & RECTAL SURGERY

## 2025-03-04 PROCEDURE — 3079F DIAST BP 80-89 MM HG: CPT | Performed by: COLON & RECTAL SURGERY

## 2025-03-04 PROCEDURE — 3074F SYST BP LT 130 MM HG: CPT | Performed by: COLON & RECTAL SURGERY

## 2025-03-04 PROCEDURE — 1111F DSCHRG MED/CURRENT MED MERGE: CPT | Performed by: COLON & RECTAL SURGERY

## 2025-03-04 PROCEDURE — 1159F MED LIST DOCD IN RCRD: CPT | Performed by: COLON & RECTAL SURGERY

## 2025-03-04 ASSESSMENT — ENCOUNTER SYMPTOMS
DIFFICULTY URINATING: 0
NAUSEA: 0
DIARRHEA: 0
SHORTNESS OF BREATH: 0
CHILLS: 0
CONSTIPATION: 0
VOMITING: 0
ABDOMINAL DISTENTION: 0
ABDOMINAL PAIN: 0
FEVER: 0

## 2025-03-05 ENCOUNTER — TELEPHONE (OUTPATIENT)
Dept: SURGICAL ONCOLOGY | Facility: HOSPITAL | Age: 75
End: 2025-03-05

## 2025-03-20 ENCOUNTER — APPOINTMENT (OUTPATIENT)
Dept: UROLOGY | Facility: CLINIC | Age: 75
End: 2025-03-20
Payer: MEDICARE

## 2025-03-21 ENCOUNTER — OFFICE VISIT (OUTPATIENT)
Dept: SURGERY | Facility: CLINIC | Age: 75
End: 2025-03-21
Payer: MEDICARE

## 2025-03-21 VITALS
WEIGHT: 217 LBS | BODY MASS INDEX: 30.27 KG/M2 | DIASTOLIC BLOOD PRESSURE: 72 MMHG | HEART RATE: 96 BPM | SYSTOLIC BLOOD PRESSURE: 106 MMHG

## 2025-03-21 DIAGNOSIS — K56.609 SMALL BOWEL OBSTRUCTION (MULTI): Primary | ICD-10-CM

## 2025-03-21 PROCEDURE — 3074F SYST BP LT 130 MM HG: CPT | Performed by: NURSE PRACTITIONER

## 2025-03-21 PROCEDURE — 99211 OFF/OP EST MAY X REQ PHY/QHP: CPT | Performed by: NURSE PRACTITIONER

## 2025-03-21 PROCEDURE — 1159F MED LIST DOCD IN RCRD: CPT | Performed by: NURSE PRACTITIONER

## 2025-03-21 PROCEDURE — 1111F DSCHRG MED/CURRENT MED MERGE: CPT | Performed by: NURSE PRACTITIONER

## 2025-03-21 PROCEDURE — 3078F DIAST BP <80 MM HG: CPT | Performed by: NURSE PRACTITIONER

## 2025-03-21 NOTE — PROGRESS NOTES
History Of Present Illness  Swapnil Allen is a 75 y.o. male s/p PEG placement for SBO and is here to have his peg tube removed.    He was admitted to [facility] 1/28/25 and underwent open right radical nephrectomy with Dr. Perez.  Path revealed papillary renal cell carcinoma.  Post-op course was complicated by ileus vs PSBO and failure to progress.  He underwent PEG tube placement 2/10/25 in IR.  PEG was capped 2/13 and he was discharged home on soft diet 2/26/25.        He has been eating and drinking well with no n/v.  He has not had to use the peg tube for any decompression.  No c/o any abdominal pain.  No issues with his bowels.      Past Medical History  He has a past medical history of Anemia, BPH (benign prostatic hyperplasia), CKD (chronic kidney disease), CKD (chronic kidney disease), Cocaine abuse in remission (Multi), Colon polyp, Hepatitis C, Hypertension, Hypothyroidism, Internal carotid artery stenosis, Liver disease, Parotid mass, Renal cell carcinoma (Multi), and Stroke (Multi).    Surgical History  He has a past surgical history that includes Thyroid surgery; Colonoscopy; Renal biopsy; and Nephrectomy (Right, 01/28/2025).     Social History  He reports that he has been smoking cigars. He has never been exposed to tobacco smoke. He has never used smokeless tobacco. He reports that he does not currently use alcohol. He reports that he does not currently use drugs after having used the following drugs: Marijuana.    Family History  Family History   Problem Relation Name Age of Onset    Hypertension Brother          Allergies  Patient has no known allergies.    Review of Systems   All other systems reviewed and are negative.       Physical Exam  Vitals reviewed. Exam conducted with a chaperone present.   HENT:      Head: Normocephalic.   Cardiovascular:      Rate and Rhythm: Normal rate and regular rhythm.   Pulmonary:      Effort: Pulmonary effort is normal.      Breath sounds: Normal breath sounds.    Abdominal:      General: Abdomen is flat. Bowel sounds are normal.      Palpations: Abdomen is soft.      Comments: His peg tube was removed today with ease   Genitourinary:     Rectum: Normal.   Musculoskeletal:         General: Normal range of motion.   Skin:     General: Skin is warm and dry.   Neurological:      General: No focal deficit present.   Psychiatric:         Mood and Affect: Mood normal.         Behavior: Behavior normal.         Thought Content: Thought content normal.         Judgment: Judgment normal.             Assessment/Plan   Swapnil tolerated the removal of the peg tube today.  He will continue to increase his activity level.  He will continue resuming a regular diet.  He will call with any issues or if he starts to develop n/v again.       Elyssa Harris, APRN-CNP

## 2025-03-24 DIAGNOSIS — C64.1 RENAL CELL CARCINOMA OF RIGHT KIDNEY: ICD-10-CM

## 2025-04-09 ENCOUNTER — HOSPITAL ENCOUNTER (OUTPATIENT)
Dept: RADIOLOGY | Facility: HOSPITAL | Age: 75
Discharge: HOME | End: 2025-04-09
Payer: MEDICARE

## 2025-04-09 DIAGNOSIS — C64.1 RENAL CELL CARCINOMA OF RIGHT KIDNEY: ICD-10-CM

## 2025-04-09 PROCEDURE — 71250 CT THORAX DX C-: CPT

## 2025-04-10 ENCOUNTER — APPOINTMENT (OUTPATIENT)
Dept: UROLOGY | Facility: CLINIC | Age: 75
End: 2025-04-10
Payer: MEDICARE

## 2025-04-10 DIAGNOSIS — N28.89 RENAL MASS: ICD-10-CM

## 2025-04-10 DIAGNOSIS — C64.1 RENAL CELL CARCINOMA OF RIGHT KIDNEY: Primary | ICD-10-CM

## 2025-04-10 PROCEDURE — 99213 OFFICE O/P EST LOW 20 MIN: CPT | Performed by: STUDENT IN AN ORGANIZED HEALTH CARE EDUCATION/TRAINING PROGRAM

## 2025-04-10 NOTE — PROGRESS NOTES
HPI:  Proc (1/28/25): right radical nephrectomy   Path: renal cell carcinoma, papillary type, pN0     Swapnil Allen is a 75 y.o. male referred by Dr. Ramon Morris for right renal mass. Hx of chronic hepatitis C infection, CVA, HTN, HLD. CT Kidney & chest (9/12/24) showed large right renal heterogeneous mixed density mass with punctate calcifications, which demonstrates no enhancement on early arterial or portal venous phase to suggest underlying malignancy, adjacent masslike appearance in the right renal upper pole likely represents normal renal parenchyma. S/p right radical nephrectomy () with pathology showing renal cell carcinoma, papillary type, pN0. CT CAP (4/9/25): s/p right nephrectomy without abnormal nodularity about the surgical bed to suggest locoregional disease recurrence, no evidence of metastatic disease in the chest abdomen or pelvis, improved aeration of the bilateral lower lobes as well as resolved patchy ground-glass and nodular opacities, a small residual 4 mm nodule within the left lower lobe. Reports normal eating, drinking, and bowel movements. Denies nausea and vomiting.    Smoker   No FHx of kidney cancer.    Cre: 1.82 (2/20/25), 1.32 (9/9/24)    CT Kidney & chest (9/12/24): large right renal heterogeneous mixed density mass with punctate calcifications, which demonstrates no enhancement on early arterial or portal venous phase to suggest underlying malignancy, adjacent masslike appearance in the right renal upper pole likely represents normal renal parenchyma.     CT CAP (4/9/25): s/p right nephrectomy without abnormal nodularity about the surgical bed to suggest locoregional disease recurrence, no evidence of metastatic disease in the chest abdomen or pelvis, improved aeration of the bilateral lower lobes as well as resolved patchy ground-glass and nodular opacities, a small residual 4 mm nodule within the left lower lobe    Review of Systems:  All systems reviewed. Anything negative noted  in the HPI.    Physical Exam:  Virtual visit.     Procedures:    Assessment/Plan   Swapnil Allen is a 75 y.o. male referred by Dr. Ramon Morris for right renal mass. Hx of chronic hepatitis C infection, CVA, HTN, HLD. CT Kidney & chest (9/12/24) showed large right renal heterogeneous mixed density mass with punctate calcifications, which demonstrates no enhancement on early arterial or portal venous phase to suggest underlying malignancy, adjacent masslike appearance in the right renal upper pole likely represents normal renal parenchyma. S/p right radical nephrectomy () with pathology showing renal cell carcinoma, papillary type, pN0. CT CAP (4/9/25): s/p right nephrectomy without abnormal nodularity about the surgical bed to suggest locoregional disease recurrence, no evidence of metastatic disease in the chest abdomen or pelvis, improved aeration of the bilateral lower lobes as well as resolved patchy ground-glass and nodular opacities, a small residual 4 mm nodule within the left lower lobe. Reports normal eating, drinking, and bowel movements. Denies nausea and vomiting. Management options including risks, benefits and alternatives discussed at length and all questions answered. Patient prefers to proceed with follow-up in 6mos with BMP, CT chest, and MRI abdomen.     Will refer to nephrology.         Scribe Attestation:  By signing my name below, Gloria DANIELS Scribe   attest that this documentation has been prepared under the direction and in the presence of Vaughn Perez MD.

## 2025-10-16 ENCOUNTER — APPOINTMENT (OUTPATIENT)
Dept: UROLOGY | Facility: CLINIC | Age: 75
End: 2025-10-16
Payer: MEDICARE

## (undated) DEVICE — STAPLER, ENDO ECHELON 45MM RELOAD, WHITE, REUSABLE

## (undated) DEVICE — Device

## (undated) DEVICE — DRAPE, SHEET, LAPAROTOMY, W/ISO-BAC, W/ARMBOARD COVERS, 98 X 122 IN, DISPOSABLE, LF, STERILE

## (undated) DEVICE — CLIPPER, SURGICAL BLADE ASSEMBLY, GENERAL PURPOSE, SINGLE USE

## (undated) DEVICE — RETRACTOR, GLASSMAN, VISCERA, FISH, SMALL, STERILE

## (undated) DEVICE — CLIP, LIGATING, HORIZON, MEDIUM, TITANIUM

## (undated) DEVICE — COVER, CART, 45 X 27 X 48 IN, CLEAR

## (undated) DEVICE — SUTURE, SILK, 3-0, 30 IN, MULTIPACK, BLACK

## (undated) DEVICE — SUTURE, PDSII, 1, TP-1, VIL, MONO, 48LP

## (undated) DEVICE — DRAPE, SHEET, UTILITY, NON ABSORBENT, 18 X 26 IN, LF

## (undated) DEVICE — SUTURE, PDS, 0, 18 IN, LIGATING LOOP, VIOLET

## (undated) DEVICE — TOWELS 4-PK

## (undated) DEVICE — CATHETER, URETHRAL, ROBNEL, 12 FR, 16 IN, LF, RED

## (undated) DEVICE — STAPLER, SKIN PROXIMATE, 35 WIDE

## (undated) DEVICE — SOLUTION, IRRIGATION, SODIUM CHLORIDE 0.9%, 1000 ML, POUR BOTTLE

## (undated) DEVICE — SUTURE, SILK, 0, 24 IN, SUTUPAK, BLACK

## (undated) DEVICE — PILLOW, POLYFILL, NYLEX, 21 X 27 IN

## (undated) DEVICE — CATHETER TRAY, SURESTEP, 16FR, URINE METER W/STATLOCK

## (undated) DEVICE — SUTURE, SILK, 2-0, TIES, 12-30 IN, BLACK

## (undated) DEVICE — APPLICATOR, CHLORAPREP, W/ORANGE TINT, 26ML

## (undated) DEVICE — TOWEL, SURGICAL, NEURO, O/R, 16 X 26, BLUE, STERILE

## (undated) DEVICE — LOOP, VESSEL, MAXI, BLUE

## (undated) DEVICE — TISSEEL FIBRIN SEALANT, PRIMA, FROZEN, 4ML

## (undated) DEVICE — TIP, SUCTION, YANKAUER, FLEXIBLE

## (undated) DEVICE — CLIP, LIGATING, HORIZON, LARGE, TITANIUM

## (undated) DEVICE — DRESSING, ADHESIVE, ISLAND, TELFA, 2 X 3.75 IN, LF

## (undated) DEVICE — MANIFOLD, 4 PORT NEPTUNE STANDARD

## (undated) DEVICE — SUTURE, MONOCRYL PLUS, 4-0, PS-2 UD 27IN

## (undated) DEVICE — SUTURE, VICRYL 2-0, TAPER POINT, RB-1 VIOLET 27 INCH

## (undated) DEVICE — SUTURE, VICRYL, 1, 27 IN, TP-1, VIOLET

## (undated) DEVICE — SEALER, BIPOLAR, AQUA MANTYS 6.0

## (undated) DEVICE — TOWEL, OR, XRAY DETECT 5 PK, WHITE, 17X26, W/DMT TAG, ST

## (undated) DEVICE — DRESSING, NON-ADHERENT, TELFA, OUCHLESS, 3 X 8 IN, STERILE

## (undated) DEVICE — SET, SPRAY, TISSEAL EASYSPRAY

## (undated) DEVICE — LIGASURE IMPACT, 18CM

## (undated) DEVICE — HOLSTER, JET SAFETY

## (undated) DEVICE — DRAPE, MAGENTIC INSTRUMENT, 12X16

## (undated) DEVICE — LIGASURE, SEALER/DIVIDER MARYLAND JAW, 5MM

## (undated) DEVICE — SLEEVE, SUCTION, E-SEP, 125MM

## (undated) DEVICE — GOWN, ASTOUND, L

## (undated) DEVICE — SPONGE, LAP, XRAY DECT, 18IN X 18IN, W/LOOP, STERILE

## (undated) DEVICE — DRESSING, ADHESIVE, ISLAND, TELFA, 4 X 10 IN

## (undated) DEVICE — SUTURE, PROLENE, 4-0, 36 IN, RB1, DA, BLUE

## (undated) DEVICE — ADHESIVE, SKIN, DERMABOND ADVANCED, 15CM, PEN-STYLE

## (undated) DEVICE — SPONGE GAUZE, XRAY SC+RFID, 4X4 16 PLY, STERILE

## (undated) DEVICE — SPONGE, GAUZE, XRAY DECT, 16 PLY, 4 X 4, W/MASTER DMT,STERILE

## (undated) DEVICE — STAPLER, ECHELON 3000, 45MM STD

## (undated) DEVICE — TOWEL, OR XRAY, RFID DETECT, WHITE, 17X26, STERILE